# Patient Record
Sex: FEMALE | Race: WHITE | NOT HISPANIC OR LATINO | Employment: OTHER | URBAN - METROPOLITAN AREA
[De-identification: names, ages, dates, MRNs, and addresses within clinical notes are randomized per-mention and may not be internally consistent; named-entity substitution may affect disease eponyms.]

---

## 2017-05-10 ENCOUNTER — TELEPHONE (OUTPATIENT)
Dept: FAMILY MEDICINE | Facility: CLINIC | Age: 67
End: 2017-05-10

## 2017-05-10 NOTE — TELEPHONE ENCOUNTER
Call returned to pt.  Pt stated that she could not make an appt on 6/14/2017 due to jury duty.  Pt was informed that nurse will give her a call back once she has an available date and time to offer per Dr. Covarrubias.  Pt verbalized understanding.

## 2017-05-10 NOTE — TELEPHONE ENCOUNTER
----- Message from Christina Del Valle sent at 5/10/2017  3:12 PM CDT -----  Contact: Self [phone]  Patient Returning Your Phone Call

## 2017-05-11 NOTE — TELEPHONE ENCOUNTER
----- Message from Allison Owusu sent at 5/11/2017  1:21 PM CDT -----  Contact: [phone]/ self   Patient called in returning your call. Please advise.

## 2017-05-11 NOTE — TELEPHONE ENCOUNTER
----- Message from Allison Owusu sent at 5/11/2017  1:21 PM CDT -----  Contact: [phone]/ self   Patient called in returning your call. Please advise.

## 2017-05-18 ENCOUNTER — OFFICE VISIT (OUTPATIENT)
Dept: FAMILY MEDICINE | Facility: CLINIC | Age: 67
End: 2017-05-18
Payer: MEDICARE

## 2017-05-18 VITALS
DIASTOLIC BLOOD PRESSURE: 76 MMHG | HEART RATE: 77 BPM | WEIGHT: 130.31 LBS | HEIGHT: 63 IN | BODY MASS INDEX: 23.09 KG/M2 | SYSTOLIC BLOOD PRESSURE: 108 MMHG | OXYGEN SATURATION: 97 %

## 2017-05-18 DIAGNOSIS — R73.03 PREDIABETES: ICD-10-CM

## 2017-05-18 DIAGNOSIS — E06.3 ACQUIRED AUTOIMMUNE HYPOTHYROIDISM: ICD-10-CM

## 2017-05-18 DIAGNOSIS — I10 ESSENTIAL HYPERTENSION: ICD-10-CM

## 2017-05-18 DIAGNOSIS — F32.A MILD DEPRESSION: ICD-10-CM

## 2017-05-18 DIAGNOSIS — M47.812 FACET ARTHRITIS OF CERVICAL REGION: ICD-10-CM

## 2017-05-18 DIAGNOSIS — M85.80 OSTEOPENIA, UNSPECIFIED LOCATION: ICD-10-CM

## 2017-05-18 DIAGNOSIS — K21.9 GASTROESOPHAGEAL REFLUX DISEASE, ESOPHAGITIS PRESENCE NOT SPECIFIED: ICD-10-CM

## 2017-05-18 DIAGNOSIS — E78.5 HYPERLIPIDEMIA, UNSPECIFIED HYPERLIPIDEMIA TYPE: ICD-10-CM

## 2017-05-18 DIAGNOSIS — N60.19 FIBROCYSTIC BREAST DISEASE, UNSPECIFIED LATERALITY: ICD-10-CM

## 2017-05-18 DIAGNOSIS — Z00.00 ENCOUNTER FOR PREVENTIVE HEALTH EXAMINATION: Primary | ICD-10-CM

## 2017-05-18 PROBLEM — E03.9 HYPOTHYROIDISM: Status: ACTIVE | Noted: 2017-05-18

## 2017-05-18 PROCEDURE — 3078F DIAST BP <80 MM HG: CPT | Mod: S$GLB,,, | Performed by: NURSE PRACTITIONER

## 2017-05-18 PROCEDURE — 3074F SYST BP LT 130 MM HG: CPT | Mod: S$GLB,,, | Performed by: NURSE PRACTITIONER

## 2017-05-18 PROCEDURE — 99999 PR PBB SHADOW E&M-EST. PATIENT-LVL IV: CPT | Mod: PBBFAC,,, | Performed by: NURSE PRACTITIONER

## 2017-05-18 PROCEDURE — G0439 PPPS, SUBSEQ VISIT: HCPCS | Mod: S$GLB,,, | Performed by: NURSE PRACTITIONER

## 2017-05-18 RX ORDER — IBUPROFEN 100 MG/5ML
1000 SUSPENSION, ORAL (FINAL DOSE FORM) ORAL DAILY
COMMUNITY

## 2017-05-18 RX ORDER — BIOTIN 1 MG
1000 TABLET ORAL DAILY
COMMUNITY
End: 2019-03-21

## 2017-05-18 RX ORDER — HYDROCHLOROTHIAZIDE 12.5 MG/1
12.5 TABLET ORAL EVERY MORNING
Qty: 90 TABLET | Refills: 0 | Status: SHIPPED | OUTPATIENT
Start: 2017-05-18 | End: 2017-07-12 | Stop reason: SDUPTHER

## 2017-05-18 RX ORDER — ACETAMINOPHEN 500 MG
5000 TABLET ORAL DAILY
COMMUNITY

## 2017-05-18 NOTE — MR AVS SNAPSHOT
[facility]  [address]  [place] [address]  Phone: [phone]  Fax: [phone]                  Bernadette Aquino   2017 10:00 AM   Office Visit    Description:  Female : 1950   Provider:  Roseann Parks NP   Department:  [facility]           Reason for Visit     Health Risk Assessment           Diagnoses this Visit        Comments    Encounter for preventive health examination    -  Primary     Essential hypertension         Hyperlipidemia, unspecified hyperlipidemia type         Hypothyroidism, unspecified type         Prediabetes                To Do List           Future Appointments        Provider Department Dept Phone    2017 8:00 AM LAB, [facility] [phone]    2017 8:00 AM James Covarrubias MD [facility] [phone]      Goals (5 Years of Data)     None      Follow-Up and Disposition     Return in 5 weeks (on 2017) for to establish care with PCP, HRA visit in 1 year.       These Medications        Disp Refills Start End    hydrochlorothiazide (HYDRODIURIL) 12.5 MG Tab 90 tablet 0 2017     Take 1 tablet (12.5 mg total) by mouth every morning. - Oral    Pharmacy: [facility] - [address] Ph #: [phone]         [facility]     [facility] - 24 Assistance  Unless otherwise directed by your provider, please contact [facility], our nurse care line that is available for  assistance.     Registered nurses in the [facility] provide: appointment scheduling, clinical advisement, health education, and other advisory services.  Call: [phone] (toll free)               Medications           Message regarding Medications     Verify the changes and/or additions to your medication regime listed below are the same as discussed with your clinician today.  If any of these changes or  additions are incorrect, please notify your healthcare provider.        CHANGE how you are taking these medications     Start Taking Instead of    hydrochlorothiazide (HYDRODIURIL) 12.5 MG Tab hydrochlorothiazide (HYDRODIURIL) 12.5 MG Tab    Dosage:  Take 1 tablet (12.5 mg total) by mouth every morning. Dosage:  Take 12.5 mg by mouth every morning.    Reason for Change:  Reorder       STOP taking these medications     aspirin 81 mg Tab Take 81 mg by mouth.  Tablet Oral Every day    promethazine (PHENERGAN) 25 MG tablet Take 1 tablet (25 mg total) by mouth every 6 (six) hours as needed for Nausea.    tramadol (ULTRAM) 50 mg tablet Take 1 tablet (50 mg total) by mouth 2 (two) times daily as needed for Pain.    valacyclovir (VALTREX) 1000 MG tablet            Verify that the below list of medications is an accurate representation of the medications you are currently taking.  If none reported, the list may be blank. If incorrect, please contact your healthcare provider. Carry this list with you in case of emergency.           Current Medications     ascorbic acid, vitamin C, (VITAMIN C) 1000 MG tablet Take 1,000 mg by mouth once daily.    aspirin (ECOTRIN) 81 MG EC tablet Take 81 mg by mouth once daily.     biotin 1 mg tablet Take 1,000 mcg by mouth once daily.    CALCIUM CARBONATE/VITAMIN D3 (CALTRATE 600 + D ORAL) Take by mouth. 1 Tablet Oral Twice a day    cholecalciferol, vitamin D3, (VITAMIN D3) 5,000 unit Tab Take 5,000 Units by mouth once daily.    fish oil-omega-3 fatty acids 300-1,000 mg capsule Take 2 g by mouth once daily.    hydrochlorothiazide (HYDRODIURIL) 12.5 MG Tab Take 1 tablet (12.5 mg total) by mouth every morning.    Lactobacillus rhamnosus GG (CULTURELLE) 10 billion cell capsule Take 1 capsule by mouth once daily.    multivitamin with minerals tablet Take 1 tablet by mouth once daily.    omeprazole (PRILOSEC) 20 MG capsule Take 1 capsule (20 mg total) by mouth 2 (two) times daily before meals.     "sertraline (ZOLOFT) 100 MG tablet Take 1 tablet (100 mg total) by mouth once daily.    simvastatin (ZOCOR) 40 MG tablet Take 40 mg by mouth nightly.    SYNTHROID 25 mcg tablet Take 1-2 tablets (25-50 mcg total) by mouth before breakfast.    TURMERIC-TURMERIC ROOT EXTRACT ORAL Take 1 capsule by mouth once daily.    zoledronic acid-mannitol & water (RECLAST) 5 mg/100 mL Soln Inject 5 mg into the vein once.           Clinical Reference Information           Your Vitals Were     BP Pulse Height Weight SpO2 BMI    108/76 77 5' 3" (1.6 m) 59.1 kg (130 lb 4.7 oz) 97% 23.08 kg/m2      Blood Pressure          Most Recent Value    BP  108/76      Allergies as of 5/18/2017     No Known Drug Allergies      Immunizations Administered on Date of Encounter - 5/18/2017     None      Orders Placed During Today's Visit     Future Labs/Procedures Expected by Expires    CBC auto differential  5/18/2017 7/17/2018    Comprehensive metabolic panel  5/18/2017 7/17/2018    Hemoglobin A1c  5/18/2017 7/17/2018    Lipid panel  5/18/2017 7/17/2018    T4, free  5/18/2017 7/17/2018    TSH  5/18/2017 7/17/2018      Instructions      Counseling and Referral of Other Preventative  (Italic type indicates deductible and co-insurance are waived)    Patient Name: Bernadette Aquino  Today's Date: 5/18/2017      SERVICE LIMITATIONS RECOMMENDATION    Vaccines    · Pneumococcal (once after 65)    · Influenza (annually)    · Hepatitis B (if medium/high risk)    · Prevnar 13      Hepatitis B medium/high risk factors:       - End-stage renal disease       - Hemophiliacs who received Factor VII or         IX concentrates       - Clients of institutions for the mentally             retarded       - Persons who live in the same house as          a HepB carrier       - Homosexual men       - Illicit injectable drug abusers     Pneumococcal: N/A     Influenza: Done, repeat in one year     Hepatitis B: Done, no repeat necessary     Prevnar 13: N/A    Mammogram (biennial " age 50-74)  Annually (age 40 or over)  Done this year, repeat every year    Pap (up to age 70 and after 70 if unknown history or abnormal study last 10 years)    N/A     The USPSTF recommends against screening for cervical cancer in women older than age 65 years who have had adequate prior screening and are not otherwise at high risk for cervical cancer.      Colorectal cancer screening (to age 75)    · Fecal occult blood test (annual)  · Flexible sigmoidoscopy (5y)  · Screening colonoscopy (10y)  · Barium enema   Last done 10/06/16, recommend to repeat every 10  years    Diabetes self-management training (no USPSTF recommendations)  Requires referral by treating physician for patient with diabetes or renal disease. 10 hours of initial DSMT sessions of no less than 30 minutes each in a continuous 12-month period. 2 hours of follow-up DSMT in subsequent years.  N/A    Bone mass measurements (age 65 & older, biennial)  Requires diagnosis related to osteoporosis or estrogen deficiency. Biennial benefit unless patient has history of long-term glucocorticoid  Last done 10/19/16, recommend to repeat every 3  years    Glaucoma screening (no USPSTF recommendation)  Diabetes mellitus, family history   , age 50 or over    American, age 65 or over  Done this year, repeat every year    Medical nutrition therapy for diabetes or renal disease (no recommended schedule)  Requires referral by treating physician for patient with diabetes or renal disease or kidney transplant within the past 3 years.  Can be provided in same year as diabetes self-management training (DSMT), and CMS recommends medical nutrition therapy take place after DSMT. Up to 3 hours for initial year and 2 hours in subsequent years.  N/A    Cardiovascular screening blood tests (every 5 years)  · Fasting lipid panel  Order as a panel if possible  Done this year, repeat every year    Diabetes screening tests (at least every 3 years, Medicare  covers annually or at 6-month intervals for prediabetic patients)  · Fasting blood sugar (FBS) or glucose tolerance test (GTT)  Patient must be diagnosed with one of the following:       - Hypertension       - Dyslipidemia       - Obesity (BMI 30kg/m2)       - Previous elevated impaired FBS or GTT       ... or any two of the following:       - Overweight (BMI 25 but <30)       - Family history of diabetes       - Age 65 or older       - History of gestational diabetes or birth of baby weighing more than 9 pounds  Done this year, repeat every year    Abdominal aortic aneurysm screening (once)  · Sonogram   Limited to patients who meet one of the following criteria:       - Men who are 65-75 years old and have smoked more than 100 cigarette in their lifetime       - Anyone with a family history of abdominal aortic aneurysm       - Anyone recommended for screening by the USPSTF  N/A    HIV screening (annually for increased risk patients)  · HIV-1 and HIV-2 by EIA, or FÉLIX, rapid antibody test or oral mucosa transudate  Patients must be at increased risk for HIV infection per USPSTF guidelines or pregnant. Tests covered annually for patient at increased risk or as requested by the patient. Pregnant patients may receive up to 3 tests during pregnancy.  Risks discussed, screening is not recommended    Smoking cessation counseling (up to 8 sessions per year)  Patients must be asymptomatic of tobacco-related conditions to receive as a preventative service.  Non-smoker    Subsequent annual wellness visit  At least 12 months since last AWV  Return in one year     The following information is provided to all patients.  This information is to help you find resources for any of the problems found today that may be affecting your health:                Living healthy guide: www.[facility].[place].gov      Understanding Diabetes: www.diabetes.org      Eating healthy: www.cdc.gov/healthyweight      CDC home safety checklist:  www.cdc.gov/steadi/patient.html      Agency on Aging: www.goea.[place].gov      Alcoholics anonymous (AA): www.aa.org      Physical Activity: www.dasia.nih.gov/ji7regr      Tobacco use: www.quitwithusla.org          Language Assistance Services     ATTENTION: Language assistance services are available, free of charge. Please call [phone].      ATENCIÓN: Si habla vic, tiene a raya disposición servicios gratuitos de asistencia lingüística. Llame al [phone].     CHÚ Ý: N?u b?n nói Ti?ng Vi?t, có các d?ch v? h? tr? ngôn ng? mi?n phí dành cho b?n. G?i s? [phone].         [facility] complies with applicable Federal civil rights laws and does not discriminate on the basis of race, color, national origin, age, disability, or sex.

## 2017-05-18 NOTE — PATIENT INSTRUCTIONS
Counseling and Referral of Other Preventative  (Italic type indicates deductible and co-insurance are waived)    Patient Name: Bernadette Aquino  Today's Date: 5/18/2017      SERVICE LIMITATIONS RECOMMENDATION    Vaccines    · Pneumococcal (once after 65)    · Influenza (annually)    · Hepatitis B (if medium/high risk)    · Prevnar 13      Hepatitis B medium/high risk factors:       - End-stage renal disease       - Hemophiliacs who received Factor VII or         IX concentrates       - Clients of institutions for the mentally             retarded       - Persons who live in the same house as          a HepB carrier       - Homosexual men       - Illicit injectable drug abusers     Pneumococcal: N/A     Influenza: Done, repeat in one year     Hepatitis B: Done, no repeat necessary     Prevnar 13: N/A    Mammogram (biennial age 50-74)  Annually (age 40 or over)  Done this year, repeat every year    Pap (up to age 70 and after 70 if unknown history or abnormal study last 10 years)    N/A     The USPSTF recommends against screening for cervical cancer in women older than age 65 years who have had adequate prior screening and are not otherwise at high risk for cervical cancer.      Colorectal cancer screening (to age 75)    · Fecal occult blood test (annual)  · Flexible sigmoidoscopy (5y)  · Screening colonoscopy (10y)  · Barium enema   Last done 10/06/16, recommend to repeat every 10  years    Diabetes self-management training (no USPSTF recommendations)  Requires referral by treating physician for patient with diabetes or renal disease. 10 hours of initial DSMT sessions of no less than 30 minutes each in a continuous 12-month period. 2 hours of follow-up DSMT in subsequent years.  N/A    Bone mass measurements (age 65 & older, biennial)  Requires diagnosis related to osteoporosis or estrogen deficiency. Biennial benefit unless patient has history of long-term glucocorticoid  Last done 10/19/16, recommend to repeat every  3  years    Glaucoma screening (no USPSTF recommendation)  Diabetes mellitus, family history   , age 50 or over    American, age 65 or over  Done this year, repeat every year    Medical nutrition therapy for diabetes or renal disease (no recommended schedule)  Requires referral by treating physician for patient with diabetes or renal disease or kidney transplant within the past 3 years.  Can be provided in same year as diabetes self-management training (DSMT), and CMS recommends medical nutrition therapy take place after DSMT. Up to 3 hours for initial year and 2 hours in subsequent years.  N/A    Cardiovascular screening blood tests (every 5 years)  · Fasting lipid panel  Order as a panel if possible  Done this year, repeat every year    Diabetes screening tests (at least every 3 years, Medicare covers annually or at 6-month intervals for prediabetic patients)  · Fasting blood sugar (FBS) or glucose tolerance test (GTT)  Patient must be diagnosed with one of the following:       - Hypertension       - Dyslipidemia       - Obesity (BMI 30kg/m2)       - Previous elevated impaired FBS or GTT       ... or any two of the following:       - Overweight (BMI 25 but <30)       - Family history of diabetes       - Age 65 or older       - History of gestational diabetes or birth of baby weighing more than 9 pounds  Done this year, repeat every year    Abdominal aortic aneurysm screening (once)  · Sonogram   Limited to patients who meet one of the following criteria:       - Men who are 65-75 years old and have smoked more than 100 cigarette in their lifetime       - Anyone with a family history of abdominal aortic aneurysm       - Anyone recommended for screening by the USPSTF  N/A    HIV screening (annually for increased risk patients)  · HIV-1 and HIV-2 by EIA, or FÉLIX, rapid antibody test or oral mucosa transudate  Patients must be at increased risk for HIV infection per USPSTF guidelines or  pregnant. Tests covered annually for patient at increased risk or as requested by the patient. Pregnant patients may receive up to 3 tests during pregnancy.  Risks discussed, screening is not recommended    Smoking cessation counseling (up to 8 sessions per year)  Patients must be asymptomatic of tobacco-related conditions to receive as a preventative service.  Non-smoker    Subsequent annual wellness visit  At least 12 months since last AWV  Return in one year     The following information is provided to all patients.  This information is to help you find resources for any of the problems found today that may be affecting your health:                Living healthy guide: www.[facility].[place].[facility]      Understanding Diabetes: www.diabetes.org      Eating healthy: www.cdc.gov/healthyweight      CDC home safety checklist: www.cdc.gov/steadi/patient.html      Agency on Aging: www.goea.[place].[facility]      Alcoholics anonymous (AA): www.aa.org      Physical Activity: www.dasia.nih.gov/fr6dcux      Tobacco use: www.quitwithusla.org

## 2017-05-18 NOTE — PROGRESS NOTES
"Bernadette Aquino presented for a  Medicare AWV and comprehensive Health Risk Assessment today. The following components were reviewed and updated:    · Medical history  · Family History  · Social history  · Allergies and Current Medications  · Health Risk Assessment  · Health Maintenance  · Care Team     ** See Completed Assessments for Annual Wellness Visit within the encounter summary.**       The following assessments were completed:  · Living Situation  · CAGE  · Depression Screening  · Timed Get Up and Go  · Whisper Test  · Cognitive Function Screening  · Nutrition Screening  · ADL Screening  · PAQ Screening    Vitals:    05/18/17 1000   BP: 108/76   Pulse: 77   SpO2: 97%   Weight: 59.1 kg (130 lb 4.7 oz)   Height: 5' 3" (1.6 m)     Body mass index is 23.08 kg/(m^2).     Physical Exam   Constitutional: She is oriented to person, place, and time. She appears well-developed and well-nourished. No distress.   HENT:   Head: Normocephalic and atraumatic.   Eyes: EOM are normal. Pupils are equal, round, and reactive to light.   Neck: Neck supple. No JVD present. No tracheal deviation present.   Cardiovascular: Normal rate, regular rhythm, normal heart sounds and intact distal pulses.    No murmur heard.  Pulmonary/Chest: Effort normal and breath sounds normal. No respiratory distress. She has no wheezes. She has no rales.   Abdominal: Soft. Bowel sounds are normal. She exhibits no distension and no mass. There is no tenderness.   Musculoskeletal: Normal range of motion. She exhibits no edema or tenderness.   Neurological: She is alert and oriented to person, place, and time. Coordination normal.   Skin: Skin is warm and dry. No erythema. No pallor.   Psychiatric: She has a normal mood and affect. Her behavior is normal. Judgment and thought content normal. Cognition and memory are normal. She expresses no homicidal and no suicidal ideation.   Nursing note and vitals reviewed.        Diagnoses and health risks identified " today and associated recommendations/orders:    1. Encounter for preventive health examination    2. Essential hypertension  Chronic; stable on medication.  Followed by PCP.  - CBC auto differential; Future  - Comprehensive metabolic panel; Future  - hydrochlorothiazide (HYDRODIURIL) 12.5 MG Tab; Take 1 tablet (12.5 mg total) by mouth every morning.  Dispense: 90 tablet; Refill: 0    3. Hyperlipidemia, unspecified hyperlipidemia type  Chronic; stable on medication.  Followed by PCP.  - Lipid panel; Future    4. Acquired autoimmune hypothyroidism  Chronic; stable on medication.  Followed by PCP.  - TSH; Future  - T4, free; Future    5. Prediabetes  Chronic; stable.  Followed by PCP.  - Hemoglobin A1c; Future    6. Gastroesophageal reflux disease, esophagitis presence not specified  Chronic; stable on medication.  Followed by PCP.    7. Fibrocystic breast disease, unspecified laterality  Chronic; stable.  Followed by external GYN ([facility]    8. Osteopenia, unspecified location  Chronic; stable on medication.  Followed by PCP.    9. Facet arthritis of cervical region  Chronic; stable.  As seen on imaging dated 09/20/13.  Followed by PCP.    10. Mild depression  Chronic; stable on medication.  Followed by PCP.    11. Body mass index (BMI) 23.0-23.9, adult      Provided Bernadette with a 5-10 year written screening schedule and personal prevention plan. Recommendations were developed using the USPSTF age appropriate recommendations. Education, counseling, and referrals were provided as needed. After Visit Summary printed and given to patient which includes a list of additional screenings\tests needed.    Return in 5 weeks (on 6/21/2017) to establish care with PCP, HRA visit in 1 year.    Roseann Parks NP

## 2017-06-16 ENCOUNTER — LAB VISIT (OUTPATIENT)
Dept: LAB | Facility: HOSPITAL | Age: 67
End: 2017-06-16
Attending: NURSE PRACTITIONER
Payer: MEDICARE

## 2017-06-16 DIAGNOSIS — E78.5 HYPERLIPIDEMIA, UNSPECIFIED HYPERLIPIDEMIA TYPE: ICD-10-CM

## 2017-06-16 DIAGNOSIS — R73.03 PREDIABETES: ICD-10-CM

## 2017-06-16 DIAGNOSIS — I10 ESSENTIAL HYPERTENSION: ICD-10-CM

## 2017-06-16 DIAGNOSIS — E06.3 ACQUIRED AUTOIMMUNE HYPOTHYROIDISM: ICD-10-CM

## 2017-06-16 LAB
ALBUMIN SERPL BCP-MCNC: 3.9 G/DL
ALP SERPL-CCNC: 66 U/L
ALT SERPL W/O P-5'-P-CCNC: 26 U/L
ANION GAP SERPL CALC-SCNC: 8 MMOL/L
AST SERPL-CCNC: 27 U/L
BASOPHILS # BLD AUTO: 0.04 K/UL
BASOPHILS NFR BLD: 0.8 %
BILIRUB SERPL-MCNC: 0.5 MG/DL
BUN SERPL-MCNC: 13 MG/DL
CALCIUM SERPL-MCNC: 9.9 MG/DL
CHLORIDE SERPL-SCNC: 100 MMOL/L
CHOLEST/HDLC SERPL: 2.3 {RATIO}
CO2 SERPL-SCNC: 31 MMOL/L
CREAT SERPL-MCNC: 0.9 MG/DL
DIFFERENTIAL METHOD: ABNORMAL
EOSINOPHIL # BLD AUTO: 0.3 K/UL
EOSINOPHIL NFR BLD: 5.7 %
ERYTHROCYTE [DISTWIDTH] IN BLOOD BY AUTOMATED COUNT: 13.4 %
EST. GFR  (AFRICAN AMERICAN): >60 ML/MIN/1.73 M^2
EST. GFR  (NON AFRICAN AMERICAN): >60 ML/MIN/1.73 M^2
ESTIMATED AVG GLUCOSE: 117 MG/DL
GLUCOSE SERPL-MCNC: 106 MG/DL
HBA1C MFR BLD HPLC: 5.7 %
HCT VFR BLD AUTO: 38.2 %
HDL/CHOLESTEROL RATIO: 42.9 %
HDLC SERPL-MCNC: 182 MG/DL
HDLC SERPL-MCNC: 78 MG/DL
HGB BLD-MCNC: 13.1 G/DL
LDLC SERPL CALC-MCNC: 87.2 MG/DL
LYMPHOCYTES # BLD AUTO: 1.5 K/UL
LYMPHOCYTES NFR BLD: 27.5 %
MCH RBC QN AUTO: 33.5 PG
MCHC RBC AUTO-ENTMCNC: 34.3 %
MCV RBC AUTO: 98 FL
MONOCYTES # BLD AUTO: 0.3 K/UL
MONOCYTES NFR BLD: 6.1 %
NEUTROPHILS # BLD AUTO: 3.2 K/UL
NEUTROPHILS NFR BLD: 59.9 %
NONHDLC SERPL-MCNC: 104 MG/DL
PLATELET # BLD AUTO: 245 K/UL
PMV BLD AUTO: 9.8 FL
POTASSIUM SERPL-SCNC: 4 MMOL/L
PROT SERPL-MCNC: 7 G/DL
RBC # BLD AUTO: 3.91 M/UL
SODIUM SERPL-SCNC: 139 MMOL/L
T4 FREE SERPL-MCNC: 1.1 NG/DL
TRIGL SERPL-MCNC: 84 MG/DL
TSH SERPL DL<=0.005 MIU/L-ACNC: 1.55 UIU/ML
WBC # BLD AUTO: 5.27 K/UL

## 2017-06-16 PROCEDURE — 84439 ASSAY OF FREE THYROXINE: CPT

## 2017-06-16 PROCEDURE — 80053 COMPREHEN METABOLIC PANEL: CPT

## 2017-06-16 PROCEDURE — 84443 ASSAY THYROID STIM HORMONE: CPT

## 2017-06-16 PROCEDURE — 83036 HEMOGLOBIN GLYCOSYLATED A1C: CPT

## 2017-06-16 PROCEDURE — 36415 COLL VENOUS BLD VENIPUNCTURE: CPT | Mod: PO

## 2017-06-16 PROCEDURE — 80061 LIPID PANEL: CPT

## 2017-06-16 PROCEDURE — 85025 COMPLETE CBC W/AUTO DIFF WBC: CPT

## 2017-07-12 ENCOUNTER — OFFICE VISIT (OUTPATIENT)
Dept: FAMILY MEDICINE | Facility: CLINIC | Age: 67
End: 2017-07-12
Payer: MEDICARE

## 2017-07-12 VITALS
HEIGHT: 63 IN | HEART RATE: 78 BPM | BODY MASS INDEX: 23.43 KG/M2 | SYSTOLIC BLOOD PRESSURE: 123 MMHG | OXYGEN SATURATION: 97 % | DIASTOLIC BLOOD PRESSURE: 79 MMHG | WEIGHT: 132.25 LBS

## 2017-07-12 DIAGNOSIS — N60.19 FIBROCYSTIC BREAST DISEASE, UNSPECIFIED LATERALITY: ICD-10-CM

## 2017-07-12 DIAGNOSIS — E06.3 ACQUIRED AUTOIMMUNE HYPOTHYROIDISM: ICD-10-CM

## 2017-07-12 DIAGNOSIS — I10 ESSENTIAL HYPERTENSION: ICD-10-CM

## 2017-07-12 DIAGNOSIS — R73.01 IFG (IMPAIRED FASTING GLUCOSE): ICD-10-CM

## 2017-07-12 DIAGNOSIS — E78.5 HYPERLIPIDEMIA, UNSPECIFIED HYPERLIPIDEMIA TYPE: ICD-10-CM

## 2017-07-12 DIAGNOSIS — I67.1 CEREBRAL ANEURYSM: ICD-10-CM

## 2017-07-12 DIAGNOSIS — K21.9 GASTROESOPHAGEAL REFLUX DISEASE, ESOPHAGITIS PRESENCE NOT SPECIFIED: ICD-10-CM

## 2017-07-12 DIAGNOSIS — M85.80 OSTEOPENIA, UNSPECIFIED LOCATION: ICD-10-CM

## 2017-07-12 DIAGNOSIS — Z00.00 ROUTINE GENERAL MEDICAL EXAMINATION AT A HEALTH CARE FACILITY: Primary | ICD-10-CM

## 2017-07-12 DIAGNOSIS — E03.9 HYPOTHYROIDISM, UNSPECIFIED TYPE: ICD-10-CM

## 2017-07-12 DIAGNOSIS — K90.0 CELIAC DISEASE: ICD-10-CM

## 2017-07-12 DIAGNOSIS — F32.A MILD DEPRESSION: ICD-10-CM

## 2017-07-12 PROCEDURE — 99999 PR PBB SHADOW E&M-EST. PATIENT-LVL III: CPT | Mod: PBBFAC,,, | Performed by: FAMILY MEDICINE

## 2017-07-12 PROCEDURE — 99397 PER PM REEVAL EST PAT 65+ YR: CPT | Mod: S$GLB,,, | Performed by: FAMILY MEDICINE

## 2017-07-12 PROCEDURE — 99499 UNLISTED E&M SERVICE: CPT | Mod: S$GLB,,, | Performed by: FAMILY MEDICINE

## 2017-07-12 RX ORDER — LEVOTHYROXINE SODIUM 25 UG/1
TABLET ORAL
Qty: 135 TABLET | Refills: 3 | Status: SHIPPED | OUTPATIENT
Start: 2017-07-12

## 2017-07-12 RX ORDER — SIMVASTATIN 40 MG/1
40 TABLET, FILM COATED ORAL NIGHTLY
Qty: 90 TABLET | Refills: 3 | Status: SHIPPED | OUTPATIENT
Start: 2017-07-12

## 2017-07-12 RX ORDER — HYDROCHLOROTHIAZIDE 12.5 MG/1
12.5 TABLET ORAL EVERY MORNING
Qty: 90 TABLET | Refills: 3 | Status: ON HOLD | OUTPATIENT
Start: 2017-07-12 | End: 2018-12-17 | Stop reason: ALTCHOICE

## 2017-07-12 RX ORDER — SERTRALINE HYDROCHLORIDE 100 MG/1
100 TABLET, FILM COATED ORAL DAILY
Qty: 90 TABLET | Refills: 3 | Status: SHIPPED | OUTPATIENT
Start: 2017-07-12

## 2017-07-12 RX ORDER — OMEPRAZOLE 20 MG/1
20 CAPSULE, DELAYED RELEASE ORAL
Qty: 180 CAPSULE | Refills: 3 | Status: SHIPPED | OUTPATIENT
Start: 2017-07-12

## 2017-07-12 NOTE — PROGRESS NOTES
(Portions of this note were dictated using voice recognition software and may contain dictation related errors in spelling/grammar/syntax not found on text review)    CC:   Chief Complaint   Patient presents with    Annual Exam       HPI: 66 y.o. female new patient to the office.  Here for annual exam.  Medical history below    Hypothyroidism on Synthroid 25 µg daily (50 mcg on sat and sun)    Hyperlipidemia on simvastatin 40 mg daily    Depression on Zoloft 100 mg daily    Osteopenia history, were on fosamax but was taken off due to PUD, started on Reclast infusion therapy, took once 2 years ago (2015). Recent dxa was osteopenia range as below (FRAX: 7.7% total fx risk/0.5% hip fx risk). Takes vit d3 5000 units daily.    GERD, on omeprazole and PUD.   Had upper endoscopy with Dr. Gonzales in October 2016 showing normal esophagus, normal stomach, normal duodenum.  Listed history of celiac disease, diet controlled. although during endoscopy  Biopsies were taken for celiac disease but these were negative.    History of cerebral aneurysm in 2010 status post coiling, had been followed by neurosurgery.  She also has a second untreated aneurysm MCA bifurcation which has been stable.  I reviewed recent visit from interventional radiology in August 2016.  Recommendation for repeat imaging and 3 years    Also has been seen in the past by physical medicine secondary to cervical spine stenosis    She has a history of fibrocystic breast disease.  Had 3 biopsies on the right breast through [facility]. Gets mmg thru [facility]    HCTZ 12.5 mg for HTN    Overall feels well.  Plays tennis 3 days a week.  His attention to her diet especially given her celiac disease history    Past Medical History:   Diagnosis Date    Appendicitis with perforation     Status post appendectomy, January 2011    Celiac disease     Cerebral aneurysm     Right internal carotid artery, nonruptured, status post coil    DDD (degenerative disc  disease), cervical     Paresthesia of hands    Elevated liver enzymes     Prior history    Encounter for blood transfusion     GERD (gastroesophageal reflux disease)     HTN (hypertension)     Hyperlipidemia     Mild depression     Multinodular thyroid     u/s     Osteopenia     Prior history of osteoporosis on bisphosphonate    Other specified acquired hypothyroidism     On replacement    Single cyst of left breast        Past Surgical History:   Procedure Laterality Date    APPENDECTOMY      2011    BREAST BIOPSY Left     BREAST LUMPECTOMY Right     BREAST SURGERY      BUNIONECTOMY  10/2013    right foot    CARPAL TUNNEL RELEASE Left     CEREBRAL ANEURYSM REPAIR       SECTION      CHOLECYSTECTOMY      COLONOSCOPY      COLONOSCOPY N/A 10/6/2016    Procedure: COLONOSCOPY;  Surgeon: Tom Gonzales MD;  Location: [facility] ([address] [place] [address]);  Service: Endoscopy;  Laterality: N/A;  Patient reports PONV.    EYE SURGERY Left     pterygium removal    KNEE ARTHROSCOPY Right     SHOULDER ARTHROSCOPY Right 2015    Dr Altman    TOTAL ABDOMINAL HYSTERECTOMY         Family History   Problem Relation Age of Onset    Hyperlipidemia Mother     Diverticulitis Mother     Hypertension Father     Diabetes Father     COPD Father     Heart disease Father     Hypertension Sister     Hyperlipidemia Sister     Heart disease Sister     Gout Brother     Celiac disease Neg Hx     Colon cancer Neg Hx     Cirrhosis Neg Hx     Crohn's disease Neg Hx     Esophageal cancer Neg Hx     Irritable bowel syndrome Neg Hx     Liver cancer Neg Hx     Rectal cancer Neg Hx     Stomach cancer Neg Hx     Ulcerative colitis Neg Hx          SCREENINGS  DEXA scan 10/19/16:  L spine T score is -1.9.  Left femoral neck T score is -0.9.  Right femoral neck T score is -0.9.  Mammogram through [facility], last 3/2017 (goes q 6 mo)   Colonoscopy 10/6/16 normal.   Thyroid US , stable subcentimeter nodules not  meeting criteria for bx  PAP 2017 Dr. Sanchez    Immunizations  Tdap 2016  Pneumococcal (received one shot? At prior PCP 2016, not sure which one)  Zvx: has not done    Lab Results   Component Value Date    WBC 5.27 06/16/2017    HGB 13.1 06/16/2017    HCT 38.2 06/16/2017     06/16/2017    CHOL 182 06/16/2017    TRIG 84 06/16/2017    HDL 78 (H) 06/16/2017    ALT 26 06/16/2017    AST 27 06/16/2017     06/16/2017    K 4.0 06/16/2017     06/16/2017    CREATININE 0.9 06/16/2017    BUN 13 06/16/2017    CO2 31 (H) 06/16/2017    TSH 1.552 06/16/2017    INR 1.0 12/08/2015    HGBA1C 5.7 (H) 06/16/2017    LDLCALC 87.2 06/16/2017     06/16/2017       ROS:  GENERAL: No fever, chills, fatigability or weight loss.  SKIN: No rashes, no itching.  HEAD: No headaches.  EYES: No visual changes  EARS: No ear pain or changes in hearing.  NOSE: No congestion or rhinorrhea.  MOUTH & THROAT: No hoarseness, change in voice, or sore throat.  NODES: Denies swollen glands.  CHEST: Denies PALMA, cyanosis, wheezing, cough and sputum production.  CARDIOVASCULAR: Denies chest pain, PND, orthopnea.  ABDOMEN: No nausea, vomiting, or changes in bowel function.  URINARY: No flank pain, dysuria or hematuria.  PERIPHERAL VASCULAR: No claudication or cyanosis.  MUSCULOSKELETAL: No joint stiffness or swelling. Denies back pain.  NEUROLOGIC: No weakness or numbness.    Vital signs reviewed  PE:   APPEARANCE: Well nourished, well developed, in no acute distress.    HEAD: Normocephalic, atraumatic.  EYES: PERRL. EOMI.   Conjunctivae noninjected.  EARS: TM's intact. Light reflex normal. No retraction or perforation  NOSE: Mucosa pink. Airway clear.  MOUTH & THROAT: No tonsillar enlargement. No pharyngeal erythema or exudate.   NECK: Supple with no cervical lymphadenopathy.  No thyromegaly.  No carotid bruits   CHEST: Good inspiratory effort. Lungs clear to auscultation with no wheezes or crackles.  CARDIOVASCULAR: Normal S1, S2. No rubs,  murmurs, or gallops.  ABDOMEN: Bowel sounds normal. Not distended. Soft. No tenderness or masses. No organomegaly.  EXTREMITIES: No edema, cyanosis, or clubbing.      IMPRESSION  1. Routine general medical examination at a health care facility    2. Essential hypertension    3. Hypothyroidism, unspecified type    4. Fibrocystic breast disease, unspecified laterality    5. Hyperlipidemia, unspecified hyperlipidemia type    6. Osteopenia, unspecified location    7. Acquired autoimmune hypothyroidism    8. Mild depression    9. Gastroesophageal reflux disease, esophagitis presence not specified    10. IFG (impaired fasting glucose)    11. Cerebral aneurysm    12. Celiac disease            PLAN  Reviewed available chart records and health history and updated as above.  Reviewed her health screenings, labs, immunizations status and updated as above    Hypertension controlled.  Continue Hydrocort thiazide    Hyperlipidemia controlled.  Continue simvastatin    Osteopenia: Low frax score.  We can hold off on BPP at this time.  Repeat bone density in 2 years    Hypothyroidism: Continue current Synthroid dose of 25 µg weekly and 50 µg on the weekends.  Recent TSH is normal    Depression controlled with Zoloft.  No changes    Cerebral aneurysm: Continue radiology follow-up.  Apparently has been stable on recent assessments.    Celiac disease: She continues diet control.  No active issues.  Recent biopsies were negative    Mildly abnormal fasting glucose of 106 but with normal A1c.  Attention to eating habits, decreasing sugars and starches.  Continue regular exercise    Health maintenance: Reviewed as above.  She received 1 pneumococcal vaccination by her prior PCP.  She will have to check to see which one this was.  Would need one Prevnar vaccination and 1 Pneumovax vaccination after 65.  Also she can check to local pharmacy regarding Zostavax vaccination

## 2017-12-11 ENCOUNTER — HOSPITAL ENCOUNTER (OUTPATIENT)
Dept: RADIOLOGY | Facility: HOSPITAL | Age: 67
Discharge: HOME | End: 2017-12-11
Attending: ORTHOPAEDIC SURGERY
Payer: MEDICARE

## 2017-12-11 ENCOUNTER — OFFICE VISIT (OUTPATIENT)
Dept: SPORTS MEDICINE | Facility: CLINIC | Age: 67
End: 2017-12-11
Payer: MEDICARE

## 2017-12-11 VITALS
DIASTOLIC BLOOD PRESSURE: 78 MMHG | HEIGHT: 63 IN | BODY MASS INDEX: 23.39 KG/M2 | HEART RATE: 76 BPM | WEIGHT: 132 LBS | SYSTOLIC BLOOD PRESSURE: 130 MMHG

## 2017-12-11 DIAGNOSIS — M21.161 GENU VARUM, ACQUIRED, RIGHT: ICD-10-CM

## 2017-12-11 DIAGNOSIS — M17.11 PRIMARY OSTEOARTHRITIS OF RIGHT KNEE: ICD-10-CM

## 2017-12-11 DIAGNOSIS — M23.91 INTERNAL DERANGEMENT OF RIGHT KNEE: ICD-10-CM

## 2017-12-11 DIAGNOSIS — M25.569 KNEE PAIN, UNSPECIFIED CHRONICITY, UNSPECIFIED LATERALITY: Primary | ICD-10-CM

## 2017-12-11 DIAGNOSIS — M25.569 KNEE PAIN, UNSPECIFIED CHRONICITY, UNSPECIFIED LATERALITY: ICD-10-CM

## 2017-12-11 PROCEDURE — 73564 X-RAY EXAM KNEE 4 OR MORE: CPT | Mod: 26,50,, | Performed by: RADIOLOGY

## 2017-12-11 PROCEDURE — 73564 X-RAY EXAM KNEE 4 OR MORE: CPT | Mod: TC,50,PO

## 2017-12-11 PROCEDURE — 99999 PR PBB SHADOW E&M-EST. PATIENT-LVL IV: CPT | Mod: PBBFAC,,, | Performed by: ORTHOPAEDIC SURGERY

## 2017-12-11 PROCEDURE — 99214 OFFICE O/P EST MOD 30 MIN: CPT | Mod: 25,S$GLB,, | Performed by: ORTHOPAEDIC SURGERY

## 2017-12-11 NOTE — PROGRESS NOTES
Subjective:          Chief Complaint: Bernadette Aquino is a 67 y.o. female who had concerns including Pain of the Right Knee.    68 yo F presents to clinic for initial evaluation for right knee. She has pain in her knee daily and she plays tennis. She saw Dr Cronin who gave a 5 series of HA injections into the knee. She previously has a partial meniscectomy to that knee. The knee swells from time to time. She does not take NSAIDs due to gastric ulcers. She does ice her knee. She had a brace from Dr Altman which helps quite a bit. She has no issues with her right shoulder, which was operated on in 2015.    DATE OF PROCEDURE: 12/11/2015     ATTENDING SURGEON: Surgeon(s) and Role:     * Shalonda Altman MD - Primary     * La Tang DO - Fellow     * Laquita Vail PA-C - assistant     PREOPERATIVE DIAGNOSIS:    Pre-operative Diagnosis: Right shoulder   Tear, biceps tendon, non-traumatic M66.829 Rotator Cuff Syndrome/Disorder  M75.100, Tear, Biceps Tendon, Non-Tramatic M66.829, Tear, Rotator Cuff, Tramatic S46.012A, S46.011A and Labral tear and chondromalacia     Post-operative Diagnosis:  Right shoulder   Tear, biceps tendon, non-traumatic M66.829 Rotator Cuff Syndrome/Disorder  M75.100, Tear, Rotator Cuff, Tramatic S46.012A, S46.011A and Labral Tear, Chondromalacia Shoulder joint      Procedures Performed:  1. Right shoulder Arthroscopic rotator cuff repair CPT - 24827, COMPLEX     2. Right shoulder Biceps tenodesis CPT - 86021, COMPLEX     3. Right shoulder Arthroscopic labral debridement CPT - 60004     4. Right shoulder Arthroscopic chondroplasty            Review of Systems   Constitution: Negative for fever and night sweats.   HENT: Negative for hearing loss.    Eyes: Negative for blurred vision and visual disturbance.   Cardiovascular: Negative for chest pain and leg swelling.   Respiratory: Negative for shortness of breath.    Endocrine: Negative for polyuria.   Hematologic/Lymphatic: Negative for  bleeding problem.   Skin: Negative for rash.   Musculoskeletal: Positive for joint pain and joint swelling. Negative for back pain, muscle cramps and muscle weakness.   Gastrointestinal: Negative for melena.   Genitourinary: Negative for hematuria.   Neurological: Negative for loss of balance, numbness and paresthesias.   Psychiatric/Behavioral: Negative for altered mental status.       Pain Related Questions  Over the past 3 days, what was your average pain during activity? (I.e. running, jogging, walking, climbing stairs, getting dressed, ect.): 7  Over the past 3 days, what was your highest pain level?: 7  Over the past 3 days, what was your lowest pain level? : 2    Other  How many nights a week are you awakened by your affected body part?: 0  Was the patient's HEIGHT measured or patient reported?: Patient Reported  Was the patient's WEIGHT measured or patient reported?: Patient Reported      Objective:        General: Bernadette is well-developed, well-nourished, appears stated age, in no acute distress, alert and oriented to time, place and person.     General    Vitals reviewed.  Constitutional: She is oriented to person, place, and time. She appears well-developed and well-nourished. No distress.   HENT:   Mouth/Throat: No oropharyngeal exudate.   Eyes: Right eye exhibits no discharge. Left eye exhibits no discharge.   Neck: Normal range of motion.   Pulmonary/Chest: Effort normal and breath sounds normal. No respiratory distress.   Neurological: She is alert and oriented to person, place, and time. She has normal reflexes. No cranial nerve deficit. Coordination normal.   Psychiatric: She has a normal mood and affect. Her behavior is normal. Judgment and thought content normal.     General Musculoskeletal Exam   Gait: normal       Right Knee Exam     Inspection   Erythema: absent  Scars: absent  Swelling: present  Effusion: effusion  Deformity: deformity  Bruising: absent    Tenderness   The patient is tender to  palpation of the lateral joint line, medial joint line and patella.    Crepitus   The patient has crepitus of the patella.    Range of Motion   Extension: 5   Flexion: 130     Tests   Meniscus   Neeta:  Medial - positive Lateral - positive  Ligament Examination Lachman: normal (-1 to 2mm) PCL-Posterior Drawer: normal (0 to 2mm)     MCL - Valgus: normal (0 to 2mm)  LCL - Varus: normalPivot Shift: normal (Equal)Reverse Pivot Shift: normal (Equal)Dial Test at 30 degrees: normal (< 5 degrees)Dial Test at 90 degrees: normal (< 5 degrees)  Posterior Sag Test: negative  Posterolateral Corner: unstable (>15 degrees difference)  Patella   Patellar Apprehension: negative  Passive Patellar Tilt: neutral  Patellar Tracking: normal  Patellar Glide (quadrants): Lateral - 1   Medial - 2  Q-Angle at 90 degrees: normal  Patellar Grind: positive  J-Sign: none    Other   Meniscal Cyst: absent  Popliteal (Baker's) Cyst: absent  Sensation: normal    Left Knee Exam     Inspection   Erythema: absent  Scars: absent  Swelling: absent  Effusion: absent  Deformity: deformity  Bruising: absent    Tenderness   The patient tender to palpation of the medial joint line.    Range of Motion   Extension: 0   Flexion: 140     Tests   Meniscus   Neeta:  Medial - negative Lateral - negative  Stability Lachman: normal (-1 to 2mm) PCL-Posterior Drawer: normal (0 to 2mm)  MCL - Valgus: normal (0 to 2mm)  LCL - Varus: normal (0 to 2mm)Pivot Shift: normal (Equal)Reverse Pivot Shift: normal (Equal)Dial Test at 30 degrees: normal (< 5 degrees)Dial Test at 90 degrees: normal (< 5 degrees)  Posterior Sag Test: negative  Posterolateral Corner: unstable (>15 degrees difference)  Patella   Patellar Apprehension: negative  Passive Patellar Tilt: neutral  Patellar Tracking: normal  Patellar Glide (Quadrants): Lateral - 1 Medial - 2  Q-Angle at 90 degrees: normal  Patellar Grind: negative  J-Sign: J sign absent    Other   Meniscal Cyst: absent  Popliteal  (Baker's) Cyst: absent  Sensation: normal    Right Hip Exam     Tests   Jorden: negative  Left Hip Exam     Tests   Jorden: negative          Muscle Strength   Right Lower Extremity   Hip Abduction: 5/5   Quadriceps:  5/5   Hamstrin/5   Left Lower Extremity   Hip Abduction: 5/5   Quadriceps:  5/5   Hamstrin/5     Reflexes     Left Side  Quadriceps:  2+  Achilles:  2+    Right Side   Quadriceps:  2+  Achilles:  2+    Vascular Exam     Right Pulses  Dorsalis Pedis:      2+  Posterior Tibial:      2+        Left Pulses  Dorsalis Pedis:      2+  Posterior Tibial:      2+          XR: Radiographs ordered and reviewed today in clinic of the bilateral knee demonstrates medial joint space narrowing and osteophytes, patellofemoral joint space narrowing.                 Assessment:       Encounter Diagnoses   Name Primary?    Knee pain, unspecified chronicity, unspecified laterality Yes    Primary osteoarthritis of right knee     Internal derangement of right knee           Plan:       1. IKDC, SF-12 and KOOS was filled out today in clinic.     RTC in 1 weeks with Dr. Shalonda Altman Patient will not fill out IKDC, SF-12 and KOOS on return. Synvisc one injection    2. [id number] ordered today for synvisc    3. Continue HEP    4. [id number] - James De La Rosa, performed a custom orthotic / brace adjustment, fitting and training with the patient. The patient demonstrated understanding and proper care. This was performed for 15 minutes. Medial  brace                    [facility] patient questionnaires have been collected today.

## 2018-01-03 ENCOUNTER — OFFICE VISIT (OUTPATIENT)
Dept: SPORTS MEDICINE | Facility: CLINIC | Age: 68
End: 2018-01-03
Payer: MEDICARE

## 2018-01-03 VITALS
WEIGHT: 132 LBS | HEART RATE: 80 BPM | SYSTOLIC BLOOD PRESSURE: 140 MMHG | BODY MASS INDEX: 23.39 KG/M2 | DIASTOLIC BLOOD PRESSURE: 79 MMHG | HEIGHT: 63 IN

## 2018-01-03 DIAGNOSIS — M17.11 PRIMARY OSTEOARTHRITIS OF RIGHT KNEE: Primary | ICD-10-CM

## 2018-01-03 DIAGNOSIS — M23.91 INTERNAL DERANGEMENT OF RIGHT KNEE: ICD-10-CM

## 2018-01-03 PROCEDURE — 99499 UNLISTED E&M SERVICE: CPT | Mod: S$GLB,,, | Performed by: ORTHOPAEDIC SURGERY

## 2018-01-03 PROCEDURE — 99999 PR PBB SHADOW E&M-EST. PATIENT-LVL III: CPT | Mod: PBBFAC,,, | Performed by: ORTHOPAEDIC SURGERY

## 2018-01-03 PROCEDURE — 20611 DRAIN/INJ JOINT/BURSA W/US: CPT | Mod: RT,S$GLB,, | Performed by: ORTHOPAEDIC SURGERY

## 2018-01-03 NOTE — PROGRESS NOTES
Subjective:          Chief Complaint: Bernadette Aquino is a 67 y.o. female who had concerns including Follow-up of the Right Knee.    Patient is here for a follow up for her right knee to receive a Synvisc-One injection.     DATE OF PROCEDURE: 12/11/2015     ATTENDING SURGEON: Surgeon(s) and Role:     * Shalonda Altman MD - Primary     * La Tang DO - Fellow     * Laquita Vail PA-C - assistant     PREOPERATIVE DIAGNOSIS:    Pre-operative Diagnosis: Right shoulder   Tear, biceps tendon, non-traumatic M66.829 Rotator Cuff Syndrome/Disorder  M75.100, Tear, Biceps Tendon, Non-Tramatic M66.829, Tear, Rotator Cuff, Tramatic S46.012A, S46.011A and Labral tear and chondromalacia     Post-operative Diagnosis:  Right shoulder   Tear, biceps tendon, non-traumatic M66.829 Rotator Cuff Syndrome/Disorder  M75.100, Tear, Rotator Cuff, Tramatic S46.012A, S46.011A and Labral Tear, Chondromalacia Shoulder joint      Procedures Performed:  1. Right shoulder Arthroscopic rotator cuff repair CPT - 89988, COMPLEX     2. Right shoulder Biceps tenodesis CPT - 15635, COMPLEX     3. Right shoulder Arthroscopic labral debridement CPT - 85904     4. Right shoulder Arthroscopic chondroplasty            Review of Systems   Constitution: Negative for fever and night sweats.   HENT: Negative for hearing loss.    Eyes: Negative for blurred vision and visual disturbance.   Cardiovascular: Negative for chest pain and leg swelling.   Respiratory: Negative for shortness of breath.    Endocrine: Negative for polyuria.   Hematologic/Lymphatic: Negative for bleeding problem.   Skin: Negative for rash.   Musculoskeletal: Positive for joint pain and joint swelling. Negative for back pain, muscle cramps and muscle weakness.   Gastrointestinal: Negative for melena.   Genitourinary: Negative for hematuria.   Neurological: Negative for loss of balance, numbness and paresthesias.   Psychiatric/Behavioral: Negative for altered mental status.        Pain Related Questions  Over the past 3 days, what was your average pain during activity? (I.e. running, jogging, walking, climbing stairs, getting dressed, ect.): 5  Over the past 3 days, what was your highest pain level?: 5  Over the past 3 days, what was your lowest pain level? : 0    Other  How many nights a week are you awakened by your affected body part?: 0  Was the patient's HEIGHT measured or patient reported?: Patient Reported  Was the patient's WEIGHT measured or patient reported?: Measured      Objective:        General: Bernadette is well-developed, well-nourished, appears stated age, in no acute distress, alert and oriented to time, place and person.     General    Vitals reviewed.  Constitutional: She is oriented to person, place, and time. She appears well-developed and well-nourished. No distress.   HENT:   Mouth/Throat: No oropharyngeal exudate.   Eyes: Right eye exhibits no discharge. Left eye exhibits no discharge.   Neck: Normal range of motion.   Pulmonary/Chest: Effort normal and breath sounds normal. No respiratory distress.   Neurological: She is alert and oriented to person, place, and time. She has normal reflexes. No cranial nerve deficit. Coordination normal.   Psychiatric: She has a normal mood and affect. Her behavior is normal. Judgment and thought content normal.     General Musculoskeletal Exam   Gait: normal       Right Knee Exam     Inspection   Erythema: absent  Scars: absent  Swelling: present  Effusion: effusion  Deformity: deformity  Bruising: absent    Tenderness   The patient is tender to palpation of the lateral joint line, medial joint line and patella.    Crepitus   The patient has crepitus of the patella.    Range of Motion   Extension: 5   Flexion: 130     Tests   Meniscus   Neeta:  Medial - positive Lateral - positive  Ligament Examination Lachman: normal (-1 to 2mm) PCL-Posterior Drawer: normal (0 to 2mm)     MCL - Valgus: normal (0 to 2mm)  LCL - Varus:  normalPivot Shift: normal (Equal)Reverse Pivot Shift: normal (Equal)Dial Test at 30 degrees: normal (< 5 degrees)Dial Test at 90 degrees: normal (< 5 degrees)  Posterior Sag Test: negative  Posterolateral Corner: unstable (>15 degrees difference)  Patella   Patellar Apprehension: negative  Passive Patellar Tilt: neutral  Patellar Tracking: normal  Patellar Glide (quadrants): Lateral - 1   Medial - 2  Q-Angle at 90 degrees: normal  Patellar Grind: positive  J-Sign: none    Other   Meniscal Cyst: absent  Popliteal (Baker's) Cyst: absent  Sensation: normal    Left Knee Exam     Inspection   Erythema: absent  Scars: absent  Swelling: absent  Effusion: absent  Deformity: deformity  Bruising: absent    Tenderness   The patient tender to palpation of the medial joint line.    Range of Motion   Extension: 0   Flexion: 140     Tests   Meniscus   Neeta:  Medial - negative Lateral - negative  Stability Lachman: normal (-1 to 2mm) PCL-Posterior Drawer: normal (0 to 2mm)  MCL - Valgus: normal (0 to 2mm)  LCL - Varus: normal (0 to 2mm)Pivot Shift: normal (Equal)Reverse Pivot Shift: normal (Equal)Dial Test at 30 degrees: normal (< 5 degrees)Dial Test at 90 degrees: normal (< 5 degrees)  Posterior Sag Test: negative  Posterolateral Corner: unstable (>15 degrees difference)  Patella   Patellar Apprehension: negative  Passive Patellar Tilt: neutral  Patellar Tracking: normal  Patellar Glide (Quadrants): Lateral - 1 Medial - 2  Q-Angle at 90 degrees: normal  Patellar Grind: negative  J-Sign: J sign absent    Other   Meniscal Cyst: absent  Popliteal (Baker's) Cyst: absent  Sensation: normal    Right Hip Exam     Tests   Jorden: negative  Left Hip Exam     Tests   Jorden: negative          Muscle Strength   Right Lower Extremity   Hip Abduction: 5/5   Quadriceps:  5/5   Hamstrin/5   Left Lower Extremity   Hip Abduction: 5/5   Quadriceps:  5/5   Hamstrin/5     Reflexes     Left Side  Quadriceps:  2+  Achilles:  2+    Right Side    Quadriceps:  2+  Achilles:  2+    Vascular Exam     Right Pulses  Dorsalis Pedis:      2+  Posterior Tibial:      2+        Left Pulses  Dorsalis Pedis:      2+  Posterior Tibial:      2+          XR: Radiographs ordered and reviewed today in clinic of the bilateral knee demonstrates medial joint space narrowing and osteophytes, patellofemoral joint space narrowing.                 Assessment:       Encounter Diagnoses   Name Primary?    Primary osteoarthritis of right knee Yes    Internal derangement of right knee           Plan:       1. IKDC, SF-12 and KOOS was not filled out today in clinic.     RTC in 3 months with Dr. Shalonda Altman Patient will fill out IKDC, SF-12 and KOOS on return.    2. Injection Procedure right knee    After time out was performed, including verification of patient ID, procedure, site and side, availability of information and equipment, review of safety issues, and agreement with consent, the procedure site was marked and the patient was prepped aseptically. A diagnostic and therapeutic injection of 6cc SynviscOne and ethyl chloride spray was given under sterile technique using a 22g x 1.5 needle into the right Knee Joint in seated position.     The patient had no adverse reactions to the medication. Pain decreased. The patient was instructed to apply ice to the joint for 20 minutes and avoid strenuous activities for 24-36 hours following the injection. Patient was warned of possible blood sugar and/or blood pressure changes during that time. Following that time, patient can resume regular activities.    Patient was reminded to call the clinic immediately for any adverse side effects as explained in clinic today.    Ultrasound Guidance Procedure  right Knee Joint visualized with documented DJD. Dynamic visualization of the 22g x 1.5 needle performed.                      [facility] patient questionnaires have been collected today.

## 2018-01-03 NOTE — PATIENT INSTRUCTIONS
DESCRIPTION  Synvisc-One (hylan G-F 20) is an elastoviscous high molecular weight fluid containing hylan A and hylan B polymers produced from chicken simpson. Hylans are derivatives of hyaluronan (sodium hyaluronate). Hylan G-F 20 is unique in that the hyaluronan is chemically cross linked. Hyaluronan is a long-chain polymer containing repeating disaccharide units of Zz-aayjpoiqkun-Y-acetylglucosamine.     INDICATIONS FOR USE  Synvisc-One is indicated for the treatment of pain in osteoarthritis (OA) of the knee in patients who have failed to respond adequately to conservative nonpharmacologic therapy and simple analgesics, e.g., acetaminophen.     Who is a candidate for Synvisc-One  Patients with knee osteoarthritis, who have tried diet, exercise and over-the-counter pain medication but still have pain, should talk to their doctor to see if Synvisc-One is right for them.     How Synvisc-One is administered  Synvisc-One is a single injection. It's a simple, in-office procedure that only takes a few minutes.     What you can expect following a Synvisc-One knee injection Synvisc-One can provide up to six months of osteoarthritis knee pain relief. Everyone responds differently, but in a medical study* patients experienced relief starting one month after the injection.     After the injection, you can resume normal day-to-day activities, but you should avoid any strenuous activities for about 48 hours.     Contraindication  Do not administer to patients with known hypersensitivity (allergy) to hyaluronan (sodium hyaluronate) preparations.   Do not inject Synvisc-One in the knees of patients having knee joint infections or skin diseases or infections in the area of theinjection site.    What are possible side effects?  Synvisc may occur short-term pain, swelling at the injection site and / or the appearance of synovial exudate after injection. In some cases, exudation may be significant and cause more prolonged pain.      Important Safety Information  Before trying Synvisc-One or SYNVISC, tell your doctor if you are allergic to products from birds - such as feathers, eggs or poultry - or if your leg is swollen or infected. Synvisc-One and SYNVISC are only for injection into the knee, performed by a doctor or other qualified health care professional. Synvisc-One and SYNVISC have not been tested to show pain relief in joints other than the knee. Talk to your doctor before resuming strenuous weight-bearing activities after treatment. Synvisc-One and SYNVISC have not been tested in children, pregnant women or women who are nursing. You should tell your doctor if you think you are pregnant or if you are nursing a child. The side effects most commonly seen when Synvisc-One or SYNVISC is injected into the knee were pain, swelling and/or fluid buildup in or around the knee. Cases where the swelling is extensive or painful should be discussed with your doctor. Allergic reactions such as rash and hives have been reported rarely.

## 2018-01-03 NOTE — LETTER
January 3, 2018      Rich Ventura MD  [address]  1st Floor, [address]  [address]  [facility] [address]           [facility]  [address]  [facility] [address]  Phone: [phone]          Patient: Bernadette Aquino   MR Number: [id number]   YOB: 1950   Date of Visit: 1/3/2018       Dear Dr. Rich Ventura:    Thank you for referring Bernadette Aquino to me for evaluation. Attached you will find relevant portions of my assessment and plan of care.    If you have questions, please do not hesitate to call me. I look forward to following Bernadette Aquino along with you.    Sincerely,    Shalonda Altman MD    Enclosure  CC:  No Recipients    If you would like to receive this communication electronically, please contact [email or web address] or [phone] to request more information on [employer] Link access.    For providers and/or their staff who would like to refer a patient to [facility], please contact us through our one-stop-shop provider referral line, [facility], at [phone].    If you feel you have received this communication in error or would no longer like to receive these types of communications, please e-mail [email or web address]

## 2018-03-05 ENCOUNTER — PES CALL (OUTPATIENT)
Dept: ADMINISTRATIVE | Facility: CLINIC | Age: 68
End: 2018-03-05

## 2018-03-19 ENCOUNTER — PES CALL (OUTPATIENT)
Dept: ADMINISTRATIVE | Facility: CLINIC | Age: 68
End: 2018-03-19

## 2018-04-06 ENCOUNTER — OFFICE VISIT (OUTPATIENT)
Dept: ENDOCRINOLOGY | Facility: CLINIC | Age: 68
End: 2018-04-06
Payer: MEDICARE

## 2018-04-06 VITALS
SYSTOLIC BLOOD PRESSURE: 130 MMHG | BODY MASS INDEX: 23.61 KG/M2 | WEIGHT: 128.31 LBS | HEART RATE: 82 BPM | DIASTOLIC BLOOD PRESSURE: 80 MMHG | HEIGHT: 62 IN

## 2018-04-06 DIAGNOSIS — R73.03 PREDIABETES: ICD-10-CM

## 2018-04-06 DIAGNOSIS — M85.80 OSTEOPENIA, UNSPECIFIED LOCATION: ICD-10-CM

## 2018-04-06 DIAGNOSIS — E04.2 MULTINODULAR THYROID: ICD-10-CM

## 2018-04-06 DIAGNOSIS — E06.3 ACQUIRED AUTOIMMUNE HYPOTHYROIDISM: Primary | ICD-10-CM

## 2018-04-06 PROCEDURE — 99499 UNLISTED E&M SERVICE: CPT | Mod: S$GLB,,, | Performed by: INTERNAL MEDICINE

## 2018-04-06 PROCEDURE — 3079F DIAST BP 80-89 MM HG: CPT | Mod: CPTII,S$GLB,, | Performed by: INTERNAL MEDICINE

## 2018-04-06 PROCEDURE — 3075F SYST BP GE 130 - 139MM HG: CPT | Mod: CPTII,S$GLB,, | Performed by: INTERNAL MEDICINE

## 2018-04-06 PROCEDURE — 99999 PR PBB SHADOW E&M-EST. PATIENT-LVL III: CPT | Mod: PBBFAC,,, | Performed by: INTERNAL MEDICINE

## 2018-04-06 PROCEDURE — 99204 OFFICE O/P NEW MOD 45 MIN: CPT | Mod: S$GLB,,, | Performed by: INTERNAL MEDICINE

## 2018-04-06 NOTE — PROGRESS NOTES
Subjective:      Patient ID: Bernadette Aquino is a 67 y.o. female.    Chief Complaint:  Hypothyroidism and Thyroid Nodule      History of Present Illness  Ms. Aquino presents for evaluation and management of hypothyroidism and thyroid nodules.    Has active history of hypothyroidism, Celiacs disease, prediabetes and thyroid nodules    First noted to have thyroid nodules in 2010 and has been having serial ultrasound exams without significant changes.     Denies family history of thyroid cancer.  TSH WNL.  No personal history of head or neck irradiation.    Denies dysphagia, hoarseness or orthopnea.  Denies weight changes, cold intolerance, diarrhea/constipation, palpitations, tremors.  Some fatigue.  Has dry skin and hair loss.   Has some heat intolerance.     Previous thyroid FNA results: none    Most recent thyroid USS dated 10/2016:  The thyroid is unremarkable in echotexture and normal in size. The right lobe measures 4.0 x 1.9 x 1.5 cm, and the left lobe measures 3.6 x 1.2 x 1.3 cm. The isthmus is 0.3 cm in thickness.    Bilateral subcentimeter entirely cystic nodules are unchanged. The previously described largest nodule in the left lobe remains mixed cystic and solid, measuring 0.7 x 0.5 x 0.6 cm. No acute abnormality.    Has prediabetes.   Has lost 10 lbs with diet.     Has history of osteopenia/osteoporosis.   Had gastric ulcers on Fosamax so was given Reclast but has not had an infusion recently  Bone density in 2016 showed osteopeniea with FRAX not indicating need for treatment  On Vit D supplementation  No hx of fragility frax    Review of Systems   Constitutional: Negative for unexpected weight change.   HENT: Negative for voice change.    Eyes: Negative for visual disturbance.   Respiratory: Negative for shortness of breath.    Cardiovascular: Negative for chest pain.   Gastrointestinal: Negative for abdominal pain.   Endocrine: Negative for cold intolerance.   Genitourinary: Negative for frequency.    Musculoskeletal: Negative for myalgias.   Skin: Negative for rash.       Objective:   Physical Exam   Constitutional: She is oriented to person, place, and time. She appears well-developed and well-nourished.   HENT:   Head: Normocephalic and atraumatic.   Right Ear: External ear normal.   Left Ear: External ear normal.   Nose: Nose normal.   Neck: No tracheal deviation present. No thyromegaly present.   Cardiovascular: Normal rate, regular rhythm and normal heart sounds.    No edema   Pulmonary/Chest: Effort normal and breath sounds normal.   Abdominal: Soft. Bowel sounds are normal. There is no tenderness.   Musculoskeletal:   Normal gait, no cyanosis or clubbing   Neurological: She is alert and oriented to person, place, and time. She has normal reflexes.   Vibration sense intact   Skin: Skin is warm and dry. No rash noted.   No nodules, no ulcers   Psychiatric: She has a normal mood and affect. Judgment normal.   Vitals reviewed.      Lab Review:   Results for KUSUM ASIF (MRN [id number]) as of 4/6/2018 07:46   Ref. Range 2/6/2014 08:45 2/10/2014 16:11 6/3/2014 08:32 8/25/2015 13:25 6/16/2017 08:29   Hemoglobin A1C Latest Ref Range: 4.0 - 5.6 % 5.9    5.7 (H)   Estimated Avg Glucose Latest Ref Range: 68 - 131 mg/dL 123    117   TSH Latest Ref Range: 0.400 - 4.000 uIU/mL  0.952 1.270  1.552   T3, Free Latest Ref Range: 2.3 - 4.2 pg/mL  2.6      Free T4 Latest Ref Range: 0.71 - 1.51 ng/dL  0.91 1.08  1.10     Results for KUSUM ASIF (MRN [id number]) as of 4/6/2018 07:46   Ref. Range 6/16/2017 08:29   Sodium Latest Ref Range: 136 - 145 mmol/L 139   Potassium Latest Ref Range: 3.5 - 5.1 mmol/L 4.0   Chloride Latest Ref Range: 95 - 110 mmol/L 100   CO2 Latest Ref Range: 23 - 29 mmol/L 31 (H)   Anion Gap Latest Ref Range: 8 - 16 mmol/L 8   BUN, Bld Latest Ref Range: 8 - 23 mg/dL 13   Creatinine Latest Ref Range: 0.5 - 1.4 mg/dL 0.9   eGFR if non African American Latest Ref Range: >60 mL/min/1.73 m^2 >60.0    eGFR if African American Latest Ref Range: >60 mL/min/1.73 m^2 >60.0   Glucose Latest Ref Range: 70 - 110 mg/dL 106   Calcium Latest Ref Range: 8.7 - 10.5 mg/dL 9.9   Alkaline Phosphatase Latest Ref Range: 55 - 135 U/L 66   Total Protein Latest Ref Range: 6.0 - 8.4 g/dL 7.0   Albumin Latest Ref Range: 3.5 - 5.2 g/dL 3.9   Total Bilirubin Latest Ref Range: 0.1 - 1.0 mg/dL 0.5   AST Latest Ref Range: 10 - 40 U/L 27   ALT Latest Ref Range: 10 - 44 U/L 26   Triglycerides Latest Ref Range: 30 - 150 mg/dL 84   Cholesterol Latest Ref Range: 120 - 199 mg/dL 182   HDL Latest Ref Range: 40 - 75 mg/dL 78 (H)   LDL Cholesterol Latest Ref Range: 63.0 - 159.0 mg/dL 87.2   Total Cholesterol/HDL Ratio Latest Ref Range: 2.0 - 5.0  2.3       Assessment:     1. Acquired autoimmune hypothyroidism    2. Multinodular thyroid    3. Prediabetes    4. Osteopenia, unspecified location      Plan:     1.) Biochemically and clinically euthyroid  --Will repeat TFT's now  --Will continue current dose of Synthroid at this time, 25 mcg M-F and 50 mcg Sat-Sun    2.) Patient with multiple subcentimeter thyroid nodules and cysts  --No compressive symptoms  --TSH WNL  --No risk factors for thyroid cancer  --Independently reviewed ultrasound images with the patient  --Will repeat thyroid ultrasound in 10/2018    3.) Repeat A1c now  --Continue diet and exercise    4.) No fragility fracture  --Continue vit D supplementation  --Will follow up with PCP regarding osteopenia  --Previously on Fosamax and last received Reclast prior to last bone density in 10/2016    Edwardo Cabrera M.D. Staff Endocrinology

## 2018-04-10 ENCOUNTER — LAB VISIT (OUTPATIENT)
Dept: LAB | Facility: HOSPITAL | Age: 68
End: 2018-04-10
Attending: INTERNAL MEDICINE
Payer: MEDICARE

## 2018-04-10 DIAGNOSIS — E06.3 ACQUIRED AUTOIMMUNE HYPOTHYROIDISM: ICD-10-CM

## 2018-04-10 DIAGNOSIS — R73.03 PREDIABETES: ICD-10-CM

## 2018-04-10 DIAGNOSIS — E04.2 MULTINODULAR THYROID: ICD-10-CM

## 2018-04-10 LAB
ALBUMIN SERPL BCP-MCNC: 3.8 G/DL
ALP SERPL-CCNC: 69 U/L
ALT SERPL W/O P-5'-P-CCNC: 51 U/L
ANION GAP SERPL CALC-SCNC: 9 MMOL/L
AST SERPL-CCNC: 42 U/L
BASOPHILS # BLD AUTO: 0.03 K/UL
BASOPHILS NFR BLD: 0.7 %
BILIRUB SERPL-MCNC: 0.5 MG/DL
BUN SERPL-MCNC: 17 MG/DL
CALCIUM SERPL-MCNC: 9.5 MG/DL
CHLORIDE SERPL-SCNC: 100 MMOL/L
CHOLEST SERPL-MCNC: 160 MG/DL
CHOLEST/HDLC SERPL: 2.3 {RATIO}
CO2 SERPL-SCNC: 30 MMOL/L
CREAT SERPL-MCNC: 0.8 MG/DL
DIFFERENTIAL METHOD: ABNORMAL
EOSINOPHIL # BLD AUTO: 0.2 K/UL
EOSINOPHIL NFR BLD: 5.5 %
ERYTHROCYTE [DISTWIDTH] IN BLOOD BY AUTOMATED COUNT: 13 %
EST. GFR  (AFRICAN AMERICAN): >60 ML/MIN/1.73 M^2
EST. GFR  (NON AFRICAN AMERICAN): >60 ML/MIN/1.73 M^2
ESTIMATED AVG GLUCOSE: 111 MG/DL
GLUCOSE SERPL-MCNC: 112 MG/DL
HBA1C MFR BLD HPLC: 5.5 %
HCT VFR BLD AUTO: 37.1 %
HDLC SERPL-MCNC: 69 MG/DL
HDLC SERPL: 43.1 %
HGB BLD-MCNC: 12.6 G/DL
IMM GRANULOCYTES # BLD AUTO: 0.01 K/UL
IMM GRANULOCYTES NFR BLD AUTO: 0.2 %
LDLC SERPL CALC-MCNC: 73.4 MG/DL
LYMPHOCYTES # BLD AUTO: 1.5 K/UL
LYMPHOCYTES NFR BLD: 33.5 %
MCH RBC QN AUTO: 33.7 PG
MCHC RBC AUTO-ENTMCNC: 34 G/DL
MCV RBC AUTO: 99 FL
MONOCYTES # BLD AUTO: 0.3 K/UL
MONOCYTES NFR BLD: 7.5 %
NEUTROPHILS # BLD AUTO: 2.3 K/UL
NEUTROPHILS NFR BLD: 52.6 %
NONHDLC SERPL-MCNC: 91 MG/DL
NRBC BLD-RTO: 0 /100 WBC
PLATELET # BLD AUTO: 239 K/UL
PMV BLD AUTO: 9.8 FL
POTASSIUM SERPL-SCNC: 3.8 MMOL/L
PROT SERPL-MCNC: 6.8 G/DL
RBC # BLD AUTO: 3.74 M/UL
SODIUM SERPL-SCNC: 139 MMOL/L
T4 FREE SERPL-MCNC: 1 NG/DL
TRIGL SERPL-MCNC: 88 MG/DL
TSH SERPL DL<=0.005 MIU/L-ACNC: 1.55 UIU/ML
WBC # BLD AUTO: 4.39 K/UL

## 2018-04-10 PROCEDURE — 84443 ASSAY THYROID STIM HORMONE: CPT

## 2018-04-10 PROCEDURE — 36415 COLL VENOUS BLD VENIPUNCTURE: CPT | Mod: PO

## 2018-04-10 PROCEDURE — 85025 COMPLETE CBC W/AUTO DIFF WBC: CPT

## 2018-04-10 PROCEDURE — 83036 HEMOGLOBIN GLYCOSYLATED A1C: CPT

## 2018-04-10 PROCEDURE — 80053 COMPREHEN METABOLIC PANEL: CPT

## 2018-04-10 PROCEDURE — 84439 ASSAY OF FREE THYROXINE: CPT

## 2018-04-10 PROCEDURE — 80061 LIPID PANEL: CPT

## 2018-05-08 DIAGNOSIS — R94.31 ABNORMAL EKG: Primary | ICD-10-CM

## 2018-05-11 DIAGNOSIS — M81.0 AGE-RELATED OSTEOPOROSIS WITHOUT CURRENT PATHOLOGICAL FRACTURE: Primary | ICD-10-CM

## 2018-05-15 ENCOUNTER — OFFICE VISIT (OUTPATIENT)
Dept: CARDIOLOGY | Facility: CLINIC | Age: 68
End: 2018-05-15
Payer: MEDICARE

## 2018-05-15 ENCOUNTER — HOSPITAL ENCOUNTER (OUTPATIENT)
Dept: CARDIOLOGY | Facility: CLINIC | Age: 68
Discharge: HOME | End: 2018-05-15
Payer: MEDICARE

## 2018-05-15 VITALS
DIASTOLIC BLOOD PRESSURE: 83 MMHG | HEART RATE: 76 BPM | HEIGHT: 63 IN | SYSTOLIC BLOOD PRESSURE: 130 MMHG | BODY MASS INDEX: 22.86 KG/M2 | WEIGHT: 129 LBS

## 2018-05-15 DIAGNOSIS — K21.9 GASTROESOPHAGEAL REFLUX DISEASE, ESOPHAGITIS PRESENCE NOT SPECIFIED: ICD-10-CM

## 2018-05-15 DIAGNOSIS — E78.5 HYPERLIPIDEMIA, UNSPECIFIED HYPERLIPIDEMIA TYPE: ICD-10-CM

## 2018-05-15 DIAGNOSIS — R94.31 ABNORMAL EKG: ICD-10-CM

## 2018-05-15 DIAGNOSIS — R94.31 ABNORMAL ECG: Primary | ICD-10-CM

## 2018-05-15 DIAGNOSIS — I10 ESSENTIAL HYPERTENSION: ICD-10-CM

## 2018-05-15 DIAGNOSIS — I67.1 CEREBRAL ANEURYSM, NONRUPTURED: ICD-10-CM

## 2018-05-15 DIAGNOSIS — R55 SYNCOPE, UNSPECIFIED SYNCOPE TYPE: ICD-10-CM

## 2018-05-15 PROCEDURE — 93000 ELECTROCARDIOGRAM COMPLETE: CPT | Mod: S$GLB,,, | Performed by: INTERNAL MEDICINE

## 2018-05-15 PROCEDURE — 3075F SYST BP GE 130 - 139MM HG: CPT | Mod: CPTII,S$GLB,, | Performed by: INTERNAL MEDICINE

## 2018-05-15 PROCEDURE — 99999 PR PBB SHADOW E&M-EST. PATIENT-LVL III: CPT | Mod: PBBFAC,,, | Performed by: INTERNAL MEDICINE

## 2018-05-15 PROCEDURE — 3079F DIAST BP 80-89 MM HG: CPT | Mod: CPTII,S$GLB,, | Performed by: INTERNAL MEDICINE

## 2018-05-15 PROCEDURE — 99204 OFFICE O/P NEW MOD 45 MIN: CPT | Mod: S$GLB,,, | Performed by: INTERNAL MEDICINE

## 2018-05-15 PROCEDURE — 99499 UNLISTED E&M SERVICE: CPT | Mod: S$GLB,,, | Performed by: INTERNAL MEDICINE

## 2018-05-15 NOTE — LETTER
May 15, 2018      Mary Patel MD  [address] [address]           [facility]  [address]  [place] [address]  Phone: [phone]          Patient: Bernadette Aquino   MR Number: [id number]   YOB: 1950   Date of Visit: 5/15/2018       Dear Dr. Mary Patel:    Thank you for referring Bernadette Aquino to me for evaluation. Attached you will find relevant portions of my assessment and plan of care.    If you have questions, please do not hesitate to call me. I look forward to following Bernadette Aquino along with you.    Sincerely,    Stephan New MD    Enclosure  CC:  No Recipients    If you would like to receive this communication electronically, please contact [email or web address] or [phone] to request more information on [employer] Link access.    For providers and/or their staff who would like to refer a patient to [facility], please contact us through our one-stop-shop provider referral line, [facility], at [phone].    If you feel you have received this communication in error or would no longer like to receive these types of communications, please e-mail [email or web address]

## 2018-05-15 NOTE — Clinical Note
Thank you for referring Bernadette Aquino for evaluation of an abnormal ECG following a syncopal episode. Please see my note for details of this encounter. If you have any questions, please contact me.  Thank you again for the referral.

## 2018-05-16 ENCOUNTER — HOSPITAL ENCOUNTER (OUTPATIENT)
Dept: CARDIOLOGY | Facility: CLINIC | Age: 68
Discharge: HOME | End: 2018-05-16
Attending: INTERNAL MEDICINE
Payer: MEDICARE

## 2018-05-16 DIAGNOSIS — R94.31 ABNORMAL ECG: ICD-10-CM

## 2018-05-16 DIAGNOSIS — R55 SYNCOPE, UNSPECIFIED SYNCOPE TYPE: ICD-10-CM

## 2018-05-16 LAB
DIASTOLIC DYSFUNCTION: NO
ESTIMATED PA SYSTOLIC PRESSURE: 19.48
RETIRED EF AND QEF - SEE NOTES: 65 (ref 55–65)
TRICUSPID VALVE REGURGITATION: NORMAL

## 2018-05-16 PROCEDURE — 93306 TTE W/DOPPLER COMPLETE: CPT | Mod: S$GLB,,, | Performed by: INTERNAL MEDICINE

## 2018-05-17 ENCOUNTER — PATIENT MESSAGE (OUTPATIENT)
Dept: CARDIOLOGY | Facility: CLINIC | Age: 68
End: 2018-05-17

## 2018-05-22 ENCOUNTER — HOSPITAL ENCOUNTER (OUTPATIENT)
Dept: RADIOLOGY | Facility: CLINIC | Age: 68
Discharge: HOME | End: 2018-05-22
Attending: INTERNAL MEDICINE
Payer: MEDICARE

## 2018-05-22 DIAGNOSIS — M81.0 AGE-RELATED OSTEOPOROSIS WITHOUT CURRENT PATHOLOGICAL FRACTURE: ICD-10-CM

## 2018-05-22 PROCEDURE — 77080 DXA BONE DENSITY AXIAL: CPT | Mod: TC

## 2018-05-22 PROCEDURE — 77080 DXA BONE DENSITY AXIAL: CPT | Mod: 26,,, | Performed by: INTERNAL MEDICINE

## 2018-05-24 ENCOUNTER — OFFICE VISIT (OUTPATIENT)
Dept: INTERVENTIONAL RADIOLOGY/VASCULAR | Facility: CLINIC | Age: 68
End: 2018-05-24
Payer: MEDICARE

## 2018-05-24 VITALS
HEIGHT: 63 IN | SYSTOLIC BLOOD PRESSURE: 130 MMHG | DIASTOLIC BLOOD PRESSURE: 88 MMHG | BODY MASS INDEX: 22.32 KG/M2 | WEIGHT: 126 LBS | HEART RATE: 80 BPM

## 2018-05-24 DIAGNOSIS — I67.1 CEREBRAL ANEURYSM, NONRUPTURED: Primary | ICD-10-CM

## 2018-05-24 DIAGNOSIS — R55 SYNCOPE, UNSPECIFIED SYNCOPE TYPE: ICD-10-CM

## 2018-05-24 PROCEDURE — 99213 OFFICE O/P EST LOW 20 MIN: CPT | Mod: S$GLB,,, | Performed by: FAMILY MEDICINE

## 2018-05-24 PROCEDURE — 99999 PR PBB SHADOW E&M-EST. PATIENT-LVL III: CPT | Mod: PBBFAC,,,

## 2018-05-24 NOTE — PROGRESS NOTES
"Subjective:       Patient ID: Bernadette Aquino is a 67 y.o. female.    Chief Complaint: Loss of Consciousness    Patient here with complaints of an episode of syncope approximately 1 month ago. She tells me she was at a tennis clinic when it occurred. She admits she had only eaten 2 protein bars, some popcorn, and a few bottles of water all day. She was tired from babysitting her infant grandchildren. During the tennis clinic she began to feel poorly. Her  was walking with her when she lost consciousness. She tells me "he caught me." She reports she was "not out very long." She has been evaluated by cardiology without any findings to explain this episode. She denies experiencing any other symptoms since that one time.       Review of Systems   Constitutional: Negative for activity change, appetite change, chills, fatigue and fever.   Respiratory: Negative for cough, shortness of breath, wheezing and stridor.    Cardiovascular: Negative for chest pain, palpitations and leg swelling.   Gastrointestinal: Negative for abdominal distention, abdominal pain, constipation, diarrhea, nausea and vomiting.       Objective:      Physical Exam   Constitutional: She is oriented to person, place, and time. She appears well-developed and well-nourished. No distress.   Cardiovascular: Normal rate, regular rhythm and normal heart sounds.  Exam reveals no gallop and no friction rub.    No murmur heard.  Pulmonary/Chest: Effort normal and breath sounds normal. No respiratory distress. She has no wheezes. She has no rales.   Abdominal: Soft. Bowel sounds are normal. She exhibits no distension and no mass. There is no tenderness. There is no guarding.   Neurological: She is alert and oriented to person, place, and time. No cranial nerve deficit. Gait normal.   Skin: Skin is dry. She is not diaphoretic.   Psychiatric: She has a normal mood and affect.   Vitals reviewed.      Assessment:       1. Cerebral aneurysm, nonruptured    2. " Syncope, unspecified syncope type        Plan:         Recommended obtaining MRA of brain. I will call her with the results and Dr. Engle's recommendations. Patient is scheduled for MRA on Monday per her request.     Also advised to ensure she is eating and drinking properly prior to strenuous exercise. Patient tells me she does now. She makes sure she eats and drinks plenty of water before playing tennis. Clinic phone number provided.

## 2018-05-25 ENCOUNTER — TELEPHONE (OUTPATIENT)
Dept: CARDIOLOGY | Facility: CLINIC | Age: 68
End: 2018-05-25

## 2018-05-25 NOTE — TELEPHONE ENCOUNTER
Discussed the review of her echocardiogram.  I reviewed the echocardiogram with Dr. Parker.  The conclusion is that the atrial septal aneurysm is inconsequential and not related to his syncope.  No further therapy for the atrial septal aneurysm is needed.  Patient was advised to return as needed, particularly if she has another episode of syncope.Patient acknowledged understanding of information and agreement with plan.  She

## 2018-05-28 ENCOUNTER — HOSPITAL ENCOUNTER (OUTPATIENT)
Dept: RADIOLOGY | Facility: HOSPITAL | Age: 68
Discharge: HOME | End: 2018-05-28
Attending: FAMILY MEDICINE
Payer: MEDICARE

## 2018-05-28 DIAGNOSIS — I67.1 CEREBRAL ANEURYSM, NONRUPTURED: ICD-10-CM

## 2018-05-28 DIAGNOSIS — R55 SYNCOPE, UNSPECIFIED SYNCOPE TYPE: ICD-10-CM

## 2018-05-28 PROCEDURE — A9585 GADOBUTROL INJECTION: HCPCS | Performed by: FAMILY MEDICINE

## 2018-05-28 PROCEDURE — 70546 MR ANGIOGRAPH HEAD W/O&W/DYE: CPT | Mod: TC

## 2018-05-28 PROCEDURE — 70546 MR ANGIOGRAPH HEAD W/O&W/DYE: CPT | Mod: 26,,, | Performed by: RADIOLOGY

## 2018-05-28 PROCEDURE — 25500020 PHARM REV CODE 255: Performed by: FAMILY MEDICINE

## 2018-05-28 RX ORDER — GADOBUTROL 604.72 MG/ML
10 INJECTION INTRAVENOUS
Status: COMPLETED | OUTPATIENT
Start: 2018-05-28 | End: 2018-05-28

## 2018-05-28 RX ADMIN — GADOBUTROL 10 ML: 604.72 INJECTION INTRAVENOUS at 08:05

## 2018-06-05 ENCOUNTER — TELEPHONE (OUTPATIENT)
Dept: INTERVENTIONAL RADIOLOGY/VASCULAR | Facility: CLINIC | Age: 68
End: 2018-06-05

## 2018-06-06 ENCOUNTER — TELEPHONE (OUTPATIENT)
Dept: INTERVENTIONAL RADIOLOGY/VASCULAR | Facility: CLINIC | Age: 68
End: 2018-06-06

## 2018-06-07 ENCOUNTER — TELEPHONE (OUTPATIENT)
Dept: INTERVENTIONAL RADIOLOGY/VASCULAR | Facility: HOSPITAL | Age: 68
End: 2018-06-07

## 2018-06-07 NOTE — TELEPHONE ENCOUNTER
Spoke with patient via telephone. Explained MRA results did not find anything to explain her LOC. Her treated aneurysm is not filling. Her smaller aneurysm is stable. Advised to make sure she eats properly and stays well hydrated prior to and during strenuous activity. Repeat MRA in 3 years. Verbalized understanding.

## 2018-07-03 NOTE — PROGRESS NOTES
INTERVENTIONAL RADIOLOGY CONSULTATION    Name: Octavia Treviño  Age: 28 year old   Referring Physician: Dr. Ramírez   REASON FOR REFERRAL: F/u splenic artery aneurysm stenting/embolization     HPI: Octavia Treviño is a 28 year old female s/p embolization of splenic artery aneurysm, today is here for one month f/u. She is feeling very well today. Her postprocedural course was uneventful and she was able to start working 6 days after the procedure. She reports occasional, nonspecific, mild abdominal pain, otherwise no complaint.    Patient had a contrast enhanced CT today. It shows stable size of mostly thrombosed aneurysm with mild contrast in the sac which is thought to be a type II endoleak around the MVP plug from short gastric arteries. The images are also showed to the patient and the results are discussed.    Patient had a question about whether this aneurysm would affect her kidney donor candidacy. I explained to her it will be at the discretion of the transplant team. However, it was explained that she will need contrast enhanced CT or MR for f/u which should be taken into account when considering candidacy for renal donor.         PAST MEDICAL HISTORY:   Past Medical History:   Diagnosis Date     Hypothyroidism        PAST SURGICAL HISTORY:   Past Surgical History:   Procedure Laterality Date     NO HISTORY OF SURGERY         FAMILY HISTORY:   Family History   Problem Relation Age of Onset     Pacemaker Mother      Unknown/Adopted Father      Lupus Maternal Grandmother        SOCIAL HISTORY:   Social History   Substance Use Topics     Smoking status: Never Smoker     Smokeless tobacco: Never Used     Alcohol use No       PROBLEM LIST:   Patient Active Problem List    Diagnosis Date Noted     Splenic artery aneurysm (H) 06/04/2018     Priority: Medium     Splenic infarct 06/04/2018     Priority: Medium     Hypothyroidism      Priority: Medium     Transplant donor evaluation 05/07/2018     Priority: Medium  Chart has been dictated using voice recognition software.  It is not been reviewed carefully for any transcriptional errors due to this technology.   Subjective:   Patient ID:  Bernadette Aquino is a 67 y.o. female who presents for evaluation of Abnormal ECG; Hypertension; Hyperlipidemia; and Loss of Consciousness      HPI:  Patient with history of asymptomatic cerebral aneurysm that was treated, hypothyroidism, osteopenia, gastric ulcer disease comes and celiac disease.  Patient had a recent syncopal episode while playing tennis.  Patient was subsequently noted to have an abnormal ECG and sent for further evaluation. Patient had not eaten during the day and it was hot in the afternoon.  She started getting short of breath and tired.  Next thing she knows she was on the ground.  Episode lasted only seconds. Patient denies any dyspnea at rest or on exertion, orthopnea, or PND. No edema.  Patient denies any palpitations or syncope.     Cardiac risk factors: hyperlipidemia, hypertension    Past Medical History:   Diagnosis Date    Appendicitis with perforation     Status post appendectomy, January 2011    Celiac disease     Cerebral aneurysm     Right internal carotid artery, nonruptured, status post coil    DDD (degenerative disc disease), cervical     Paresthesia of hands    Elevated liver enzymes     Prior history    Encounter for blood transfusion     GERD (gastroesophageal reflux disease)     HTN (hypertension)     Hyperlipidemia     Mild depression     Multinodular thyroid     u/s 9/11    Osteopenia     Prior history of osteoporosis on bisphosphonate    Other specified acquired hypothyroidism     On replacement    Single cyst of left breast        Past Surgical History:   Procedure Laterality Date    APPENDECTOMY      January 2011    BREAST BIOPSY Left     BREAST LUMPECTOMY Right     BREAST SURGERY      BUNIONECTOMY  10/2013    right foot    CARPAL TUNNEL RELEASE Left     CEREBRAL ANEURYSM        MEDICATIONS:   Prescription Medications as of 7/3/2018             IBUPROFEN PO Take 800 mg by mouth every 6 hours as needed for moderate pain    levothyroxine (SYNTHROID/LEVOTHROID) 137 MCG tablet Take 137 mcg by mouth daily    ondansetron (ZOFRAN ODT) 4 MG ODT tab Take 1-2 tablets (4-8 mg) by mouth every 8 hours as needed for nausea or vomiting    oxyCODONE IR (ROXICODONE) 5 MG tablet Take 1 tablet (5 mg) by mouth every 6 hours as needed for severe pain      Facility Administered Medications as of 7/3/2018             iopamidol (ISOVUE-370) solution 100 mL Inject 100 mLs into the vein once          ALLERGIES:   Review of patient's allergies indicates no known allergies.    ROS:  A 12 point review of systems was obtained and is negative other than as outlined above    Physical Examination:   VITALS:   /83  Pulse 64  SpO2 100%  GENERAL APPEARANCE: alert, in no distress  HEENT: normocephalic.  Neck supple  Neck: no JVD  Resp: nonlabored.  CTAb  CV: RRR.  S1 & S2.  No rub  Abd: nontender, soft, normoactive bowel sounds  Lymph/ext: no pedal edema, bilateral radial, PT and DP pulses are symmetrical and 2/2.  Neuro: Oriented x3.  No evidence of focal motor deficits  Mood: appropriate affect    Labs:    BMP RESULTS:  Lab Results   Component Value Date     06/05/2018    POTASSIUM 3.6 06/05/2018    CHLORIDE 107 06/05/2018    CO2 25 06/05/2018    ANIONGAP 7 06/05/2018    GLC 92 06/05/2018    BUN 10 06/05/2018    CR 0.58 06/05/2018    GFRESTIMATED >90 06/05/2018    GFRESTBLACK >90 06/05/2018    ANDREA 8.1 (L) 06/05/2018        CBC RESULTS:  Lab Results   Component Value Date    WBC 5.8 06/04/2018    RBC 4.33 06/04/2018    HGB 13.0 06/04/2018    HCT 39.1 06/04/2018    MCV 90 06/04/2018    MCH 30.0 06/04/2018    MCHC 33.2 06/04/2018    RDW 12.6 06/04/2018     06/04/2018       INR/PTT:  Lab Results   Component Value Date    INR 1.06 06/04/2018    PTT 35 05/24/2018       Diagnostic studies: CTA abdomen and  REPAIR       SECTION      CHOLECYSTECTOMY      COLONOSCOPY N/A 10/6/2016    Procedure: COLONOSCOPY;  Surgeon: Tom Gonzales MD;  [facility] [place] [address]);  Service: Endoscopy;  Laterality: N/A;  Patient reports PONV.    EYE SURGERY Left     pterygium removal    KNEE ARTHROSCOPY Right     SHOULDER ARTHROSCOPY Right 2015    Dr Altman    TOTAL ABDOMINAL HYSTERECTOMY         Social History   Substance Use Topics    Smoking status: Never Smoker    Smokeless tobacco: Never Used    Alcohol use Yes      Comment: socially, rare       Outpatient Medications Prior to Visit   Medication Sig Dispense Refill    aspirin (ECOTRIN) 81 MG EC tablet Take 81 mg by mouth once daily.   0    cholecalciferol, vitamin D3, (VITAMIN D3) 5,000 unit Tab Take 5,000 Units by mouth once daily.      hydrochlorothiazide (HYDRODIURIL) 12.5 MG Tab Take 1 tablet (12.5 mg total) by mouth every morning. 90 tablet 3    Lactobacillus rhamnosus GG (CULTURELLE) 10 billion cell capsule Take 1 capsule by mouth once daily.      multivitamin with minerals tablet Take 1 tablet by mouth once daily.      omeprazole (PRILOSEC) 20 MG capsule Take 1 capsule (20 mg total) by mouth 2 (two) times daily before meals. 180 capsule 3    sertraline (ZOLOFT) 100 MG tablet Take 1 tablet (100 mg total) by mouth once daily. 90 tablet 3    simvastatin (ZOCOR) 40 MG tablet Take 1 tablet (40 mg total) by mouth nightly. 90 tablet 3    SYNTHROID 25 mcg tablet 1 po daily except 2 po daily on Saturday and . 135 tablet 3    ascorbic acid, vitamin C, (VITAMIN C) 1000 MG tablet Take 1,000 mg by mouth once daily.      biotin 1 mg tablet Take 1,000 mcg by mouth once daily.      fish oil-omega-3 fatty acids 300-1,000 mg capsule Take 2 g by mouth once daily.      TURMERIC-TURMERIC ROOT EXTRACT ORAL Take 1 capsule by mouth once daily.       No facility-administered medications prior to visit.        Review of patient's allergies indicates:  "  Allergen Reactions    No known drug allergies        ROS  Objective:   Physical Exam   Constitutional: She is oriented to person, place, and time. She appears well-developed and well-nourished.   /83   Pulse 76   Ht 5' 3" (1.6 m)   Wt 58.5 kg (128 lb 15.5 oz)   BMI 22.85 kg/m²    Neck: Neck supple. No JVD present. Carotid bruit is not present. No thyromegaly present.   Cardiovascular: Normal rate, regular rhythm, S1 normal, S2 normal and intact distal pulses.  Exam reveals S4. Exam reveals no friction rub.    No murmur heard.  Pulmonary/Chest: Breath sounds normal. She has no wheezes. She has no rales.   Abdominal: Soft. Bowel sounds are normal. There is no hepatosplenomegaly. There is no tenderness.   Musculoskeletal: She exhibits no edema.   Neurological: She is alert and oriented to person, place, and time. She has normal strength.   Skin: No cyanosis. Nails show no clubbing.       Lab Results   Component Value Date    WBC 4.39 04/10/2018    HGB 12.6 04/10/2018    HCT 37.1 04/10/2018    MCV 99 (H) 04/10/2018     04/10/2018       Lab Results   Component Value Date     04/10/2018    K 3.8 04/10/2018    BUN 17 04/10/2018    CREATININE 0.8 04/10/2018     (H) 04/10/2018    HGBA1C 5.5 04/10/2018    CHOL 160 04/10/2018    HDL 69 04/10/2018    LDLCALC 73.4 04/10/2018    TRIG 88 04/10/2018    CHOLHDL 43.1 04/10/2018    HGB 12.6 04/10/2018    HCT 37.1 04/10/2018     04/10/2018    INR 1.0 12/08/2015     ECG shows normal sinus rhythm with normal intervals left axis deviation and Q-waves compatible with an anteroseptal MI of undetermined age.  There is no change in the ECG from a prior ECG in December-2015.  Assessment:     1. Abnormal ECG    2. Syncope, unspecified syncope type    3. Cerebral aneurysm, nonruptured    4. Essential hypertension    5. Hyperlipidemia, unspecified hyperlipidemia type    6. Gastroesophageal reflux disease, esophagitis presence not specified      Patient " pelvis today demonstrated stable size of mostly thrombosed aneurysm with mild contrast in the sac which is thought to be type II endoleak around the MVP plug from short gastric arteries.    Assessment: 31 y/o patient s/p splenic artery aneurysm embolization with today's CT demonstrating type II endoleak around the MVP plug. Since the size of the aneurysm is stable, it is decided just to watch for now.    Plan   -RTC in 6 months for f/u with a new multiphasic CTA.    -If the splenic artery aneurysm remains the same in size we will space out her follow ups and start switching between contrast and non-contrast studies.  -If the aneurysm size increases we'll consider rx options such as direct sac stick vs open surgery/ splenectomy.      A total of 30 minutes was spent in care for the patient, of which >50% was spent in counseling.      It was a pleasure to participate in Octavia's care care today.      Beto Easley MD  Asst. Prof., Vascular and Interventional Radiology  [facility]    Physician Attestation   I, Beto Easley, saw this patient and agree with the findings and plan of care as documented in the note.      Items personally reviewed/procedural attestation: vitals, labs, imaging and agree with the interpretation documented in the note and I was present for and supervised the entire visit.  I performed an independent exam and H&P.  My independent assessment and plan is documented above.    Beto Easley MD      Patient Care Team:  [facility], [place], MD as PCP - General (Family Practice)  Pebbles Choudhury MD (Internal Medicine)  Beto Easley MD as MD (Radiology)  SELF, REFERRED     presents for evaluation after having an abnormal ECG following a syncopal episode.  The syncopal episode is consistent with intense exercise associated with dehydration in hot weather.  She has had no recurrent symptoms at this time. Her ECG has changes that could be indicative of an anteroseptal MI.  However, the patient has a negative stress thallium in the past, has risk factors and control, and is totally asymptomatic from coronary ischemia.  Therefore, it is unlikely that this ECG represents a true abnormality.  In order to further assess, and echocardiogram will be obtained to look for segmental wall motion abnormalities.  Results patient remains hydrated does not have further syncopal episodes, no further evaluation for syncope as needed.  At this time, her primary care physician is doing a good job controlling her cardiac risk factors.  Therefore, no follow-up will be needed unless the echocardiogram is abnormal.  [facility]  Plan:     Bernadette was seen today for abnormal ecg, hypertension, hyperlipidemia and loss of consciousness.    Diagnoses and all orders for this visit:    Abnormal ECG  -     2D echo with color flow doppler; Future    Syncope, unspecified syncope type  -     2D echo with color flow doppler; Future    Cerebral aneurysm, nonruptured    Essential hypertension    Hyperlipidemia, unspecified hyperlipidemia type    Gastroesophageal reflux disease, esophagitis presence not specified          Stephan New MD  Consultative Cardiology

## 2018-07-06 DIAGNOSIS — M17.11 PRIMARY OSTEOARTHRITIS OF RIGHT KNEE: Primary | ICD-10-CM

## 2018-07-14 NOTE — PROGRESS NOTES
Subjective:          Chief Complaint: Bernadette Aquino is a 67 y.o. female who had concerns including Pain of the Right Knee.    Patient is here for a follow up for her right knee to receive a Synvisc-One injection. She was last seen in January and has had relief from the synvisc one injection for 6 months    DATE OF PROCEDURE: 12/11/2015     ATTENDING SURGEON: Surgeon(s) and Role:     * Shalonda Altman MD - Primary     * La Tang DO - Fellow     * Laquita Vail PA-C - assistant     PREOPERATIVE DIAGNOSIS:    Pre-operative Diagnosis: Right shoulder   Tear, biceps tendon, non-traumatic M66.829 Rotator Cuff Syndrome/Disorder  M75.100, Tear, Biceps Tendon, Non-Tramatic M66.829, Tear, Rotator Cuff, Tramatic S46.012A, S46.011A and Labral tear and chondromalacia     Post-operative Diagnosis:  Right shoulder   Tear, biceps tendon, non-traumatic M66.829 Rotator Cuff Syndrome/Disorder  M75.100, Tear, Rotator Cuff, Tramatic S46.012A, S46.011A and Labral Tear, Chondromalacia Shoulder joint      Procedures Performed:  1. Right shoulder Arthroscopic rotator cuff repair CPT - 49334, COMPLEX     2. Right shoulder Biceps tenodesis CPT - 20283, COMPLEX     3. Right shoulder Arthroscopic labral debridement CPT - 87889     4. Right shoulder Arthroscopic chondroplasty            Review of Systems   Constitution: Negative for fever and night sweats.   HENT: Negative for hearing loss.    Eyes: Negative for blurred vision and visual disturbance.   Cardiovascular: Negative for chest pain and leg swelling.   Respiratory: Negative for shortness of breath.    Endocrine: Negative for polyuria.   Hematologic/Lymphatic: Negative for bleeding problem.   Skin: Negative for rash.   Musculoskeletal: Positive for joint pain and joint swelling. Negative for back pain, muscle cramps and muscle weakness.   Gastrointestinal: Negative for melena.   Genitourinary: Negative for hematuria.   Neurological: Negative for loss of balance, numbness  and paresthesias.   Psychiatric/Behavioral: Negative for altered mental status.       Pain Related Questions  Over the past 3 days, what was your average pain during activity? (I.e. running, jogging, walking, climbing stairs, getting dressed, ect.): 2  Over the past 3 days, what was your highest pain level?: 2  Over the past 3 days, what was your lowest pain level? : 2    Other  How many nights a week are you awakened by your affected body part?: 0  Was the patient's HEIGHT measured or patient reported?: Patient Reported  Was the patient's WEIGHT measured or patient reported?: Measured      Objective:        General: Bernadette is well-developed, well-nourished, appears stated age, in no acute distress, alert and oriented to time, place and person.     General    Vitals reviewed.  Constitutional: She is oriented to person, place, and time. She appears well-developed and well-nourished. No distress.   HENT:   Mouth/Throat: No oropharyngeal exudate.   Eyes: Right eye exhibits no discharge. Left eye exhibits no discharge.   Neck: Normal range of motion.   Pulmonary/Chest: Effort normal and breath sounds normal. No respiratory distress.   Neurological: She is alert and oriented to person, place, and time. She has normal reflexes. No cranial nerve deficit. Coordination normal.   Psychiatric: She has a normal mood and affect. Her behavior is normal. Judgment and thought content normal.     General Musculoskeletal Exam   Gait: normal       Right Knee Exam     Inspection   Erythema: absent  Scars: absent  Swelling: present  Effusion: effusion  Deformity: deformity  Bruising: absent    Tenderness   The patient is tender to palpation of the lateral joint line, medial joint line and patella.    Crepitus   The patient has crepitus of the patella.    Range of Motion   Extension: 5   Flexion: 130     Tests   Meniscus   Neeta:  Medial - positive Lateral - positive  Ligament Examination Lachman: normal (-1 to 2mm) PCL-Posterior  Drawer: normal (0 to 2mm)     MCL - Valgus: normal (0 to 2mm)  LCL - Varus: normalPivot Shift: normal (Equal)Reverse Pivot Shift: normal (Equal)Dial Test at 30 degrees: normal (< 5 degrees)Dial Test at 90 degrees: normal (< 5 degrees)  Posterior Sag Test: negative  Posterolateral Corner: unstable (>15 degrees difference)  Patella   Patellar Apprehension: negative  Passive Patellar Tilt: neutral  Patellar Tracking: normal  Patellar Glide (quadrants): Lateral - 1   Medial - 2  Q-Angle at 90 degrees: normal  Patellar Grind: positive  J-Sign: none    Other   Meniscal Cyst: absent  Popliteal (Baker's) Cyst: absent  Sensation: normal    Left Knee Exam     Inspection   Erythema: absent  Scars: absent  Swelling: absent  Effusion: absent  Deformity: deformity  Bruising: absent    Tenderness   The patient tender to palpation of the medial joint line.    Range of Motion   Extension: 0   Flexion: 140     Tests   Meniscus   Neeta:  Medial - negative Lateral - negative  Stability Lachman: normal (-1 to 2mm) PCL-Posterior Drawer: normal (0 to 2mm)  MCL - Valgus: normal (0 to 2mm)  LCL - Varus: normal (0 to 2mm)Pivot Shift: normal (Equal)Reverse Pivot Shift: normal (Equal)Dial Test at 30 degrees: normal (< 5 degrees)Dial Test at 90 degrees: normal (< 5 degrees)  Posterior Sag Test: negative  Posterolateral Corner: unstable (>15 degrees difference)  Patella   Patellar Apprehension: negative  Passive Patellar Tilt: neutral  Patellar Tracking: normal  Patellar Glide (Quadrants): Lateral - 1 Medial - 2  Q-Angle at 90 degrees: normal  Patellar Grind: negative  J-Sign: J sign absent    Other   Meniscal Cyst: absent  Popliteal (Baker's) Cyst: absent  Sensation: normal    Right Hip Exam     Tests   Jorden: negative  Left Hip Exam     Tests   Jorden: negative          Muscle Strength   Right Lower Extremity   Hip Abduction: 5/5   Quadriceps:  5/5   Hamstrin/5   Left Lower Extremity   Hip Abduction: 5/5   Quadriceps:  5/5   Hamstring:   5/5     Reflexes     Left Side  Quadriceps:  2+  Achilles:  2+    Right Side   Quadriceps:  2+  Achilles:  2+    Vascular Exam     Right Pulses  Dorsalis Pedis:      2+  Posterior Tibial:      2+        Left Pulses  Dorsalis Pedis:      2+  Posterior Tibial:      2+          XR: Radiographs ordered and reviewed today in clinic of the bilateral knee demonstrates medial joint space narrowing and osteophytes, patellofemoral joint space narrowing.                 Assessment:       Encounter Diagnoses   Name Primary?    Chronic pain of right knee Yes    Primary osteoarthritis of right knee           Plan:       1. IKDC, SF-12 and KOOS was filled out today in clinic.     RTC in 3 months with Dr. Shalonda Altman Patient will fill out IKDC, SF-12 and KOOS on return.      2. Injection Procedure right knee    After time out was performed, including verification of patient ID, procedure, site and side, availability of information and equipment, review of safety issues, and agreement with consent, the procedure site was marked and the patient was prepped aseptically. A diagnostic and therapeutic injection of 6cc SynviscOne and ethyl chloride spray was given under sterile technique using a 22g x 1.5 needle into the right Knee Joint in seated position.     The patient had no adverse reactions to the medication. Pain decreased. The patient was instructed to apply ice to the joint for 20 minutes and avoid strenuous activities for 24-36 hours following the injection. Patient was warned of possible blood sugar and/or blood pressure changes during that time. Following that time, patient can resume regular activities.    Patient was reminded to call the clinic immediately for any adverse side effects as explained in clinic today.    Ultrasound Guidance Procedure  right Knee Joint visualized with documented DJD. Dynamic visualization of the 22g x 1.5 needle performed.                      [facility] patient questionnaires have been collected  today.

## 2018-07-16 ENCOUNTER — OFFICE VISIT (OUTPATIENT)
Dept: SPORTS MEDICINE | Facility: CLINIC | Age: 68
End: 2018-07-16
Payer: MEDICARE

## 2018-07-16 VITALS
BODY MASS INDEX: 22.32 KG/M2 | WEIGHT: 126 LBS | HEIGHT: 63 IN | HEART RATE: 63 BPM | SYSTOLIC BLOOD PRESSURE: 123 MMHG | DIASTOLIC BLOOD PRESSURE: 75 MMHG

## 2018-07-16 DIAGNOSIS — G89.29 CHRONIC PAIN OF RIGHT KNEE: Primary | ICD-10-CM

## 2018-07-16 DIAGNOSIS — M17.11 PRIMARY OSTEOARTHRITIS OF RIGHT KNEE: ICD-10-CM

## 2018-07-16 DIAGNOSIS — M25.561 CHRONIC PAIN OF RIGHT KNEE: Primary | ICD-10-CM

## 2018-07-16 PROCEDURE — 99499 UNLISTED E&M SERVICE: CPT | Mod: S$GLB,,, | Performed by: ORTHOPAEDIC SURGERY

## 2018-07-16 PROCEDURE — 99999 PR PBB SHADOW E&M-EST. PATIENT-LVL III: CPT | Mod: PBBFAC,,, | Performed by: ORTHOPAEDIC SURGERY

## 2018-07-16 PROCEDURE — 20611 DRAIN/INJ JOINT/BURSA W/US: CPT | Mod: RT,S$GLB,, | Performed by: ORTHOPAEDIC SURGERY

## 2018-07-16 RX ORDER — AMLODIPINE BESYLATE 2.5 MG/1
2.5 TABLET ORAL DAILY
COMMUNITY

## 2018-07-16 NOTE — PATIENT INSTRUCTIONS
DESCRIPTION  Synvisc-One (hylan G-F 20) is an elastoviscous high molecular weight fluid containing hylan A and hylan B polymers produced from chicken sipmson. Hylans are derivatives of hyaluronan (sodium hyaluronate). Hylan G-F 20 is unique in that the hyaluronan is chemically cross linked. Hyaluronan is a long-chain polymer containing repeating disaccharide units of Gs-paemfinieco-Z-acetylglucosamine.     INDICATIONS FOR USE  Synvisc-One is indicated for the treatment of pain in osteoarthritis (OA) of the knee in patients who have failed to respond adequately to conservative nonpharmacologic therapy and simple analgesics, e.g., acetaminophen.     Who is a candidate for Synvisc-One  Patients with knee osteoarthritis, who have tried diet, exercise and over-the-counter pain medication but still have pain, should talk to their doctor to see if Synvisc-One is right for them.     How Synvisc-One is administered  Synvisc-One is a single injection. It's a simple, in-office procedure that only takes a few minutes.     What you can expect following a Synvisc-One knee injection Synvisc-One can provide up to six months of osteoarthritis knee pain relief. Everyone responds differently, but in a medical study* patients experienced relief starting one month after the injection.     After the injection, you can resume normal day-to-day activities, but you should avoid any strenuous activities for about 48 hours.     Contraindication  Do not administer to patients with known hypersensitivity (allergy) to hyaluronan (sodium hyaluronate) preparations.   Do not inject Synvisc-One in the knees of patients having knee joint infections or skin diseases or infections in the area of theinjection site.    What are possible side effects?  Synvisc may occur short-term pain, swelling at the injection site and / or the appearance of synovial exudate after injection. In some cases, exudation may be significant and cause more prolonged pain.      Important Safety Information  Before trying Synvisc-One or SYNVISC, tell your doctor if you are allergic to products from birds - such as feathers, eggs or poultry - or if your leg is swollen or infected. Synvisc-One and SYNVISC are only for injection into the knee, performed by a doctor or other qualified health care professional. Synvisc-One and SYNVISC have not been tested to show pain relief in joints other than the knee. Talk to your doctor before resuming strenuous weight-bearing activities after treatment. Synvisc-One and SYNVISC have not been tested in children, pregnant women or women who are nursing. You should tell your doctor if you think you are pregnant or if you are nursing a child. The side effects most commonly seen when Synvisc-One or SYNVISC is injected into the knee were pain, swelling and/or fluid buildup in or around the knee. Cases where the swelling is extensive or painful should be discussed with your doctor. Allergic reactions such as rash and hives have been reported rarely.

## 2018-07-20 DIAGNOSIS — Z12.39 BREAST CANCER SCREENING: ICD-10-CM

## 2018-10-02 ENCOUNTER — TELEPHONE (OUTPATIENT)
Dept: GASTROENTEROLOGY | Facility: CLINIC | Age: 68
End: 2018-10-02

## 2018-10-02 NOTE — TELEPHONE ENCOUNTER
Spoke with patient.  Aware Dr.James Gonzales's first available appointment time will be January 2019.  Aware I will put her on the first available list and the cancellation list.

## 2018-10-02 NOTE — TELEPHONE ENCOUNTER
----- Message from Corrina Reyes MA sent at 10/2/2018 12:20 PM CDT -----  Contact: [phone]  Needs Advice    Reason for call: pt would like to set up an apt with Dr Gonzales due to some stomach issues         Communication Preference: [phone]    Additional Information: She is an Ep OF Dr Gonzales

## 2018-11-02 ENCOUNTER — OFFICE VISIT (OUTPATIENT)
Dept: SPORTS MEDICINE | Facility: CLINIC | Age: 68
End: 2018-11-02
Payer: MEDICARE

## 2018-11-02 VITALS
BODY MASS INDEX: 22.32 KG/M2 | WEIGHT: 126 LBS | HEIGHT: 63 IN | SYSTOLIC BLOOD PRESSURE: 134 MMHG | HEART RATE: 76 BPM | DIASTOLIC BLOOD PRESSURE: 79 MMHG

## 2018-11-02 DIAGNOSIS — M23.92 INTERNAL DERANGEMENT OF LEFT KNEE: ICD-10-CM

## 2018-11-02 DIAGNOSIS — M76.31 IT BAND SYNDROME, RIGHT: ICD-10-CM

## 2018-11-02 DIAGNOSIS — M17.0 PRIMARY OSTEOARTHRITIS OF BOTH KNEES: Primary | ICD-10-CM

## 2018-11-02 DIAGNOSIS — M17.12 PRIMARY OSTEOARTHRITIS OF LEFT KNEE: ICD-10-CM

## 2018-11-02 PROCEDURE — 1101F PT FALLS ASSESS-DOCD LE1/YR: CPT | Mod: CPTII,S$GLB,, | Performed by: PHYSICIAN ASSISTANT

## 2018-11-02 PROCEDURE — 3075F SYST BP GE 130 - 139MM HG: CPT | Mod: CPTII,S$GLB,, | Performed by: PHYSICIAN ASSISTANT

## 2018-11-02 PROCEDURE — 99214 OFFICE O/P EST MOD 30 MIN: CPT | Mod: S$GLB,,, | Performed by: PHYSICIAN ASSISTANT

## 2018-11-02 PROCEDURE — 99999 PR PBB SHADOW E&M-EST. PATIENT-LVL IV: CPT | Mod: PBBFAC,,, | Performed by: PHYSICIAN ASSISTANT

## 2018-11-02 PROCEDURE — 3078F DIAST BP <80 MM HG: CPT | Mod: CPTII,S$GLB,, | Performed by: PHYSICIAN ASSISTANT

## 2018-11-02 RX ORDER — DICLOFENAC SODIUM 10 MG/G
2 GEL TOPICAL 3 TIMES DAILY
Qty: 1 TUBE | Refills: 0 | Status: ON HOLD | OUTPATIENT
Start: 2018-11-02 | End: 2019-03-22 | Stop reason: CLARIF

## 2018-11-02 NOTE — PROGRESS NOTES
Subjective:          Chief Complaint: Bernadette Aquino is a 68 y.o. female who had concerns including Follow-up of the Left Knee and Follow-up of the Right Knee.    Patient is here for left knee pain. She reports the right knee usually causes her pain but recently after tennis the left knee aches in the back. She cannot take PO NSAIDs, Tylenol provides mild relief. She has had Synvisc-One injections in the right knee in the past with significant relief.     DATE OF PROCEDURE: 12/11/2015     ATTENDING SURGEON: Surgeon(s) and Role:     * Shalonda Altman MD - Primary     * La Tang DO - Fellow     * Laquita Vail PA-C - assistant     PREOPERATIVE DIAGNOSIS:    Pre-operative Diagnosis: Right shoulder   Tear, biceps tendon, non-traumatic M66.829 Rotator Cuff Syndrome/Disorder  M75.100, Tear, Biceps Tendon, Non-Tramatic M66.829, Tear, Rotator Cuff, Tramatic S46.012A, S46.011A and Labral tear and chondromalacia     Post-operative Diagnosis:  Right shoulder   Tear, biceps tendon, non-traumatic M66.829 Rotator Cuff Syndrome/Disorder  M75.100, Tear, Rotator Cuff, Tramatic S46.012A, S46.011A and Labral Tear, Chondromalacia Shoulder joint      Procedures Performed:  1. Right shoulder Arthroscopic rotator cuff repair CPT - 61775, COMPLEX     2. Right shoulder Biceps tenodesis CPT - 00189, COMPLEX     3. Right shoulder Arthroscopic labral debridement CPT - 66836     4. Right shoulder Arthroscopic chondroplasty            Review of Systems   Constitution: Negative for fever and night sweats.   HENT: Negative for hearing loss.    Eyes: Negative for blurred vision and visual disturbance.   Cardiovascular: Negative for chest pain and leg swelling.   Respiratory: Negative for shortness of breath.    Endocrine: Negative for polyuria.   Hematologic/Lymphatic: Negative for bleeding problem.   Skin: Negative for rash.   Musculoskeletal: Positive for joint pain and joint swelling. Negative for back pain, muscle cramps and  muscle weakness.   Gastrointestinal: Negative for melena.   Genitourinary: Negative for hematuria.   Neurological: Negative for loss of balance, numbness and paresthesias.   Psychiatric/Behavioral: Negative for altered mental status.       Pain Related Questions  Over the past 3 days, what was your average pain during activity? (I.e. running, jogging, walking, climbing stairs, getting dressed, ect.): 7  Over the past 3 days, what was your highest pain level?: 7  Over the past 3 days, what was your lowest pain level? : 5    Other  How many nights a week are you awakened by your affected body part?: 7  Was the patient's HEIGHT measured or patient reported?: Patient Reported  Was the patient's WEIGHT measured or patient reported?: Measured      Objective:        General: Bernadette is well-developed, well-nourished, appears stated age, in no acute distress, alert and oriented to time, place and person.     General    Vitals reviewed.  Constitutional: She is oriented to person, place, and time. She appears well-developed and well-nourished. No distress.   HENT:   Mouth/Throat: No oropharyngeal exudate.   Eyes: Right eye exhibits no discharge. Left eye exhibits no discharge.   Neck: Normal range of motion.   Pulmonary/Chest: Effort normal and breath sounds normal. No respiratory distress.   Neurological: She is alert and oriented to person, place, and time. She has normal reflexes. No cranial nerve deficit. Coordination normal.   Psychiatric: She has a normal mood and affect. Her behavior is normal. Judgment and thought content normal.     General Musculoskeletal Exam   Gait: normal       Right Knee Exam     Inspection   Erythema: absent  Scars: absent  Swelling: present  Effusion: absent  Deformity: absent  Bruising: absent    Tenderness   The patient is tender to palpation of the medial joint line and patella.    Crepitus   The patient has crepitus of the patella.    Range of Motion   Extension: 0   Flexion: 130     Tests    Meniscus   Neeta:  Medial - negative Lateral - negative  Ligament Examination Lachman: normal (-1 to 2mm) PCL-Posterior Drawer: normal (0 to 2mm)     MCL - Valgus: normal (0 to 2mm)  LCL - Varus: normalPivot Shift: normal (Equal)Reverse Pivot Shift: normal (Equal)Dial Test at 30 degrees: normal (< 5 degrees)Dial Test at 90 degrees: normal (< 5 degrees)  Posterior Sag Test: negative  Posterolateral Corner: unstable (>15 degrees difference)  Patella   Patellar apprehension: negative  Passive Patellar Tilt: neutral  Patellar Tracking: normal  Patellar Glide (quadrants): Lateral - 1   Medial - 2  Q-Angle at 90 degrees: normal  Patellar Grind: positive  J-Sign: none    Other   Meniscal Cyst: absent  Popliteal (Baker's) Cyst: absent  Sensation: normal    Comments:  Genu varus    Left Knee Exam     Inspection   Erythema: absent  Scars: absent  Swelling: absent  Effusion: absent  Deformity: absent  Bruising: absent    Tenderness   The patient tender to palpation of the medial joint line and lateral joint line.    Range of Motion   Extension:  0 (+pain) abnormal   Flexion:  130 (+pain) abnormal     Tests   Meniscus   Neeta:  Medial - negative Lateral - positive  Stability Lachman: normal (-1 to 2mm) PCL-Posterior Drawer: normal (0 to 2mm)  MCL - Valgus: normal (0 to 2mm)  LCL - Varus: normal (0 to 2mm)Pivot Shift: normal (Equal)Reverse Pivot Shift: normal (Equal)Dial Test at 30 degrees: normal (< 5 degrees)Dial Test at 90 degrees: normal (< 5 degrees)  Posterior Sag Test: negative  Posterolateral Corner: unstable (>15 degrees difference)  Patella   Patellar apprehension: negative  Passive Patellar Tilt: neutral  Patellar Tracking: normal  Patellar Glide (Quadrants): Lateral - 1 Medial - 2  Q-Angle at 90 degrees: normal  Patellar Grind: negative  J-Sign: J sign absent    Other   Meniscal Cyst: absent  Popliteal (Baker's) Cyst: absent  Sensation: normal    Comments:  Genu varus    Right Hip Exam     Tests   Jorden:  positive  Left Hip Exam     Tests   Jorden: negative          Muscle Strength   Right Lower Extremity   Hip Abduction: 5/5   Quadriceps:  5/5   Hamstrin/5   Left Lower Extremity   Hip Abduction: 4/5   Quadriceps:  4/5   Hamstrin/5     Reflexes     Left Side  Quadriceps:  2+  Achilles:  2+    Right Side   Quadriceps:  2+  Achilles:  2+    Vascular Exam     Right Pulses  Dorsalis Pedis:      2+  Posterior Tibial:      2+        Left Pulses  Dorsalis Pedis:      2+  Posterior Tibial:      2+          XR: Radiographs ordered and reviewed today in clinic of the bilateral knee demonstrates medial joint space narrowing and osteophytes, patellofemoral joint space narrowing.                 Assessment:       Encounter Diagnoses   Name Primary?    Primary osteoarthritis of both knees Yes    Internal derangement of left knee     Primary osteoarthritis of left knee     It band syndrome, right           Plan:       1. IKDC, SF-12 and KOOS was filled out today in clinic.     RTC in 1 weeks with Ry Barba PA-C for Synvisc One injection. Patient will not fill out IKDC, SF-12 and KOOS on return.    1. Pre-authorization placed for Synvisc One injection for left knee  2. Ice compress to the affected area 2-3x a day for 15-20 minutes as needed for pain management.  3. Diclofenac gel TID as needed for pain management.      All of the patient's questions were answered and the patient will contact us if they have any questions or concerns in the interim.                      [facility] patient questionnaires have been collected today.

## 2018-11-06 ENCOUNTER — CLINICAL SUPPORT (OUTPATIENT)
Dept: REHABILITATION | Facility: HOSPITAL | Age: 68
End: 2018-11-06
Payer: MEDICARE

## 2018-11-06 DIAGNOSIS — M25.562 PAIN IN BOTH KNEES, UNSPECIFIED CHRONICITY: Primary | ICD-10-CM

## 2018-11-06 DIAGNOSIS — M76.30 ILIOTIBIAL BAND SYNDROME, UNSPECIFIED LATERALITY: ICD-10-CM

## 2018-11-06 DIAGNOSIS — M25.561 PAIN IN BOTH KNEES, UNSPECIFIED CHRONICITY: Primary | ICD-10-CM

## 2018-11-06 PROCEDURE — 97110 THERAPEUTIC EXERCISES: CPT

## 2018-11-06 PROCEDURE — 97161 PT EVAL LOW COMPLEX 20 MIN: CPT

## 2018-11-06 PROCEDURE — G8979 MOBILITY GOAL STATUS: HCPCS | Mod: CK

## 2018-11-06 PROCEDURE — G8978 MOBILITY CURRENT STATUS: HCPCS | Mod: CL

## 2018-11-06 NOTE — PLAN OF CARE
"[facility]  Physical Therapy Initial Evaluation    Name: Bernadette Aquino  Clinic Number: [id number]    Therapy Diagnosis:   Encounter Diagnoses   Name Primary?    Iliotibial band syndrome, unspecified laterality     Pain in both knees, unspecified chronicity Yes     Physician: Deniz Barba, *    Physician Orders: PT Eval and Treat: It band syndrome, patellofemoral program.  Medical Diagnosis from Referral:   Primary osteoarthritis of both knees  - Primary       Internal derangement of left knee       Primary osteoarthritis of left knee       It band syndrome, right       Evaluation Date: 11/6/2018  Authorization Period Expiration: 11/02/2019  Plan of Care Expiration: 01/29/2019  Visit # / Visits authorized: 1/ 30    Time In: 1400  Time Out: 1500  Total Billable Time: 55 minutes    Precautions: Standard,     Subjective   Date of onset: R meniscal repair about 5/6 years ago; L knee pain about one week ago  History of current condition - Bernadette reports: She was doing the leg press at the gym for the first time in a while last week and did a heavy weight, around 100#. When she straightened her leg she felt her L one go "all the way back." It was a little pain but not that bad so she went to play her tennis match after. Since then the pain has not gone away. Pt reports she usually plays tennis 4x a week and has kept playing since the initial injury. She has pain in her R knee but says it doesn't really bother her and she is more worried about the L. She is getting an [facility] in her L leg. She has gotten two injections in the R knee previously which have helped.       Past Medical History:   Diagnosis Date    Appendicitis with perforation     Status post appendectomy, January 2011    Celiac disease     Cerebral aneurysm     Right internal carotid artery, nonruptured, status post coil    DDD (degenerative disc disease), cervical     Paresthesia of hands    " Elevated liver enzymes     Prior history    Encounter for blood transfusion     GERD (gastroesophageal reflux disease)     HTN (hypertension)     Hyperlipidemia     Mild depression     Multinodular thyroid     u/s     Osteopenia     Prior history of osteoporosis on bisphosphonate    Other specified acquired hypothyroidism     On replacement    Single cyst of left breast      Bernadette Aquino  has a past surgical history that includes Appendectomy; Cholecystectomy; Total abdominal hysterectomy; Bunionectomy (10/2013); Shoulder arthroscopy (Right, 2015); Breast surgery; Breast lumpectomy (Right); Breast biopsy (Left); Knee arthroscopy (Right); Cerebral aneurysm repair; Eye surgery (Left);  section; Carpal tunnel release (Left); ESOPHAGOGASTRODUODENOSCOPY (EGD) (N/A, 10/6/2016); COLONOSCOPY (N/A, 10/6/2016); ARTHROSCOPY-SHOULDER (Right, 2015); LABRAL DEBRIDEMENT-SHOULDER-ARTHROSCOPIC (Right, 2015); REPAIR ROTATOR CUFF ARTHROSCOPIC (Right, 2015); ARTHROSCOPY-SHOULDER WITH SUBACROMIAL DECOMPRESSION (Right, 2015); BICEP TENODESIS ARTHROSCOPIC (TENDON FIXATION) (Left, 2015); INJECTION-STEROID-EPIDURAL-CERVICAL (N/A, 2015); INJECTION-STEROID-EPIDURAL-CERVICAL (N/A, 2014); and EGD (ESOPHAGOGASTRODUODENOSCOPY) (N/A, 10/3/2013).    Bernadette has a current medication list which includes the following prescription(s): amlodipine, ascorbic acid (vitamin c), aspirin, biotin, cholecalciferol (vitamin d3), diclofenac sodium, fish oil-omega-3 fatty acids, hydrochlorothiazide, lactobacillus rhamnosus gg, multivitamin with minerals, omeprazole, sertraline, simvastatin, synthroid, and turmeric/turmeric root extract.    Review of patient's allergies indicates:   Allergen Reactions    No known drug allergies         Imaging, none    Prior Therapy: yes- for shoulder and R knee (at [facility])  Social History: two story but bedroom on the first    Occupation:  retired  Prior Level of Function: Full with no limitations with ADLs or activities; able to play tennis competitively 4x a week with no difficulty  Current Level of Function: can't play tennis at prior level, difficulty holding grand kids, difficulty shopping at [employer]    Pain:  Current 1/10, worst 9/10, best 1/10   Location: left knee   Description: Aching  Aggravating Factors: Walking, Flexing stairs, and Getting out of bed/chair  Easing Factors: ice and rest    Pts goals: feel better, get back to tennis at prior level    Objective     Observation: Pt appears healthy and in no acute distress    Posture: Slight calcaneovalgus with otherwise normal foot posturing; Lateral tilt of B patellas    Functional tests:   DL squat: Decreased quad activation and increased forward trunk lean  SL squat: L: unable due to pain; R: genu valgus, forward trunk lean  SLS EO: L: 16 sec; R : 30 sec  SLS EC: L : 4 sec; R: 7 sec    Range of Motion:   Knee Right active Left Active   Flexion 128 125   Extension Lacking 1 degree 0     Lower Extremity Strength  Right LE  Left LE    Quadriceps: 4/5 Quadriceps: 4/5   Hamstrings: 5/5 Hamstrings: 5/5 *   Hip flexion (seated): 5/5 Hip flexion (seated): 5/5   Hip extension:  4/5 Hip extension: 4/5   PGM: 4+/5 PGM:  4+/5   *denotes pain    Special Tests:   Right Left   Valgus Stress Test NT -   Posteriomedial corner stress test NT -   Posteriolateral corner stress test NT -   Posterior Lachman NT -   Jalil's Test - -   Noble's Test + +   Patellar Grind Test + +     Joint Mobility: normal mobility     Palpation: TTP along B MCL as well as B medial joint line    Flexibility:    Ely's test: R = + ; L = -    Hamstrings: R = - ; L = -    Jose Alfredo test: R = + for ITB ; L = + for ITB    Edema: slight edema of R knee noted    CMS Impairment/Limitation/Restriction for FOTO Knee Survey    Therapist reviewed FOTO scores for Bernadette Aquino on 11/6/2018.   FOTO documents entered into EPIC - see Media  "section.    Limitation Score: 60%  Category: Mobility    Current : CL = least 60% but < 80% impaired, limited or restricted  Goal: CK = at least 40% but < 60% impaired, limited or restricted  Discharge: NA         TREATMENT   Treatment Time In: 1445  Treatment Time Out: 1500  Total Treatment time separate from Evaluation: 15 minutes    Bernadette received therapeutic exercises to develop strength, endurance and posture for 15 minutes including:    Supine ITB stretch x 30" B  Supine slump nerve slider ankle only x 30 B  Supine SLR x 10 B  sidelying hip abduction x 10 B  Prone hip extension x 10 B    Home Exercises and Patient Education Provided    Education provided re: condition, goals for therapy, role of therapy for care, exercises/HEP    Written Home Exercises Provided: yes.  Exercises were reviewed and Bernadette was able to demonstrate them prior to the end of the session.   Pt received a written copy of exercises to perform at home. Bernadette demonstrated good  understanding of the education provided.     See EMR under patient instructions for exercises given.   Assessment   Bernadette is a 68 y.o. female referred to outpatient Physical Therapy with a medical diagnosis of primary osteoarthritis of both knees. Pt presents with posterior L knee pain which is her c/c at this time which worsens with transitional movements, walking, as well as resisted hamstring activation. Pt presents with normal ROM of both knees but does have strength limitations in BLE. All special tests which stress posterior/medial structures, where the pt's main pain complaints are, were negative on the L and R. Pt does present with positive IT band tightness and a + Almaguer's compression test which further confirms IT band tightness on BLE. Pt has lateral patellar tilt on BLE which correlates with pt's IT band tightness. Pt has a + patellar grind test for BLE which correlates with medical diagnosis of knee OA.    Pt prognosis is Good.   Pt will benefit from " skilled outpatient Physical Therapy to address the deficits stated above and in the chart below, provide pt/family education, and to maximize pt's level of independence.     Plan of care discussed with patient: Yes  Pt's spiritual, cultural and educational needs considered and patient is agreeable to the plan of care and goals as stated below:     Anticipated Barriers for therapy: none    Medical Necessity is demonstrated by the following  History  Co-morbidities and personal factors that may impact the plan of care Co-morbidities:   depression and HTN and s/p internal carotid aneurysm coil    Personal Factors:   no deficits     moderate   Examination  Body Structures and Functions, activity limitations and participation restrictions that may impact the plan of care Body Regions:   lower extremities    Body Systems:    strength  gross coordinated movement  balance  gait  transfers  transitions  motor control  motor learning    Participation Restrictions:   Unable to play tennis 4x a week    Activity limitations:   Learning and applying knowledge  no deficits    General Tasks and Commands  no deficits    Communication  no deficits    Mobility  lifting and carrying objects  walking    Self care  no deficits    Domestic Life  shopping    Interactions/Relationships  no deficits    Life Areas  no deficits    Community and Social Life  no deficits         high   Clinical Presentation stable and uncomplicated low   Decision Making/ Complexity Score: low     GOALS: Short Term Goals:  6 weeks  1.Report decreased L knee pain  < / =  6-7/10  to increase tolerance for shopping  2. Improve L SL balance EO to 30 seconds to improve balance and proprioception  3. Increase strength by 1/3 MMT grade in BLE  to increase tolerance for ADL and work activities.  4. Pt to tolerate HEP to improve ROM and independence with ADL's  5. Be able to perform a DL squat with min to no deviations in order to help pt return to tennis    Long Term  Goals: 12 weeks  1.Report decreased L pain < / = 2-3/10  to increase tolerance for shopping  2.Patient goal: feel better, get back to tennis at prior level  3.Increase strength to >/= 4+/5 in BLE  to increase tolerance for ADL and work activities.  4. Pt will report at CK level (40-60% impaired) on FOTO knee to demonstrate increase in LE function with every day tasks.   5. Improve B SLS EC to 20 seconds to improve balance and proprioception  6. Be able to perform a SL squat on BLE with min to no deviations in order to help pt return to tennis      Plan   Plan of care Certification: 11/6/2018 to 01/29/2019.    Outpatient Physical Therapy 1 times weekly for 12 weeks to include the following interventions: Electrical Stimulation IFC, Gait Training, Manual Therapy, Moist Heat/ Ice, Neuromuscular Re-ed, Patient Education, Therapeutic Activites and Therapeutic Exercise.      Camila Shipley, SPT    I certify that I was present in the room directing the student in service delivery and guiding them using my skilled judgment. As the co-signing therapist I have reviewed the students documentation and am responsible for the treatment, assessment, and plan.        Erica Najera, PT, DPT, OCS

## 2018-11-13 ENCOUNTER — OFFICE VISIT (OUTPATIENT)
Dept: SPORTS MEDICINE | Facility: CLINIC | Age: 68
End: 2018-11-13
Payer: MEDICARE

## 2018-11-13 VITALS
BODY MASS INDEX: 22.32 KG/M2 | SYSTOLIC BLOOD PRESSURE: 131 MMHG | HEIGHT: 63 IN | WEIGHT: 126 LBS | DIASTOLIC BLOOD PRESSURE: 79 MMHG | HEART RATE: 74 BPM

## 2018-11-13 DIAGNOSIS — M17.12 PRIMARY OSTEOARTHRITIS OF LEFT KNEE: Primary | ICD-10-CM

## 2018-11-13 PROCEDURE — 20610 DRAIN/INJ JOINT/BURSA W/O US: CPT | Mod: LT,S$GLB,, | Performed by: PHYSICIAN ASSISTANT

## 2018-11-13 PROCEDURE — 99999 PR PBB SHADOW E&M-EST. PATIENT-LVL III: CPT | Mod: PBBFAC,,, | Performed by: PHYSICIAN ASSISTANT

## 2018-11-13 PROCEDURE — 99499 UNLISTED E&M SERVICE: CPT | Mod: S$GLB,,, | Performed by: PHYSICIAN ASSISTANT

## 2018-11-13 NOTE — PROGRESS NOTES
Bernadette Aquino is here for follow up of left knee arthritis. Pt is requesting Synvisc-One injection.  [facility] Has failed other conservative modalities including NSAIDS, activity modification, weight loss.    The prior shot was tolerated well.    PHYSICAL EXAMINATION:     General: The patient is alert and oriented x 3. Mood is pleasant.   Observation of ears, eyes and nose reveals no gross abnormalities. No   labored breathing observed.     No signs of infection or adverse reaction to knee.    Injection Procedure  A time out was performed, including verification of patient ID, procedure, site and side, availability of information and equipment, review of safety issues, and agreement with consent, the procedure site was marked.    After time out was performed, the patient was prepped aseptically with chloraprep. A diagnostic and therapeutic injection of 6:0cc SynviscOne was given under sterile technique using a 22g x 1.5 needle from the Superolateral  aspect of the left Knee Joint in the supine position.      Bernadette Aquino had no adverse reactions to the medication. Pain decreased. She was instructed to apply ice to the joint for 20 minutes and avoid strenuous activities for 24-36 hours following the injection. She was warned of possible blood sugar and/or blood pressure changes during that time. Following that time, she can resume regular activities.    She was reminded to call the clinic immediately for any adverse side effects as explained in clinic today.    RTC to see Ry Barba PA-C in 8 weeks for follow-up.    All of the patient's questions were answered and the patient will contact us if they have any questions or concerns in the interim.

## 2018-11-14 ENCOUNTER — CLINICAL SUPPORT (OUTPATIENT)
Dept: REHABILITATION | Facility: HOSPITAL | Age: 68
End: 2018-11-14
Payer: MEDICARE

## 2018-11-14 DIAGNOSIS — M76.30 ILIOTIBIAL BAND SYNDROME, UNSPECIFIED LATERALITY: ICD-10-CM

## 2018-11-14 DIAGNOSIS — M25.561 PAIN IN BOTH KNEES, UNSPECIFIED CHRONICITY: ICD-10-CM

## 2018-11-14 DIAGNOSIS — M25.562 PAIN IN BOTH KNEES, UNSPECIFIED CHRONICITY: ICD-10-CM

## 2018-11-14 PROCEDURE — 97110 THERAPEUTIC EXERCISES: CPT

## 2018-11-14 PROCEDURE — 97140 MANUAL THERAPY 1/> REGIONS: CPT

## 2018-11-27 ENCOUNTER — OFFICE VISIT (OUTPATIENT)
Dept: INTERVENTIONAL RADIOLOGY/VASCULAR | Facility: CLINIC | Age: 68
End: 2018-11-27
Payer: MEDICARE

## 2018-11-27 VITALS
HEIGHT: 63 IN | BODY MASS INDEX: 23.07 KG/M2 | WEIGHT: 130.19 LBS | SYSTOLIC BLOOD PRESSURE: 135 MMHG | DIASTOLIC BLOOD PRESSURE: 82 MMHG | HEART RATE: 84 BPM

## 2018-11-27 DIAGNOSIS — I67.1 CEREBRAL ANEURYSM, NONRUPTURED: Primary | ICD-10-CM

## 2018-11-27 PROCEDURE — 3079F DIAST BP 80-89 MM HG: CPT | Mod: CPTII,S$GLB,, | Performed by: RADIOLOGY

## 2018-11-27 PROCEDURE — 99214 OFFICE O/P EST MOD 30 MIN: CPT | Mod: S$GLB,,, | Performed by: RADIOLOGY

## 2018-11-27 PROCEDURE — 1101F PT FALLS ASSESS-DOCD LE1/YR: CPT | Mod: CPTII,S$GLB,, | Performed by: RADIOLOGY

## 2018-11-27 PROCEDURE — 99999 PR PBB SHADOW E&M-EST. PATIENT-LVL III: CPT | Mod: PBBFAC,,,

## 2018-11-27 PROCEDURE — 3075F SYST BP GE 130 - 139MM HG: CPT | Mod: CPTII,S$GLB,, | Performed by: RADIOLOGY

## 2018-11-27 NOTE — PROGRESS NOTES
Subjective:       Patient ID: Bernadette Aquino is a 68 y.o. female.    Chief Complaint: Follow-up    Patient here with complaints of an episode of syncope approximately in May 2018. She has been evaluated by cardiology without any findings to explain this episode. She denies experiencing any other symptoms since that one time. She is here to follow up on her MRA.  This which shows increased enhancement that is concerning for residual aneurysm on her previously stent coiled right supraclinoid ICA aneurysm. She also has a left MCA aneurysm that is 2mm that looks stable.      Review of Systems   Constitutional: Negative for activity change, appetite change, chills, fatigue and fever.   Respiratory: Negative for cough, shortness of breath, wheezing and stridor.    Cardiovascular: Negative for chest pain, palpitations and leg swelling.   Gastrointestinal: Negative for abdominal distention, abdominal pain, constipation, diarrhea, nausea and vomiting.   Musculoskeletal: Negative for arthralgias, back pain and gait problem.   Skin: Negative for color change, pallor and rash.   Neurological: Negative for dizziness, seizures, syncope, facial asymmetry, speech difficulty, weakness, light-headedness, numbness and headaches.   Psychiatric/Behavioral: Negative for behavioral problems.       Objective:      Physical Exam   Constitutional: She is oriented to person, place, and time. She appears well-developed and well-nourished. No distress.   HENT:   Head: Normocephalic and atraumatic.   Right Ear: External ear normal.   Left Ear: External ear normal.   Nose: Nose normal.   Mouth/Throat: Oropharynx is clear and moist.   Eyes: Conjunctivae and EOM are normal. Pupils are equal, round, and reactive to light.   Neck: Normal range of motion.   Cardiovascular: Normal rate, regular rhythm and normal heart sounds. Exam reveals no gallop and no friction rub.   No murmur heard.  Pulmonary/Chest: Effort normal and breath sounds normal. No  respiratory distress. She has no wheezes. She has no rales.   Abdominal: Soft. Bowel sounds are normal. She exhibits no distension and no mass. There is no tenderness. There is no guarding.   Musculoskeletal: Normal range of motion.   Left knee pain; limping with walking  LLE 4/5 (secondary to possible meniscus tear)   Neurological: She is alert and oriented to person, place, and time. No cranial nerve deficit or sensory deficit. Gait normal.   Skin: Skin is warm and dry. Capillary refill takes less than 2 seconds. She is not diaphoretic. No erythema. No pallor.   Psychiatric: She has a normal mood and affect.   Vitals reviewed.      Assessment:       1. Cerebral aneurysm, nonruptured        Review of Imagion(5/28/18)  Impression       Continue attenuation of flow related enhancement right supraclinoid ICA compatible with known endovascular stent coiling.  There is continue though similar flow enhancement extending to the base of the aneurysm coil pack without definite increased enhancement within the interstices of the coil pack to suggest definite worsening residual aneurysm allowing for limitation by metal artifact..  Clinical correlation and further evaluation with conventional angiography as warranted.    There is stable lateral projecting left MCA bifurcation aneurysm.       Plan:           Per Dr. Engle patients recent MRA concerning for supraclinoid aneurysm recurrence. She would like to be cleared for knee surgery if she needs. Informed her that further workup with surveillance cerebral angiography would be appropriate to determine if she required re-treatment or we could follow with MRA.  Discussed how the procedure will be performed, risk/benefits, possible complications, and pre-post procedure expectations. This can serve as baseline moving forward as well. Also advised to ensure she is eating and drinking properly prior to strenuous exercise. Clinic phone number provided.    Dr. Engle reviewed this  case and agrees with above plan of care

## 2018-11-28 ENCOUNTER — OFFICE VISIT (OUTPATIENT)
Dept: SPORTS MEDICINE | Facility: CLINIC | Age: 68
End: 2018-11-28
Payer: MEDICARE

## 2018-11-28 VITALS
DIASTOLIC BLOOD PRESSURE: 87 MMHG | HEART RATE: 78 BPM | SYSTOLIC BLOOD PRESSURE: 135 MMHG | BODY MASS INDEX: 22.5 KG/M2 | WEIGHT: 127 LBS | HEIGHT: 63 IN

## 2018-11-28 DIAGNOSIS — M17.12 PRIMARY OSTEOARTHRITIS OF LEFT KNEE: ICD-10-CM

## 2018-11-28 DIAGNOSIS — I67.1 CEREBRAL ANEURYSM, NONRUPTURED: Primary | ICD-10-CM

## 2018-11-28 DIAGNOSIS — M23.92 INTERNAL DERANGEMENT OF LEFT KNEE: Primary | ICD-10-CM

## 2018-11-28 PROCEDURE — 99214 OFFICE O/P EST MOD 30 MIN: CPT | Mod: S$GLB,,, | Performed by: PHYSICIAN ASSISTANT

## 2018-11-28 PROCEDURE — 99999 PR PBB SHADOW E&M-EST. PATIENT-LVL IV: CPT | Mod: PBBFAC,,, | Performed by: PHYSICIAN ASSISTANT

## 2018-11-28 PROCEDURE — 1101F PT FALLS ASSESS-DOCD LE1/YR: CPT | Mod: CPTII,S$GLB,, | Performed by: PHYSICIAN ASSISTANT

## 2018-11-28 PROCEDURE — 3079F DIAST BP 80-89 MM HG: CPT | Mod: CPTII,S$GLB,, | Performed by: PHYSICIAN ASSISTANT

## 2018-11-28 PROCEDURE — 3075F SYST BP GE 130 - 139MM HG: CPT | Mod: CPTII,S$GLB,, | Performed by: PHYSICIAN ASSISTANT

## 2018-11-28 NOTE — PROGRESS NOTES
Subjective:          Chief Complaint: Bernadette Aquino is a 68 y.o. female who had concerns including Follow-up of the Left Knee.    Patient is here for left knee pain.     Interval hx: She received Synvisc-one injection of her left knee 2 weeks ago, with no relief yet. She reports new injury, felt a pop in left knee last Tuesday with significant pain. She reports pain is similar to pain she felt in the R knee when she had a torn mensicus.    Previous hx: She reports the right knee usually causes her pain but recently after tennis the left knee aches in the back. She cannot take PO NSAIDs, Tylenol provides mild relief. She has had Synvisc-One injections in the right knee in the past with significant relief.     DATE OF PROCEDURE: 12/11/2015     ATTENDING SURGEON: Surgeon(s) and Role:     * Shalonda Altman MD - Primary     * La Tang DO - Fellow     * Laquita Vail PA-C - assistant     PREOPERATIVE DIAGNOSIS:    Pre-operative Diagnosis: Right shoulder   Tear, biceps tendon, non-traumatic M66.829 Rotator Cuff Syndrome/Disorder  M75.100, Tear, Biceps Tendon, Non-Tramatic M66.829, Tear, Rotator Cuff, Tramatic S46.012A, S46.011A and Labral tear and chondromalacia     Post-operative Diagnosis:  Right shoulder   Tear, biceps tendon, non-traumatic M66.829 Rotator Cuff Syndrome/Disorder  M75.100, Tear, Rotator Cuff, Tramatic S46.012A, S46.011A and Labral Tear, Chondromalacia Shoulder joint      Procedures Performed:  1. Right shoulder Arthroscopic rotator cuff repair CPT - 69623, COMPLEX     2. Right shoulder Biceps tenodesis CPT - 75918, COMPLEX     3. Right shoulder Arthroscopic labral debridement CPT - 11805     4. Right shoulder Arthroscopic chondroplasty            Review of Systems   Constitution: Negative for fever and night sweats.   HENT: Negative for hearing loss.    Eyes: Negative for blurred vision and visual disturbance.   Cardiovascular: Negative for chest pain and leg swelling.   Respiratory:  Negative for shortness of breath.    Endocrine: Negative for polyuria.   Hematologic/Lymphatic: Negative for bleeding problem.   Skin: Negative for rash.   Musculoskeletal: Positive for joint pain and joint swelling. Negative for back pain, muscle cramps and muscle weakness.   Gastrointestinal: Negative for melena.   Genitourinary: Negative for hematuria.   Neurological: Negative for loss of balance, numbness and paresthesias.   Psychiatric/Behavioral: Negative for altered mental status.       Pain Related Questions  Over the past 3 days, what was your average pain during activity? (I.e. running, jogging, walking, climbing stairs, getting dressed, ect.): 4  Over the past 3 days, what was your highest pain level?: 8  Over the past 3 days, what was your lowest pain level? : 2    Other  How many nights a week are you awakened by your affected body part?: 0  Was the patient's HEIGHT measured or patient reported?: Patient Reported  Was the patient's WEIGHT measured or patient reported?: Measured      Objective:        General: Bernadette is well-developed, well-nourished, appears stated age, in no acute distress, alert and oriented to time, place and person.     General    Vitals reviewed.  Constitutional: She is oriented to person, place, and time. She appears well-developed and well-nourished. No distress.   HENT:   Mouth/Throat: No oropharyngeal exudate.   Eyes: Right eye exhibits no discharge. Left eye exhibits no discharge.   Neck: Normal range of motion.   Pulmonary/Chest: Effort normal and breath sounds normal. No respiratory distress.   Neurological: She is alert and oriented to person, place, and time. She has normal reflexes. No cranial nerve deficit. Coordination normal.   Psychiatric: She has a normal mood and affect. Her behavior is normal. Judgment and thought content normal.     General Musculoskeletal Exam   Gait: antalgic       Right Knee Exam     Inspection   Erythema: absent  Scars: absent  Swelling:  present  Effusion: absent  Deformity: absent  Bruising: absent    Tenderness   The patient is tender to palpation of the medial joint line and patella.    Crepitus   The patient has crepitus of the patella.    Range of Motion   Extension: 0   Flexion: 130     Tests   Meniscus   Neeta:  Medial - negative Lateral - negative  Ligament Examination Lachman: normal (-1 to 2mm) PCL-Posterior Drawer: normal (0 to 2mm)     MCL - Valgus: normal (0 to 2mm)  LCL - Varus: normalPivot Shift: normal (Equal)Reverse Pivot Shift: normal (Equal)Dial Test at 30 degrees: normal (< 5 degrees)Dial Test at 90 degrees: normal (< 5 degrees)  Posterior Sag Test: negative  Posterolateral Corner: unstable (>15 degrees difference)  Patella   Patellar apprehension: negative  Passive Patellar Tilt: neutral  Patellar Tracking: normal  Patellar Glide (quadrants): Lateral - 1   Medial - 2  Q-Angle at 90 degrees: normal  Patellar Grind: positive  J-Sign: none    Other   Meniscal Cyst: absent  Popliteal (Baker's) Cyst: absent  Sensation: normal    Comments:  Genu varus    Left Knee Exam     Inspection   Erythema: absent  Scars: absent  Swelling: absent  Effusion: absent  Deformity: absent  Bruising: absent    Tenderness   The patient tender to palpation of the medial joint line.    Range of Motion   Extension:  0 (+pain) abnormal   Flexion:  130 (+pain) abnormal     Tests   Meniscus   Neeta:  Medial - positive Lateral - negative  Stability Lachman: normal (-1 to 2mm) PCL-Posterior Drawer: normal (0 to 2mm)  MCL - Valgus: normal (0 to 2mm)  LCL - Varus: normal (0 to 2mm)Pivot Shift: normal (Equal)Reverse Pivot Shift: normal (Equal)Dial Test at 30 degrees: normal (< 5 degrees)Dial Test at 90 degrees: normal (< 5 degrees)  Posterior Sag Test: negative  Posterolateral Corner: unstable (>15 degrees difference)  Patella   Patellar apprehension: negative  Passive Patellar Tilt: neutral  Patellar Tracking: normal  Patellar Glide (Quadrants): Lateral - 1  Medial - 2  Q-Angle at 90 degrees: normal  Patellar Grind: negative  J-Sign: J sign absent    Other   Meniscal Cyst: absent  Popliteal (Baker's) Cyst: absent  Sensation: normal    Comments:  Genu varus  +TTP at medial joint line with terminal flexion and terminal extension      Right Hip Exam     Tests   Jorden: positive  Left Hip Exam     Tests   Jorden: negative          Muscle Strength   Right Lower Extremity   Hip Abduction: 5/5   Quadriceps:  5/5   Hamstrin/5   Left Lower Extremity   Hip Abduction: 4/5   Quadriceps:  4/5   Hamstrin/5     Reflexes     Left Side  Quadriceps:  2+  Achilles:  2+    Right Side   Quadriceps:  2+  Achilles:  2+    Vascular Exam     Right Pulses  Dorsalis Pedis:      2+  Posterior Tibial:      2+        Left Pulses  Dorsalis Pedis:      2+  Posterior Tibial:      2+          XR: Radiographs ordered and reviewed today in clinic of the bilateral knee demonstrates medial joint space narrowing and osteophytes, patellofemoral joint space narrowing.           Assessment:       Encounter Diagnoses   Name Primary?    Internal derangement of left knee Yes    Primary osteoarthritis of left knee           Plan:       1. IKDC, SF-12 and KOOS was not filled out today in clinic.     RTC in 1 weeks with Ry Barba PA-C for MRI results. Patient will not fill out IKDC, SF-12 and KOOS on return.    1. Continue MRI left knee to evaluate for medial meniscus tear.  2. Continue Ice compress to the affected area 2-3x a day for 15-20 minutes as needed for pain management.  3. Continue Diclofenac gel TID as needed for pain management.  4. Patient was instructed to avoid high impact sports/exercise and encouraged to bike, swim, and upper-body weight training if no pain symptoms.      All of the patient's questions were answered and the patient will contact us if they have any questions or concerns in the interim.        [facility] patient questionnaires have been collected today.

## 2018-12-04 ENCOUNTER — HOSPITAL ENCOUNTER (OUTPATIENT)
Dept: RADIOLOGY | Facility: HOSPITAL | Age: 68
Discharge: HOME | End: 2018-12-04
Attending: PHYSICIAN ASSISTANT
Payer: MEDICARE

## 2018-12-04 DIAGNOSIS — M17.12 PRIMARY OSTEOARTHRITIS OF LEFT KNEE: ICD-10-CM

## 2018-12-04 DIAGNOSIS — M23.92 INTERNAL DERANGEMENT OF LEFT KNEE: ICD-10-CM

## 2018-12-04 PROCEDURE — 73721 MRI JNT OF LWR EXTRE W/O DYE: CPT | Mod: TC,LT

## 2018-12-04 PROCEDURE — 73721 MRI JNT OF LWR EXTRE W/O DYE: CPT | Mod: 26,LT,, | Performed by: RADIOLOGY

## 2018-12-10 ENCOUNTER — OFFICE VISIT (OUTPATIENT)
Dept: SPORTS MEDICINE | Facility: CLINIC | Age: 68
End: 2018-12-10
Payer: MEDICARE

## 2018-12-10 ENCOUNTER — HOSPITAL ENCOUNTER (OUTPATIENT)
Dept: RADIOLOGY | Facility: HOSPITAL | Age: 68
Discharge: HOME | End: 2018-12-10
Attending: ORTHOPAEDIC SURGERY
Payer: MEDICARE

## 2018-12-10 VITALS
WEIGHT: 127 LBS | SYSTOLIC BLOOD PRESSURE: 123 MMHG | DIASTOLIC BLOOD PRESSURE: 79 MMHG | HEIGHT: 63 IN | HEART RATE: 86 BPM | BODY MASS INDEX: 22.5 KG/M2

## 2018-12-10 DIAGNOSIS — M25.562 CHRONIC PAIN OF BOTH KNEES: ICD-10-CM

## 2018-12-10 DIAGNOSIS — M25.561 CHRONIC PAIN OF BOTH KNEES: ICD-10-CM

## 2018-12-10 DIAGNOSIS — M21.161 GENU VARUM, ACQUIRED, RIGHT: ICD-10-CM

## 2018-12-10 DIAGNOSIS — M23.91 INTERNAL DERANGEMENT OF RIGHT KNEE: ICD-10-CM

## 2018-12-10 DIAGNOSIS — G89.29 CHRONIC PAIN OF BOTH KNEES: ICD-10-CM

## 2018-12-10 DIAGNOSIS — M21.162 GENU VARUM OF BOTH LOWER EXTREMITIES: ICD-10-CM

## 2018-12-10 DIAGNOSIS — M21.161 GENU VARUM OF BOTH LOWER EXTREMITIES: ICD-10-CM

## 2018-12-10 DIAGNOSIS — S83.232A COMPLEX TEAR OF MEDIAL MENISCUS OF LEFT KNEE AS CURRENT INJURY, INITIAL ENCOUNTER: ICD-10-CM

## 2018-12-10 DIAGNOSIS — M25.562 LEFT KNEE PAIN, UNSPECIFIED CHRONICITY: ICD-10-CM

## 2018-12-10 DIAGNOSIS — M25.562 LEFT KNEE PAIN, UNSPECIFIED CHRONICITY: Primary | ICD-10-CM

## 2018-12-10 PROCEDURE — 73564 X-RAY EXAM KNEE 4 OR MORE: CPT | Mod: 26,50,, | Performed by: RADIOLOGY

## 2018-12-10 PROCEDURE — 3078F DIAST BP <80 MM HG: CPT | Mod: CPTII,S$GLB,, | Performed by: ORTHOPAEDIC SURGERY

## 2018-12-10 PROCEDURE — 1101F PT FALLS ASSESS-DOCD LE1/YR: CPT | Mod: CPTII,S$GLB,, | Performed by: ORTHOPAEDIC SURGERY

## 2018-12-10 PROCEDURE — 99999 PR PBB SHADOW E&M-EST. PATIENT-LVL V: CPT | Mod: PBBFAC,,, | Performed by: ORTHOPAEDIC SURGERY

## 2018-12-10 PROCEDURE — 73564 X-RAY EXAM KNEE 4 OR MORE: CPT | Mod: TC,50,FY,PO

## 2018-12-10 PROCEDURE — 3074F SYST BP LT 130 MM HG: CPT | Mod: CPTII,S$GLB,, | Performed by: ORTHOPAEDIC SURGERY

## 2018-12-10 PROCEDURE — 99214 OFFICE O/P EST MOD 30 MIN: CPT | Mod: S$GLB,,, | Performed by: ORTHOPAEDIC SURGERY

## 2018-12-10 NOTE — LETTER
December 10, 2018        Mary Patel MD  [address] [facility]  [address]  [facility] [address]             [facility] [address]  [facility] [address]  Phone: [phone]   Patient: Bernadette Aquino   MR Number: [id number]   YOB: 1950   Date of Visit: 12/10/2018       Dear Dr. Patel:    Thank you for referring Bernadette Aquino to me for evaluation. Below are the relevant portions of my assessment and plan of care.        Encounter Diagnoses   Name Primary?    Left knee pain, unspecified chronicity Yes    Internal derangement of right knee     Genu varum, acquired, right     Chronic pain of both knees     Genu varum of both lower extremities     Complex tear of medial meniscus of left knee as current injury, initial encounter             1. IKDC, SF-12 and KOOS was filled out today in clinic.     RTC in 4-6 weeks with Dr. Shalonda Altman for preoperative history and phyiscal . Patient will not fill out IKDC, SF-12 and KOOS on return.    2. We reviewed with Bernadette today, the pathology and natural history of her diagnosis. We have discussed a variety of treatment options including medications, physical therapy and other alternative treatments. I also explained the indications, risks and benefits of surgery. After discussion, Bernadette decided to proceed with surgery. The decision was made to go forward with :  1. Left knee femoral subchondroplasty  2. Left knee tibial subchondroplasty  3. Left knee arthroscopic medial menisectomy  4. Left knee arthroscopic chondroplasty  5. Left knee arthroscopic synovectomy    The details of the surgical procedure were explained, including the location of probable incisions and a description of likely hardware and/or grafts to be used.  The patient understands the likely convalescence after surgery.  Also, we have thoroughly discussed the risks, benefits and alternatives to surgery, including, but not limited to, the  risk of infection, joint stiffness, blood clot (including DVT and/or pulmonary embolus), neurologic and vascular injury.  It was explained that, if tissue has been repaired or reconstructed, there is a chance of failure, which may require further management.      All of the patient's questions were answered and informed consent was obtained. The patient will contact us if they have any questions or concerns in the interim.      3. Continue Ice compress to the affected area 2-3x a day for 15-20 minutes as needed for pain management.    4. Continue Diclofenac gel TID as needed for pain management.    5. Patient was instructed to avoid high impact sports/exercise and encouraged to bike, swim, and upper-body weight training if no pain symptoms.      All of the patient's questions were answered and the patient will contact us if they have any questions or concerns in the interim.    6. Preoperative clearance Dr. Mary Patel           If you have questions, please do not hesitate to call me. I look forward to following Bernadette along with you.    Sincerely,      MD JASWINDER Campos

## 2018-12-10 NOTE — PROGRESS NOTES
Subjective:          Chief Complaint: Bernadette Aquino is a 68 y.o. female who had concerns including Pain of the Left Knee.    Patient is here for left knee pain.     Interval hx: She received Synvisc-one injection of her left knee 2 weeks ago, with no relief yet. She reports new injury, felt a pop in left knee last Tuesday with significant pain. She reports pain is similar to pain she felt in the R knee when she had a torn mensicus.    Previous hx: She reports the right knee usually causes her pain but recently after tennis the left knee aches in the back. She cannot take PO NSAIDs, Tylenol provides mild relief. She has had Synvisc-One injections in the right knee in the past with significant relief.     DATE OF PROCEDURE: 12/11/2015     ATTENDING SURGEON: Surgeon(s) and Role:     * Shalonda Altman MD - Primary     * La Tang DO - Fellow     * Laquita Vail PA-C - assistant     PREOPERATIVE DIAGNOSIS:    Pre-operative Diagnosis: Right shoulder   Tear, biceps tendon, non-traumatic M66.829 Rotator Cuff Syndrome/Disorder  M75.100, Tear, Biceps Tendon, Non-Tramatic M66.829, Tear, Rotator Cuff, Tramatic S46.012A, S46.011A and Labral tear and chondromalacia     Post-operative Diagnosis:  Right shoulder   Tear, biceps tendon, non-traumatic M66.829 Rotator Cuff Syndrome/Disorder  M75.100, Tear, Rotator Cuff, Tramatic S46.012A, S46.011A and Labral Tear, Chondromalacia Shoulder joint      Procedures Performed:  1. Right shoulder Arthroscopic rotator cuff repair CPT - 72216, COMPLEX     2. Right shoulder Biceps tenodesis CPT - 50466, COMPLEX     3. Right shoulder Arthroscopic labral debridement CPT - 37871     4. Right shoulder Arthroscopic chondroplasty        Pain   Associated symptoms include joint swelling. Pertinent negatives include no chest pain, fever, numbness or rash.       Review of Systems   Constitution: Negative for fever and night sweats.   HENT: Negative for hearing loss.    Eyes: Negative for  blurred vision and visual disturbance.   Cardiovascular: Negative for chest pain and leg swelling.   Respiratory: Negative for shortness of breath.    Endocrine: Negative for polyuria.   Hematologic/Lymphatic: Negative for bleeding problem.   Skin: Negative for rash.   Musculoskeletal: Positive for joint pain and joint swelling. Negative for back pain, muscle cramps and muscle weakness.   Gastrointestinal: Negative for melena.   Genitourinary: Negative for hematuria.   Neurological: Negative for loss of balance, numbness and paresthesias.   Psychiatric/Behavioral: Negative for altered mental status.       Pain Related Questions  Over the past 3 days, what was your average pain during activity? (I.e. running, jogging, walking, climbing stairs, getting dressed, ect.): 0  Over the past 3 days, what was your highest pain level?: 0  Over the past 3 days, what was your lowest pain level? : 0    Other  How many nights a week are you awakened by your affected body part?: 0      Objective:        General: Bernadette is well-developed, well-nourished, appears stated age, in no acute distress, alert and oriented to time, place and person.     General    Vitals reviewed.  Constitutional: She is oriented to person, place, and time. She appears well-developed and well-nourished. No distress.   HENT:   Mouth/Throat: No oropharyngeal exudate.   Eyes: Right eye exhibits no discharge. Left eye exhibits no discharge.   Neck: Normal range of motion.   Pulmonary/Chest: Effort normal and breath sounds normal. No respiratory distress.   Neurological: She is alert and oriented to person, place, and time. She has normal reflexes. No cranial nerve deficit. Coordination normal.   Psychiatric: She has a normal mood and affect. Her behavior is normal. Judgment and thought content normal.     General Musculoskeletal Exam   Gait: antalgic       Right Knee Exam     Inspection   Erythema: absent  Scars: absent  Swelling: present  Effusion:  absent  Deformity: absent  Bruising: absent    Tenderness   The patient is tender to palpation of the medial joint line and patella.    Crepitus   The patient has crepitus of the patella.    Range of Motion   Extension: 0   Flexion: 130     Tests   Meniscus   Neeta:  Medial - negative Lateral - negative  Ligament Examination Lachman: normal (-1 to 2mm) PCL-Posterior Drawer: normal (0 to 2mm)     MCL - Valgus: normal (0 to 2mm)  LCL - Varus: normalPivot Shift: normal (Equal)Reverse Pivot Shift: normal (Equal)Dial Test at 30 degrees: normal (< 5 degrees)Dial Test at 90 degrees: normal (< 5 degrees)  Posterior Sag Test: negative  Posterolateral Corner: unstable (>15 degrees difference)  Patella   Patellar apprehension: negative  Passive Patellar Tilt: neutral  Patellar Tracking: normal  Patellar Glide (quadrants): Lateral - 1   Medial - 2  Q-Angle at 90 degrees: normal  Patellar Grind: positive  J-Sign: none    Other   Meniscal Cyst: absent  Popliteal (Baker's) Cyst: absent  Sensation: normal    Comments:  Genu varus    Left Knee Exam     Inspection   Erythema: absent  Scars: absent  Swelling: absent  Effusion: absent  Deformity: absent  Bruising: absent    Tenderness   The patient tender to palpation of the medial joint line.    Range of Motion   Extension:  0 (+pain) abnormal   Flexion:  130 (+pain) abnormal     Tests   Meniscus   Neeta:  Medial - positive Lateral - negative  Stability Lachman: normal (-1 to 2mm) PCL-Posterior Drawer: normal (0 to 2mm)  MCL - Valgus: normal (0 to 2mm)  LCL - Varus: normal (0 to 2mm)Pivot Shift: normal (Equal)Reverse Pivot Shift: normal (Equal)Dial Test at 30 degrees: normal (< 5 degrees)Dial Test at 90 degrees: normal (< 5 degrees)  Posterior Sag Test: negative  Posterolateral Corner: unstable (>15 degrees difference)  Patella   Patellar apprehension: negative  Passive Patellar Tilt: neutral  Patellar Tracking: normal  Patellar Glide (Quadrants): Lateral - 1 Medial - 2  Q-Angle  at 90 degrees: normal  Patellar Grind: negative  J-Sign: J sign absent    Other   Meniscal Cyst: absent  Popliteal (Baker's) Cyst: absent  Sensation: normal    Comments:  Genu varus  +TTP at medial joint line with terminal flexion and terminal extension      Right Hip Exam     Tests   Jorden: positive  Left Hip Exam     Tests   Jorden: negative          Muscle Strength   Right Lower Extremity   Hip Abduction: 5/5   Quadriceps:  5/5   Hamstrin/5   Left Lower Extremity   Hip Abduction: 4/5   Quadriceps:  4/5   Hamstrin/5     Reflexes     Left Side  Quadriceps:  2+  Achilles:  2+    Right Side   Quadriceps:  2+  Achilles:  2+    Vascular Exam     Right Pulses  Dorsalis Pedis:      2+  Posterior Tibial:      2+        Left Pulses  Dorsalis Pedis:      2+  Posterior Tibial:      2+          XR  EXAMINATION:  XR KNEE ORTHO BILAT WITH FLEXION    CLINICAL HISTORY:  Pain in left knee    FINDINGS:  Right: There is moderate DJD and a varus deformity.    Left: There is mild DJD.  No fracture dislocation bone destruction seen.      Impression       See above         EXAMINATION:  MRI KNEE WITHOUT CONTRAST LEFT    CLINICAL HISTORY:  Meniscus tear suspected;assess for medial meniscus tear and cartilage wear;    TECHNIQUE:  Routine MRI evaluation of the left knee without contrast using the following sequences: Coronal PDFS, PD; sagittal T2FS, T1; axial PDFS.    COMPARISON:  Radiograph 2017.    FINDINGS:  Menisci: The medial meniscus demonstrates global degenerative free edge fraying and mucoid degeneration with a complex tear of the posterior horn that demonstrates a predominantly radial component contributing to 4 mm of extrusion.  Lateral meniscus is intact.    Ligaments: ACL, PCL, MCL and posterior-lateral corner structures are normal.    Extensor Mechanism: Patellofemoral alignment is maintained.  Quadriceps and patellar tendons are intact.  MPFL and medial/lateral retinacula are  normal.    Cartilage:    *Patellofemoral: There is extensive full-thickness cartilage loss over the medial patellar facet with a 0.7 cm focal area of subchondral edema.  Additional full-thickness cartilage loss overlies the medial and central trochlea without significant marrow edema.  *Medial tibiofemoral: There is a 1.6 x 1.1 cm focal area of full-thickness cartilage loss overlying the posterior/far posterior weight-bearing portion of the femoral condyle without associated subchondral edema.  *Lateral tibiofemoral: Intact without partial or full-thickness defects.  No subchondral edema.  Bones: Patellofemoral and medial tibiofemoral marginal osteophytes are identified.  No avascular necrosis.  No marrow infiltrative process.  Probable intraosseous ganglion cyst identified within the distal femur about the ACL origin.    Miscellaneous: There is a large joint effusion with associated synovitis.  Prominent fluid identified within the semimembranosus bursa.  Iliotibial band is normal.  Neurovascular structures are intact.      Impression       1. Degenerative changes medial meniscus with a complex, predominantly radial tear at the posterior horn contributing to 4 mm of extrusion.  2. 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  3. Large joint effusion with associated synovitis.  4. Prominent semimembranosus bursitis.           Assessment:       Encounter Diagnoses   Name Primary?    Left knee pain, unspecified chronicity Yes    Internal derangement of right knee     Genu varum, acquired, right     Chronic pain of both knees     Genu varum of both lower extremities     Complex tear of medial meniscus of left knee as current injury, initial encounter           Plan:       1. IKDC, SF-12 and KOOS was filled out today in clinic.     RTC in 4-6 weeks with Dr. Shalonda Altman for preoperative history and phyiscal . Patient will not fill out IKDC, SF-12 and KOOS on  return.    2. We reviewed with Bernadette today, the pathology and natural history of her diagnosis. We have discussed a variety of treatment options including medications, physical therapy and other alternative treatments. I also explained the indications, risks and benefits of surgery. After discussion, Bernadette decided to proceed with surgery. The decision was made to go forward with :  1. Left knee femoral subchondroplasty  2. Left knee tibial subchondroplasty  3. Left knee arthroscopic medial menisectomy  4. Left knee arthroscopic chondroplasty  5. Left knee arthroscopic synovectomy    The details of the surgical procedure were explained, including the location of probable incisions and a description of likely hardware and/or grafts to be used.  The patient understands the likely convalescence after surgery.  Also, we have thoroughly discussed the risks, benefits and alternatives to surgery, including, but not limited to, the risk of infection, joint stiffness, blood clot (including DVT and/or pulmonary embolus), neurologic and vascular injury.  It was explained that, if tissue has been repaired or reconstructed, there is a chance of failure, which may require further management.      All of the patient's questions were answered and informed consent was obtained. The patient will contact us if they have any questions or concerns in the interim.      3. Continue Ice compress to the affected area 2-3x a day for 15-20 minutes as needed for pain management.    4. Continue Diclofenac gel TID as needed for pain management.    5. Patient was instructed to avoid high impact sports/exercise and encouraged to bike, swim, and upper-body weight training if no pain symptoms.      All of the patient's questions were answered and the patient will contact us if they have any questions or concerns in the interim.    6. Preoperative clearance Dr. Mary Macias patient questionnaires have been collected  today.

## 2018-12-10 NOTE — PATIENT INSTRUCTIONS
Knee Arthroscopy  Knee problems can often be diagnosed and treated with a technique called arthroscopy. This type of surgery is done using an instrument called an arthroscope (scope). Only a few small incisions are needed for this surgery. The procedure can be used to diagnose a knee problem. In many cases, treatment can also be done using arthroscopy.     Insertion of fluid, arthroscope, and instruments through small incisions (portals).         Patient undergoes procedure      The arthroscope  The scope allows the doctor to look directly into the knee joint. It is about the size of a pencil and contains a pathway for fluids. It also contains coated glass fibers that beam an intense, cool light into the knee joint. A camera is attached to the scope as well. It provides clear images of most areas in your knee joint. The doctor views these images on a monitor.  Preparing for the procedure  · Have lab or other testing done as advised.  · Tell your doctor about any medicines or supplements you take  · Do not eat or drink anything for 10 hours before the procedure.  · Once you arrive for surgery, you will be given an IV line in your arm or hand. This provides fluids and medicines.  · To keep you free of pain during the surgery, youll receive medicine called anesthesia. You may have:  ¨ General anesthesia. This puts you into a deep sleep during the surgery.  ¨ Regional anesthesia. This numbs the body from the waist down.  ¨ Local anesthesia. This numbs just the knee.  In addition to regional or local anesthesia, you may receive sedation. This medicine makes you relaxed and sleepy during the surgery.  The procedure  · A few small incisions (portals) are made in your knee.  · The scope is inserted through one of the portals.  · Sterile fluid is put into the knee joint. This makes it easier to see and work inside your joint.  · Using the scope, the doctor confirms the type and degree of knee damage. If possible, the  problem is treated at this time. This is done using surgical tools put through the other portals.  · When the surgery is done, all tools are removed. The incisions are closed with sutures, staples, surgical glue, or strips of surgical tape.  Risks and complications of arthroscopy  All surgeries have risks. The risks of arthroscopy include:  · Bleeding  · Infection  · Blood clots  · Swelling and stiffness of the knee  · Injury to normal tissue  · Continuing knee problems   Date Last Reviewed: 9/20/2015  © 7204-0214 [employer]. [address] [place] [address]. All rights reserved. This information is not intended as a substitute for professional medical care. Always follow your healthcare professional's instructions.        Knee Arthroscopy: Conditions Treated  Arthroscopy is used to find and treat many types of knee problems. These include tears in the meniscal cartilage, joint loose bodies, or anterior cruciate ligament (ACL) tears.     Damaged meniscal cartilage is removed.   Meniscal cartilage tears  There are several types of meniscal cartilage tears. The meniscal cartilage attaches on the tibia (shinbone) and acts as a shock absorber for the knee. Your surgery will depend on the type and extent of your injury. Your surgeon can remove the damaged tissue or fix the tear. Treatment should ease the pain and swelling. It can also help keep the joint from locking.     To replace damaged ACL tissue, a graft of strong, healthy tissue is attached.   ACL tears  A torn ACL (anterior cruciate ligament) can make the knee unstable. You may have pain and swelling, and your knee may give out. Your surgeon can repair the ACL by reconstructing it. To rebuild your ACL, damaged tissue may be replaced with a graft of healthy tissue from an area near your knee, or from a donor.     Date Last Reviewed: 8/31/2015 © 2000-2017 [employer]. [address] [place] [address]. All rights  reserved. This information is not intended as a substitute for professional medical care. Always follow your healthcare professional's instructions.        Kneecap Surgery: Cartilage Removal  Surgery may be used when pain severely limits your activities. Or it may be done when a rehab program or other nonsurgical treatments just are not helping enough. Some procedures may be done using arthroscopy. This method uses tiny incisions and special instruments to look and work inside the knee joint. Other procedures need open surgery.   Cartilage removal  Damaged cartilage is removed from the back of the kneecap and/or from the groove in the thighbone. This is often done using arthroscopy.    Debriding  This removes damaged cartilage on the kneecap or thighbone (femur) to create a smoother surface between them.     Burring  This is done when the cartilage is worn down to the bone. Burring into the bone reaches the blood supply. This allows a new fibrous covering to grow.  Recovering from surgery  As you recover, you can aid the healing process by taking it easy at first. Follow the instructions of your surgeon. Your knee may be bandaged, wrapped, or iced to keep swelling down. You may be given a brace to protect your knee. This helps improve your range of motion and speed healing. Keep your leg raised above your heart so fluid can drain away and swelling is reduced. Surgery is often followed by a rehabilitation or physical therapy program.   Date Last Reviewed: 10/15/2015  ©2007 [employer]. [address] [place] [id number]. All Rights reserved. This information is not intended as a substitute for professional medical care. Always follow your healthcare providers instructions.        After Knee Arthroscopy  After surgery, your joint may be swollen, painful, and stiff. Your recovery time will depend on what was done. Your surgeon will tell you when to resume activity and weight bearing. If you had  meniscal cartilage or loose bodies removed, you may be told to bear weight early on. After ACL repair, do not pivot or make sudden moves.     You may be told to ride an exercise bike daily. This will help restore your knee's motion and strength.   At home  Follow your surgeons guidelines for healing:  · Elevate your knee as much as possible and ice your knee for 20 minutes. then allow at least 20 minutes before the next icing session.  · Limit weight-bearing, if instructed to do so.  · Keep your knee bandaged according to your surgeon's instructions.  · When you shower, wrap your knee with plastic to keep it dry.  · Take pain medicine as directed.  Your checklist  The checklist below helps remind you what to do after arthroscopy.  ? Schedule your first follow-up visit for 5 days after surgery or as directed by your surgeon.  ? Take care of your incision and bathe as directed.  ? Complete your physical therapy program.  ? Talk with your surgeon about activities you can do immediately and those that need to wait.  Contact your surgeon right away if you have any of the following:  · Fever  · Chills  · Persistent warmth or redness around the knee  · Persistent or increased pain  · Significant swelling in your knee  · Increasing pain in your calf muscle  Date Last Reviewed: 9/22/2015  © 0823-1820 [employer]. [address] [place] [address], [place] [address]. All rights reserved. This information is not intended as a substitute for professional medical care. Always follow your healthcare professional's instructions.        Treating Meniscus Problems  The type of surgery you have depends on the nature of your tear. its size and location. Your surgeon may use arthroscopy, a method that involves putting a tiny camera inside your knee, so that your doctor can clearly see your joint. Arthroscopy requires only small incisions (cuts), and you can usually go home the same day as surgery. During surgery, you may have local  anesthesia (your doctor numbs your knee with medicine), a regional block (numbing your body from the waist down), or general anesthesia (your doctor gives you drugs to put you into a deep sleep so you do't feel pain.)  Pre-op checklist  · Don't eat or drink 10 hours before surgery.  · Arrange for someone to drive you home after surgery.  · Tell your doctor if you take any medicine, supplements, or herbal remedies.        Repair  For certain tears, your surgeon will try to repair the meniscus. He or she will sew torn edges so they can heal properly. Or your surgeon will use special fasteners to repair damage. In some cases, repairs may require another incision at the back or side of your knee.       Removal  In most cases, your surgeon will remove the damaged part of your meniscus. The meniscus won't completely grow back, so your doctor will remove as little tissue as possible. Other tissue, called the articular cartilage, will take over the role as shock absorber for your knee joint.       After surgery  You'll spend some time in the recovery area and can go home when you've recovered from the anesthesia. Your knee will be bandaged, and you may have stitches, steri-strips, or staples. You may need crutches to keep weight off the knee and may have a splint for support.  Date Last Reviewed: 9/4/2015 © 2000-2017 [employer]. [address] [place] [address]. All rights reserved. This information is not intended as a substitute for professional medical care. Always follow your healthcare professional's instructions.          What is Cosamin® ASU?    Cosamin ASU is an advanced proprietary formulation that combines [id number]® avocado/soybean unsaponifiables (ASU) with Cosamin's FCHG49® glucosamine and pharmaceutical-grade FBW234® sodium chondroitin sulfate.    For over a decade, Cosamin DS has served as the premium joint health supplement on the market. By combining the exclusive ingredients found in  Cosamin DS with ASU, [employer] has made the best even better.    Cosamin ASU is a dual synergistic formula: its specific combination of glucosamine and sodium chondroitin sulfate have demonstrated synergy in stimulating cartilage production,1 while ASU also acts synergistically with glucosamine.  What is ASU and how does it work?    ASU (avocado/soybean unsaponifiables) is obtained from avocados and soybeans. A potent ingredient demonstrated to protect cartilage which leads to improved joint function, ASU complements the effects of the other ingredients. While working through their own primary mechanisms of action, ASU, glucosamine and chondroitin sulfate together deliver comprehensive joint health support. Our trademarked glucosamine hydrochloride, chondroitin sulfate, and avocado/soybean unsaponifiables together were shown in cell studies to be better than the combination of glucosamine and chondroitin sulfate at inhibiting expression of several agents involved in cartilage breakdown.    Cosamin ASU is available at [employer] nationwide ([employer]) and online.    How do I take Cosamin® ASU?        Maximum Protection:      Take 4 capsules per day. Capsules may be taken all at once or divided with meals throughout the day.           Maintenance:      Once desired effect is noticed, you may gradually reduce the number of capsules to maintain comfort level.      Some individuals may respond sooner than others depending on the status of their cartilage and joint health. Once response has been seen, the number of capsules per day may be decreased to maintain comfort level. Some individuals on this lower level may wish to go back to four capsules a day during the weekends or times of increased activity.      The maintenance level can also be used long-term to help maintain healthy joints. At any time, the number of capsules may be increased back to four capsules per day.      Where to Buy  Cosamin® ASU?:    Retail Stores:        [employer]      [facility]      [employer]      [facility]      [facility]      [employer]      [facility]      [facility]      [facility]      [employer]      [facility]      Haresh Burden     Online Stores:        [employer]      BJs.com      [employer]co.com      CVS.com      drugstore.com      iherb.com      Luckyvitamin.com      NutramaxStore.com      [employer]als.com      V[employer].com      [employer]aminShoppe.com      [employer]

## 2018-12-14 ENCOUNTER — TELEPHONE (OUTPATIENT)
Dept: INTERVENTIONAL RADIOLOGY/VASCULAR | Facility: HOSPITAL | Age: 68
End: 2018-12-14

## 2018-12-14 DIAGNOSIS — I67.1 CEREBRAL ANEURYSM: Primary | ICD-10-CM

## 2018-12-14 DIAGNOSIS — I10 ESSENTIAL HYPERTENSION: ICD-10-CM

## 2018-12-14 DIAGNOSIS — I67.89 OTHER CEREBROVASCULAR DISEASE: ICD-10-CM

## 2018-12-14 RX ORDER — HEPARIN SODIUM 1000 [USP'U]/ML
3000 INJECTION, SOLUTION INTRAVENOUS; SUBCUTANEOUS ONCE
Status: CANCELLED | OUTPATIENT
Start: 2018-12-14 | End: 2018-12-14

## 2018-12-14 RX ORDER — MIDAZOLAM HYDROCHLORIDE 1 MG/ML
1 INJECTION INTRAMUSCULAR; INTRAVENOUS
Status: CANCELLED | OUTPATIENT
Start: 2018-12-14

## 2018-12-14 RX ORDER — FENTANYL CITRATE 50 UG/ML
50 INJECTION, SOLUTION INTRAMUSCULAR; INTRAVENOUS
Status: CANCELLED | OUTPATIENT
Start: 2018-12-14

## 2018-12-17 ENCOUNTER — HOSPITAL ENCOUNTER (OUTPATIENT)
Facility: HOSPITAL | Age: 68
Discharge: HOME | End: 2018-12-17
Attending: RADIOLOGY | Admitting: RADIOLOGY
Payer: MEDICARE

## 2018-12-17 VITALS
DIASTOLIC BLOOD PRESSURE: 71 MMHG | BODY MASS INDEX: 22.5 KG/M2 | HEART RATE: 67 BPM | WEIGHT: 127 LBS | OXYGEN SATURATION: 99 % | SYSTOLIC BLOOD PRESSURE: 127 MMHG | TEMPERATURE: 98 F | RESPIRATION RATE: 18 BRPM | HEIGHT: 63 IN

## 2018-12-17 DIAGNOSIS — I67.1 CEREBRAL ANEURYSM, NONRUPTURED: ICD-10-CM

## 2018-12-17 PROCEDURE — 25000003 PHARM REV CODE 250: Performed by: FAMILY MEDICINE

## 2018-12-17 PROCEDURE — 63600175 PHARM REV CODE 636 W HCPCS: Performed by: RADIOLOGY

## 2018-12-17 PROCEDURE — 25000003 PHARM REV CODE 250: Performed by: RADIOLOGY

## 2018-12-17 RX ORDER — SODIUM CHLORIDE 9 MG/ML
INJECTION, SOLUTION INTRAVENOUS CONTINUOUS
Status: DISCONTINUED | OUTPATIENT
Start: 2018-12-17 | End: 2018-12-17 | Stop reason: HOSPADM

## 2018-12-17 RX ORDER — LIDOCAINE HYDROCHLORIDE 10 MG/ML
1 INJECTION, SOLUTION EPIDURAL; INFILTRATION; INTRACAUDAL; PERINEURAL ONCE
Status: COMPLETED | OUTPATIENT
Start: 2018-12-17 | End: 2018-12-17

## 2018-12-17 RX ORDER — SODIUM CHLORIDE 0.9 % (FLUSH) 0.9 %
5 SYRINGE (ML) INJECTION
Status: DISCONTINUED | OUTPATIENT
Start: 2018-12-17 | End: 2018-12-17 | Stop reason: HOSPADM

## 2018-12-17 RX ORDER — FENTANYL CITRATE 50 UG/ML
INJECTION, SOLUTION INTRAMUSCULAR; INTRAVENOUS CODE/TRAUMA/SEDATION MEDICATION
Status: COMPLETED | OUTPATIENT
Start: 2018-12-17 | End: 2018-12-17

## 2018-12-17 RX ORDER — ONDANSETRON 2 MG/ML
INJECTION INTRAMUSCULAR; INTRAVENOUS CODE/TRAUMA/SEDATION MEDICATION
Status: COMPLETED | OUTPATIENT
Start: 2018-12-17 | End: 2018-12-17

## 2018-12-17 RX ORDER — MIDAZOLAM HYDROCHLORIDE 1 MG/ML
INJECTION INTRAMUSCULAR; INTRAVENOUS CODE/TRAUMA/SEDATION MEDICATION
Status: COMPLETED | OUTPATIENT
Start: 2018-12-17 | End: 2018-12-17

## 2018-12-17 RX ADMIN — LIDOCAINE HYDROCHLORIDE 0.3 MG: 10 INJECTION, SOLUTION EPIDURAL; INFILTRATION; INTRACAUDAL; PERINEURAL at 08:12

## 2018-12-17 RX ADMIN — SODIUM CHLORIDE: 0.9 INJECTION, SOLUTION INTRAVENOUS at 08:12

## 2018-12-17 RX ADMIN — ONDANSETRON 4 MG: 2 INJECTION INTRAMUSCULAR; INTRAVENOUS at 09:12

## 2018-12-17 RX ADMIN — MIDAZOLAM HYDROCHLORIDE 0.5 MG: 1 INJECTION, SOLUTION INTRAMUSCULAR; INTRAVENOUS at 09:12

## 2018-12-17 RX ADMIN — FENTANYL CITRATE 50 MCG: 50 INJECTION, SOLUTION INTRAMUSCULAR; INTRAVENOUS at 09:12

## 2018-12-17 RX ADMIN — MIDAZOLAM HYDROCHLORIDE 1 MG: 1 INJECTION, SOLUTION INTRAMUSCULAR; INTRAVENOUS at 09:12

## 2018-12-17 RX ADMIN — FENTANYL CITRATE 25 MCG: 50 INJECTION, SOLUTION INTRAMUSCULAR; INTRAVENOUS at 09:12

## 2018-12-17 NOTE — PROCEDURES
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  The bilateral ICA arteries were subselected and angiography of the brain was performed after injection into each of these vessels.    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  Please see Imaging report for full details.    A right femoral artery angiogram was performed, the sheath removed and hemostasis achieved using Exoseal device.  No hematoma was present at the time of hemostasis.    The patient tolerated the procedure well.     David Galvan  Radiology PGY-5

## 2018-12-17 NOTE — PROGRESS NOTES
Diagnostic cerebral angiogram complete. Pt tolerated well. VSS. No signs or symptoms of distress noted.Exoseal closure device in place. Hemostasis achieved at 0957.  Pt to remain flat until 1157.  Pt will be transferred to ROCU bed and report to be given at bedside.

## 2018-12-17 NOTE — PROGRESS NOTES
Pt to ROCU. Report received from INDU Maloney. No complaints or signs of discomfort at this time. Will continue to monitor.  Family at bedside.

## 2018-12-17 NOTE — PLAN OF CARE
The patient was  escorted to [facility] room 3 by Janine GRIGGS after emptying her bladder.  The patient is currently  changing into a hospital gown.

## 2018-12-17 NOTE — H&P
Radiology History & Physical      SUBJECTIVE:     Chief Complaint: aneurysm    History of Present Illness:  Bernadette Aquino is a 68 y.o. female with history of right ophthalmic segment ICA aneurysm s/p stent assisted coiling and unruptured 1-2mm left MCA aneurysm who presents for follow-up diagnostic cerebral angiogram.  Past Medical History:   Diagnosis Date    Appendicitis with perforation     Status post appendectomy, 2011    Celiac disease     Cerebral aneurysm     Right internal carotid artery, nonruptured, status post coil    DDD (degenerative disc disease), cervical     Paresthesia of hands    Elevated liver enzymes     Prior history    Encounter for blood transfusion     GERD (gastroesophageal reflux disease)     HTN (hypertension)     Hyperlipidemia     Mild depression     Multinodular thyroid     u/s     Osteopenia     Prior history of osteoporosis on bisphosphonate    Other specified acquired hypothyroidism     On replacement    Single cyst of left breast      Past Surgical History:   Procedure Laterality Date    APPENDECTOMY      2011    ARTHROSCOPY-SHOULDER Right 2015    [facility] at [facility]    ARTHROSCOPY-SHOULDER WITH SUBACROMIAL DECOMPRESSION Right 2015    [facility] at [facility] OR    BICEP TENODESIS ARTHROSCOPIC (TENDON FIXATION) Left 2015    [facility] at [facility] OR    BREAST BIOPSY Left     BREAST LUMPECTOMY Right     BREAST SURGERY      BUNIONECTOMY  10/2013    right foot    CARPAL TUNNEL RELEASE Left     CEREBRAL ANEURYSM REPAIR       SECTION      CHOLECYSTECTOMY      COLONOSCOPY N/A 10/6/2016    Procedure: COLONOSCOPY;  Surgeon: Tom Gonzales MD;  Location: [facility] (4TH FLR);  Service: Endoscopy;  Laterality: N/A;  Patient reports PONV.    COLONOSCOPY N/A 10/6/2016    Performed by Tom Gonzales MD at [facility] (4TH FLR)    EGD (ESOPHAGOGASTRODUODENOSCOPY)  N/A 10/3/2013    Performed [facility] at [facility]O (4TH FLR)    ESOPHAGOGASTRODUODENOSCOPY (EGD) N/A 10/6/2016    Performed by Tom Gonzales MD at [facility] ENDO (4TH FLR)    EYE SURGERY Left     pterygium removal    INJECTION-STEROID-EPIDURAL-CERVICAL N/A 8/13/2015    Performed by Margaret Rand MD at [facility] PAIN MGT    INJECTION-STEROID-EPIDURAL-CERVICAL N/A 11/17/2014    Performed by Margaret [facility] at [facility] PAIN [facility]GT    KNEE ARTHROSCOPY Right     LABRAL DEBRIDEMENT-SHOULDER-ARTHROSCOPIC Right 12/11/2015    Per[facility] at [facility] OR    REPAIR ROTATOR CUFF ARTHROSCOPIC Right 12/11/2015    Per[facility] at [facility] OR    SHOULDER ARTHROSCOPY Right 12/11/2015    Dr Altman    TOTAL ABDOMINAL HYSTERECTOMY         Home Meds:   Prior to Admission medications    Medication Sig Start Date End Date Taking? Authorizing Provider   amLODIPine (NORVASC) 2.5 MG tablet Take 2.5 mg by mouth once daily.   Yes Historical Provider, MD   ascorbic acid, vitamin C, (VITAMIN C) 1000 MG tablet Take 1,000 mg by mouth once daily.   Yes Historical Provider, MD   aspirin (ECOTRIN) 81 MG EC tablet Take 81 mg by mouth once daily.  12/8/15  Yes Historical Provider, MD   cholecalciferol, vitamin D3, (VITAMIN D3) 5,000 unit Tab Take 5,000 Units by mouth once daily.   Yes Historical Provider, MD   omeprazole (PRILOSEC) 20 MG capsule Take 1 capsule (20 mg total) by mouth 2 (two) times daily before meals. 7/12/17  Yes James Covarrubias MD   SYNTHROID 25 mcg tablet 1 po daily except 2 po daily on Saturday and Sunday. 7/12/17  Yes James Covarrubias MD   biotin 1 mg tablet Take 1,000 mcg by mouth once daily.    Historical Provider, MD   diclofenac sodium (VOLTAREN) 1 % Gel Apply 2 g topically 3 (three) times daily. 11/2/18   Deniz Barba PA-C   fish oil-omega-3 fatty acids 300-1,000 mg capsule Take 2 g by mouth once daily.    Historical Provider, MD   sertraline (ZOLOFT) 100 MG tablet Take 1 tablet  (100 mg total) by mouth once daily. 7/12/17   James Covarrubias MD   simvastatin (ZOCOR) 40 MG tablet Take 1 tablet (40 mg total) by mouth nightly. 7/12/17   James Covarrubias MD   hydrochlorothiazide (HYDRODIURIL) 12.5 MG Tab Take 1 tablet (12.5 mg total) by mouth every morning. 7/12/17 12/17/18  James Covarrubias MD   Lactobacillus rhamnosus GG (CULTURELLE) 10 billion cell capsule Take 1 capsule by mouth once daily.  12/17/18  Historical Provider, MD   multivitamin with minerals tablet Take 1 tablet by mouth once daily.  12/17/18  Historical Provider, MD   TURMERIC-TURMERIC ROOT EXTRACT ORAL Take 1 capsule by mouth once daily.  12/17/18  Historical Provider, MD     Anticoagulants/Antiplatelets: aspirin    Allergies:   Review of patient's allergies indicates:   Allergen Reactions    No known drug allergies      Sedation History:  no adverse reactions    Review of Systems:   Hematological: no known coagulopathies  Respiratory: no shortness of breath  Cardiovascular: no chest pain  Gastrointestinal: no abdominal pain  Genito-Urinary: no dysuria  Musculoskeletal: negative  Neurological: no TIA or stroke symptoms         OBJECTIVE:     Vital Signs (Most Recent)  Temp: 98.1 °F (36.7 °C) (12/17/18 0818)  Pulse: 73 (12/17/18 0818)  Resp: 16 (12/17/18 0818)  BP: 135/72 (12/17/18 0818)  SpO2: 97 % (12/17/18 0818)    Physical Exam:  ASA: 2  Mallampati: 2    General: no acute distress  Mental Status: alert and oriented to person, place and time  HEENT: normocephalic, atraumatic  Chest: unlabored breathing  Heart: regular heart rate  Abdomen: nondistended  Extremity: moves all extremities    Laboratory  Lab Results   Component Value Date    INR 0.9 12/17/2018       Lab Results   Component Value Date    WBC 5.64 12/17/2018    HGB 13.5 12/17/2018    HCT 40.0 12/17/2018     (H) 12/17/2018     12/17/2018      Lab Results   Component Value Date     (H) 12/17/2018     12/17/2018    K 4.0 12/17/2018      12/17/2018    CO2 28 12/17/2018    BUN 15 12/17/2018    CREATININE 0.8 12/17/2018    CALCIUM 9.8 12/17/2018    MG 2.4 08/25/2015    ALT 25 12/17/2018    AST 23 12/17/2018    ALBUMIN 3.9 12/17/2018    BILITOT 0.3 12/17/2018    BILIDIR 0.2 09/17/2008       ASSESSMENT/PLAN:     Sedation Plan: moderate  Patient will undergo diagnostic cerebral angiogram.    David Galvan  Radiology PGY-5

## 2018-12-17 NOTE — DISCHARGE SUMMARY
Radiology Discharge Summary      Hospital Course: No complications    Admit Date: 12/17/2018  Discharge Date: 12/17/2018     Instructions Given to Patient: Yes  Diet: Resume prior diet  Activity: activity as tolerated    Description of Condition on Discharge: Stable  Vital Signs (Most Recent): Temp: 98.1 °F (36.7 °C) (12/17/18 1000)  Pulse: 67 (12/17/18 1200)  Resp: 18 (12/17/18 1200)  BP: 127/71 (12/17/18 1200)  SpO2: 99 % (12/17/18 1200)    Discharge Disposition: Home    Discharge Diagnosis: no recurrence of right paraophthalmic aneurysm; unruptured left MCA bifurcation aneurysm     Follow-up: pt will follow-up in NeuroIR clinic with yearly MRA of the brain to monitor left MCA aneurysm    David Galvan  Radiology PGY-5

## 2018-12-17 NOTE — PROGRESS NOTES
Ok to discharge per  Dr Engle. Discharge instructions reviewed with patient and family. Understanding verbalized. Dressing CDI. VSS. IV discontinued with cannula intact, dressing applied. Patient awaiting transport w/c and will be accompanied by .

## 2018-12-17 NOTE — DISCHARGE INSTRUCTIONS
Interventional Radiology [phone]  Mon-Fri  8A-4P  24hr Radiology Resident on call [phone]

## 2018-12-17 NOTE — PLAN OF CARE
Patient assigned to this writer by charge nurse. The patient is in the waiting room but will be assigned to [facility] room 3.This writer is completing a chart review and reviewing MD orders.

## 2018-12-17 NOTE — PROGRESS NOTES
Pt arrived to  for diagnostic cerebral angiogram.  Name verified using two identifiers.  Allergies verified. Consent in chart.  Will continue to monitor.

## 2019-01-04 ENCOUNTER — TELEPHONE (OUTPATIENT)
Dept: INTERVENTIONAL RADIOLOGY/VASCULAR | Facility: CLINIC | Age: 69
End: 2019-01-04

## 2019-01-04 ENCOUNTER — TELEPHONE (OUTPATIENT)
Dept: SPORTS MEDICINE | Facility: CLINIC | Age: 69
End: 2019-01-04

## 2019-01-04 NOTE — TELEPHONE ENCOUNTER
Spoke to the patient and canceled her surgery and corresponding appts. She will call when she is ready to reschedule.     ----- Message from Monica Cole sent at 1/4/2019  2:40 PM CST -----  Contact: self  Pt Is needing a call back to reschedule surgery pt can be reached @home#

## 2019-01-04 NOTE — TELEPHONE ENCOUNTER
Left message for pt to return my call.  Need to schedule IR clinic follow up.  Please forward call to [phone]. Deborah

## 2019-01-15 ENCOUNTER — OFFICE VISIT (OUTPATIENT)
Dept: INTERVENTIONAL RADIOLOGY/VASCULAR | Facility: CLINIC | Age: 69
End: 2019-01-15
Payer: MEDICARE

## 2019-01-15 VITALS
HEIGHT: 62 IN | WEIGHT: 129.5 LBS | SYSTOLIC BLOOD PRESSURE: 134 MMHG | BODY MASS INDEX: 23.83 KG/M2 | DIASTOLIC BLOOD PRESSURE: 81 MMHG | HEART RATE: 75 BPM

## 2019-01-15 DIAGNOSIS — I67.1 CEREBRAL ANEURYSM: ICD-10-CM

## 2019-01-15 PROCEDURE — 99213 PR OFFICE/OUTPT VISIT, EST, LEVL III, 20-29 MIN: ICD-10-PCS | Mod: S$GLB,,, | Performed by: RADIOLOGY

## 2019-01-15 PROCEDURE — 3075F PR MOST RECENT SYSTOLIC BLOOD PRESS GE 130-139MM HG: ICD-10-PCS | Mod: CPTII,S$GLB,, | Performed by: RADIOLOGY

## 2019-01-15 PROCEDURE — 1101F PT FALLS ASSESS-DOCD LE1/YR: CPT | Mod: CPTII,S$GLB,, | Performed by: RADIOLOGY

## 2019-01-15 PROCEDURE — 3079F PR MOST RECENT DIASTOLIC BLOOD PRESSURE 80-89 MM HG: ICD-10-PCS | Mod: CPTII,S$GLB,, | Performed by: RADIOLOGY

## 2019-01-15 PROCEDURE — 99999 PR PBB SHADOW E&M-EST. PATIENT-LVL III: CPT | Mod: PBBFAC,,,

## 2019-01-15 PROCEDURE — 3075F SYST BP GE 130 - 139MM HG: CPT | Mod: CPTII,S$GLB,, | Performed by: RADIOLOGY

## 2019-01-15 PROCEDURE — 3079F DIAST BP 80-89 MM HG: CPT | Mod: CPTII,S$GLB,, | Performed by: RADIOLOGY

## 2019-01-15 PROCEDURE — 99213 OFFICE O/P EST LOW 20 MIN: CPT | Mod: S$GLB,,, | Performed by: RADIOLOGY

## 2019-01-15 PROCEDURE — 1101F PR PT FALLS ASSESS DOC 0-1 FALLS W/OUT INJ PAST YR: ICD-10-PCS | Mod: CPTII,S$GLB,, | Performed by: RADIOLOGY

## 2019-01-15 PROCEDURE — 99999 PR PBB SHADOW E&M-EST. PATIENT-LVL III: ICD-10-PCS | Mod: PBBFAC,,,

## 2019-01-15 NOTE — PROGRESS NOTES
Subjective:       Patient ID: Bernadette Aquino is a 68 y.o. female.    Chief Complaint: Follow-up    Patient here with is sp cerebral angiogram for surveillance of her previously treated ICA aneurysm and her small MCA aneurysm. She is doing well, reports her groin is CDI and demonstrates no signs or symptoms of infection. She denies new neurological deficits, including numbness, weakness, or other signs of stroke. She additionally reports she has had no new syncopal events and denies dizziness, shortness of breath, chest pain or palpitation. Today she reports that she had an aunt who had a subarachnoid hemorrhage due to ruptured aneurysm. She denies smoking and informed me she leads a healthy lifestyle.       Review of Systems   Constitutional: Negative for activity change, appetite change, chills, fatigue and fever.   Respiratory: Negative for cough, shortness of breath, wheezing and stridor.    Cardiovascular: Negative for chest pain, palpitations and leg swelling.   Gastrointestinal: Negative for abdominal distention, abdominal pain, constipation, diarrhea, nausea and vomiting.   Musculoskeletal: Negative for arthralgias, back pain and gait problem.   Skin: Negative for color change, pallor and rash.   Neurological: Negative for dizziness, seizures, syncope, facial asymmetry, speech difficulty, weakness, light-headedness, numbness and headaches.   Psychiatric/Behavioral: Negative for behavioral problems.       Objective:      Physical Exam   Constitutional: She is oriented to person, place, and time. She appears well-developed and well-nourished. No distress.   HENT:   Head: Normocephalic and atraumatic.   Right Ear: External ear normal.   Left Ear: External ear normal.   Nose: Nose normal.   Mouth/Throat: Oropharynx is clear and moist.   Eyes: Conjunctivae and EOM are normal. Pupils are equal, round, and reactive to light.   Neck: Normal range of motion.   Cardiovascular: Normal rate, regular rhythm and normal  heart sounds. Exam reveals no gallop and no friction rub.   No murmur heard.  Pulmonary/Chest: Effort normal and breath sounds normal. No respiratory distress. She has no wheezes. She has no rales.   Abdominal: Soft. Bowel sounds are normal. She exhibits no distension and no mass. There is no tenderness. There is no guarding.   Musculoskeletal: Normal range of motion.   Left knee pain; limping with walking  LLE 4/5 (secondary to possible meniscus tear)   Neurological: She is alert and oriented to person, place, and time. No cranial nerve deficit or sensory deficit. Gait normal.   Skin: Skin is warm and dry. Capillary refill takes less than 2 seconds. She is not diaphoretic. No erythema. No pallor.   Psychiatric: She has a normal mood and affect.   Vitals reviewed.      Assessment:       1. Cerebral aneurysm        Review of Imaging  MRA (5/28/18)  Impression       Continue attenuation of flow related enhancement right supraclinoid ICA compatible with known endovascular stent coiling.  There is continue though similar flow enhancement extending to the base of the aneurysm coil pack without definite increased enhancement within the interstices of the coil pack to suggest definite worsening residual aneurysm allowing for limitation by metal artifact..  Clinical correlation and further evaluation with conventional angiography as warranted.    There is stable lateral projecting left MCA bifurcation aneurysm.     Review of Imaging  Angiogram(12/17/18)  Impression       1.  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    2.  Wide necked left MCA bifurcation aneurysm measuring 2.4 x 2.0 mm, slightly increased in size from 2010.    3.  Small shallow blister type aneurysm from the left paraophthalmic ICA measuring 2.7 x 1.1 mm.  Plan:   Reviewed imaging with patient. At this time risk of endovascular stenting out-weigh benefits per Dr. Engle. He discussed that the  aneurysm is quite small and the risk for rupture is extremely low. He feels if the aneurysm continues to grow treatment of her Left MCA aneurysm would be an option, via stenting. However he discussed that she should have her surgery prior to this as she would need to be on dual antiplatelet. He feels that the risk of rupture of this aneurysm is very low, however that surveillance using MRA would be prudent. He reccomends a 6-12 month follow up angiogram to observe for growth. Clinic phone number provided.Signs and symptoms of stroke discussed/when to go to ED. Patient was educated on familial aneurysms as well and that we would be happy to provide family members with appointment to do MRA(screening). Dr. Engle reviewed this case and agrees with above plan of care.    I spent 45 minutes performing a direct face-to face visit with Ms. Aquino and greater than 50% of that time was spent counseling/coordinating care. She verbalized understanding of all of the above. Was given appropriate time ask questions, and these were answered. She was encouraged to call office with any new questions, comments, or concerns.

## 2019-01-31 ENCOUNTER — PES CALL (OUTPATIENT)
Dept: ADMINISTRATIVE | Facility: CLINIC | Age: 69
End: 2019-01-31

## 2019-02-12 ENCOUNTER — OFFICE VISIT (OUTPATIENT)
Dept: INTERVENTIONAL RADIOLOGY/VASCULAR | Facility: CLINIC | Age: 69
End: 2019-02-12
Payer: MEDICARE

## 2019-02-12 VITALS
HEIGHT: 63 IN | BODY MASS INDEX: 23.25 KG/M2 | DIASTOLIC BLOOD PRESSURE: 87 MMHG | SYSTOLIC BLOOD PRESSURE: 144 MMHG | WEIGHT: 131.19 LBS | HEART RATE: 74 BPM

## 2019-02-12 DIAGNOSIS — I67.1 CEREBRAL ANEURYSM, NONRUPTURED: Primary | ICD-10-CM

## 2019-02-12 DIAGNOSIS — I67.1 CEREBRAL ANEURYSM: ICD-10-CM

## 2019-02-12 PROCEDURE — 3079F PR MOST RECENT DIASTOLIC BLOOD PRESSURE 80-89 MM HG: ICD-10-PCS | Mod: CPTII,S$GLB,, | Performed by: NURSE PRACTITIONER

## 2019-02-12 PROCEDURE — 99215 PR OFFICE/OUTPT VISIT, EST, LEVL V, 40-54 MIN: ICD-10-PCS | Mod: S$GLB,,, | Performed by: NURSE PRACTITIONER

## 2019-02-12 PROCEDURE — 1101F PR PT FALLS ASSESS DOC 0-1 FALLS W/OUT INJ PAST YR: ICD-10-PCS | Mod: CPTII,S$GLB,, | Performed by: NURSE PRACTITIONER

## 2019-02-12 PROCEDURE — 99999 PR PBB SHADOW E&M-EST. PATIENT-LVL III: ICD-10-PCS | Mod: PBBFAC,,, | Performed by: NURSE PRACTITIONER

## 2019-02-12 PROCEDURE — 3079F DIAST BP 80-89 MM HG: CPT | Mod: CPTII,S$GLB,, | Performed by: NURSE PRACTITIONER

## 2019-02-12 PROCEDURE — 99999 PR PBB SHADOW E&M-EST. PATIENT-LVL III: CPT | Mod: PBBFAC,,, | Performed by: NURSE PRACTITIONER

## 2019-02-12 PROCEDURE — 99215 OFFICE O/P EST HI 40 MIN: CPT | Mod: S$GLB,,, | Performed by: NURSE PRACTITIONER

## 2019-02-12 PROCEDURE — 3077F SYST BP >= 140 MM HG: CPT | Mod: CPTII,S$GLB,, | Performed by: NURSE PRACTITIONER

## 2019-02-12 PROCEDURE — 3077F PR MOST RECENT SYSTOLIC BLOOD PRESSURE >= 140 MM HG: ICD-10-PCS | Mod: CPTII,S$GLB,, | Performed by: NURSE PRACTITIONER

## 2019-02-12 PROCEDURE — 1101F PT FALLS ASSESS-DOCD LE1/YR: CPT | Mod: CPTII,S$GLB,, | Performed by: NURSE PRACTITIONER

## 2019-02-12 NOTE — PROGRESS NOTES
Subjective:       Patient ID: Bernadette Aquino is a 68 y.o. female.    Chief Complaint: Follow-up    Patient here with is sp cerebral angiogram for surveillance of her previously treated ICA aneurysm and her small MCA aneurysm. She is doing well, reports her groin is CDI and demonstrates no signs or symptoms of infection. She denies new neurological deficits, including numbness, weakness, or other signs of stroke. She additionally reports she has had no new syncopal events and denies dizziness, shortness of breath, chest pain or palpitation. Today she reports that she had an aunt who had a subarachnoid hemorrhage due to ruptured aneurysm. She denies smoking and informed me she leads a healthy lifestyle. She had some new questions regarding her knee surgery and when it could be done.       Review of Systems   Constitutional: Negative for activity change, appetite change, chills, fatigue and fever.   Respiratory: Negative for cough, shortness of breath, wheezing and stridor.    Cardiovascular: Negative for chest pain, palpitations and leg swelling.   Gastrointestinal: Negative for abdominal distention, abdominal pain, constipation, diarrhea, nausea and vomiting.   Musculoskeletal: Negative for arthralgias, back pain and gait problem.   Skin: Negative for color change, pallor and rash.   Neurological: Negative for dizziness, seizures, syncope, facial asymmetry, speech difficulty, weakness, light-headedness, numbness and headaches.   Psychiatric/Behavioral: Negative for behavioral problems.       Objective:      Physical Exam   Constitutional: She is oriented to person, place, and time. She appears well-developed and well-nourished. No distress.   HENT:   Head: Normocephalic and atraumatic.   Right Ear: External ear normal.   Left Ear: External ear normal.   Nose: Nose normal.   Mouth/Throat: Oropharynx is clear and moist.   Eyes: Conjunctivae and EOM are normal. Pupils are equal, round, and reactive to light.   Neck:  Normal range of motion.   Cardiovascular: Normal rate, regular rhythm and normal heart sounds. Exam reveals no gallop and no friction rub.   No murmur heard.  Pulmonary/Chest: Effort normal and breath sounds normal. No respiratory distress. She has no wheezes. She has no rales.   Abdominal: Soft. Bowel sounds are normal. She exhibits no distension and no mass. There is no tenderness. There is no guarding.   Musculoskeletal: Normal range of motion.   Left knee pain; limping with walking  LLE 4/5 (secondary to possible meniscus tear)   Neurological: She is alert and oriented to person, place, and time. No cranial nerve deficit or sensory deficit. Gait normal.   Skin: Skin is warm and dry. Capillary refill takes less than 2 seconds. She is not diaphoretic. No erythema. No pallor.   Psychiatric: She has a normal mood and affect.   Vitals reviewed.      Assessment:       1. Cerebral aneurysm, nonruptured    2. Cerebral aneurysm        Review of Imaging  MRA (5/28/18)  Impression       Continue attenuation of flow related enhancement right supraclinoid ICA compatible with known endovascular stent coiling.  There is continue though similar flow enhancement extending to the base of the aneurysm coil pack without definite increased enhancement within the interstices of the coil pack to suggest definite worsening residual aneurysm allowing for limitation by metal artifact..  Clinical correlation and further evaluation with conventional angiography as warranted.    There is stable lateral projecting left MCA bifurcation aneurysm.     Review of Imaging  Angiogram(12/17/18)  Impression       1.  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    2.  Wide necked left MCA bifurcation aneurysm measuring 2.4 x 2.0 mm, slightly increased in size from 2010.    3.  Small shallow blister type aneurysm from the left paraophthalmic ICA measuring 2.7 x 1.1 mm.  Plan:   Dr. Engle saw and  examined this patient. He reviewed imaging with patient. At this time risk of endovascular stenting out-weigh benefits per Dr. Engel. He discussed that the aneurysm is quite small and the risk for rupture is extremely low. He feels if the aneurysm continues to grow treatment of her Left MCA aneurysm would be an option, via stenting. However he discussed that she should have her surgery prior to this as she would need to be on dual antiplatelet to have the pipeline placed. Encouraged patient that if she needs General anesthesia for her procedure, anesthesia to minimize increases in intracranial/intrathoracic pressure for optimal aneurysm protection. Dr. Engle expressed that he feels that the risk of rupture of this aneurysm is very low, however that surveillance using MRA would be prudent in the future if it doesn't grow. He reccomends a 6-12 month follow up angiogram to observe for growth, and we could correlate a baseline MRA, then follow her with annual MRA thereafter if it has not demonstrated growth on her angiogram. Clinic phone number provided.Signs and symptoms of stroke discussed/when to go to ED. Patient was educated on familial aneurysms as well and that we would be happy to provide family members with appointment to do MRA(screening). Follow up with PCP for BP control.     I spent 45 minutes performing a direct face-to face visit with Ms. Aquino and greater than 50% of that time was spent counseling/coordinating care. She verbalized understanding of all of the above. Was given appropriate time ask questions, and these were answered. She was encouraged to call office with any new questions, comments, or concerns.

## 2019-02-12 NOTE — PATIENT INSTRUCTIONS
RTC in 6 months   We will plan to perform and angiogram at that time  If your aneurysm has not grown on angiogram, we can obtain a baseline MRA   and follow you with MRAs after that

## 2019-02-13 ENCOUNTER — TELEPHONE (OUTPATIENT)
Dept: SPORTS MEDICINE | Facility: CLINIC | Age: 69
End: 2019-02-13

## 2019-02-13 NOTE — TELEPHONE ENCOUNTER
M for Ms. Fleming to return my calling regarding her surgery.    ----- Message from Kenia Reveles MA sent at 2/13/2019 11:57 AM CST -----  Contact: patient      ----- Message -----  From: Francisca Livingston  Sent: 2/13/2019  11:20 AM  To: [facility] Staff    Attn Kenia    Please call pt at [phone]    Would like to discuss future surgery with you only    Thank you

## 2019-02-14 ENCOUNTER — TELEPHONE (OUTPATIENT)
Dept: SPORTS MEDICINE | Facility: CLINIC | Age: 69
End: 2019-02-14

## 2019-02-14 NOTE — TELEPHONE ENCOUNTER
Telephoned patient and scheduled surgery with Dr. Altman 3/22/19.  ----- Message from Kenia Reveles MA sent at 2/14/2019 12:05 PM CST -----  Contact: Self      ----- Message -----  From: Mahi Conklin  Sent: 2/14/2019  12:01 PM  To: [facility] Staff    Pt requesting a return call back from [facility] and could be reached [phone]

## 2019-02-15 ENCOUNTER — TELEPHONE (OUTPATIENT)
Dept: SPORTS MEDICINE | Facility: CLINIC | Age: 69
End: 2019-02-15

## 2019-02-18 ENCOUNTER — TELEPHONE (OUTPATIENT)
Dept: SPORTS MEDICINE | Facility: CLINIC | Age: 69
End: 2019-02-18

## 2019-02-18 DIAGNOSIS — M17.11 PRIMARY OSTEOARTHRITIS OF RIGHT KNEE: Primary | ICD-10-CM

## 2019-02-18 NOTE — TELEPHONE ENCOUNTER
Spoke to the patient scheduled an appt for Right knee Synvisc-One injection.   ----- Message from Mahi Conklin sent at 2/18/2019  3:11 PM CST -----  Contact: Self  Pt requesting a return call back from [facility] regarding her upcoming surgery and could be reached at [phone]

## 2019-02-19 DIAGNOSIS — M94.262 CHONDROMALACIA OF LEFT KNEE: ICD-10-CM

## 2019-02-19 DIAGNOSIS — S83.242A ACUTE MEDIAL MENISCUS TEAR OF LEFT KNEE, INITIAL ENCOUNTER: Primary | ICD-10-CM

## 2019-02-19 DIAGNOSIS — M23.92 INTERNAL DERANGEMENT OF LEFT KNEE: ICD-10-CM

## 2019-02-19 DIAGNOSIS — M22.42 CHONDROMALACIA OF LEFT PATELLA: ICD-10-CM

## 2019-03-11 ENCOUNTER — TELEPHONE (OUTPATIENT)
Dept: SPORTS MEDICINE | Facility: CLINIC | Age: 69
End: 2019-03-11

## 2019-03-11 ENCOUNTER — DOCUMENTATION ONLY (OUTPATIENT)
Dept: SPORTS MEDICINE | Facility: CLINIC | Age: 69
End: 2019-03-11

## 2019-03-11 ENCOUNTER — OFFICE VISIT (OUTPATIENT)
Dept: SPORTS MEDICINE | Facility: CLINIC | Age: 69
End: 2019-03-11
Payer: MEDICARE

## 2019-03-11 VITALS
SYSTOLIC BLOOD PRESSURE: 132 MMHG | HEIGHT: 63 IN | WEIGHT: 131 LBS | BODY MASS INDEX: 23.21 KG/M2 | HEART RATE: 82 BPM | DIASTOLIC BLOOD PRESSURE: 87 MMHG

## 2019-03-11 DIAGNOSIS — M23.92 INTERNAL DERANGEMENT OF LEFT KNEE: Primary | ICD-10-CM

## 2019-03-11 DIAGNOSIS — S83.242A ACUTE MEDIAL MENISCUS TEAR OF LEFT KNEE, INITIAL ENCOUNTER: ICD-10-CM

## 2019-03-11 DIAGNOSIS — M94.262 CHONDROMALACIA OF LEFT KNEE: ICD-10-CM

## 2019-03-11 PROCEDURE — 99499 UNLISTED E&M SERVICE: CPT | Mod: S$GLB,,, | Performed by: PHYSICIAN ASSISTANT

## 2019-03-11 PROCEDURE — 99999 PR PBB SHADOW E&M-EST. PATIENT-LVL IV: ICD-10-PCS | Mod: PBBFAC,,, | Performed by: PHYSICIAN ASSISTANT

## 2019-03-11 PROCEDURE — 99499 NO LOS: ICD-10-PCS | Mod: S$GLB,,, | Performed by: PHYSICIAN ASSISTANT

## 2019-03-11 PROCEDURE — 99999 PR PBB SHADOW E&M-EST. PATIENT-LVL IV: CPT | Mod: PBBFAC,,, | Performed by: PHYSICIAN ASSISTANT

## 2019-03-11 RX ORDER — SODIUM CHLORIDE 0.9 % (FLUSH) 0.9 %
5 SYRINGE (ML) INJECTION
Status: CANCELLED | OUTPATIENT
Start: 2019-03-11

## 2019-03-11 RX ORDER — CELECOXIB 200 MG/1
200 CAPSULE ORAL 2 TIMES DAILY
Qty: 60 CAPSULE | Refills: 0 | Status: SHIPPED | OUTPATIENT
Start: 2019-03-11

## 2019-03-11 RX ORDER — OXYCODONE AND ACETAMINOPHEN 10; 325 MG/1; MG/1
1 TABLET ORAL EVERY 6 HOURS PRN
Qty: 30 TABLET | Refills: 0 | Status: SHIPPED | OUTPATIENT
Start: 2019-03-11 | End: 2022-07-16

## 2019-03-11 RX ORDER — MUPIROCIN 20 MG/G
OINTMENT TOPICAL
Status: CANCELLED | OUTPATIENT
Start: 2019-03-11

## 2019-03-11 RX ORDER — ASPIRIN 325 MG
325 TABLET ORAL DAILY
Qty: 42 TABLET | Refills: 0 | Status: SHIPPED | OUTPATIENT
Start: 2019-03-11 | End: 2019-04-22

## 2019-03-11 RX ORDER — TRAMADOL HYDROCHLORIDE 50 MG/1
50 TABLET ORAL EVERY 6 HOURS PRN
Qty: 30 TABLET | Refills: 0 | Status: SHIPPED | OUTPATIENT
Start: 2019-03-11 | End: 2019-03-21

## 2019-03-11 RX ORDER — PROMETHAZINE HYDROCHLORIDE 25 MG/1
25 TABLET ORAL EVERY 4 HOURS PRN
Qty: 30 TABLET | Refills: 0 | Status: SHIPPED | OUTPATIENT
Start: 2019-03-11 | End: 2022-07-16

## 2019-03-11 NOTE — TELEPHONE ENCOUNTER
**PT STILL NEEDS CLEARANCE**While pt was at her pre-op appt with Harvey Barba, she requested the letter to her Doctor that Dr. Altman wrote back in December be re-printed with today's date so she could bring it to her doctor appt. She stated she would get her clearance to us before her surgery date 03/22/2019.

## 2019-03-11 NOTE — H&P (VIEW-ONLY)
Bernadette Aquino  is here for a completion of her perioperative paperwork. she  Is scheduled to undergo 1. Left knee femoral subchondroplasty  2. Left knee tibial subchondroplasty  3. Left knee arthroscopic medial menisectomy  4. Left knee arthroscopic chondroplasty  5. Left knee arthroscopic synovectomy     on 3/22/2019.  She is a healthy individual and does need clearance for this procedure.     Risks, indications and benefits of the surgical procedure were discussed with the patient. All questions with regard to surgery, rehab, expected return to functional activities, activities of daily living and recreational endeavors were answered to her satisfaction.    Patient was informed and understands the risks of surgery are greater for patients with a current condition or history of heart disease, obesity, clotting disorders, recurrent infections, steroid use, current or past smoking, and factors such as sedentary lifestyle and noncompliance with medications, therapy or follow-up. The degree of the increased risk is hard to estimate with any degree of precision.    Once no other questions were asked, a brief history and physical exam was then performed.    PAST MEDICAL HISTORY:   Past Medical History:   Diagnosis Date    Appendicitis with perforation     Status post appendectomy, January 2011    Celiac disease     Cerebral aneurysm     Right internal carotid artery, nonruptured, status post coil    DDD (degenerative disc disease), cervical     Paresthesia of hands    Elevated liver enzymes     Prior history    Encounter for blood transfusion     GERD (gastroesophageal reflux disease)     HTN (hypertension)     Hyperlipidemia     Mild depression     Multinodular thyroid     u/s 9/11    Osteopenia     Prior history of osteoporosis on bisphosphonate    Other specified acquired hypothyroidism     On replacement    Single cyst of left breast      PAST SURGICAL HISTORY:   Past Surgical History:   Procedure  Laterality Date    ANGIOGRAM-CEREBRAL N/A 2018    Performed by [facility] at [facility] OR 2ND FLR    APPENDECTOMY      2011    ARTHROSCOPY-SHOULDER Right 2015    [facility] at [facility]    ARTHROSCOPY-SHOULDER WITH SUBACROMIAL DECOMPRESSION Right 2015    [facility] at [facility] OR    BICEP TENODESIS ARTHROSCOPIC (TENDON FIXATION) Left 2015    [facility] at [facility] OR    BREAST BIOPSY Left     BREAST LUMPECTOMY Right     BREAST SURGERY      BUNIONECTOMY  10/2013    right foot    CARPAL TUNNEL RELEASE Left     CEREBRAL ANEURYSM REPAIR       SECTION      CHOLECYSTECTOMY      COLONOSCOPY N/A 10/6/2016    Performed by Tom Gonzales MD at [facility] ENDO (4TH FLR)    EGD (ESOPHAGOGASTRODUODENOSCOPY) N/A 10/3/2013    Performed by Tom Gonzales [facility] at [facility] ENDO (4TH FLR)    ESOPHAGOGASTRODUODENOSCOPY (EGD) N/A 10/6/2016    Performed by Tom Gonzales [facility] at [facility] ENDO (4TH FLR)    EYE SURGERY Left     pterygium removal    INJECTION-STEROID-EPIDURAL-CERVICAL N/A 2015    Performed by Margaret Rand MD at [facility] PAIN MGT    INJECTION-STEROID-EPIDURAL-CERVICAL N/A 2014    Performed by Margaret [facility] at [facility] PAIN MGT    KNEE ARTHROSCOPY Right     LABRAL DEBRIDEMENT-SHOULDER-ARTHROSCOPIC Right 2015    Per[facility] at [facility] OR    REPAIR ROTATOR CUFF ARTHROSCOPIC Right 2015    [facility] at [facility] OR    SHOULDER ARTHROSCOPY Right 2015    Dr Altman    TOTAL ABDOMINAL HYSTERECTOMY       FAMILY HISTORY:   Family History   Problem Relation Age of Onset    Hyperlipidemia Mother     Diverticulitis Mother     Hypertension Father     Diabetes Father     COPD Father     Heart disease Father     Hypertension Sister     Hyperlipidemia Sister     Heart disease Sister     Gout Brother     Celiac disease Neg Hx     Colon cancer Neg Hx     Cirrhosis Neg  Hx     Crohn's disease Neg Hx     Esophageal cancer Neg Hx     Irritable bowel syndrome Neg Hx     Liver cancer Neg Hx     Rectal cancer Neg Hx     Stomach cancer Neg Hx     Ulcerative colitis Neg Hx      SOCIAL HISTORY:   Social History     Socioeconomic History    Marital status:      Spouse name: Not on file    Number of children: 3    Years of education: college    Highest education level: Not on file   Social Needs    Financial resource strain: Not on file    Food insecurity - worry: Not on file    Food insecurity - inability: Not on file    Transportation needs - medical: Not on file    Transportation needs - non-medical: Not on file   Occupational History    Occupation: Liv[employer]     Employer: [employer]     Comment: retired     Employer: [employer]   Tobacco Use    Smoking status: Never Smoker    Smokeless tobacco: Never Used   Substance and Sexual Activity    Alcohol use: Yes     Comment: socially, rare    Drug use: No    Sexual activity: Yes     Partners: Male   Other Topics Concern    Not on file   Social History Narrative    Plays tennis regularly    Healthy diet       MEDICATIONS:   Current Outpatient Medications:     amLODIPine (NORVASC) 2.5 MG tablet, Take 2.5 mg by mouth once daily., Disp: , Rfl:     ascorbic acid, vitamin C, (VITAMIN C) 1000 MG tablet, Take 1,000 mg by mouth once daily., Disp: , Rfl:     aspirin (ECOTRIN) 81 MG EC tablet, Take 81 mg by mouth once daily. , Disp: , Rfl: 0    biotin 1 mg tablet, Take 1,000 mcg by mouth once daily., Disp: , Rfl:     cholecalciferol, vitamin D3, (VITAMIN D3) 5,000 unit Tab, Take 5,000 Units by mouth once daily., Disp: , Rfl:     diclofenac sodium (VOLTAREN) 1 % Gel, Apply 2 g topically 3 (three) times daily., Disp: 1 Tube, Rfl: 0    fish oil-omega-3 fatty acids 300-1,000 mg capsule, Take 2 g by mouth once daily., Disp: , Rfl:     omeprazole (PRILOSEC) 20 MG capsule, Take 1 capsule (20 mg total) by mouth 2 (two)  times daily before meals., Disp: 180 capsule, Rfl: 3    sertraline (ZOLOFT) 100 MG tablet, Take 1 tablet (100 mg total) by mouth once daily., Disp: 90 tablet, Rfl: 3    simvastatin (ZOCOR) 40 MG tablet, Take 1 tablet (40 mg total) by mouth nightly., Disp: 90 tablet, Rfl: 3    SYNTHROID 25 mcg tablet, 1 po daily except 2 po daily on Saturday and Sunday., Disp: 135 tablet, Rfl: 3  ALLERGIES:   Review of patient's allergies indicates:   Allergen Reactions    No known drug allergies        Review of Systems   Constitution: Negative. Negative for chills, fever and night sweats.   HENT: Negative for congestion and headaches.    Eyes: Negative for blurred vision, left vision loss and right vision loss.   Cardiovascular: Negative for chest pain and syncope.   Respiratory: Negative for cough and shortness of breath.    Endocrine: Negative for polydipsia, polyphagia and polyuria.   Hematologic/Lymphatic: Negative for bleeding problem. Does not bruise/bleed easily.   Skin: Negative for dry skin, itching and rash.   Musculoskeletal: Negative for falls and muscle weakness.   Gastrointestinal: Negative for abdominal pain and bowel incontinence.   Genitourinary: Negative for bladder incontinence and nocturia.   Neurological: Negative for disturbances in coordination, loss of balance and seizures.   Psychiatric/Behavioral: Negative for depression. The patient does not have insomnia.    Allergic/Immunologic: Negative for hives and persistent infections.     PHYSICAL EXAM:  GEN: A&Ox3, WD WN NAD  HEENT: WNL  CHEST: CTAB, no W/R/R  HEART: RRR, no M/R/G   ABD: Soft, NT ND, BS x4 QUADS  MS: Refer to previous note for detailed MS exam  NEURO: CN II-XII intact       The surgical consent was then reviewed with the patient, who agreed with all the contents of the consent form and it was signed. she was then given the [facility] surgery packet to bring with her to [facility] for the anesthesia portion of her perioperative paperwork.      PHYSICAL THERAPY:  She was also instructed regarding physical therapy and will begin POD # 1-3. She was given a copy of the original prescription to schedule. Another copy of this prescription was also faxed to [facility].    POST OP CARE:instructions were reviewed including care of the wound and dressing after surgery and when she can shower.     PAIN MANAGEMENT: Bernadette Aquino was also given a pain management regime, which includes the TENS unit given to her by James De La Rosa along with the education required for its use. She was also instructed regarding the Polar ice unit that will be in place after surgery and her postoperative pain medications.     PAIN MEDICATION:  Percocet 10/325mg 1 po q 4-6 hours prn pain  Ultram 50 mg one p.o. q.4-6 hours p.r.n. breakthrough pain,   Phenergan 25 mg one p.o. q.4-6 hours p.r.n. nausea and vomiting.  Celebrex 200 mg BID    Patient denies history of seizures.     Patient was instructed to buy and take:  Aspirin 325mg daily x 6 weeks for DVT prophylaxis starting on the evening after surgery.  Patient will also use bilateral TEDs on lower extremities, SCDs during surgery, and early ambulation post-op. If the patient was previously taking 81mg baby aspirin, they were told to not take it will using the above stated aspirin and to restart the 81mg aspirin after completion of the aspirin dose.       Patient was also told to buy over the counter Prilosec medication and take it once daily for GI protection as long as they are taking NSAIDs or Aspirin.    DVT prophylaxis was discussed with the patient today including risk factors for developing DVTs and history of DVTs. The patient was asked if any specific recommendations were given from the doctor/s that did pre-operative surgical clearance.      Patient was asked if they were taking or using OCP pills or devices. If they answered yes, then they were instructed to stop using OCPs at this pre-operative appointment until 2  months post-op to help prevent DVT development. They understand that there are other forms of birth control that do not involve hormones. They expressed understanding that ignoring/not following this instruction could result in a DVT which could turn into a deadly pulmonary embolism.      The patient was told that narcotic pain medications may make them drowsy and instructions were given to not sign legal documents, drive or operate heavy machinery, cars, or equipment while under the influence of narcotic medications.     As there were no other questions to be asked, she was given my business card along with Shalonda Altman MD business card if she has any questions or concerns prior to surgery or in the postop period.

## 2019-03-14 ENCOUNTER — TELEPHONE (OUTPATIENT)
Dept: SPORTS MEDICINE | Facility: CLINIC | Age: 69
End: 2019-03-14

## 2019-03-14 NOTE — TELEPHONE ENCOUNTER
Patient says she has an appointment on tomorrow 3/15/19 with internist for surgery medical clearance. I've asked that she has her provider fax the findings to our office.

## 2019-03-16 ENCOUNTER — PATIENT MESSAGE (OUTPATIENT)
Dept: SPORTS MEDICINE | Facility: CLINIC | Age: 69
End: 2019-03-16

## 2019-03-19 NOTE — PROGRESS NOTES
Subjective:          Chief Complaint: Bernadette Aquino is a 68 y.o. female who had concerns including Injections of the Right Knee.    Patient is here for a follow up for her right knee to receive a Synvisc-One injection.     DATE OF PROCEDURE: 12/11/2015     ATTENDING SURGEON: Surgeon(s) and Role:     * Shalonda Altman MD - Primary     * La Tang DO - Fellow     * Laquita Vail PA-C - assistant     PREOPERATIVE DIAGNOSIS:    Pre-operative Diagnosis: Right shoulder   Tear, biceps tendon, non-traumatic M66.829 Rotator Cuff Syndrome/Disorder  M75.100, Tear, Biceps Tendon, Non-Tramatic M66.829, Tear, Rotator Cuff, Tramatic S46.012A, S46.011A and Labral tear and chondromalacia     Post-operative Diagnosis:  Right shoulder   Tear, biceps tendon, non-traumatic M66.829 Rotator Cuff Syndrome/Disorder  M75.100, Tear, Rotator Cuff, Tramatic S46.012A, S46.011A and Labral Tear, Chondromalacia Shoulder joint      Procedures Performed:  1. Right shoulder Arthroscopic rotator cuff repair CPT - 99450, COMPLEX     2. Right shoulder Biceps tenodesis CPT - 69794, COMPLEX     3. Right shoulder Arthroscopic labral debridement CPT - 86007     4. Right shoulder Arthroscopic chondroplasty            Review of Systems   Constitution: Negative for fever and night sweats.   HENT: Negative for hearing loss.    Eyes: Negative for blurred vision and visual disturbance.   Cardiovascular: Negative for chest pain and leg swelling.   Respiratory: Negative for shortness of breath.    Endocrine: Negative for polyuria.   Hematologic/Lymphatic: Negative for bleeding problem.   Skin: Negative for rash.   Musculoskeletal: Positive for joint pain and joint swelling. Negative for back pain, muscle cramps and muscle weakness.   Gastrointestinal: Negative for melena.   Genitourinary: Negative for hematuria.   Neurological: Negative for loss of balance, numbness and paresthesias.   Psychiatric/Behavioral: Negative for altered mental status.        Pain Related Questions  Over the past 3 days, what was your average pain during activity? (I.e. running, jogging, walking, climbing stairs, getting dressed, ect.): 7  Over the past 3 days, what was your highest pain level?: 6  Over the past 3 days, what was your lowest pain level? : 3    Other  How many nights a week are you awakened by your affected body part?: 1  Was the patient's HEIGHT measured or patient reported?: Patient Reported  Was the patient's WEIGHT measured or patient reported?: Measured      Objective:        General: Bernadette is well-developed, well-nourished, appears stated age, in no acute distress, alert and oriented to time, place and person.     General    Vitals reviewed.  Constitutional: She is oriented to person, place, and time. She appears well-developed and well-nourished. No distress.   HENT:   Mouth/Throat: No oropharyngeal exudate.   Eyes: Right eye exhibits no discharge. Left eye exhibits no discharge.   Neck: Normal range of motion.   Pulmonary/Chest: Effort normal and breath sounds normal. No respiratory distress.   Neurological: She is alert and oriented to person, place, and time. She has normal reflexes. No cranial nerve deficit. Coordination normal.   Psychiatric: She has a normal mood and affect. Her behavior is normal. Judgment and thought content normal.     General Musculoskeletal Exam   Gait: normal       Right Knee Exam     Inspection   Erythema: absent  Scars: absent  Swelling: present  Effusion: present  Deformity: absent  Bruising: absent    Tenderness   The patient is tender to palpation of the lateral joint line, medial joint line and patella.    Crepitus   The patient has crepitus of the patella.    Range of Motion   Extension: 5   Flexion: 130     Tests   Meniscus   Neeta:  Medial - positive Lateral - positive  Ligament Examination Lachman: normal (-1 to 2mm) PCL-Posterior Drawer: normal (0 to 2mm)     MCL - Valgus: normal (0 to 2mm)  LCL - Varus: normalPivot  Shift: normal (Equal)Reverse Pivot Shift: normal (Equal)Dial Test at 30 degrees: normal (< 5 degrees)Dial Test at 90 degrees: normal (< 5 degrees)  Posterior Sag Test: negative  Posterolateral Corner: unstable (>15 degrees difference)  Patella   Patellar apprehension: negative  Passive Patellar Tilt: neutral  Patellar Tracking: normal  Patellar Glide (quadrants): Lateral - 1   Medial - 2  Q-Angle at 90 degrees: normal  Patellar Grind: positive  J-Sign: none    Other   Meniscal Cyst: absent  Popliteal (Baker's) Cyst: absent  Sensation: normal    Left Knee Exam     Inspection   Erythema: absent  Scars: absent  Swelling: absent  Effusion: absent  Deformity: absent  Bruising: absent    Tenderness   The patient tender to palpation of the medial joint line.    Range of Motion   Extension: 0   Flexion: 140     Tests   Meniscus   Neeta:  Medial - negative Lateral - negative  Stability Lachman: normal (-1 to 2mm) PCL-Posterior Drawer: normal (0 to 2mm)  MCL - Valgus: normal (0 to 2mm)  LCL - Varus: normal (0 to 2mm)Pivot Shift: normal (Equal)Reverse Pivot Shift: normal (Equal)Dial Test at 30 degrees: normal (< 5 degrees)Dial Test at 90 degrees: normal (< 5 degrees)  Posterior Sag Test: negative  Posterolateral Corner: unstable (>15 degrees difference)  Patella   Patellar apprehension: negative  Passive Patellar Tilt: neutral  Patellar Tracking: normal  Patellar Glide (Quadrants): Lateral - 1 Medial - 2  Q-Angle at 90 degrees: normal  Patellar Grind: negative  J-Sign: J sign absent    Other   Meniscal Cyst: absent  Popliteal (Baker's) Cyst: absent  Sensation: normal    Right Hip Exam     Tests   Jorden: negative  Left Hip Exam     Tests   Jorden: negative          Muscle Strength   Right Lower Extremity   Hip Abduction: 5/5   Quadriceps:  5/5   Hamstrin/5   Left Lower Extremity   Hip Abduction: 5/5   Quadriceps:  5/5   Hamstrin/5     Reflexes     Left Side  Quadriceps:  2+  Achilles:  2+    Right Side   Quadriceps:   2+  Achilles:  2+    Vascular Exam     Right Pulses  Dorsalis Pedis:      2+  Posterior Tibial:      2+        Left Pulses  Dorsalis Pedis:      2+  Posterior Tibial:      2+          XR: Radiographs ordered and reviewed today in clinic of the bilateral knee demonstrates medial joint space narrowing and osteophytes, patellofemoral joint space narrowing.                 Assessment:       Encounter Diagnoses   Name Primary?    Chronic pain of right knee Yes    Primary osteoarthritis of right knee     Genu varum of both lower extremities           Plan:       1. IKDC, SF-12 and KOOS was not filled out today in clinic.     RTC in 1-2 weeks with Dr. Shalonda Altman for postoperative follow-up. Patient will not fill out IKDC, SF-12 and KOOS on return.    2. Aspiration/Injection Procedure    After time out was performed, including verification of patient ID, procedure, site and side, availability of information and equipment, review of safety issues, and agreement with consent, the procedure site was marked and the patient was prepped aseptically. 8cc's of normal joint fluid was aspirated from the right Knee Joint using a 21.5g x 1.5 needle in sterile fashion without complication. Following aspiration, a diagnostic and therapeutic injection of 6cc SynviscOne and ethyl chloride spray was given under sterile technique using a 21.5g x 1.5 needle into the right Knee Joint in seated position.     The patient had no adverse reactions to the medication. Pain decreased. The patient was instructed to apply ice to the joint for 20 minutes and avoid strenuous activities for 24-36 hours following the injection. Patient was warned of possible blood sugar and/or blood pressure changes during that time. Following that time, patient can resume regular activities.    Ultrasound Guidance Procedure  right Knee Joint visualized with documented DJD. Dynamic visualization of the 22g x 1.5 needle performed.                      [place] patient  questionnaires have been collected today.

## 2019-03-20 ENCOUNTER — DOCUMENTATION ONLY (OUTPATIENT)
Dept: SPORTS MEDICINE | Facility: CLINIC | Age: 69
End: 2019-03-20

## 2019-03-20 ENCOUNTER — OFFICE VISIT (OUTPATIENT)
Dept: SPORTS MEDICINE | Facility: CLINIC | Age: 69
End: 2019-03-20
Payer: MEDICARE

## 2019-03-20 VITALS
HEIGHT: 63 IN | WEIGHT: 131 LBS | BODY MASS INDEX: 23.21 KG/M2 | DIASTOLIC BLOOD PRESSURE: 88 MMHG | HEART RATE: 78 BPM | SYSTOLIC BLOOD PRESSURE: 137 MMHG

## 2019-03-20 DIAGNOSIS — M21.161 GENU VARUM OF BOTH LOWER EXTREMITIES: ICD-10-CM

## 2019-03-20 DIAGNOSIS — M21.162 GENU VARUM OF BOTH LOWER EXTREMITIES: ICD-10-CM

## 2019-03-20 DIAGNOSIS — G89.29 CHRONIC PAIN OF RIGHT KNEE: Primary | ICD-10-CM

## 2019-03-20 DIAGNOSIS — M17.11 PRIMARY OSTEOARTHRITIS OF RIGHT KNEE: ICD-10-CM

## 2019-03-20 DIAGNOSIS — M25.561 CHRONIC PAIN OF RIGHT KNEE: Primary | ICD-10-CM

## 2019-03-20 PROCEDURE — 99499 NO LOS: ICD-10-PCS | Mod: S$GLB,,, | Performed by: ORTHOPAEDIC SURGERY

## 2019-03-20 PROCEDURE — 99999 PR PBB SHADOW E&M-EST. PATIENT-LVL III: CPT | Mod: PBBFAC,,, | Performed by: ORTHOPAEDIC SURGERY

## 2019-03-20 PROCEDURE — 20611 PR DRAIN/ASP/INJECT MAJOR JOINT/BURSA W/US GUIDANCE: ICD-10-PCS | Mod: RT,S$GLB,, | Performed by: ORTHOPAEDIC SURGERY

## 2019-03-20 PROCEDURE — 20611 DRAIN/INJ JOINT/BURSA W/US: CPT | Mod: RT,S$GLB,, | Performed by: ORTHOPAEDIC SURGERY

## 2019-03-20 PROCEDURE — 99499 UNLISTED E&M SERVICE: CPT | Mod: S$GLB,,, | Performed by: ORTHOPAEDIC SURGERY

## 2019-03-20 PROCEDURE — 99999 PR PBB SHADOW E&M-EST. PATIENT-LVL III: ICD-10-PCS | Mod: PBBFAC,,, | Performed by: ORTHOPAEDIC SURGERY

## 2019-03-20 NOTE — PATIENT INSTRUCTIONS
DESCRIPTION  Synvisc-One (hylan G-F 20) is an elastoviscous high molecular weight fluid containing hylan A and hylan B polymers produced from chicken simpson. Hylans are derivatives of hyaluronan (sodium hyaluronate). Hylan G-F 20 is unique in that the hyaluronan is chemically cross linked. Hyaluronan is a long-chain polymer containing repeating disaccharide units of Sg-hkpfxwotcvj-D-acetylglucosamine.     INDICATIONS FOR USE  Synvisc-One is indicated for the treatment of pain in osteoarthritis (OA) of the knee in patients who have failed to respond adequately to conservative nonpharmacologic therapy and simple analgesics, e.g., acetaminophen.     Who is a candidate for Synvisc-One  Patients with knee osteoarthritis, who have tried diet, exercise and over-the-counter pain medication but still have pain, should talk to their doctor to see if Synvisc-One is right for them.     How Synvisc-One is administered  Synvisc-One is a single injection. It's a simple, in-office procedure that only takes a few minutes.     What you can expect following a Synvisc-One knee injection Synvisc-One can provide up to six months of osteoarthritis knee pain relief. Everyone responds differently, but in a medical study* patients experienced relief starting one month after the injection.     After the injection, you can resume normal day-to-day activities, but you should avoid any strenuous activities for about 48 hours.     Contraindication  Do not administer to patients with known hypersensitivity (allergy) to hyaluronan (sodium hyaluronate) preparations.   Do not inject Synvisc-One in the knees of patients having knee joint infections or skin diseases or infections in the area of theinjection site.    What are possible side effects?  Synvisc may occur short-term pain, swelling at the injection site and / or the appearance of synovial exudate after injection. In some cases, exudation may be significant and cause more prolonged pain.      Important Safety Information  Before trying Synvisc-One or SYNVISC, tell your doctor if you are allergic to products from birds - such as feathers, eggs or poultry - or if your leg is swollen or infected. Synvisc-One and SYNVISC are only for injection into the knee, performed by a doctor or other qualified health care professional. Synvisc-One and SYNVISC have not been tested to show pain relief in joints other than the knee. Talk to your doctor before resuming strenuous weight-bearing activities after treatment. Synvisc-One and SYNVISC have not been tested in children, pregnant women or women who are nursing. You should tell your doctor if you think you are pregnant or if you are nursing a child. The side effects most commonly seen when Synvisc-One or SYNVISC is injected into the knee were pain, swelling and/or fluid buildup in or around the knee. Cases where the swelling is extensive or painful should be discussed with your doctor. Allergic reactions such as rash and hives have been reported rarely.

## 2019-03-20 NOTE — PROGRESS NOTES
"Surgical clearance received and scanned in media tab.    Per PCP, "She is cleared low risk for general anesthesia for proposed knee surgery."  "

## 2019-03-21 ENCOUNTER — TELEPHONE (OUTPATIENT)
Dept: SPORTS MEDICINE | Facility: CLINIC | Age: 69
End: 2019-03-21

## 2019-03-21 ENCOUNTER — ANESTHESIA EVENT (OUTPATIENT)
Dept: SURGERY | Facility: HOSPITAL | Age: 69
End: 2019-03-21
Payer: MEDICARE

## 2019-03-21 NOTE — TELEPHONE ENCOUNTER
Pt reports her knee is very swollen and she is having trouble walking on it after having Synvisc injection yesterday. Patient is coming in today at 3:30 to see Laquita Vail PA-C

## 2019-03-21 NOTE — TELEPHONE ENCOUNTER
----- Message from Kenia Reveles MA sent at 3/21/2019  8:32 AM CDT -----  Contact: patient   Delaney Rolle,    I scheduled Ms. Aquino for 3:30pm because she works from 12-2:30. However if Laquita says its okay she would like to come in the morning. She received a synvisc-one injection yesterday and states her knee is more swollen and painful. She is scheduled for surgery on Friday. Can you just ask Laquita if she could come anytime before lunch? If so can you just let the patient know what time. If not it is no big deal she is understanding. Just very nervous because we're operating on her other knee tomorrow.    Thank you,  Kenia Reveles  Sports Medicine Assistant  [facility]    ----- Message -----  From: Francisca Livingston  Sent: 3/21/2019   8:07 AM  To: [facility] Staff    Please call pt at [phone]    Patient is having severe pain & swelling in the right knee pain since yesterday    Thank you

## 2019-03-21 NOTE — ANESTHESIA PREPROCEDURE EVALUATION
03/21/2019  Bernadette Aquino is a 68 y.o., female.    Anesthesia Evaluation    I have reviewed the Patient Summary Reports.    I have reviewed the Nursing Notes.   I have reviewed the Medications.     Review of Systems  Anesthesia Hx:  No problems with previous Anesthesia  History of prior surgery of interest to airway management or planning: Previous anesthesia: General Denies Family Hx of Anesthesia complications.  Personal Hx of Anesthesia complications, Post-Operative Nausea/Vomiting, in the past, but not with recent anesthetics / prophylaxis   Social:  Non-Smoker    Hematology/Oncology:  Hematology Normal   Oncology Normal     EENT/Dental:EENT/Dental Normal   Cardiovascular:   Exercise tolerance: good Hypertension hyperlipidemia    Pulmonary:  Pulmonary Normal    Renal/:  Renal/ Normal     Hepatic/GI:   PUD, GERD Celiac disease   Musculoskeletal:   Arthritis   Spine Disorders: cervical Disc disease    Neurological:   Neuromuscular Disease, Hx cerebral coiling   Endocrine:   Hypothyroidism    Dermatological:  Skin Normal    Psych:   Psychiatric History depression      some relief with phenergan    Physical Exam  General:  Well nourished    Airway/Jaw/Neck:  Airway Findings: Mouth Opening: Normal Tongue: Normal  General Airway Assessment: Adult, Average  Mallampati: III  Improves to II with phonation.  TM Distance: Normal, at least 6 cm        Eyes/Ears/Nose:  EYES/EARS/NOSE FINDINGS: Normal   Dental:  Dental Findings: In tact, Molar caps   Chest/Lungs:  Chest/Lungs Findings: Clear to auscultation, Normal Respiratory Rate     Heart/Vascular:  Heart Findings: Rate: Normal  Rhythm: Regular Rhythm  Sounds: Normal  Heart murmur: negative Vascular Findings: Normal    Abdomen:  Abdomen Findings: Normal    Musculoskeletal:  Musculoskeletal Findings: Normal   Skin:  Skin Findings: Normal    Mental  Status:  Mental Status Findings:  Cooperative, Alert and Oriented         Anesthesia Plan  Type of Anesthesia, risks & benefits discussed:  Anesthesia Type:  general  Patient's Preference:   Intra-op Monitoring Plan: standard ASA monitors  Intra-op Monitoring Plan Comments:   Post Op Pain Control Plan:   Post Op Pain Control Plan Comments:   Induction:   IV  Beta Blocker:  Patient is not currently on a Beta-Blocker (No further documentation required).       Informed Consent: Patient understands risks and agrees with Anesthesia plan.  Questions answered. Anesthesia consent signed with patient.  ASA Score: 3     Day of Surgery Review of History & Physical:            Ready For Surgery From Anesthesia Perspective.

## 2019-03-21 NOTE — PRE-PROCEDURE INSTRUCTIONS
Preop instructions: NPO solids/ milk products after midnight and clears up to 2 hours before arrival (clear liquids are: water, apple juice, Gatorade & Jell-O, black coffee/no milk, cream or creamer), shower instructions, directions, leave all valuables at home, medication instructions for PM prior & am of procedure explained. Patient stated an understanding.     Patient states has had PONV with anesthesia in the past, better with prophylaxis.

## 2019-03-21 NOTE — TELEPHONE ENCOUNTER
Spoke to the patient and scheduled a follow up with Laquita. Requested that she be seen before lunch if possible. MA will contact pt if they can make those arrangements.    ----- Message from Francisca Livingston sent at 3/21/2019  8:07 AM CDT -----  Contact: patient   Please call pt at [phone]    Patient is having severe pain & swelling in the right knee pain since yesterday    Thank you

## 2019-03-22 ENCOUNTER — HOSPITAL ENCOUNTER (OUTPATIENT)
Facility: HOSPITAL | Age: 69
Discharge: HOME | End: 2019-03-22
Attending: ORTHOPAEDIC SURGERY | Admitting: ORTHOPAEDIC SURGERY
Payer: MEDICARE

## 2019-03-22 ENCOUNTER — ANESTHESIA (OUTPATIENT)
Dept: SURGERY | Facility: HOSPITAL | Age: 69
End: 2019-03-22
Payer: MEDICARE

## 2019-03-22 VITALS
TEMPERATURE: 98 F | DIASTOLIC BLOOD PRESSURE: 73 MMHG | RESPIRATION RATE: 16 BRPM | SYSTOLIC BLOOD PRESSURE: 125 MMHG | BODY MASS INDEX: 23.92 KG/M2 | HEIGHT: 62 IN | HEART RATE: 89 BPM | WEIGHT: 130 LBS | OXYGEN SATURATION: 95 %

## 2019-03-22 DIAGNOSIS — M23.92 INTERNAL DERANGEMENT OF LEFT KNEE: ICD-10-CM

## 2019-03-22 DIAGNOSIS — M94.262 CHONDROMALACIA OF LEFT KNEE: ICD-10-CM

## 2019-03-22 DIAGNOSIS — S83.232D COMPLEX TEAR OF MEDIAL MENISCUS OF LEFT KNEE AS CURRENT INJURY, SUBSEQUENT ENCOUNTER: Primary | ICD-10-CM

## 2019-03-22 DIAGNOSIS — S83.242A ACUTE MEDIAL MENISCUS TEAR OF LEFT KNEE, INITIAL ENCOUNTER: ICD-10-CM

## 2019-03-22 PROCEDURE — 27200651 HC AIRWAY, LMA: Performed by: NURSE ANESTHETIST, CERTIFIED REGISTERED

## 2019-03-22 PROCEDURE — 37000009 HC ANESTHESIA EA ADD 15 MINS: Performed by: ORTHOPAEDIC SURGERY

## 2019-03-22 PROCEDURE — 27599 PR SUBCHONDROPLASTY, KNEE, OPEN: ICD-10-PCS | Mod: ,,, | Performed by: ORTHOPAEDIC SURGERY

## 2019-03-22 PROCEDURE — 29855 PR TIBIAL SCOPE/SURG/FX AID,UNICONDYLR: ICD-10-PCS | Mod: LT,,, | Performed by: ORTHOPAEDIC SURGERY

## 2019-03-22 PROCEDURE — 27201423 OPTIME MED/SURG SUP & DEVICES STERILE SUPPLY: Performed by: ORTHOPAEDIC SURGERY

## 2019-03-22 PROCEDURE — D9220A PRA ANESTHESIA: ICD-10-PCS | Mod: CRNA,,, | Performed by: NURSE ANESTHETIST, CERTIFIED REGISTERED

## 2019-03-22 PROCEDURE — 27000221 HC OXYGEN, UP TO 24 HOURS

## 2019-03-22 PROCEDURE — 37000008 HC ANESTHESIA 1ST 15 MINUTES: Performed by: ORTHOPAEDIC SURGERY

## 2019-03-22 PROCEDURE — 71000033 HC RECOVERY, INTIAL HOUR: Performed by: ORTHOPAEDIC SURGERY

## 2019-03-22 PROCEDURE — 71000039 HC RECOVERY, EACH ADD'L HOUR: Performed by: ORTHOPAEDIC SURGERY

## 2019-03-22 PROCEDURE — 64447 NJX AA&/STRD FEMORAL NRV IMG: CPT | Mod: LT | Performed by: ANESTHESIOLOGY

## 2019-03-22 PROCEDURE — 94761 N-INVAS EAR/PLS OXIMETRY MLT: CPT

## 2019-03-22 PROCEDURE — 29855 TIBIAL ARTHROSCOPY/SURGERY: CPT | Mod: LT,,, | Performed by: ORTHOPAEDIC SURGERY

## 2019-03-22 PROCEDURE — 63600175 PHARM REV CODE 636 W HCPCS: Performed by: NURSE ANESTHETIST, CERTIFIED REGISTERED

## 2019-03-22 PROCEDURE — 63600175 PHARM REV CODE 636 W HCPCS: Performed by: PHYSICIAN ASSISTANT

## 2019-03-22 PROCEDURE — 64447 PR NERVE BLOCK INJ, ANES/STEROID, FEMORAL, INCL IMAG GUIDANCE: ICD-10-PCS | Mod: 59,LT,, | Performed by: ANESTHESIOLOGY

## 2019-03-22 PROCEDURE — 76942 ECHO GUIDE FOR BIOPSY: CPT | Mod: 26,,, | Performed by: ANESTHESIOLOGY

## 2019-03-22 PROCEDURE — 71000016 HC POSTOP RECOV ADDL HR: Performed by: ORTHOPAEDIC SURGERY

## 2019-03-22 PROCEDURE — 36000711: Performed by: ORTHOPAEDIC SURGERY

## 2019-03-22 PROCEDURE — 27599 UNLISTED PX FEMUR/KNEE: CPT | Mod: ,,, | Performed by: ORTHOPAEDIC SURGERY

## 2019-03-22 PROCEDURE — 25000003 PHARM REV CODE 250: Performed by: PHYSICIAN ASSISTANT

## 2019-03-22 PROCEDURE — 29881 ARTHRS KNE SRG MNISECTMY M/L: CPT | Mod: 51,LT,, | Performed by: ORTHOPAEDIC SURGERY

## 2019-03-22 PROCEDURE — 25000003 PHARM REV CODE 250: Performed by: NURSE ANESTHETIST, CERTIFIED REGISTERED

## 2019-03-22 PROCEDURE — 64447 NJX AA&/STRD FEMORAL NRV IMG: CPT | Mod: 59,LT,, | Performed by: ANESTHESIOLOGY

## 2019-03-22 PROCEDURE — C1889 IMPLANT/INSERT DEVICE, NOC: HCPCS | Performed by: ORTHOPAEDIC SURGERY

## 2019-03-22 PROCEDURE — 71000015 HC POSTOP RECOV 1ST HR: Performed by: ORTHOPAEDIC SURGERY

## 2019-03-22 PROCEDURE — S0028 INJECTION, FAMOTIDINE, 20 MG: HCPCS | Performed by: NURSE ANESTHETIST, CERTIFIED REGISTERED

## 2019-03-22 PROCEDURE — D9220A PRA ANESTHESIA: Mod: ANES,,, | Performed by: ANESTHESIOLOGY

## 2019-03-22 PROCEDURE — D9220A PRA ANESTHESIA: ICD-10-PCS | Mod: ANES,,, | Performed by: ANESTHESIOLOGY

## 2019-03-22 PROCEDURE — 25000003 PHARM REV CODE 250: Performed by: ORTHOPAEDIC SURGERY

## 2019-03-22 PROCEDURE — 25000003 PHARM REV CODE 250: Performed by: ANESTHESIOLOGY

## 2019-03-22 PROCEDURE — 36000710: Performed by: ORTHOPAEDIC SURGERY

## 2019-03-22 PROCEDURE — 63600175 PHARM REV CODE 636 W HCPCS: Performed by: ANESTHESIOLOGY

## 2019-03-22 PROCEDURE — 29881 PR KNEE SCOPE SINGLE MENISECECTOMY: ICD-10-PCS | Mod: 51,LT,, | Performed by: ORTHOPAEDIC SURGERY

## 2019-03-22 PROCEDURE — 76942 PR U/S GUIDANCE FOR NEEDLE GUIDANCE: ICD-10-PCS | Mod: 26,,, | Performed by: ANESTHESIOLOGY

## 2019-03-22 PROCEDURE — D9220A PRA ANESTHESIA: Mod: CRNA,,, | Performed by: NURSE ANESTHETIST, CERTIFIED REGISTERED

## 2019-03-22 PROCEDURE — 63600175 PHARM REV CODE 636 W HCPCS: Performed by: ORTHOPAEDIC SURGERY

## 2019-03-22 DEVICE — KIT KNEE SCP ACCUPRT 11G 120MM: Type: IMPLANTABLE DEVICE | Site: FEMUR | Status: FUNCTIONAL

## 2019-03-22 RX ORDER — DIPHENHYDRAMINE HYDROCHLORIDE 50 MG/ML
25 INJECTION INTRAMUSCULAR; INTRAVENOUS EVERY 6 HOURS PRN
Status: DISCONTINUED | OUTPATIENT
Start: 2019-03-22 | End: 2019-03-22 | Stop reason: HOSPADM

## 2019-03-22 RX ORDER — LORAZEPAM 2 MG/ML
0.25 INJECTION INTRAMUSCULAR ONCE AS NEEDED
Status: DISCONTINUED | OUTPATIENT
Start: 2019-03-22 | End: 2019-03-22 | Stop reason: HOSPADM

## 2019-03-22 RX ORDER — SODIUM CHLORIDE 9 MG/ML
INJECTION, SOLUTION INTRAVENOUS CONTINUOUS
Status: DISCONTINUED | OUTPATIENT
Start: 2019-03-22 | End: 2019-03-22 | Stop reason: HOSPADM

## 2019-03-22 RX ORDER — ONDANSETRON 8 MG/1
8 TABLET, ORALLY DISINTEGRATING ORAL EVERY 8 HOURS PRN
Status: DISCONTINUED | OUTPATIENT
Start: 2019-03-22 | End: 2019-03-22 | Stop reason: HOSPADM

## 2019-03-22 RX ORDER — EPINEPHRINE 1 MG/ML
INJECTION INTRAMUSCULAR; INTRAVENOUS; SUBCUTANEOUS
Status: DISCONTINUED | OUTPATIENT
Start: 2019-03-22 | End: 2019-03-22 | Stop reason: HOSPADM

## 2019-03-22 RX ORDER — OXYCODONE HYDROCHLORIDE 5 MG/1
10 TABLET ORAL EVERY 4 HOURS PRN
Status: DISCONTINUED | OUTPATIENT
Start: 2019-03-22 | End: 2019-03-22 | Stop reason: HOSPADM

## 2019-03-22 RX ORDER — FAMOTIDINE 10 MG/ML
INJECTION INTRAVENOUS
Status: DISCONTINUED | OUTPATIENT
Start: 2019-03-22 | End: 2019-03-22

## 2019-03-22 RX ORDER — LIDOCAINE HCL/PF 100 MG/5ML
SYRINGE (ML) INTRAVENOUS
Status: DISCONTINUED | OUTPATIENT
Start: 2019-03-22 | End: 2019-03-22

## 2019-03-22 RX ORDER — LIDOCAINE HYDROCHLORIDE 10 MG/ML
1 INJECTION, SOLUTION EPIDURAL; INFILTRATION; INTRACAUDAL; PERINEURAL ONCE
Status: COMPLETED | OUTPATIENT
Start: 2019-03-22 | End: 2019-03-22

## 2019-03-22 RX ORDER — MUPIROCIN 20 MG/G
OINTMENT TOPICAL
Status: DISCONTINUED | OUTPATIENT
Start: 2019-03-22 | End: 2019-03-22 | Stop reason: HOSPADM

## 2019-03-22 RX ORDER — ONDANSETRON 2 MG/ML
INJECTION INTRAMUSCULAR; INTRAVENOUS
Status: DISCONTINUED | OUTPATIENT
Start: 2019-03-22 | End: 2019-03-22

## 2019-03-22 RX ORDER — FENTANYL CITRATE 50 UG/ML
INJECTION, SOLUTION INTRAMUSCULAR; INTRAVENOUS
Status: DISCONTINUED | OUTPATIENT
Start: 2019-03-22 | End: 2019-03-22

## 2019-03-22 RX ORDER — HYDROMORPHONE HYDROCHLORIDE 1 MG/ML
0.2 INJECTION, SOLUTION INTRAMUSCULAR; INTRAVENOUS; SUBCUTANEOUS EVERY 5 MIN PRN
Status: DISCONTINUED | OUTPATIENT
Start: 2019-03-22 | End: 2019-03-22 | Stop reason: HOSPADM

## 2019-03-22 RX ORDER — HYDROCODONE BITARTRATE AND ACETAMINOPHEN 5; 325 MG/1; MG/1
1 TABLET ORAL EVERY 4 HOURS PRN
Status: DISCONTINUED | OUTPATIENT
Start: 2019-03-22 | End: 2019-03-22 | Stop reason: HOSPADM

## 2019-03-22 RX ORDER — MIDAZOLAM HYDROCHLORIDE 1 MG/ML
0.5 INJECTION INTRAMUSCULAR; INTRAVENOUS
Status: DISCONTINUED | OUTPATIENT
Start: 2019-03-22 | End: 2019-03-22 | Stop reason: HOSPADM

## 2019-03-22 RX ORDER — FENTANYL CITRATE 50 UG/ML
25 INJECTION, SOLUTION INTRAMUSCULAR; INTRAVENOUS EVERY 5 MIN PRN
Status: DISCONTINUED | OUTPATIENT
Start: 2019-03-22 | End: 2019-03-22 | Stop reason: HOSPADM

## 2019-03-22 RX ORDER — SODIUM CHLORIDE 0.9 % (FLUSH) 0.9 %
5 SYRINGE (ML) INJECTION
Status: DISCONTINUED | OUTPATIENT
Start: 2019-03-22 | End: 2019-03-22 | Stop reason: HOSPADM

## 2019-03-22 RX ORDER — SODIUM CHLORIDE 0.9 % (FLUSH) 0.9 %
3 SYRINGE (ML) INJECTION
Status: DISCONTINUED | OUTPATIENT
Start: 2019-03-22 | End: 2019-03-22 | Stop reason: HOSPADM

## 2019-03-22 RX ORDER — CEFAZOLIN SODIUM 1 G/3ML
2 INJECTION, POWDER, FOR SOLUTION INTRAMUSCULAR; INTRAVENOUS
Status: DISCONTINUED | OUTPATIENT
Start: 2019-03-22 | End: 2019-03-22 | Stop reason: HOSPADM

## 2019-03-22 RX ORDER — METOCLOPRAMIDE HYDROCHLORIDE 5 MG/ML
5 INJECTION INTRAMUSCULAR; INTRAVENOUS EVERY 6 HOURS PRN
Status: DISCONTINUED | OUTPATIENT
Start: 2019-03-22 | End: 2019-03-22 | Stop reason: HOSPADM

## 2019-03-22 RX ORDER — GLYCOPYRROLATE 0.2 MG/ML
INJECTION INTRAMUSCULAR; INTRAVENOUS
Status: DISCONTINUED | OUTPATIENT
Start: 2019-03-22 | End: 2019-03-22

## 2019-03-22 RX ORDER — DEXAMETHASONE SODIUM PHOSPHATE 4 MG/ML
INJECTION, SOLUTION INTRA-ARTICULAR; INTRALESIONAL; INTRAMUSCULAR; INTRAVENOUS; SOFT TISSUE
Status: DISCONTINUED | OUTPATIENT
Start: 2019-03-22 | End: 2019-03-22

## 2019-03-22 RX ORDER — PROPOFOL 10 MG/ML
VIAL (ML) INTRAVENOUS
Status: DISCONTINUED | OUTPATIENT
Start: 2019-03-22 | End: 2019-03-22

## 2019-03-22 RX ADMIN — FENTANYL CITRATE 50 MCG: 50 INJECTION, SOLUTION INTRAMUSCULAR; INTRAVENOUS at 08:03

## 2019-03-22 RX ADMIN — LIDOCAINE HYDROCHLORIDE 10 MG: 10 INJECTION, SOLUTION EPIDURAL; INFILTRATION; INTRACAUDAL; PERINEURAL at 06:03

## 2019-03-22 RX ADMIN — ONDANSETRON 4 MG: 2 INJECTION INTRAMUSCULAR; INTRAVENOUS at 08:03

## 2019-03-22 RX ADMIN — DEXAMETHASONE SODIUM PHOSPHATE 8 MG: 4 INJECTION, SOLUTION INTRAMUSCULAR; INTRAVENOUS at 07:03

## 2019-03-22 RX ADMIN — KETOROLAC TROMETHAMINE: 30 INJECTION, SOLUTION INTRAMUSCULAR; INTRAVENOUS at 08:03

## 2019-03-22 RX ADMIN — LIDOCAINE HYDROCHLORIDE 60 MG: 20 INJECTION, SOLUTION INTRAVENOUS at 07:03

## 2019-03-22 RX ADMIN — SODIUM CHLORIDE: 0.9 INJECTION, SOLUTION INTRAVENOUS at 06:03

## 2019-03-22 RX ADMIN — GLYCOPYRROLATE 0.2 MG: 0.2 INJECTION, SOLUTION INTRAMUSCULAR; INTRAVENOUS at 07:03

## 2019-03-22 RX ADMIN — CEFAZOLIN 2 G: 330 INJECTION, POWDER, FOR SOLUTION INTRAMUSCULAR; INTRAVENOUS at 07:03

## 2019-03-22 RX ADMIN — FAMOTIDINE 20 MG: 10 INJECTION, SOLUTION INTRAVENOUS at 07:03

## 2019-03-22 RX ADMIN — FENTANYL CITRATE 50 MCG: 50 INJECTION INTRAMUSCULAR; INTRAVENOUS at 06:03

## 2019-03-22 RX ADMIN — MIDAZOLAM HYDROCHLORIDE 2 MG: 1 INJECTION, SOLUTION INTRAMUSCULAR; INTRAVENOUS at 06:03

## 2019-03-22 RX ADMIN — PROPOFOL 160 MG: 10 INJECTION, EMULSION INTRAVENOUS at 07:03

## 2019-03-22 RX ADMIN — HYDROCODONE BITARTRATE AND ACETAMINOPHEN 1 TABLET: 5; 325 TABLET ORAL at 09:03

## 2019-03-22 RX ADMIN — MUPIROCIN: 20 OINTMENT TOPICAL at 06:03

## 2019-03-22 RX ADMIN — SODIUM CHLORIDE, SODIUM GLUCONATE, SODIUM ACETATE, POTASSIUM CHLORIDE, MAGNESIUM CHLORIDE, SODIUM PHOSPHATE, DIBASIC, AND POTASSIUM PHOSPHATE: .53; .5; .37; .037; .03; .012; .00082 INJECTION, SOLUTION INTRAVENOUS at 07:03

## 2019-03-22 NOTE — TRANSFER OF CARE
"Anesthesia Transfer of Care Note    Patient: Bernadette Aquino    Procedure(s) Performed: Procedure(s) (LRB):  REPAIR,Subchondroplasty-Tibia (Left)  SUBCHONDROPLASTY-Femur (Left)  SYNOVECTOMY, KNEE (Left)  ARTHROSCOPY, KNEE, WITH CHONDROPLASTY (Left)  ARTHROSCOPY, KNEE, WITH MENISCECTOMY,(medial and lateral) (Left)    Patient location: PACU    Anesthesia Type: general    Transport from OR: Transported from OR on 6-10 L/min O2 by face mask with adequate spontaneous ventilation    Post pain: adequate analgesia    Post assessment: no apparent anesthetic complications and tolerated procedure well    Post vital signs: stable    Level of consciousness: awake    Nausea/Vomiting: no nausea/vomiting    Complications: none    Transfer of care protocol was followed      Last vitals:   Visit Vitals  /72 (BP Location: Left arm, Patient Position: Lying)   Pulse (P) 95   Temp 36.9 °C (98.5 °F) (Oral)   Resp (P) 14   Ht 5' 2" (1.575 m)   Wt 59 kg (130 lb)   SpO2 (P) 98%   Breastfeeding? No   BMI 23.78 kg/m²     "

## 2019-03-22 NOTE — PROGRESS NOTES
Pt and family have questions regarding TENS unit use.  Ortho resident (Palsis) paged. Called into OR. Waiting for response.

## 2019-03-22 NOTE — PROGRESS NOTES
Unable to get in touch with pt.  Pt ambulated comfortably with walker to restroom.  Pt states she feels confident and does not need bedside training. All questions answered.

## 2019-03-22 NOTE — ANESTHESIA POSTPROCEDURE EVALUATION
"Anesthesia Post Evaluation    Patient: Bernadette Aquino    Procedure(s) Performed: Procedure(s) (LRB):  REPAIR,Subchondroplasty-Tibia (Left)  SUBCHONDROPLASTY-Femur (Left)  SYNOVECTOMY, KNEE (Left)  ARTHROSCOPY, KNEE, WITH CHONDROPLASTY (Left)  ARTHROSCOPY, KNEE, WITH MENISCECTOMY,(medial and lateral) (Left)    Final Anesthesia Type: general  Patient location during evaluation: PACU  Patient participation: Yes- Able to Participate  Level of consciousness: awake and alert and oriented  Post-procedure vital signs: reviewed and stable  Pain management: adequate  Airway patency: patent  PONV status at discharge: No PONV  Anesthetic complications: no      Cardiovascular status: hemodynamically stable  Respiratory status: unassisted, spontaneous ventilation and room air  Hydration status: euvolemic  Follow-up not needed.        Visit Vitals  /73   Pulse 89   Temp 36.4 °C (97.5 °F) (Temporal)   Resp 16   Ht 5' 2" (1.575 m)   Wt 59 kg (130 lb)   SpO2 95%   Breastfeeding? No   BMI 23.78 kg/m²       Pain/Andrew Score: Pain Rating Prior to Med Admin: 4 (3/22/2019  9:02 AM)  Pain Rating Post Med Admin: 0 (3/22/2019  7:07 AM)  Andrew Score: 10 (3/22/2019  9:40 AM)        "

## 2019-03-22 NOTE — INTERVAL H&P NOTE
"The patient has been examined and the H&P has been reviewed:    I concur with the findings and changes have been noted since the H&P was written: Per PCP, "She is cleared low risk for general anesthesia for proposed knee surgery."    Anesthesia/Surgery risks, benefits and alternative options discussed and understood by patient/family.          Active Hospital Problems    Diagnosis  POA    Internal derangement of left knee [M23.92]  Yes      Resolved Hospital Problems   No resolved problems to display.     "

## 2019-03-22 NOTE — DISCHARGE SUMMARY
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]    Post-op Diagnosis:  Post-Op Diagnosis Codes:     * Acute medial meniscus tear of left knee, initial encounter [S83.242A]     * Internal derangement of left knee [M23.92]     * Chondromalacia of left knee [M94.262]     * 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]    Procedure: Procedure(s) (LRB):  REPAIR,Subchondroplasty-Tibia (Left)  SUBCHONDROPLASTY-Femur (Left)  SYNOVECTOMY, KNEE (Left)  ARTHROSCOPY, KNEE, WITH CHONDROPLASTY (Left)  ARTHROSCOPY, KNEE, WITH MENISCECTOMY,(medial and lateral) (Left)    Anesthesia: General    Description of the findings of the procedure: See Dictation    Findings/Key Components: left knee arthroscopic synovectomy, chondroplasty, partial medial meniscectomy, femoral and tibial subchondroplasty    Estimated Blood Loss: * No values recorded between 3/22/2019  7:43 AM and 3/22/2019  8:38 AM *         Specimens:   Specimen (12h ago, onward)    None          Disposition: Patient tolerated the procedure well and was transferred to PACU in stable condition.      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]    Post-op Diagnosis:  Post-Op Diagnosis Codes:     * Acute medial meniscus tear of left knee, initial encounter [S83.242A]     * Internal derangement of left knee [M23.92]     * Chondromalacia of left knee [M94.262]     * Chondromalacia of  left patella [M22.42]    Procedure: Procedure(s) (LRB):  REPAIR,Subchondroplasty-Tibia (Left)  SUBCHONDROPLASTY-Femur (Left)  SYNOVECTOMY, KNEE (Left)  ARTHROSCOPY, KNEE, WITH CHONDROPLASTY (Left)  ARTHROSCOPY, KNEE, WITH MENISCECTOMY,(medial and lateral) (Left)    Hospital Course (synopsis of major diagnoses, care, treatment, and services provided during the course of the hospital stay): left knee arthroscopic synovectomy, chondroplasty, partial medial meniscectomy, femoral and tibial subchondroplasty     Final Diagnosis: Post-Op Diagnosis Codes:     * Acute medial meniscus tear of left knee, initial encounter [S83.242A]     * Internal derangement of left knee [M23.92]     * Chondromalacia of left knee [M94.262]     * 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     * aspirin 81 MG EC tablet  Commonly known as:  ECOTRIN  Take 81 mg by mouth once daily.     * aspirin 325 MG tablet  Take 1 tablet (325 mg total) by mouth once daily. for 42 doses     celecoxib 200 MG capsule  Commonly known as:  CeleBREX  Take 1 capsule (200 mg total) by mouth 2 (two) times daily.     omeprazole 20 MG capsule  Commonly known as:  PRILOSEC  Take 1 capsule (20 mg total) by mouth 2 (two) times daily before meals.     oxyCODONE-acetaminophen  mg per tablet  Commonly known as:  PERCOCET  Take 1 tablet by mouth every 6 (six) hours as needed for Pain.     promethazine 25 MG tablet  Commonly known as:  PHENERGAN  Take 1 tablet (25 mg total) by mouth every 4 (four) hours as needed for Nausea.     sertraline 100 MG tablet  Commonly known as:  ZOLOFT  Take 1 tablet (100 mg total) by mouth once daily.     simvastatin 40 MG tablet  Commonly known as:  ZOCOR  Take 1 tablet (40 mg total)  by mouth nightly.     SYNTHROID 25 MCG tablet  Generic drug:  levothyroxine  1 po daily except 2 po daily on Saturday and Sunday.     VITAMIN C 1000 MG tablet  Generic drug:  ascorbic acid (vitamin C)  Take 1,000 mg by mouth once daily.     VITAMIN D3 5,000 unit Tab  Generic drug:  cholecalciferol (vitamin D3)  Take 5,000 Units by mouth once daily.         * This list has 2 medication(s) that are the same as other medications prescribed for you. Read the directions carefully, and ask your doctor or other care provider to review them with you.            ASK your doctor about these medications    traMADol 50 mg tablet  Commonly known as:  ULTRAM  Take 1 tablet (50 mg total) by mouth every 6 (six) hours as needed for Pain.  Ask about: Should I take this medication?          Discharge Procedure Orders   Diet general     Activity as tolerated     Sponge bath only until clinic visit     Ice to affected area     Weight bearing restrictions (specify)   Order Comments: WBAT to BLE     Call MD for:  temperature >100.4     Call MD for:  persistent nausea and vomiting     Call MD for:  severe uncontrolled pain     Call MD for:  difficulty breathing, headache or visual disturbances     Call MD for:  redness, tenderness, or signs of infection (pain, swelling, redness, odor or green/yellow discharge around incision site)     Call MD for:  hives     Call MD for:  persistent dizziness or light-headedness     Call MD for:  extreme fatigue     Remove dressing in 72 hours

## 2019-03-22 NOTE — ANESTHESIA PROCEDURE NOTES
Left adductor canal single shot block    Patient location during procedure: holding area   Block not for primary anesthetic.  Reason for block: at surgeon's request and post-op pain management   Post-op Pain Location: Left knee pain  Start time: 3/22/2019 6:40 AM  Timeout: 3/22/2019 6:40 AM   End time: 3/22/2019 6:45 AM  Staffing  Anesthesiologist: Juan Luis Thomas MD  Performed: anesthesiologist   Preanesthetic Checklist  Completed: patient identified, site marked, surgical consent, pre-op evaluation, timeout performed, IV checked, risks and benefits discussed and monitors and equipment checked  Peripheral Block  Patient position: supine  Prep: ChloraPrep  Patient monitoring: continuous pulse ox, frequent blood pressure checks, continuous capnometry, cardiac monitor and heart rate  Block type: adductor canal  Laterality: left  Injection technique: single shot  Needle  Needle type: Stimuplex   Needle gauge: 20 G  Needle length: 4 in  Needle localization: ultrasound guidance   -ultrasound image captured on disc.  Assessment  Injection assessment: negative aspiration, negative parasthesia and local visualized surrounding nerve  Paresthesia pain: none  Heart rate change: no  Slow fractionated injection: yes  Additional Notes  Ropivacaine 0.25% 20ml

## 2019-03-22 NOTE — DISCHARGE INSTRUCTIONS
[address], [place]                                                                                          [phone]                   Postoperative Instructions for Knee Surgery                 Your Surgery Included:   Open               Arthroscopic   [] Ligament Repair       [x] Diagnostic           [] ACL     [] PCL     [] MCL     [] PLLC      [x] Synovectomy / Plica Removal [] Meniscal Cartilage Repair / Transplantation      [] Lysis of Adhesions / Manipulation [] Articular Cartilage Repair      [] Interval Release           [] Microfracture       [] OATS   [] ACI      [x] Meniscectomy           [] Osteochondral Allograft      [] Meniscal Cartilage Repair  [] Patellar Realignment       [x] Debridement / Chondroplasty         [] Lateral Release   [] Ligament Repair      [] Articular Cartilage Repair          [] Extensor Mechanism             []   Microfracture  []  OATS         []  Cartilage Biopsy [] Tendon Repair          [] Ligament Reconstruction          [] Patella                  [] Quadriceps             []   ACL    []   PCL  [] High Tibial Osteotomy       [] PRP Arthrocentesis  [] Joint Replacement         [] Amniox Arthrocentesis           [] Unicompartmental   [] Patellofemoral        [x] Subchondroplasty         Call our office ([phone]) immediately or message through [facility] if you experience any of the following:       Excessive bleeding or pus like drainage at the incision site       Uncontrollable pain not relieved by pain medication       Excessive swelling or redness at the incision site       Fever above 101.5 degrees not controlled with Tylenol or Motrin       Shortness of Breath or severe calf pain       Any foul odor or blistering from the surgery site    FOR EMERGENCIES: [facility] is the best way to contact us. If on the weekend, page the  at [phone] who will direct your call appropriately.    1.   Pain Management: A cold  therapy cuff, pain medications, local injections, TENs unit, and in some cases, regional anesthesia injections are used to manage your post-operative pain. The decision to use each of these options is based on their risks and benefits.     Medications: You were given one or more of the following medication prescriptions before leaving the hospital. Have the prescriptions filled at a pharmacy on your way home and follow the instructions on the bottles. If you need a refill, please call your pharmacy.      Narcotic Medication (usually Percocet, Roxicodone, or Norco): Begin taking the medication before your knee starts to hurt. Some patients do not like to take any medication, but if you wait until your pain is severe before taking, you will be very uncomfortable for several hours waiting for the narcotic to work. Always take with food.     Nausea / Vomiting: For this issue, we prescribe Phenergan or Zofran, use this medication as directed as needed for nausea.     Cold Therapy: You may have been sent home with a [facility] cold therapy unit and wrap for your knee. Fill with ice and water to the indicated fill line. You can use 20-30 minutes on then off, several times a day. This will help relieve pain and control swelling. Do not sleep with on.     Regional Anesthesia Injections (Blocks): You may have been given a regional nerve block either before or after surgery. This may make your entire leg numb for 24-36 hours.                            * Proceed with caution when bearing weight on your leg.     2.   Diet: Eat a bland diet for the first day after surgery. Progress your diet as tolerated. Constipation may occur with Narcotic usage. We recommend Colace 100mg twice a day while taking narcotics.    3.   Activity: Limit your activity during the first 48 hours, keep your leg elevated with pillows under your heel. After the first 48 hours at home, increase your activity level based on your symptoms.    4.   Dressing:  (a) The soft, bulky dressing will be removed on the 3rd day after surgery. Place waterproof bandages at this time. Keep wounds as dry as possible for first 2 weeks. It is normal for some blood to be seen on the dressings. It is also normal for you to see apparent bruising on the skin around your knee. If you are concerned by the drainage or the appearance of your knee, you can send a picture via [facility].    5.   Shower: (a) You may shower on the 3rd day after surgery. Place waterproof bandages prior to shower. It is recommended to use Saran wrap before showering. Do not submerge limb for 4 weeks or incisions completely healed in any water.     6.   Your procedure did not require a post-operative brace.    7.   Your procedure did not require a Continuous Passive Motion (CPM) device.    8.   Weight Bearing: You may have been sent home with crutches. If instructed (see below), use these crutches at all times unless at complete rest.      [] Non-weight bearing for     {NUMBER:00650} weeks (you may touch your toes to the floor)      [] Partial weight bearing for  {NUMBER:62552} weeks    [] 25% Body Weight   [] 50% Body Weight      [x] Full weight bearing            [x]  NOW    []  after {NUMBER:97053} weeks     9.  Knee Exercises: Begin these exercises the first day after surgery in order to help you regain your motion and strength. You may do the following marked exercises:     [x] Quad Sets - Begin activating your quadriceps muscle by driving your          knee downward into full knee extension while seated on a table or bed   with a towel rolled and propped under your heel     [x] Straight Leg Raise (SLR) - While sharyn your quadriceps muscle, lift     your fully extended leg to the level of your non-operative knee (as shown)     [x] Heel Slides - With the knee straight, slide your heel slowly toward your   buttocks, hold at the endpoint for 10-15 seconds, then slowly straighten     [x] Ankle pumps - With your  "knee straight, move your ankle in a "pumping"    fashion to activate your calf and leg muscles      10.  Physical Therapy: Physical therapy is an essential component to your recovery from surgery. Your physical therapy will start in 3 days.    FIRST POSTOPERATIVE VISIT: As scheduled.       "

## 2019-03-23 NOTE — OP NOTE
DATE OF PROCEDURE: 3/22/2019    ATTENDING SURGEON: Surgeon(s) and Role:     * Shalonda Altman MD - Primary    ASSISTANTS:  Noe Estrada MD - Fellow  SMA [place] - Assistant     PREOPERATIVE DIAGNOSIS:  LEFT  Chondromalacia, patella M22.40, Chondromalacia, (excludes patella) M94.29, Internal derangement knee M23.90, Synovitis M65.9 and Tear, Medial meniscus, acute S83.249A    POSTOPERATIVE DIAGNOSIS:   LEFT  Chondromalacia, patella M22.40, Chondromalacia, (excludes patella) M94.29, Internal derangement knee M23.90, Pain, arthralgia M25.569, Synovitis M65.9 and Tear, Medial meniscus, acute S83.249A    PROCEDURES(S) PERFORMED:   1. LEFT  Femoral Subchondroplasty, 19103  2.  LEFT  Tibial Subchondroplasty, 54504  3.  LEFT  Arthroscopy, with meniscectomy (medial OR lateral) 51133  4LEFT  Arthroscopy, debridement/shaving of articular cartilage (chondroplasty) 12506  5. LEFT  Arthroscopy, knee, synovectomy, limited 72792    ANESTHESIA: General, Local, Regional w/o Catheter  adductor block and 30 cc 0.5% ropivicaine mixture    FLUIDS IN THE CASE: 1000 ml    ESTIMATED BLOOD LOSS: Minimal    URINE OUTPUT: 0 ml    COMPLICATIONS: none    CONDITION ON RETURN TO RECOVERY ROOM: Good     Expand All Collapse All       IMPLANTS UTILIZED: Shaw SCP Kit x 2    INDICATIONS FOR OPERATIVE PROCEDURE: Bernadette Aquino is a 68 y.o.  female with history of LEFT knee pain and pathology. The patient's history and physical examination findings consistent with the procedure performed. He noted significant problems in the area of concern with problems on activities of daily living and aggressive use of the LEFT leg. As a result of these problems and problems with overall activity level, the patient was deemed to be an appropriate candidate for operative intervention. Nonoperative versus operative options were discussed. The risks and benefits were discussed with the patient. The patient acknowledged understanding and wished to proceed  with operative intervention. Informed consent was obtained prior to the procedure. Details of the surgical procedure were explained, including incisions and probable rehabilitation course. The patient understands the likely length of convalescence after surgery; and we have explained the risks, benefits, and alternatives of surgery. Reasonable expectations and potential complications were discussed and acknowledged, including but not limited to infection, bleeding, blood clots, (DVT and/or PE), nerve injury, retear, instability, continued pain and stiffness. It was also explained that there was a chance of failure which may require further management. The patient agreed and understood and wished to proceed.       FINDINGS:     ARTICULAR CARTILAGE LESION(S):  Medial Femoral Condyle: ICRS Grade 3      Size: 2.0 x 2.5 cm  Medial tibial plateau: ICRS Grade 2      Size: 2.0 x 2.0 cm        Lateral Femoral Condyle: ICRS Grade 0      Size: none  Lateral tibial plateau: ICRS Grade 0      Size: none      Patellar surface: ICRS Grade 0      Size: none  Trochlear groove: ICRS Grade 3      Size: 2.0 x 2.5 cm        EXAMINATION UNDER ANESTHESIA:   Extension 0 degrees  Flexion 140 degrees  Lachman Maneuver:  Negative  Anterior Drawer: Negative  Pivot Shift: Negative  Posterior Drawer:  Negative  Varus stability @ 30 degrees: 0  Valgus stability @ 30 degrees: 0  Patellar glide:1 quadrant lateral, 2 quadrant medial      DESCRIPTION OF PROCEDURE: The patient was brought into the Operating Room and placed in supine position. Upon application of no block in the preoperative holding area, the patient underwent General to stabilize the airway. The patient was given the appropriate dose of antibiotics based on body weight. Timeout was utilized to verify the side as the operative side. Both upper extremities were placed in comfortable position. Examination under anesthesia was performed. The nonoperative leg was carefully padded along the  heel and peroneal nerve regions and maintained flat on the table. The operative leg was then stabilized with a lateral post for intra-operative positioning as well as a popliteal post placed at the mid-calf level.  No bump was placed under the hip on the operative side. The operative leg was prepped and draped in a sterile fashion with ChloraPrep material.    We injected 0.5% ropivacaine mixture into the anterolateral and anteromedial aspect of the knee with application of 15 mL per portal site. A #11 blade was used to make the arthroscopic portals. Blunt trocar and sheath were inserted into the intercondylar notch and then subsequently into the suprapatellar pouch. This patellofemoral joint was visualized, demonstrating normal lateral patellar tilt, normal patellar subluxation. The patellar tracked midline with flexion and extension. Arthroscopic pictures were obtained. There was articular cartilage damage was present in the patellofemoral compartment as noted in the Findings section of this operative report. The lesion if present was treated with arthroscopic chondroplasty technique removing all irregular edges and flaps of articular cartilage at the lesion.    Attention was turned to the intercondylar notch where the anterior cruciate ligament (ACL) and posterior cruciate ligament (PCL) structures were visualized. Visualization demonstrated an intact ACL and an intact PCL. Probe analysis revealed no signs of occult pathology within the ligamentous structures.     Attention was then turned to the lateral compartment. The patient demonstrated an intact lateral meniscus with probe analysis demonstrating no occult tears or pathology Arthroscopic instrumentation was used to veify no tear and pictures obtained. no articular cartilage damage in the lateral compartment The lesion if present was treated withobservation.    Attention was then turned to the medial compartment. The patient demonstrated a smalll central tear  to the body  type tear of the central body region of the medial meniscus. Arthroscopic instrumentation was used to  carefully remove the tear and removing 10 % of the central body region of the medial meniscus. The remaining meniscus structure medially was found to be intact. There was articular cartilage damage was present in the medial compartment as noted in the Findings section of this operative report. The lesion if present was treated with arthroscopic chondroplasty technique removing all irregular edges and flaps of articular cartilage at the lesion.    Arthroscopic limited synovectomy was performed in the anteromedial and anterolateral regions of the knee.     Subchondroplasty:    Arthroscopic instrumentation was removed from the LEFT knee and the C-arm was used to visualize the medial femoral region. A spinal needle was used to localize the location for incision based on flouroscopic visualization laterally and anteriorly. A # 11 blade was used to create a small incision and the trochar from the Shaw set was used to place the application system into the medial femur 1.5 - 2 cm proximal to the medial articular subchondral bone plate. The holes were positioned towards the joint line using flouroscopic guidance and the AccuFill injected; all vials were placed and the trochar was reapplied. Attention was turned distally. The C-arm was used to visualize the medial Tibial region. A # 11 blade was used to create a small incision and the trochar from the Shaw set was used to place the application system into the medial Tibial 1.5 - 2.0 cm distal to the medial articular subchondral bone plate. The holes were positioned towards the joint line using flouroscopic guidance and the AccuFill injected; all vials were placed and the trochar was reapplied. We allowed 15 minutes for the calcium phosphate to harden and solidify. The trochars were removed. We used arthroscopic instrumentation to verify that no calcium  phosphate had entered the medial compartment. The trochars were removed and we verified no material in the medial soft tissue structures as well.       Final arthroscopic pictures were obtained. Fluid was extravasated from the joint. A 4-0 nylon sutures were used to close the arthroscopic portals. We then injected additional 0.5% ropivicaine mixture using 10 ml per portal site and along then anterior portion of the knee. no further treatments were performed to the knee. Xeroform was applied along with application of sterile electrodes proximally and distally, gauze, ABD pads,cast padding, long-leg BREE hose stocking and cooling unit. No immobilizer was required postoperatively for this patient. The patient was then allowed to recover from anesthesia.  General was removed. The patient was taken to Recovery Room in Good condition. At the completion case, all instrument and sponge counts were   correct.    NOTE: I was present and scrubbed for the key portions of the procedure.    PHYSICAL THERAPY:  The patient should begin physical therapy on postoperative   day # 5 and will be advanced to outpatient therapy as soon as   Possible following discharge.  Weight bearing:as tolerate LEFT leg  Range of Motion:Full normal motion symmetric to opposite side     If the CPM is required the patient is to be taken out of the CPM machine as much as possible.    Additional exercises to be performed are:   to begin quad sets with a heel roll to obtain hyperextension, straight leg raise and heel slides with the heel supported in a closed-chain fashion    Immediate specific exercises should include:   Gait program should include the above stated weightbearing; in addition: active extension with a heel-to-toe gait working on maintaining extension    Discharge summary:  The patient was discharged to home in Good  Follow-up as scheduled preoperatively.    Medication(s): Refer to Discharge Medication List         Resume preoperative diet as  tolerated    Activity per outpatient discharge instruction sheet

## 2019-03-25 ENCOUNTER — PATIENT MESSAGE (OUTPATIENT)
Dept: SPORTS MEDICINE | Facility: CLINIC | Age: 69
End: 2019-03-25

## 2019-03-27 ENCOUNTER — PATIENT MESSAGE (OUTPATIENT)
Dept: SPORTS MEDICINE | Facility: CLINIC | Age: 69
End: 2019-03-27

## 2019-04-01 ENCOUNTER — OFFICE VISIT (OUTPATIENT)
Dept: SPORTS MEDICINE | Facility: CLINIC | Age: 69
End: 2019-04-01
Payer: MEDICARE

## 2019-04-01 ENCOUNTER — TELEPHONE (OUTPATIENT)
Dept: SPORTS MEDICINE | Facility: CLINIC | Age: 69
End: 2019-04-01

## 2019-04-01 VITALS
SYSTOLIC BLOOD PRESSURE: 141 MMHG | DIASTOLIC BLOOD PRESSURE: 86 MMHG | HEIGHT: 62 IN | WEIGHT: 130 LBS | HEART RATE: 82 BPM | BODY MASS INDEX: 23.92 KG/M2

## 2019-04-01 DIAGNOSIS — M21.162 GENU VARUM OF BOTH LOWER EXTREMITIES: ICD-10-CM

## 2019-04-01 DIAGNOSIS — M23.92 INTERNAL DERANGEMENT OF LEFT KNEE: Primary | ICD-10-CM

## 2019-04-01 DIAGNOSIS — G89.29 CHRONIC PAIN OF LEFT KNEE: ICD-10-CM

## 2019-04-01 DIAGNOSIS — M23.91 INTERNAL DERANGEMENT OF RIGHT KNEE: ICD-10-CM

## 2019-04-01 DIAGNOSIS — M21.161 GENU VARUM OF BOTH LOWER EXTREMITIES: ICD-10-CM

## 2019-04-01 DIAGNOSIS — M25.562 CHRONIC PAIN OF LEFT KNEE: ICD-10-CM

## 2019-04-01 PROCEDURE — 99999 PR PBB SHADOW E&M-EST. PATIENT-LVL III: ICD-10-PCS | Mod: PBBFAC,,, | Performed by: ORTHOPAEDIC SURGERY

## 2019-04-01 PROCEDURE — 99999 PR PBB SHADOW E&M-EST. PATIENT-LVL III: CPT | Mod: PBBFAC,,, | Performed by: ORTHOPAEDIC SURGERY

## 2019-04-01 PROCEDURE — 99024 PR POST-OP FOLLOW-UP VISIT: ICD-10-PCS | Mod: S$GLB,,, | Performed by: ORTHOPAEDIC SURGERY

## 2019-04-01 PROCEDURE — 99024 POSTOP FOLLOW-UP VISIT: CPT | Mod: S$GLB,,, | Performed by: ORTHOPAEDIC SURGERY

## 2019-04-01 NOTE — TELEPHONE ENCOUNTER
Spoke to Brielle. Clarified the PT orders.    ----- Message from Francisca Livingston sent at 4/1/2019  2:12 PM CDT -----  [facility]  Please call Brielle at [phone]    Fax# [phone]    Need a clarification on physical therapy orders. Patient is currently there now and has had 3 sessions already    Thank you

## 2019-04-01 NOTE — PROGRESS NOTES
Subjective:          Chief Complaint: Bernadette Aquino is a 68 y.o. female who had concerns including Post-op Evaluation of the Left Knee.    Patient is here for 11 day post op follow up for her left knee. She is doing well. She did notice a blister and redness under her portal sites. She is completing PT at [facility]. She does have concern about swelling in her right knee following the Synvisc-One injection. She is only taking ASA at this time.     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 LEFT  Femoral Subchondroplasty, 40956  2.  LEFT  Tibial Subchondroplasty, 63895  3.  LEFT  Arthroscopy, with meniscectomy (medial OR lateral) 05233  4LEFT  Arthroscopy, debridement/shaving of articular cartilage (chondroplasty) 31148  5. LEFT  Arthroscopy, knee, synovectomy, limited 37728      DATE OF PROCEDURE: 12/11/2015     ATTENDING SURGEON: Surgeon(s) and Role:     * Shalonda Altman MD - Primary     * La Tang DO - Fellow     * Laquita Vail PA-C - assistant     PREOPERATIVE DIAGNOSIS:    Pre-operative Diagnosis: Right shoulder   Tear, biceps tendon, non-traumatic M66.829 Rotator Cuff Syndrome/Disorder  M75.100, Tear, Biceps Tendon, Non-Tramatic M66.829, Tear, Rotator Cuff, Tramatic S46.012A, S46.011A and Labral tear and chondromalacia     Post-operative Diagnosis:  Right shoulder   Tear, biceps  tendon, non-traumatic M66.829 Rotator Cuff Syndrome/Disorder  M75.100, Tear, Rotator Cuff, Tramatic S46.012A, S46.011A and Labral Tear, Chondromalacia Shoulder joint      Procedures Performed:  1. Right shoulder Arthroscopic rotator cuff repair CPT - 28040, COMPLEX     2. Right shoulder Biceps tenodesis CPT - 20758, COMPLEX     3. Right shoulder Arthroscopic labral debridement CPT - 37410     4. Right shoulder Arthroscopic chondroplasty          Review of Systems   Constitution: Negative for fever and night sweats.   HENT: Negative for hearing loss.    Eyes: Negative for blurred vision and visual disturbance.   Cardiovascular: Negative for chest pain and leg swelling.   Respiratory: Negative for shortness of breath.    Endocrine: Negative for polyuria.   Hematologic/Lymphatic: Negative for bleeding problem.   Skin: Negative for rash.   Musculoskeletal: Positive for joint pain and joint swelling. Negative for back pain, muscle cramps and muscle weakness.   Gastrointestinal: Negative for melena.   Genitourinary: Negative for hematuria.   Neurological: Negative for loss of balance, numbness and paresthesias.   Psychiatric/Behavioral: Negative for altered mental status.       Pain Related Questions  Over the past 3 days, what was your average pain during activity? (I.e. running, jogging, walking, climbing stairs, getting dressed, ect.): 3  Over the past 3 days, what was your highest pain level?: 3  Over the past 3 days, what was your lowest pain level? : 3    Other  How many nights a week are you awakened by your affected body part?: 2      Objective:        General: Bernadette is well-developed, well-nourished, appears stated age, in no acute distress, alert and oriented to time, place and person.     General    Vitals reviewed.  Constitutional: She is oriented to person, place, and time. She appears well-developed and well-nourished. No distress.   HENT:   Mouth/Throat: No oropharyngeal exudate.   Eyes: Right eye exhibits  no discharge. Left eye exhibits no discharge.   Neck: Normal range of motion.   Pulmonary/Chest: Effort normal and breath sounds normal. No respiratory distress.   Neurological: She is alert and oriented to person, place, and time. She has normal reflexes. No cranial nerve deficit. Coordination normal.   Psychiatric: She has a normal mood and affect. Her behavior is normal. Judgment and thought content normal.     General Musculoskeletal Exam   Gait: normal       Right Knee Exam     Inspection   Erythema: absent  Scars: absent  Swelling: present  Effusion: absent  Deformity: absent  Bruising: absent    Tenderness   The patient is tender to palpation of the lateral joint line, medial joint line and patella.    Crepitus   The patient has crepitus of the patella.    Range of Motion   Extension: 5   Flexion: 130     Tests   Meniscus   Neeta:  Medial - positive Lateral - positive  Ligament Examination Lachman: normal (-1 to 2mm) PCL-Posterior Drawer: normal (0 to 2mm)     MCL - Valgus: normal (0 to 2mm)  LCL - Varus: normalPivot Shift: normal (Equal)Reverse Pivot Shift: normal (Equal)Dial Test at 30 degrees: normal (< 5 degrees)Dial Test at 90 degrees: normal (< 5 degrees)  Posterior Sag Test: negative  Posterolateral Corner: unstable (>15 degrees difference)  Patella   Patellar apprehension: negative  Passive Patellar Tilt: neutral  Patellar Tracking: normal  Patellar Glide (quadrants): Lateral - 1   Medial - 2  Q-Angle at 90 degrees: normal  Patellar Grind: positive  J-Sign: none    Other   Meniscal Cyst: absent  Popliteal (Baker's) Cyst: absent  Sensation: normal    Left Knee Exam     Inspection   Erythema: absent  Scars: absent  Swelling: present  Effusion: absent  Deformity: absent  Bruising: absent    Tenderness   The patient tender to palpation of the medial joint line.    Range of Motion   Extension: 0   Flexion: 140     Tests   Meniscus   Neeta:  Medial - negative Lateral - negative  Stability Lachman:  normal (-1 to 2mm) PCL-Posterior Drawer: normal (0 to 2mm)  MCL - Valgus: normal (0 to 2mm)  LCL - Varus: normal (0 to 2mm)Pivot Shift: normal (Equal)Reverse Pivot Shift: normal (Equal)Dial Test at 30 degrees: normal (< 5 degrees)Dial Test at 90 degrees: normal (< 5 degrees)  Posterior Sag Test: negative  Posterolateral Corner: unstable (>15 degrees difference)  Patella   Patellar apprehension: negative  Passive Patellar Tilt: neutral  Patellar Tracking: normal  Patellar Glide (Quadrants): Lateral - 1 Medial - 2  Q-Angle at 90 degrees: normal  Patellar Grind: negative  J-Sign: J sign absent    Other   Meniscal Cyst: absent  Popliteal (Baker's) Cyst: absent  Sensation: normal    Right Hip Exam     Tests   Jorden: negative  Left Hip Exam     Tests   Jorden: negative          Muscle Strength   Right Lower Extremity   Hip Abduction: 5/5   Quadriceps:  5/5   Hamstrin/5   Left Lower Extremity   Hip Abduction: 5/5   Quadriceps:  5/5   Hamstrin/5     Reflexes     Left Side  Quadriceps:  2+  Achilles:  2+    Right Side   Quadriceps:  2+  Achilles:  2+    Vascular Exam     Right Pulses  Dorsalis Pedis:      2+  Posterior Tibial:      2+        Left Pulses  Dorsalis Pedis:      2+  Posterior Tibial:      2+          XR: Radiographs ordered and reviewed today in clinic of the bilateral knee demonstrates medial joint space narrowing and osteophytes, patellofemoral joint space narrowing.                 Assessment:       Encounter Diagnoses   Name Primary?    Internal derangement of left knee Yes    Genu varum of both lower extremities     Chronic pain of left knee     Internal derangement of right knee           Plan:       1. IKDC, SF-12 and KOOS was not filled out today in clinic.     RTC in 4 weeks with Dr. Shalonda Altman for postoperative follow-up. Patient will  fill out IKDC, SF-12 and KOOS on return and bilateral knee series.    2. Continue Celebrex Once Daily- due to concern of GI upset. She will notify us if she  has any trouble    3. Physical Therapy- sent to [facility]    4. Sutures Removed. May Wash with Soap and Water    5. HEP 04700 - Shalonda Altman MD and SMA [place], instructed and demonstrated a CORE HEP. The patient then demonstrated understanding of exercises and proper technique. This program was performed for 16 minutes.                         [facility] patient questionnaires have been collected today.

## 2019-04-29 ENCOUNTER — HOSPITAL ENCOUNTER (OUTPATIENT)
Dept: RADIOLOGY | Facility: HOSPITAL | Age: 69
Discharge: HOME | End: 2019-04-29
Attending: ORTHOPAEDIC SURGERY
Payer: MEDICARE

## 2019-04-29 ENCOUNTER — OFFICE VISIT (OUTPATIENT)
Dept: SPORTS MEDICINE | Facility: CLINIC | Age: 69
End: 2019-04-29
Payer: MEDICARE

## 2019-04-29 VITALS
HEIGHT: 62 IN | WEIGHT: 130 LBS | DIASTOLIC BLOOD PRESSURE: 80 MMHG | BODY MASS INDEX: 23.92 KG/M2 | HEART RATE: 87 BPM | SYSTOLIC BLOOD PRESSURE: 117 MMHG

## 2019-04-29 DIAGNOSIS — M23.91 INTERNAL DERANGEMENT OF RIGHT KNEE: ICD-10-CM

## 2019-04-29 DIAGNOSIS — M23.92 INTERNAL DERANGEMENT OF LEFT KNEE: ICD-10-CM

## 2019-04-29 DIAGNOSIS — M25.562 ACUTE PAIN OF LEFT KNEE: ICD-10-CM

## 2019-04-29 DIAGNOSIS — M17.11 PRIMARY OSTEOARTHRITIS OF RIGHT KNEE: ICD-10-CM

## 2019-04-29 DIAGNOSIS — M25.562 ACUTE PAIN OF LEFT KNEE: Primary | ICD-10-CM

## 2019-04-29 DIAGNOSIS — S83.232D COMPLEX TEAR OF MEDIAL MENISCUS OF LEFT KNEE AS CURRENT INJURY, SUBSEQUENT ENCOUNTER: ICD-10-CM

## 2019-04-29 PROCEDURE — 73564 X-RAY EXAM KNEE 4 OR MORE: CPT | Mod: 26,LT,, | Performed by: RADIOLOGY

## 2019-04-29 PROCEDURE — 99024 PR POST-OP FOLLOW-UP VISIT: ICD-10-PCS | Mod: S$GLB,,, | Performed by: ORTHOPAEDIC SURGERY

## 2019-04-29 PROCEDURE — 73564 XR KNEE ORTHO BILAT WITH FLEXION: ICD-10-PCS | Mod: 26,LT,, | Performed by: RADIOLOGY

## 2019-04-29 PROCEDURE — 99999 PR PBB SHADOW E&M-EST. PATIENT-LVL IV: ICD-10-PCS | Mod: PBBFAC,,, | Performed by: ORTHOPAEDIC SURGERY

## 2019-04-29 PROCEDURE — 99999 PR PBB SHADOW E&M-EST. PATIENT-LVL IV: CPT | Mod: PBBFAC,,, | Performed by: ORTHOPAEDIC SURGERY

## 2019-04-29 PROCEDURE — 73564 X-RAY EXAM KNEE 4 OR MORE: CPT | Mod: TC,50,FY,PO

## 2019-04-29 PROCEDURE — 99024 POSTOP FOLLOW-UP VISIT: CPT | Mod: S$GLB,,, | Performed by: ORTHOPAEDIC SURGERY

## 2019-04-29 RX ORDER — DICLOFENAC SODIUM 10 MG/G
2 GEL TOPICAL 2 TIMES DAILY
Qty: 1 TUBE | Refills: 3 | Status: SHIPPED | OUTPATIENT
Start: 2019-04-29 | End: 2020-03-11 | Stop reason: SDUPTHER

## 2019-04-29 NOTE — PROGRESS NOTES
Subjective:          Chief Complaint: Bernadette Aquino is a 68 y.o. female who had concerns including Post-op Evaluation of the Left Knee.    Patient is here for 6 weeks post op follow up for her left knee. She is doing well. The blistering has cleared up. She is completing PT at [facility]. She does have more pain in her right knee at this time than the left. She feels that the Synvisc-One injection helped some into the right knee.    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 LEFT  Femoral Subchondroplasty, 37775  2.  LEFT  Tibial Subchondroplasty, 25126  3.  LEFT  Arthroscopy, with meniscectomy (medial OR lateral) 26393  4LEFT  Arthroscopy, debridement/shaving of articular cartilage (chondroplasty) 42574  5. LEFT  Arthroscopy, knee, synovectomy, limited 10713      DATE OF PROCEDURE: 12/11/2015     ATTENDING SURGEON: Surgeon(s) and Role:     * Shalonda Altman MD - Primary     * La Tang DO - Fellow     * Laquita Vail PA-C - assistant     PREOPERATIVE DIAGNOSIS:    Pre-operative Diagnosis: Right shoulder   Tear, biceps tendon, non-traumatic M66.829 Rotator Cuff Syndrome/Disorder  M75.100, Tear, Biceps Tendon, Non-Tramatic M66.829, Tear, Rotator Cuff, Tramatic S46.012A, S46.011A and Labral tear and chondromalacia     Post-operative Diagnosis:  Right shoulder   Tear, biceps tendon,  non-traumatic M66.829 Rotator Cuff Syndrome/Disorder  M75.100, Tear, Rotator Cuff, Tramatic S46.012A, S46.011A and Labral Tear, Chondromalacia Shoulder joint      Procedures Performed:  1. Right shoulder Arthroscopic rotator cuff repair CPT - 15140, COMPLEX     2. Right shoulder Biceps tenodesis CPT - 95554, COMPLEX     3. Right shoulder Arthroscopic labral debridement CPT - 60107     4. Right shoulder Arthroscopic chondroplasty          Review of Systems   Constitution: Negative for fever and night sweats.   HENT: Negative for hearing loss.    Eyes: Negative for blurred vision and visual disturbance.   Cardiovascular: Negative for chest pain and leg swelling.   Respiratory: Negative for shortness of breath.    Endocrine: Negative for polyuria.   Hematologic/Lymphatic: Negative for bleeding problem.   Skin: Negative for rash.   Musculoskeletal: Positive for joint pain and joint swelling. Negative for back pain, muscle cramps and muscle weakness.   Gastrointestinal: Negative for melena.   Genitourinary: Negative for hematuria.   Neurological: Negative for loss of balance, numbness and paresthesias.   Psychiatric/Behavioral: Negative for altered mental status.                   Objective:        General: Bernadette is well-developed, well-nourished, appears stated age, in no acute distress, alert and oriented to time, place and person.     General    Vitals reviewed.  Constitutional: She is oriented to person, place, and time. She appears well-developed and well-nourished. No distress.   HENT:   Mouth/Throat: No oropharyngeal exudate.   Eyes: Right eye exhibits no discharge. Left eye exhibits no discharge.   Neck: Normal range of motion.   Pulmonary/Chest: Effort normal and breath sounds normal. No respiratory distress.   Neurological: She is alert and oriented to person, place, and time. She has normal reflexes. No cranial nerve deficit. Coordination normal.   Psychiatric: She has a normal mood and affect. Her behavior is  normal. Judgment and thought content normal.     General Musculoskeletal Exam   Gait: normal       Right Knee Exam     Inspection   Erythema: absent  Scars: present  Swelling: absent  Effusion: absent  Deformity: absent  Bruising: absent    Tenderness   The patient is tender to palpation of the medial joint line and patella.    Crepitus   The patient has crepitus of the patella.    Range of Motion   Extension:  5 abnormal   Flexion: 130     Tests   Meniscus   Neeta:  Medial - positive Lateral - positive  Ligament Examination Lachman: normal (-1 to 2mm) PCL-Posterior Drawer: normal (0 to 2mm)     MCL - Valgus: normal (0 to 2mm)  LCL - Varus: normalPivot Shift: normal (Equal)Reverse Pivot Shift: normal (Equal)Dial Test at 30 degrees: normal (< 5 degrees)Dial Test at 90 degrees: normal (< 5 degrees)  Posterior Sag Test: negative  Posterolateral Corner: unstable (>15 degrees difference)  Patella   Patellar apprehension: negative  Passive Patellar Tilt: neutral  Patellar Tracking: normal  Patellar Glide (quadrants): Lateral - 1   Medial - 2  Q-Angle at 90 degrees: normal  Patellar Grind: positive  J-Sign: none    Other   Meniscal Cyst: absent  Popliteal (Baker's) Cyst: absent  Sensation: normal    Left Knee Exam     Inspection   Erythema: absent  Scars: present  Effusion: absent  Deformity: absent  Bruising: absent    Tenderness   The patient is experiencing no tenderness.     Range of Motion   Extension: 0   Flexion: 140     Tests   Meniscus   Neeta:  Medial - negative Lateral - negative  Stability Lachman: normal (-1 to 2mm) PCL-Posterior Drawer: normal (0 to 2mm)  MCL - Valgus: normal (0 to 2mm)  LCL - Varus: normal (0 to 2mm)Pivot Shift: normal (Equal)Reverse Pivot Shift: normal (Equal)Dial Test at 30 degrees: normal (< 5 degrees)Dial Test at 90 degrees: normal (< 5 degrees)  Posterior Sag Test: negative  Posterolateral Corner: unstable (>15 degrees difference)  Patella   Patellar apprehension:  negative  Passive Patellar Tilt: neutral  Patellar Tracking: normal  Patellar Glide (Quadrants): Lateral - 1 Medial - 2  Q-Angle at 90 degrees: normal  Patellar Grind: negative  J-Sign: J sign absent    Other   Meniscal Cyst: absent  Popliteal (Baker's) Cyst: absent  Sensation: normal    Right Hip Exam     Tests   Jorden: negative  Left Hip Exam     Tests   Jorden: negative          Muscle Strength   Right Lower Extremity   Hip Abduction: 5/5   Quadriceps:  5/5   Hamstrin/5   Left Lower Extremity   Hip Abduction: 5/5   Quadriceps:  5/5   Hamstrin/5     Reflexes     Left Side  Quadriceps:  2+  Achilles:  2+    Right Side   Quadriceps:  2+  Achilles:  2+    Vascular Exam     Right Pulses  Dorsalis Pedis:      2+  Posterior Tibial:      2+        Left Pulses  Dorsalis Pedis:      2+  Posterior Tibial:      2+          EXAMINATION:  XR KNEE ORTHO BILAT WITH FLEXION    CLINICAL HISTORY:  Pain in left knee    TECHNIQUE:  AP standing of both knees, PA flexion standing views of both knees, and Merchant views of both knees were performed.  Lateral views of both knees were also performed.    COMPARISON:  None 12/10/2018    FINDINGS:  DJD with narrowing of the patellofemoral and the medial tibiofemoral joint spaces bilaterally.  Patchy sclerotic changes noted in the left distal femur and proximal tibia.  No acute fracture or dislocation.  No bone destruction identified   Impression       See above     Narrative     EXAMINATION:  XR KNEE ORTHO BILAT WITH FLEXION    CLINICAL HISTORY:  Pain in left knee    TECHNIQUE:  AP standing of both knees, PA flexion standing views of both knees, and Merchant views of both knees were performed.  Lateral views of both knees were also performed.    COMPARISON:  None 12/10/2018    FINDINGS:  DJD with narrowing of the patellofemoral and the medial tibiofemoral joint spaces bilaterally.  Patchy sclerotic changes noted in the left distal femur and proximal tibia.  No acute fracture or  dislocation.  No bone destruction identified      Impression       See above           Assessment:       Encounter Diagnoses   Name Primary?    Acute pain of left knee Yes    Complex tear of medial meniscus of left knee as current injury, subsequent encounter     Primary osteoarthritis of right knee     Internal derangement of right knee     Internal derangement of left knee           Plan:       1. IKDC, SF-12 and KOOS was not filled out today in clinic.     RTC in 3 months with Dr. Shalonda Altman. Patient will  fill out IKDC, SF-12 and KOOS on return.    2. D/c Celebrex Once Daily- due to concern of GI upset.    3. Physical Therapy- sent to [facility]    4. HEP 73832 - Shalonda Altman MD and SMA [place], instructed and demonstrated a CORE HEP. The patient then demonstrated understanding of exercises and proper technique. This program was performed for 16 minutes.     5. voltaren Gel topically to the arthroscopic portal sites left greater than right                        [facility] patient questionnaires have been collected today.

## 2019-05-29 ENCOUNTER — PES CALL (OUTPATIENT)
Dept: ADMINISTRATIVE | Facility: CLINIC | Age: 69
End: 2019-05-29

## 2019-06-24 ENCOUNTER — TELEPHONE (OUTPATIENT)
Dept: INTERVENTIONAL RADIOLOGY/VASCULAR | Facility: CLINIC | Age: 69
End: 2019-06-24

## 2019-06-24 NOTE — TELEPHONE ENCOUNTER
Patient call and has concerns about her speech, she has aneurysm, don't know due to her aneurysm is the reason why and need to schedule MRA of Brain. Stated she's not any any pain and will be going out of town this Friday 6/28/2019. I schedule patient for MRA Brain for tomorrow 6/25/2019 at 6 am. Forward message to Tati Angeles NP.

## 2019-06-25 ENCOUNTER — HOSPITAL ENCOUNTER (OUTPATIENT)
Dept: RADIOLOGY | Facility: HOSPITAL | Age: 69
Discharge: HOME | End: 2019-06-25
Attending: NURSE PRACTITIONER
Payer: MEDICARE

## 2019-06-25 DIAGNOSIS — I67.1 CEREBRAL ANEURYSM: ICD-10-CM

## 2019-06-25 LAB
CREAT SERPL-MCNC: 0.8 MG/DL (ref 0.5–1.4)
SAMPLE: NORMAL

## 2019-06-25 PROCEDURE — 70546 MRA BRAIN WITH AND WITHOUT: ICD-10-PCS | Mod: 26,,, | Performed by: RADIOLOGY

## 2019-06-25 PROCEDURE — 70546 MR ANGIOGRAPH HEAD W/O&W/DYE: CPT | Mod: TC

## 2019-06-25 PROCEDURE — A9585 GADOBUTROL INJECTION: HCPCS | Performed by: NURSE PRACTITIONER

## 2019-06-25 PROCEDURE — 70546 MR ANGIOGRAPH HEAD W/O&W/DYE: CPT | Mod: 26,,, | Performed by: RADIOLOGY

## 2019-06-25 PROCEDURE — 25500020 PHARM REV CODE 255: Performed by: NURSE PRACTITIONER

## 2019-06-25 RX ORDER — GADOBUTROL 604.72 MG/ML
10 INJECTION INTRAVENOUS
Status: COMPLETED | OUTPATIENT
Start: 2019-06-25 | End: 2019-06-25

## 2019-06-25 RX ADMIN — GADOBUTROL 10 ML: 604.72 INJECTION INTRAVENOUS at 07:06

## 2019-06-26 ENCOUNTER — TELEPHONE (OUTPATIENT)
Dept: INTERVENTIONAL RADIOLOGY/VASCULAR | Facility: CLINIC | Age: 69
End: 2019-06-26

## 2019-06-26 DIAGNOSIS — I67.1 CEREBRAL ANEURYSM: ICD-10-CM

## 2019-06-26 NOTE — PROGRESS NOTES
Reviewed finding of scan with patient. MRA appears stable, will cancel angiogram per Dr. Soria (Covering neurointerventional radiologist). MD recommending repeat MRA in year to observe for new aneurysms and growth of current aneurysm. Reviewed with patient. Discussed signs and symptoms of stroke and when to go to ED. Patient verbalized understanding and ammenable.

## 2019-06-26 NOTE — TELEPHONE ENCOUNTER
Spoke to patient and inform her that her MRA Brain is normal, no longer need IR Cerebral Angiogram. Patient understands. F/U MRA in 1 yr.

## 2019-07-15 ENCOUNTER — TELEPHONE (OUTPATIENT)
Dept: SPORTS MEDICINE | Facility: CLINIC | Age: 69
End: 2019-07-15

## 2019-07-15 NOTE — TELEPHONE ENCOUNTER
The patient was contacted regarding their call to the office earlier and a voice mail was left to call the office back at their convenience.    ----- Message from Sincere Bliss sent at 7/15/2019  3:25 PM CDT -----  Contact: Self   Needs Advice    Reason for call: reschedule post op appt.         Communication Preference: [phone]    Additional Information:N/A

## 2019-07-19 ENCOUNTER — TELEPHONE (OUTPATIENT)
Dept: SPORTS MEDICINE | Facility: CLINIC | Age: 69
End: 2019-07-19

## 2019-07-22 ENCOUNTER — OFFICE VISIT (OUTPATIENT)
Dept: SPORTS MEDICINE | Facility: CLINIC | Age: 69
End: 2019-07-22
Payer: MEDICARE

## 2019-07-22 ENCOUNTER — HOSPITAL ENCOUNTER (OUTPATIENT)
Dept: RADIOLOGY | Facility: HOSPITAL | Age: 69
Discharge: HOME | End: 2019-07-22
Attending: ORTHOPAEDIC SURGERY
Payer: MEDICARE

## 2019-07-22 VITALS
HEIGHT: 62 IN | HEART RATE: 80 BPM | WEIGHT: 130 LBS | BODY MASS INDEX: 23.92 KG/M2 | SYSTOLIC BLOOD PRESSURE: 127 MMHG | DIASTOLIC BLOOD PRESSURE: 79 MMHG

## 2019-07-22 DIAGNOSIS — M17.31 POST-TRAUMATIC OSTEOARTHRITIS OF RIGHT KNEE: ICD-10-CM

## 2019-07-22 DIAGNOSIS — M17.32 POST-TRAUMATIC OSTEOARTHRITIS OF LEFT KNEE: ICD-10-CM

## 2019-07-22 DIAGNOSIS — M21.162 GENU VARUM OF BOTH LOWER EXTREMITIES: ICD-10-CM

## 2019-07-22 DIAGNOSIS — M21.161 GENU VARUM OF BOTH LOWER EXTREMITIES: ICD-10-CM

## 2019-07-22 DIAGNOSIS — M25.562 LEFT KNEE PAIN, UNSPECIFIED CHRONICITY: ICD-10-CM

## 2019-07-22 DIAGNOSIS — M25.562 LEFT KNEE PAIN, UNSPECIFIED CHRONICITY: Primary | ICD-10-CM

## 2019-07-22 DIAGNOSIS — S83.232D COMPLEX TEAR OF MEDIAL MENISCUS OF LEFT KNEE AS CURRENT INJURY, SUBSEQUENT ENCOUNTER: ICD-10-CM

## 2019-07-22 DIAGNOSIS — M23.92 INTERNAL DERANGEMENT OF LEFT KNEE: ICD-10-CM

## 2019-07-22 PROCEDURE — 3074F PR MOST RECENT SYSTOLIC BLOOD PRESSURE < 130 MM HG: ICD-10-PCS | Mod: CPTII,S$GLB,, | Performed by: ORTHOPAEDIC SURGERY

## 2019-07-22 PROCEDURE — 73564 X-RAY EXAM KNEE 4 OR MORE: CPT | Mod: TC,50,FY,PO

## 2019-07-22 PROCEDURE — 1101F PR PT FALLS ASSESS DOC 0-1 FALLS W/OUT INJ PAST YR: ICD-10-PCS | Mod: CPTII,S$GLB,, | Performed by: ORTHOPAEDIC SURGERY

## 2019-07-22 PROCEDURE — 3074F SYST BP LT 130 MM HG: CPT | Mod: CPTII,S$GLB,, | Performed by: ORTHOPAEDIC SURGERY

## 2019-07-22 PROCEDURE — 1101F PT FALLS ASSESS-DOCD LE1/YR: CPT | Mod: CPTII,S$GLB,, | Performed by: ORTHOPAEDIC SURGERY

## 2019-07-22 PROCEDURE — 3078F PR MOST RECENT DIASTOLIC BLOOD PRESSURE < 80 MM HG: ICD-10-PCS | Mod: CPTII,S$GLB,, | Performed by: ORTHOPAEDIC SURGERY

## 2019-07-22 PROCEDURE — 99999 PR PBB SHADOW E&M-EST. PATIENT-LVL IV: CPT | Mod: PBBFAC,,, | Performed by: ORTHOPAEDIC SURGERY

## 2019-07-22 PROCEDURE — 73564 XR KNEE ORTHO BILAT WITH FLEXION: ICD-10-PCS | Mod: 26,50,, | Performed by: RADIOLOGY

## 2019-07-22 PROCEDURE — 73564 X-RAY EXAM KNEE 4 OR MORE: CPT | Mod: 26,50,, | Performed by: RADIOLOGY

## 2019-07-22 PROCEDURE — 99214 OFFICE O/P EST MOD 30 MIN: CPT | Mod: S$GLB,,, | Performed by: ORTHOPAEDIC SURGERY

## 2019-07-22 PROCEDURE — 99999 PR PBB SHADOW E&M-EST. PATIENT-LVL IV: ICD-10-PCS | Mod: PBBFAC,,, | Performed by: ORTHOPAEDIC SURGERY

## 2019-07-22 PROCEDURE — 3078F DIAST BP <80 MM HG: CPT | Mod: CPTII,S$GLB,, | Performed by: ORTHOPAEDIC SURGERY

## 2019-07-22 PROCEDURE — 99214 PR OFFICE/OUTPT VISIT, EST, LEVL IV, 30-39 MIN: ICD-10-PCS | Mod: S$GLB,,, | Performed by: ORTHOPAEDIC SURGERY

## 2019-07-22 NOTE — PROGRESS NOTES
Subjective:          Chief Complaint: Bernadette Aquino is a 68 y.o. female who had concerns including Pain of the Left Knee.    Patient is here for 4 months post op follow up for her left knee. She is doing well. She has started working out at the gym. She is completing an HEP and has returned to playing doubles tennis. She has been wearing her  braces while she is active. She is complaining of hamstring pain and tightness.    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 LEFT  Femoral Subchondroplasty, 73163  2.  LEFT  Tibial Subchondroplasty, 21910  3.  LEFT  Arthroscopy, with meniscectomy (medial OR lateral) 66216  4LEFT  Arthroscopy, debridement/shaving of articular cartilage (chondroplasty) 33486  5. LEFT  Arthroscopy, knee, synovectomy, limited 50009      DATE OF PROCEDURE: 12/11/2015     ATTENDING SURGEON: Surgeon(s) and Role:     * Shalonda Altman MD - Primary     * La Tang DO - Fellow     * Laquita Vail PA-C - assistant     PREOPERATIVE DIAGNOSIS:    Pre-operative Diagnosis: Right shoulder   Tear, biceps tendon, non-traumatic M66.829 Rotator Cuff Syndrome/Disorder  M75.100, Tear, Biceps Tendon, Non-Tramatic M66.829, Tear, Rotator Cuff, Tramatic S46.012A, S46.011A and Labral tear and chondromalacia     Post-operative Diagnosis:  Right shoulder   Tear, biceps tendon, non-traumatic M66.829  Rotator Cuff Syndrome/Disorder  M75.100, Tear, Rotator Cuff, Tramatic S46.012A, S46.011A and Labral Tear, Chondromalacia Shoulder joint      Procedures Performed:  1. Right shoulder Arthroscopic rotator cuff repair CPT - 26379, COMPLEX     2. Right shoulder Biceps tenodesis CPT - 84601, COMPLEX     3. Right shoulder Arthroscopic labral debridement CPT - 10605     4. Right shoulder Arthroscopic chondroplasty          Review of Systems   Constitution: Negative for fever and night sweats.   HENT: Negative for hearing loss.    Eyes: Negative for blurred vision and visual disturbance.   Cardiovascular: Negative for chest pain and leg swelling.   Respiratory: Negative for shortness of breath.    Endocrine: Negative for polyuria.   Hematologic/Lymphatic: Negative for bleeding problem.   Skin: Negative for rash.   Musculoskeletal: Positive for joint pain and joint swelling. Negative for back pain, muscle cramps and muscle weakness.   Gastrointestinal: Negative for melena.   Genitourinary: Negative for hematuria.   Neurological: Negative for loss of balance, numbness and paresthesias.   Psychiatric/Behavioral: Negative for altered mental status.       Pain Related Questions  Over the past 3 days, what was your average pain during activity? (I.e. running, jogging, walking, climbing stairs, getting dressed, ect.): 4  Over the past 3 days, what was your highest pain level?: 4  Over the past 3 days, what was your lowest pain level? : 2    Other  How many nights a week are you awakened by your affected body part?: 0  Was the patient's HEIGHT measured or patient reported?: Patient Reported  Was the patient's WEIGHT measured or patient reported?: Measured      Objective:        General: Bernadette is well-developed, well-nourished, appears stated age, in no acute distress, alert and oriented to time, place and person.     General    Vitals reviewed.  Constitutional: She is oriented to person, place, and time. She appears well-developed  and well-nourished. No distress.   HENT:   Mouth/Throat: No oropharyngeal exudate.   Eyes: Right eye exhibits no discharge. Left eye exhibits no discharge.   Neck: Normal range of motion.   Pulmonary/Chest: Effort normal and breath sounds normal. No respiratory distress.   Neurological: She is alert and oriented to person, place, and time. She has normal reflexes. No cranial nerve deficit. Coordination normal.   Psychiatric: She has a normal mood and affect. Her behavior is normal. Judgment and thought content normal.     General Musculoskeletal Exam   Gait: normal       Right Knee Exam     Inspection   Erythema: absent  Scars: present  Swelling: absent  Effusion: absent  Deformity: absent  Bruising: absent    Tenderness   The patient is tender to palpation of the medial joint line and patella.    Crepitus   The patient has crepitus of the patella.    Range of Motion   Extension:  5 abnormal   Flexion: 130     Tests   Meniscus   Neeta:  Medial - positive Lateral - positive  Ligament Examination Lachman: normal (-1 to 2mm) PCL-Posterior Drawer: normal (0 to 2mm)     MCL - Valgus: normal (0 to 2mm)  LCL - Varus: normalPivot Shift: normal (Equal)Reverse Pivot Shift: normal (Equal)Dial Test at 30 degrees: normal (< 5 degrees)Dial Test at 90 degrees: normal (< 5 degrees)  Posterior Sag Test: negative  Posterolateral Corner: unstable (>15 degrees difference)  Patella   Patellar apprehension: negative  Passive Patellar Tilt: neutral  Patellar Tracking: normal  Patellar Glide (quadrants): Lateral - 1   Medial - 2  Q-Angle at 90 degrees: normal  Patellar Grind: positive  J-Sign: none    Other   Meniscal Cyst: absent  Popliteal (Baker's) Cyst: absent  Sensation: normal    Left Knee Exam     Inspection   Erythema: absent  Scars: present  Swelling: absent  Effusion: absent  Deformity: absent  Bruising: absent    Tenderness   The patient is experiencing no tenderness.     Range of Motion   Extension: 0   Flexion: 140      Tests   Meniscus   Neeta:  Medial - negative Lateral - negative  Stability Lachman: normal (-1 to 2mm) PCL-Posterior Drawer: normal (0 to 2mm)  MCL - Valgus: normal (0 to 2mm)  LCL - Varus: normal (0 to 2mm)Pivot Shift: normal (Equal)Reverse Pivot Shift: normal (Equal)Dial Test at 30 degrees: normal (< 5 degrees)Dial Test at 90 degrees: normal (< 5 degrees)  Posterior Sag Test: negative  Posterolateral Corner: unstable (>15 degrees difference)  Patella   Patellar apprehension: negative  Passive Patellar Tilt: neutral  Patellar Tracking: normal  Patellar Glide (Quadrants): Lateral - 1 Medial - 2  Q-Angle at 90 degrees: normal  Patellar Grind: negative  J-Sign: J sign absent    Other   Meniscal Cyst: absent  Popliteal (Baker's) Cyst: absent  Sensation: normal    Right Hip Exam     Tests   Jorden: negative  Left Hip Exam     Tests   Jorden: negative          Muscle Strength   Right Lower Extremity   Hip Abduction: 5/5   Quadriceps:  5/5   Hamstrin/5   Left Lower Extremity   Hip Abduction: 5/5   Quadriceps:  5/5   Hamstrin/5     Reflexes     Left Side  Quadriceps:  2+  Achilles:  2+    Right Side   Quadriceps:  2+  Achilles:  2+    Vascular Exam     Right Pulses  Dorsalis Pedis:      2+  Posterior Tibial:      2+        Left Pulses  Dorsalis Pedis:      2+  Posterior Tibial:      2+          EXAMINATION:  XR KNEE ORTHO BILAT WITH FLEXION    CLINICAL HISTORY:  Pain in left knee    TECHNIQUE:  AP standing of both knees, PA flexion standing views of both knees, and Merchant views of both knees were performed.  Lateral views of both knees were also performed.    COMPARISON:  2019, 12/10/2018    FINDINGS:  In right knee: Severe medial knee compartment joint space narrowing.  Tricompartment moderate sized osteophyte formation.  There is a small joint effusion.  There is moderate femoral patellar joint space narrowing.  No fracture, no osseous lesions.    Left knee: Abnormal patchy sclerotic appearance of  the medial femoral condyle and medial tibial plateau, unclear etiology, unchanged from the prior study, possibly postop, this is new from 12/10/2018.  The knee joint space is preserved.  There is mild femoral patellar joint space narrowing and osteophyte formation.  Trace of joint fluid.  The soft tissues appear normal.      Impression       There is no significant change from the prior recent study 04/29/2019.             Assessment:       Encounter Diagnoses   Name Primary?    Left knee pain, unspecified chronicity Yes    Internal derangement of left knee     Genu varum of both lower extremities     Complex tear of medial meniscus of left knee as current injury, subsequent encounter     Post-traumatic osteoarthritis of right knee     Post-traumatic osteoarthritis of left knee           Plan:       1. IKDC, SF-12 and KOOS was filled out today in clinic.     RTC in 2-3 weeks with Dr. Shalonda Altman for bilateral Zilretta injections. Patient will  Not fill out IKDC, SF-12 and KOOS on return.    2. D/c Celebrex Once Daily- due to concern of GI upset.    3. Physical Therapy- sent to [facility] - Address Hamstring Pain/Tightness    4. Continues to complete HEP Core Program    5. voltaren Gel topically to the arthroscopic portal sites left greater than right    6. Dry needling and modality to bilateral hamstring regions    Increase terminal hamstring flexibility and strength    7. Zilretta authorization placed                        [facility] patient questionnaires have been collected today.

## 2019-07-22 NOTE — PATIENT INSTRUCTIONS
SO WHAT'S IN MY KNEE?    You've got ZILRETTA (triamcinolone acetonide extended-release injectable suspension). That means you've got microspheres -- tiny particles that deliver medication for about 3 months.     Zilretta is the first and only FDA-approved treatment for osteoarthritis knee pain to use extended release microsphere technology.    ZILRETTA microspheres slowly release pain medication for about 3 months:      What microspheres are doing:   The microspheres slowly and continually release medicine for about 3 months.   They stay in your knee joint -- right where the pain is.   After they've released all their medicine, they turn into carbon dioxide and water, which are found naturally in the body.    After 3 months, they're gone.  This would be a good time to reassess your pain level.    IMPORTANT RISK INFORMATION    What should you tell your doctor BEFORE receiving a ZILRETTA injection?  Tell your doctor about all of the medications you are taking (including both prescription and over-the-counter medicines) and about any medical conditions, especially if you have high blood pressure, heart disease, ulcers, diverticulitis or other gastrointestinal disorders, kidney problems, diabetes, glaucoma, behavior or mood disorders, and/or infections.

## 2019-08-19 ENCOUNTER — OFFICE VISIT (OUTPATIENT)
Dept: SPORTS MEDICINE | Facility: CLINIC | Age: 69
End: 2019-08-19
Payer: MEDICARE

## 2019-08-19 VITALS
DIASTOLIC BLOOD PRESSURE: 77 MMHG | HEART RATE: 77 BPM | WEIGHT: 130 LBS | HEIGHT: 62 IN | BODY MASS INDEX: 23.92 KG/M2 | SYSTOLIC BLOOD PRESSURE: 119 MMHG

## 2019-08-19 DIAGNOSIS — M25.562 LEFT KNEE PAIN, UNSPECIFIED CHRONICITY: Primary | ICD-10-CM

## 2019-08-19 DIAGNOSIS — M17.32 POST-TRAUMATIC OSTEOARTHRITIS OF LEFT KNEE: ICD-10-CM

## 2019-08-19 DIAGNOSIS — M17.31 POST-TRAUMATIC OSTEOARTHRITIS OF RIGHT KNEE: ICD-10-CM

## 2019-08-19 DIAGNOSIS — M23.91 INTERNAL DERANGEMENT OF RIGHT KNEE: ICD-10-CM

## 2019-08-19 DIAGNOSIS — M17.11 PRIMARY OSTEOARTHRITIS OF RIGHT KNEE: ICD-10-CM

## 2019-08-19 PROCEDURE — 3078F DIAST BP <80 MM HG: CPT | Mod: CPTII,S$GLB,, | Performed by: ORTHOPAEDIC SURGERY

## 2019-08-19 PROCEDURE — 99499 NO LOS: ICD-10-PCS | Mod: S$GLB,,, | Performed by: ORTHOPAEDIC SURGERY

## 2019-08-19 PROCEDURE — 20611 PR DRAIN/ASP/INJECT MAJOR JOINT/BURSA W/US GUIDANCE: ICD-10-PCS | Mod: 50,S$GLB,, | Performed by: ORTHOPAEDIC SURGERY

## 2019-08-19 PROCEDURE — 3078F PR MOST RECENT DIASTOLIC BLOOD PRESSURE < 80 MM HG: ICD-10-PCS | Mod: CPTII,S$GLB,, | Performed by: ORTHOPAEDIC SURGERY

## 2019-08-19 PROCEDURE — 3074F PR MOST RECENT SYSTOLIC BLOOD PRESSURE < 130 MM HG: ICD-10-PCS | Mod: CPTII,S$GLB,, | Performed by: ORTHOPAEDIC SURGERY

## 2019-08-19 PROCEDURE — 1101F PT FALLS ASSESS-DOCD LE1/YR: CPT | Mod: CPTII,S$GLB,, | Performed by: ORTHOPAEDIC SURGERY

## 2019-08-19 PROCEDURE — 99999 PR PBB SHADOW E&M-EST. PATIENT-LVL III: ICD-10-PCS | Mod: PBBFAC,,, | Performed by: ORTHOPAEDIC SURGERY

## 2019-08-19 PROCEDURE — 3074F SYST BP LT 130 MM HG: CPT | Mod: CPTII,S$GLB,, | Performed by: ORTHOPAEDIC SURGERY

## 2019-08-19 PROCEDURE — 99499 UNLISTED E&M SERVICE: CPT | Mod: S$GLB,,, | Performed by: ORTHOPAEDIC SURGERY

## 2019-08-19 PROCEDURE — 20611 DRAIN/INJ JOINT/BURSA W/US: CPT | Mod: 50,S$GLB,, | Performed by: ORTHOPAEDIC SURGERY

## 2019-08-19 PROCEDURE — 99999 PR PBB SHADOW E&M-EST. PATIENT-LVL III: CPT | Mod: PBBFAC,,, | Performed by: ORTHOPAEDIC SURGERY

## 2019-08-19 PROCEDURE — 1101F PR PT FALLS ASSESS DOC 0-1 FALLS W/OUT INJ PAST YR: ICD-10-PCS | Mod: CPTII,S$GLB,, | Performed by: ORTHOPAEDIC SURGERY

## 2019-08-19 NOTE — PROGRESS NOTES
Subjective:          Chief Complaint: Bernadette Aquino is a 69 y.o. female who had concerns including Injections of the Right Knee and Injections of the Left Knee.    Patient is here for bilateral knee Zilretta injection. She is doing well. She has started working out at the gym. She is completing an HEP and has returned to playing doubles tennis. She has been wearing her  braces while she is active. She is complaining of hamstring pain and tightness.    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 LEFT  Femoral Subchondroplasty, 73351  2.  LEFT  Tibial Subchondroplasty, 19701  3.  LEFT  Arthroscopy, with meniscectomy (medial OR lateral) 30863  4LEFT  Arthroscopy, debridement/shaving of articular cartilage (chondroplasty) 83204  5. LEFT  Arthroscopy, knee, synovectomy, limited 30391      DATE OF PROCEDURE: 12/11/2015     ATTENDING SURGEON: Surgeon(s) and Role:     * Shalonda Altman MD - Primary     * La Tang DO - Fellow     * Laquita Vail PA-C - assistant     PREOPERATIVE DIAGNOSIS:    Pre-operative Diagnosis: Right shoulder   Tear, biceps tendon, non-traumatic M66.829 Rotator Cuff Syndrome/Disorder  M75.100, Tear, Biceps Tendon, Non-Tramatic M66.829, Tear, Rotator Cuff, Tramatic S46.012A, S46.011A and Labral tear and chondromalacia     Post-operative Diagnosis:  Right shoulder   Tear, biceps  tendon, non-traumatic M66.829 Rotator Cuff Syndrome/Disorder  M75.100, Tear, Rotator Cuff, Tramatic S46.012A, S46.011A and Labral Tear, Chondromalacia Shoulder joint      Procedures Performed:  1. Right shoulder Arthroscopic rotator cuff repair CPT - 96448, COMPLEX     2. Right shoulder Biceps tenodesis CPT - 23444, COMPLEX     3. Right shoulder Arthroscopic labral debridement CPT - 23937     4. Right shoulder Arthroscopic chondroplasty          Review of Systems   Constitution: Negative for fever and night sweats.   HENT: Negative for hearing loss.    Eyes: Negative for blurred vision and visual disturbance.   Cardiovascular: Negative for chest pain and leg swelling.   Respiratory: Negative for shortness of breath.    Endocrine: Negative for polyuria.   Hematologic/Lymphatic: Negative for bleeding problem.   Skin: Negative for rash.   Musculoskeletal: Positive for joint pain and joint swelling. Negative for back pain, muscle cramps and muscle weakness.   Gastrointestinal: Negative for melena.   Genitourinary: Negative for hematuria.   Neurological: Negative for loss of balance, numbness and paresthesias.   Psychiatric/Behavioral: Negative for altered mental status.                   Objective:        General: Bernadette is well-developed, well-nourished, appears stated age, in no acute distress, alert and oriented to time, place and person.     General    Vitals reviewed.  Constitutional: She is oriented to person, place, and time. She appears well-developed and well-nourished. No distress.   HENT:   Mouth/Throat: No oropharyngeal exudate.   Eyes: Right eye exhibits no discharge. Left eye exhibits no discharge.   Neck: Normal range of motion.   Pulmonary/Chest: Effort normal and breath sounds normal. No respiratory distress.   Neurological: She is alert and oriented to person, place, and time. She has normal reflexes. No cranial nerve deficit. Coordination normal.   Psychiatric: She has a normal mood and affect. Her  behavior is normal. Judgment and thought content normal.     General Musculoskeletal Exam   Gait: normal       Right Knee Exam     Inspection   Erythema: absent  Scars: present  Swelling: absent  Effusion: absent  Deformity: absent  Bruising: absent    Tenderness   The patient is tender to palpation of the medial joint line and patella.    Crepitus   The patient has crepitus of the patella.    Range of Motion   Extension:  5 abnormal   Flexion: 130     Tests   Meniscus   Neeta:  Medial - positive Lateral - positive  Ligament Examination Lachman: normal (-1 to 2mm) PCL-Posterior Drawer: normal (0 to 2mm)     MCL - Valgus: normal (0 to 2mm)  LCL - Varus: normalPivot Shift: normal (Equal)Reverse Pivot Shift: normal (Equal)Dial Test at 30 degrees: normal (< 5 degrees)Dial Test at 90 degrees: normal (< 5 degrees)  Posterior Sag Test: negative  Posterolateral Corner: unstable (>15 degrees difference)  Patella   Patellar apprehension: negative  Passive Patellar Tilt: neutral  Patellar Tracking: normal  Patellar Glide (quadrants): Lateral - 1   Medial - 2  Q-Angle at 90 degrees: normal  Patellar Grind: positive  J-Sign: none    Other   Meniscal Cyst: absent  Popliteal (Baker's) Cyst: absent  Sensation: normal    Left Knee Exam     Inspection   Erythema: absent  Scars: present  Swelling: absent  Effusion: absent  Deformity: absent  Bruising: absent    Tenderness   The patient is experiencing no tenderness.     Range of Motion   Extension: 0   Flexion: 140     Tests   Meniscus   Neeta:  Medial - negative Lateral - negative  Stability Lachman: normal (-1 to 2mm) PCL-Posterior Drawer: normal (0 to 2mm)  MCL - Valgus: normal (0 to 2mm)  LCL - Varus: normal (0 to 2mm)Pivot Shift: normal (Equal)Reverse Pivot Shift: normal (Equal)Dial Test at 30 degrees: normal (< 5 degrees)Dial Test at 90 degrees: normal (< 5 degrees)  Posterior Sag Test: negative  Posterolateral Corner: unstable (>15 degrees difference)  Patella   Patellar  apprehension: negative  Passive Patellar Tilt: neutral  Patellar Tracking: normal  Patellar Glide (Quadrants): Lateral - 1 Medial - 2  Q-Angle at 90 degrees: normal  Patellar Grind: negative  J-Sign: J sign absent    Other   Meniscal Cyst: absent  Popliteal (Baker's) Cyst: absent  Sensation: normal    Right Hip Exam     Tests   Jorden: negative  Left Hip Exam     Tests   Jorden: negative          Muscle Strength   Right Lower Extremity   Hip Abduction: 5/5   Quadriceps:  5/5   Hamstrin/5   Left Lower Extremity   Hip Abduction: 5/5   Quadriceps:  5/5   Hamstrin/5     Reflexes     Left Side  Quadriceps:  2+  Achilles:  2+    Right Side   Quadriceps:  2+  Achilles:  2+    Vascular Exam     Right Pulses  Dorsalis Pedis:      2+  Posterior Tibial:      2+        Left Pulses  Dorsalis Pedis:      2+  Posterior Tibial:      2+          EXAMINATION:  XR KNEE ORTHO BILAT WITH FLEXION    CLINICAL HISTORY:  Pain in left knee    TECHNIQUE:  AP standing of both knees, PA flexion standing views of both knees, and Merchant views of both knees were performed.  Lateral views of both knees were also performed.    COMPARISON:  2019, 12/10/2018    FINDINGS:  In right knee: Severe medial knee compartment joint space narrowing.  Tricompartment moderate sized osteophyte formation.  There is a small joint effusion.  There is moderate femoral patellar joint space narrowing.  No fracture, no osseous lesions.    Left knee: Abnormal patchy sclerotic appearance of the medial femoral condyle and medial tibial plateau, unclear etiology, unchanged from the prior study, possibly postop, this is new from 12/10/2018.  The knee joint space is preserved.  There is mild femoral patellar joint space narrowing and osteophyte formation.  Trace of joint fluid.  The soft tissues appear normal.      Impression       There is no significant change from the prior recent study 2019.             Assessment:       Encounter Diagnoses   Name  Primary?    Left knee pain, unspecified chronicity Yes    Internal derangement of right knee     Primary osteoarthritis of right knee     Post-traumatic osteoarthritis of right knee     Post-traumatic osteoarthritis of left knee           Plan:       1. IKDC, SF-12 and KOOS was filled out today in clinic.     RTC in 3 months with Dr. Shalonda Altman for follow up. Patient will  Not fill out IKDC, SF-12 and KOOS on return.    2. D/c Celebrex Once Daily- due to concern of GI upset.    3.  Continues to complete HEP Core Program    4. voltaren Gel topically to the arthroscopic portal sites left greater than right    5. Injection Procedure-Right Knee    After time out was performed, including verification of patient ID, procedure, site and side, availability of information and equipment, review of safety issues, and agreement with consent, the procedure site was marked and the patient was prepped aseptically. A diagnostic and therapeutic injection of 5cc Zilretta and ethly chloride spray was given under sterile technique using a 22g x 1.5 needle into the right Knee Joint in seated position.     The patient had no adverse reactions to the medication. Pain decreased. The patient was instructed to apply ice to the joint for 20 minutes and avoid strenuous activities for 24-36 hours following the injection. Patient was warned of possible blood sugar and/or blood pressure changes during that time. Following that time, patient can resume regular activities.    Patient was reminded to call the clinic immediately for any adverse side effects as explained in clinic today.     6. Aspiration/Injection Procedure-Left Knee    After time out was performed, including verification of patient ID, procedure, site and side, availability of information and equipment, review of safety issues, and agreement with consent, the procedure site was marked and the patient was prepped aseptically. 11cc's of normal joint fluid was aspirated from the  left Knee Joint using a 22g x 1.5 needle in sterile fashion without complication. Following aspiration, a diagnostic and therapeutic injection of 5cc Zilretta and ethly chloride spray was given under sterile technique using a 22g x 1.5 needle into the left Knee Joint in seated position.     The patient had no adverse reactions to the medication. Pain decreased. The patient was instructed to apply ice to the joint for 20 minutes and avoid strenuous activities for 24-36 hours following the injection. Patient was warned of possible blood sugar and/or blood pressure changes during that time. Following that time, patient can resume regular activities.         Ultrasound Guidance Procedure  bilateral Knee Joint visualized with documented bilateral knee joint. Dynamic visualization of the 22g x 1.5 needle performed.                    [facility] patient questionnaires have been collected today.

## 2019-10-22 ENCOUNTER — TELEPHONE (OUTPATIENT)
Dept: SPORTS MEDICINE | Facility: CLINIC | Age: 69
End: 2019-10-22

## 2019-10-22 NOTE — TELEPHONE ENCOUNTER
Spoke to the patient and will call her friend to offer her an appt.    ----- Message from Radha Willson sent at 10/22/2019 11:20 AM CDT -----  Contact: pt  Pt would like a call back today    Called this morning    Ph [phone]  Thanks

## 2020-01-17 NOTE — PROGRESS NOTES
Subjective:          Chief Complaint: Bernadette Aquino is a 69 y.o. female who had concerns including Pain of the Right Knee and Pain of the Left Knee.    Patient is here for follow up after bilateral knee Zilretta injection. She is doing well. She has started working out at the gym. She is completing an HEP and has returned to playing doubles tennis. She has been wearing her  braces while she is active.     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 LEFT  Femoral Subchondroplasty, 33323  2.  LEFT  Tibial Subchondroplasty, 62529  3.  LEFT  Arthroscopy, with meniscectomy (medial OR lateral) 71663  4LEFT  Arthroscopy, debridement/shaving of articular cartilage (chondroplasty) 33280  5. LEFT  Arthroscopy, knee, synovectomy, limited 03338      DATE OF PROCEDURE: 12/11/2015     ATTENDING SURGEON: Surgeon(s) and Role:     * Shalonda Altman MD - Primary     * La Tang DO - Fellow     * Laquita Vail PA-C - assistant     PREOPERATIVE DIAGNOSIS:    Pre-operative Diagnosis: Right shoulder   Tear, biceps tendon, non-traumatic M66.829 Rotator Cuff Syndrome/Disorder  M75.100, Tear, Biceps Tendon, Non-Tramatic M66.829, Tear, Rotator Cuff, Tramatic S46.012A, S46.011A and Labral tear and chondromalacia     Post-operative Diagnosis:  Right shoulder   Tear, biceps tendon, non-traumatic M66.829 Rotator Cuff  Syndrome/Disorder  M75.100, Tear, Rotator Cuff, Tramatic S46.012A, S46.011A and Labral Tear, Chondromalacia Shoulder joint      Procedures Performed:  1. Right shoulder Arthroscopic rotator cuff repair CPT - 99430, COMPLEX     2. Right shoulder Biceps tenodesis CPT - 37416, COMPLEX     3. Right shoulder Arthroscopic labral debridement CPT - 93446     4. Right shoulder Arthroscopic chondroplasty          Review of Systems   Constitution: Negative. Negative for fever and night sweats.   HENT: Negative.  Negative for hearing loss.    Eyes: Negative.  Negative for blurred vision and visual disturbance.   Cardiovascular: Negative.  Negative for chest pain and leg swelling.   Respiratory: Negative.  Negative for shortness of breath.    Endocrine: Negative.  Negative for polyuria.   Hematologic/Lymphatic: Negative.  Negative for bleeding problem.   Skin: Negative.  Negative for rash.   Musculoskeletal: Positive for joint pain and joint swelling. Negative for back pain, muscle cramps and muscle weakness.   Gastrointestinal: Negative.  Negative for melena.   Genitourinary: Negative.  Negative for hematuria.   Neurological: Negative.  Negative for loss of balance, numbness and paresthesias.   Psychiatric/Behavioral: Negative.  Negative for altered mental status.   Allergic/Immunologic: Negative.                    Objective:        General: Bernadette is well-developed, well-nourished, appears stated age, in no acute distress, alert and oriented to time, place and person.     General    Vitals reviewed.  Constitutional: She is oriented to person, place, and time. She appears well-developed and well-nourished. No distress.   HENT:   Mouth/Throat: No oropharyngeal exudate.   Eyes: Right eye exhibits no discharge. Left eye exhibits no discharge.   Neck: Normal range of motion.   Pulmonary/Chest: Effort normal and breath sounds normal. No respiratory distress.   Neurological: She is alert and oriented to person, place, and time. She  has normal reflexes. No cranial nerve deficit. Coordination normal.   Psychiatric: She has a normal mood and affect. Her behavior is normal. Judgment and thought content normal.     General Musculoskeletal Exam   Gait: normal       Right Knee Exam     Inspection   Erythema: absent  Scars: present  Swelling: absent  Effusion: absent  Deformity: absent  Bruising: absent    Tenderness   The patient is tender to palpation of the medial joint line and patella.    Crepitus   The patient has crepitus of the patella.    Range of Motion   Extension:  5 abnormal   Flexion: 130     Tests   Meniscus   Neeta:  Medial - positive Lateral - positive  Ligament Examination Lachman: normal (-1 to 2mm) PCL-Posterior Drawer: normal (0 to 2mm)     MCL - Valgus: normal (0 to 2mm)  LCL - Varus: normalPivot Shift: normal (Equal)Reverse Pivot Shift: normal (Equal)Dial Test at 30 degrees: normal (< 5 degrees)Dial Test at 90 degrees: normal (< 5 degrees)  Posterior Sag Test: negative  Posterolateral Corner: unstable (>15 degrees difference)  Patella   Patellar apprehension: negative  Passive Patellar Tilt: neutral  Patellar Tracking: normal  Patellar Glide (quadrants): Lateral - 1   Medial - 2  Q-Angle at 90 degrees: normal  Patellar Grind: positive  J-Sign: none    Other   Meniscal Cyst: absent  Popliteal (Baker's) Cyst: absent  Sensation: normal    Left Knee Exam     Inspection   Erythema: absent  Scars: present  Swelling: absent  Effusion: absent  Deformity: absent  Bruising: absent    Tenderness   The patient is experiencing no tenderness.     Range of Motion   Extension: 0   Flexion: 140     Tests   Meniscus   Neeta:  Medial - negative Lateral - negative  Stability Lachman: normal (-1 to 2mm) PCL-Posterior Drawer: normal (0 to 2mm)  MCL - Valgus: normal (0 to 2mm)  LCL - Varus: normal (0 to 2mm)Pivot Shift: normal (Equal)Reverse Pivot Shift: normal (Equal)Dial Test at 30 degrees: normal (< 5 degrees)Dial Test at 90 degrees: normal  (< 5 degrees)  Posterior Sag Test: negative  Posterolateral Corner: unstable (>15 degrees difference)  Patella   Patellar apprehension: negative  Passive Patellar Tilt: neutral  Patellar Tracking: normal  Patellar Glide (Quadrants): Lateral - 1 Medial - 2  Q-Angle at 90 degrees: normal  Patellar Grind: negative  J-Sign: J sign absent    Other   Meniscal Cyst: absent  Popliteal (Baker's) Cyst: absent  Sensation: normal    Right Hip Exam     Tests   Jorden: negative  Left Hip Exam     Tests   Jorden: negative          Muscle Strength   Right Lower Extremity   Hip Abduction: 5/5   Quadriceps:  5/5   Hamstrin/5   Left Lower Extremity   Hip Abduction: 5/5   Quadriceps:  5/5   Hamstrin/5     Reflexes     Left Side  Quadriceps:  2+  Achilles:  2+    Right Side   Quadriceps:  2+  Achilles:  2+    Vascular Exam     Right Pulses  Dorsalis Pedis:      2+  Posterior Tibial:      2+        Left Pulses  Dorsalis Pedis:      2+  Posterior Tibial:      2+          EXAMINATION:  XR KNEE ORTHO BILAT WITH FLEXION    CLINICAL HISTORY:  Pain in left knee    TECHNIQUE:  AP standing of both knees, PA flexion standing views of both knees, and Merchant views of both knees were performed.  Lateral views of both knees were also performed.    COMPARISON:  2019, 12/10/2018    FINDINGS:  In right knee: Severe medial knee compartment joint space narrowing.  Tricompartment moderate sized osteophyte formation.  There is a small joint effusion.  There is moderate femoral patellar joint space narrowing.  No fracture, no osseous lesions.    Left knee: Abnormal patchy sclerotic appearance of the medial femoral condyle and medial tibial plateau, unclear etiology, unchanged from the prior study, possibly postop, this is new from 12/10/2018.  The knee joint space is preserved.  There is mild femoral patellar joint space narrowing and osteophyte formation.  Trace of joint fluid.  The soft tissues appear normal.      Impression       There is no  significant change from the prior recent study 04/29/2019.             Assessment:       Encounter Diagnosis   Name Primary?    Primary osteoarthritis of both knees Yes          Plan:       1. IKDC, SF-12 and KOOS was not filled out today in clinic.     RTC in 1 months with Dr. Shalonda Almtan for bilateral knee Zilretta. Patient will fill out IKDC, SF-12 and KOOS on return.    2. D/C Celebrex Once Daily- due to concern of GI upset.    3.  Continues to complete HEP Core Program    4. Voltaren Gel topically to the arthroscopic portal sites left greater than right     5. [id number] B/L Knee Zilretta Injection    6. PT superior for increase extension right knee                  [facility] patient questionnaires have been collected today.

## 2020-01-20 ENCOUNTER — OFFICE VISIT (OUTPATIENT)
Dept: SPORTS MEDICINE | Facility: CLINIC | Age: 70
End: 2020-01-20
Payer: MEDICARE

## 2020-01-20 VITALS
DIASTOLIC BLOOD PRESSURE: 80 MMHG | HEART RATE: 83 BPM | SYSTOLIC BLOOD PRESSURE: 131 MMHG | BODY MASS INDEX: 23.92 KG/M2 | WEIGHT: 130 LBS | HEIGHT: 62 IN

## 2020-01-20 DIAGNOSIS — M17.0 PRIMARY OSTEOARTHRITIS OF BOTH KNEES: Primary | ICD-10-CM

## 2020-01-20 PROCEDURE — 1125F PR PAIN SEVERITY QUANTIFIED, PAIN PRESENT: ICD-10-PCS | Mod: S$GLB,,, | Performed by: ORTHOPAEDIC SURGERY

## 2020-01-20 PROCEDURE — 1159F PR MEDICATION LIST DOCUMENTED IN MEDICAL RECORD: ICD-10-PCS | Mod: S$GLB,,, | Performed by: ORTHOPAEDIC SURGERY

## 2020-01-20 PROCEDURE — 99999 PR PBB SHADOW E&M-EST. PATIENT-LVL III: ICD-10-PCS | Mod: PBBFAC,,, | Performed by: ORTHOPAEDIC SURGERY

## 2020-01-20 PROCEDURE — 3075F SYST BP GE 130 - 139MM HG: CPT | Mod: CPTII,S$GLB,, | Performed by: ORTHOPAEDIC SURGERY

## 2020-01-20 PROCEDURE — 1101F PT FALLS ASSESS-DOCD LE1/YR: CPT | Mod: CPTII,S$GLB,, | Performed by: ORTHOPAEDIC SURGERY

## 2020-01-20 PROCEDURE — 3079F PR MOST RECENT DIASTOLIC BLOOD PRESSURE 80-89 MM HG: ICD-10-PCS | Mod: CPTII,S$GLB,, | Performed by: ORTHOPAEDIC SURGERY

## 2020-01-20 PROCEDURE — 3075F PR MOST RECENT SYSTOLIC BLOOD PRESS GE 130-139MM HG: ICD-10-PCS | Mod: CPTII,S$GLB,, | Performed by: ORTHOPAEDIC SURGERY

## 2020-01-20 PROCEDURE — 1125F AMNT PAIN NOTED PAIN PRSNT: CPT | Mod: S$GLB,,, | Performed by: ORTHOPAEDIC SURGERY

## 2020-01-20 PROCEDURE — 99999 PR PBB SHADOW E&M-EST. PATIENT-LVL III: CPT | Mod: PBBFAC,,, | Performed by: ORTHOPAEDIC SURGERY

## 2020-01-20 PROCEDURE — 3079F DIAST BP 80-89 MM HG: CPT | Mod: CPTII,S$GLB,, | Performed by: ORTHOPAEDIC SURGERY

## 2020-01-20 PROCEDURE — 1101F PR PT FALLS ASSESS DOC 0-1 FALLS W/OUT INJ PAST YR: ICD-10-PCS | Mod: CPTII,S$GLB,, | Performed by: ORTHOPAEDIC SURGERY

## 2020-01-20 PROCEDURE — 99214 OFFICE O/P EST MOD 30 MIN: CPT | Mod: S$GLB,,, | Performed by: ORTHOPAEDIC SURGERY

## 2020-01-20 PROCEDURE — 1159F MED LIST DOCD IN RCRD: CPT | Mod: S$GLB,,, | Performed by: ORTHOPAEDIC SURGERY

## 2020-01-20 PROCEDURE — 99214 PR OFFICE/OUTPT VISIT, EST, LEVL IV, 30-39 MIN: ICD-10-PCS | Mod: S$GLB,,, | Performed by: ORTHOPAEDIC SURGERY

## 2020-02-18 ENCOUNTER — TELEPHONE (OUTPATIENT)
Dept: SPORTS MEDICINE | Facility: CLINIC | Age: 70
End: 2020-02-18

## 2020-02-18 NOTE — TELEPHONE ENCOUNTER
LVM for patient in regard to appointment on 2/19. Would like to reschedule the patient for an evening appt.

## 2020-02-20 ENCOUNTER — TELEPHONE (OUTPATIENT)
Dept: SPORTS MEDICINE | Facility: CLINIC | Age: 70
End: 2020-02-20

## 2020-02-20 NOTE — TELEPHONE ENCOUNTER
LVM for patient in regard to appointment on yesterday, 2/19. Would like to explain to the patient that the pharmacy had a delay with getting the medication to us. Would like to offer her to reschedule for when Dr. Altman returns.

## 2020-02-20 NOTE — TELEPHONE ENCOUNTER
----- Message from Maricel Moore sent at 2/20/2020 11:58 AM CST -----  Contact: self  Pt was returning your call and would like a call back.      Contact info [phone]

## 2020-02-21 ENCOUNTER — TELEPHONE (OUTPATIENT)
Dept: SPORTS MEDICINE | Facility: CLINIC | Age: 70
End: 2020-02-21

## 2020-02-21 NOTE — TELEPHONE ENCOUNTER
----- Message from Maricel Moore sent at 2/21/2020 12:00 PM CST -----  Contact: self  Pt is asking for a call back regarding appt.        Contact info [phone]

## 2020-02-21 NOTE — TELEPHONE ENCOUNTER
Spoke to patient about message below. She was calling because she was charged for appt on 1/15 and she should not have been.

## 2020-03-11 ENCOUNTER — OFFICE VISIT (OUTPATIENT)
Dept: SPORTS MEDICINE | Facility: CLINIC | Age: 70
End: 2020-03-11
Payer: MEDICARE

## 2020-03-11 VITALS
SYSTOLIC BLOOD PRESSURE: 120 MMHG | HEIGHT: 62 IN | BODY MASS INDEX: 23.92 KG/M2 | HEART RATE: 77 BPM | DIASTOLIC BLOOD PRESSURE: 72 MMHG | WEIGHT: 130 LBS

## 2020-03-11 DIAGNOSIS — M17.11 PRIMARY OSTEOARTHRITIS OF RIGHT KNEE: Primary | ICD-10-CM

## 2020-03-11 PROCEDURE — 1159F MED LIST DOCD IN RCRD: CPT | Mod: S$GLB,,, | Performed by: ORTHOPAEDIC SURGERY

## 2020-03-11 PROCEDURE — 3078F DIAST BP <80 MM HG: CPT | Mod: CPTII,S$GLB,, | Performed by: ORTHOPAEDIC SURGERY

## 2020-03-11 PROCEDURE — 1101F PT FALLS ASSESS-DOCD LE1/YR: CPT | Mod: CPTII,S$GLB,, | Performed by: ORTHOPAEDIC SURGERY

## 2020-03-11 PROCEDURE — 1159F PR MEDICATION LIST DOCUMENTED IN MEDICAL RECORD: ICD-10-PCS | Mod: S$GLB,,, | Performed by: ORTHOPAEDIC SURGERY

## 2020-03-11 PROCEDURE — 1125F PR PAIN SEVERITY QUANTIFIED, PAIN PRESENT: ICD-10-PCS | Mod: S$GLB,,, | Performed by: ORTHOPAEDIC SURGERY

## 2020-03-11 PROCEDURE — 20611 LARGE JOINT ASPIRATION/INJECTION: R KNEE: ICD-10-PCS | Mod: RT,S$GLB,, | Performed by: ORTHOPAEDIC SURGERY

## 2020-03-11 PROCEDURE — 1125F AMNT PAIN NOTED PAIN PRSNT: CPT | Mod: S$GLB,,, | Performed by: ORTHOPAEDIC SURGERY

## 2020-03-11 PROCEDURE — 99999 PR PBB SHADOW E&M-EST. PATIENT-LVL III: CPT | Mod: PBBFAC,,, | Performed by: ORTHOPAEDIC SURGERY

## 2020-03-11 PROCEDURE — 3078F PR MOST RECENT DIASTOLIC BLOOD PRESSURE < 80 MM HG: ICD-10-PCS | Mod: CPTII,S$GLB,, | Performed by: ORTHOPAEDIC SURGERY

## 2020-03-11 PROCEDURE — 20611 DRAIN/INJ JOINT/BURSA W/US: CPT | Mod: RT,S$GLB,, | Performed by: ORTHOPAEDIC SURGERY

## 2020-03-11 PROCEDURE — 99499 UNLISTED E&M SERVICE: CPT | Mod: S$GLB,,, | Performed by: ORTHOPAEDIC SURGERY

## 2020-03-11 PROCEDURE — 1101F PR PT FALLS ASSESS DOC 0-1 FALLS W/OUT INJ PAST YR: ICD-10-PCS | Mod: CPTII,S$GLB,, | Performed by: ORTHOPAEDIC SURGERY

## 2020-03-11 PROCEDURE — 99499 NO LOS: ICD-10-PCS | Mod: S$GLB,,, | Performed by: ORTHOPAEDIC SURGERY

## 2020-03-11 PROCEDURE — 99999 PR PBB SHADOW E&M-EST. PATIENT-LVL III: ICD-10-PCS | Mod: PBBFAC,,, | Performed by: ORTHOPAEDIC SURGERY

## 2020-03-11 PROCEDURE — 3074F PR MOST RECENT SYSTOLIC BLOOD PRESSURE < 130 MM HG: ICD-10-PCS | Mod: CPTII,S$GLB,, | Performed by: ORTHOPAEDIC SURGERY

## 2020-03-11 PROCEDURE — 3074F SYST BP LT 130 MM HG: CPT | Mod: CPTII,S$GLB,, | Performed by: ORTHOPAEDIC SURGERY

## 2020-03-11 RX ORDER — DICLOFENAC SODIUM 10 MG/G
2 GEL TOPICAL 2 TIMES DAILY
Qty: 1 TUBE | Refills: 3 | Status: SHIPPED | OUTPATIENT
Start: 2020-03-11 | End: 2022-07-16

## 2020-03-11 NOTE — PROCEDURES
Large Joint Aspiration/Injection: R knee  Performed by: Shalonda Altman MD  Authorized by: Shalonda Altman MD  Date/Time: 3/11/2020 7:15 AM    Consent Done?:  Yes (Verbal)  Indications:  pain  Timeout: Immediately prior to procedure a time out was called to verify the correct patient, procedure, equipment, support staff and site/side marked as required.  Prep:Patient was prepped and draped in the usual sterile fashion.  Procedure site marked: Yes     Anesthesia  Local anesthesia used  Anesthetic: topical anesthetic    Details:   Needle size: 22 G  Ultrasonic Guidance for needle placement: Yes  Images are saved and documented.   Approach: anterolateral  Location:  Knee  Site:  R knee    Medications: 32 mg triamcinolone acetonide 32 mg  Patient tolerance:  patient tolerated the procedure well with no immediate complications

## 2020-03-11 NOTE — PROGRESS NOTES
Subjective:          Chief Complaint: Bernadette Aquino is a 69 y.o. female who had concerns including Pain of the Right Knee.    Patient is here for follow up after bilateral knee Zilretta injection. She is doing well. She has started working out at the gym. She is completing an HEP and has returned to playing doubles tennis. She has been wearing her  braces while she is active.     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 LEFT  Femoral Subchondroplasty, 50048  2.  LEFT  Tibial Subchondroplasty, 06880  3.  LEFT  Arthroscopy, with meniscectomy (medial OR lateral) 30052  4LEFT  Arthroscopy, debridement/shaving of articular cartilage (chondroplasty) 25690  5. LEFT  Arthroscopy, knee, synovectomy, limited 10710      DATE OF PROCEDURE: 12/11/2015     ATTENDING SURGEON: Surgeon(s) and Role:     * Shalonda Altman MD - Primary     * La Tang DO - Fellow     * Laquita Vail PA-C - assistant     PREOPERATIVE DIAGNOSIS:    Pre-operative Diagnosis: Right shoulder   Tear, biceps tendon, non-traumatic M66.829 Rotator Cuff Syndrome/Disorder  M75.100, Tear, Biceps Tendon, Non-Tramatic M66.829, Tear, Rotator Cuff, Tramatic S46.012A, S46.011A and Labral tear and chondromalacia     Post-operative Diagnosis:  Right shoulder   Tear, biceps tendon, non-traumatic M66.829 Rotator Cuff Syndrome/Disorder  M75.100, Tear,  Rotator Cuff, Tramatic S46.012A, S46.011A and Labral Tear, Chondromalacia Shoulder joint      Procedures Performed:  1. Right shoulder Arthroscopic rotator cuff repair CPT - 21371, COMPLEX     2. Right shoulder Biceps tenodesis CPT - 40015, COMPLEX     3. Right shoulder Arthroscopic labral debridement CPT - 21237     4. Right shoulder Arthroscopic chondroplasty      Pain   Associated symptoms include joint swelling. Pertinent negatives include no chest pain, fever, numbness or rash.       Review of Systems   Constitution: Negative. Negative for fever and night sweats.   HENT: Negative.  Negative for hearing loss.    Eyes: Negative.  Negative for blurred vision and visual disturbance.   Cardiovascular: Negative.  Negative for chest pain and leg swelling.   Respiratory: Negative.  Negative for shortness of breath.    Endocrine: Negative.  Negative for polyuria.   Hematologic/Lymphatic: Negative.  Negative for bleeding problem.   Skin: Negative.  Negative for rash.   Musculoskeletal: Positive for joint pain and joint swelling. Negative for back pain, muscle cramps and muscle weakness.   Gastrointestinal: Negative.  Negative for melena.   Genitourinary: Negative.  Negative for hematuria.   Neurological: Negative.  Negative for loss of balance, numbness and paresthesias.   Psychiatric/Behavioral: Negative.  Negative for altered mental status.   Allergic/Immunologic: Negative.        Pain Related Questions  Over the past 3 days, what was your average pain during activity? (I.e. running, jogging, walking, climbing stairs, getting dressed, ect.): 7  Over the past 3 days, what was your highest pain level?: 7  Over the past 3 days, what was your lowest pain level? : 3    Other  How many nights a week are you awakened by your affected body part?: 2  Was the patient's HEIGHT measured or patient reported?: Patient Reported  Was the patient's WEIGHT measured or patient reported?: Measured      Objective:        General: Bernadette velázquez  well-developed, well-nourished, appears stated age, in no acute distress, alert and oriented to time, place and person.     General    Vitals reviewed.  Constitutional: She is oriented to person, place, and time. She appears well-developed and well-nourished. No distress.   HENT:   Mouth/Throat: No oropharyngeal exudate.   Eyes: Right eye exhibits no discharge. Left eye exhibits no discharge.   Neck: Normal range of motion.   Pulmonary/Chest: Effort normal and breath sounds normal. No respiratory distress.   Neurological: She is alert and oriented to person, place, and time. She has normal reflexes. No cranial nerve deficit. Coordination normal.   Psychiatric: She has a normal mood and affect. Her behavior is normal. Judgment and thought content normal.     General Musculoskeletal Exam   Gait: normal       Right Knee Exam     Inspection   Erythema: absent  Scars: present  Swelling: absent  Effusion: absent  Deformity: absent  Bruising: absent    Tenderness   The patient is tender to palpation of the medial joint line and patella.    Crepitus   The patient has crepitus of the patella.    Range of Motion   Extension:  10 abnormal   Flexion: 130     Tests   Meniscus   Neeta:  Medial - positive Lateral - positive  Ligament Examination Lachman: normal (-1 to 2mm) PCL-Posterior Drawer: normal (0 to 2mm)     MCL - Valgus: normal (0 to 2mm)  LCL - Varus: normalPivot Shift: normal (Equal)Reverse Pivot Shift: normal (Equal)Dial Test at 30 degrees: normal (< 5 degrees)Dial Test at 90 degrees: normal (< 5 degrees)  Posterior Sag Test: negative  Posterolateral Corner: unstable (>15 degrees difference)  Patella   Patellar apprehension: negative  Passive Patellar Tilt: neutral  Patellar Tracking: normal  Patellar Glide (quadrants): Lateral - 1   Medial - 2  Q-Angle at 90 degrees: normal  Patellar Grind: positive  J-Sign: none    Other   Meniscal Cyst: absent  Popliteal (Baker's) Cyst: absent  Sensation: normal    Left Knee Exam      Inspection   Erythema: absent  Scars: present  Swelling: absent  Effusion: absent  Deformity: absent  Bruising: absent    Tenderness   The patient is experiencing no tenderness.     Range of Motion   Extension:  5 abnormal   Flexion: 130     Tests   Meniscus   Neeta:  Medial - negative Lateral - negative  Stability Lachman: normal (-1 to 2mm) PCL-Posterior Drawer: normal (0 to 2mm)  MCL - Valgus: normal (0 to 2mm)  LCL - Varus: normal (0 to 2mm)Pivot Shift: normal (Equal)Reverse Pivot Shift: normal (Equal)Dial Test at 30 degrees: normal (< 5 degrees)Dial Test at 90 degrees: normal (< 5 degrees)  Posterior Sag Test: negative  Posterolateral Corner: unstable (>15 degrees difference)  Patella   Patellar apprehension: negative  Passive Patellar Tilt: neutral  Patellar Tracking: normal  Patellar Glide (Quadrants): Lateral - 1 Medial - 2  Q-Angle at 90 degrees: normal  Patellar Grind: negative  J-Sign: J sign absent    Other   Meniscal Cyst: absent  Popliteal (Baker's) Cyst: absent  Sensation: normal    Right Hip Exam     Tests   Jorden: negative  Left Hip Exam     Tests   Jorden: negative          Muscle Strength   Right Lower Extremity   Hip Abduction: 5/5   Quadriceps:  5/5   Hamstrin/5   Left Lower Extremity   Hip Abduction: 5/5   Quadriceps:  5/5   Hamstrin/5     Reflexes     Left Side  Quadriceps:  2+  Achilles:  2+    Right Side   Quadriceps:  2+  Achilles:  2+    Vascular Exam     Right Pulses  Dorsalis Pedis:      2+  Posterior Tibial:      2+        Left Pulses  Dorsalis Pedis:      2+  Posterior Tibial:      2+          EXAMINATION:  XR KNEE ORTHO BILAT WITH FLEXION    CLINICAL HISTORY:  Pain in left knee    TECHNIQUE:  AP standing of both knees, PA flexion standing views of both knees, and Merchant views of both knees were performed.  Lateral views of both knees were also performed.    COMPARISON:  2019, 12/10/2018    FINDINGS:  In right knee: Severe medial knee compartment joint space  narrowing.  Tricompartment moderate sized osteophyte formation.  There is a small joint effusion.  There is moderate femoral patellar joint space narrowing.  No fracture, no osseous lesions.    Left knee: Abnormal patchy sclerotic appearance of the medial femoral condyle and medial tibial plateau, unclear etiology, unchanged from the prior study, possibly postop, this is new from 12/10/2018.  The knee joint space is preserved.  There is mild femoral patellar joint space narrowing and osteophyte formation.  Trace of joint fluid.  The soft tissues appear normal.      Impression       There is no significant change from the prior recent study 04/29/2019.             Assessment:       Encounter Diagnosis   Name Primary?    Primary osteoarthritis of right knee Yes          Plan:       1. IKDC, SF-12 and KOOS was not filled out today in clinic.     RTC in 3 months with Mid-level provider for bilateral knee Zilretta. Patient will fill out IKDC, SF-12 and KOOS on return.    2. D/C Celebrex Once Daily- due to concern of GI upset.    3.  Continues to complete HEP Core Program    4. Voltaren Gel topically to the arthroscopic portal sites left greater than right     5.                 [facility] patient questionnaires have been collected today.

## 2020-05-20 ENCOUNTER — PATIENT OUTREACH (OUTPATIENT)
Dept: ADMINISTRATIVE | Facility: HOSPITAL | Age: 70
End: 2020-05-20

## 2020-05-20 NOTE — LETTER
AUTHORIZATION FOR RELEASE OF   CONFIDENTIAL INFORMATION    Dear [facility],    We are seeing Bernadette Aquino, date of birth 1950, in the clinic at [facility]. Bernadette Aquino has an outstanding lab/procedure at the time we reviewed her chart. In order to help keep her health information updated, she has authorized us to request the following medical record(s):            ( X )  MAMMOGRAM                             Please fax records to us at [phone].     Attention: Jeanne Isabel     If you have any questions, please contact me at [phone].           Patient Name: Bernadette Aquino  : 1950  Patient Phone #: [phone]

## 2020-06-08 ENCOUNTER — OFFICE VISIT (OUTPATIENT)
Dept: NEUROSURGERY | Facility: CLINIC | Age: 70
End: 2020-06-08
Payer: MEDICARE

## 2020-06-08 ENCOUNTER — HOSPITAL ENCOUNTER (OUTPATIENT)
Dept: RADIOLOGY | Facility: HOSPITAL | Age: 70
Discharge: HOME | End: 2020-06-08
Attending: NURSE PRACTITIONER
Payer: MEDICARE

## 2020-06-08 VITALS
HEART RATE: 71 BPM | BODY MASS INDEX: 23.12 KG/M2 | DIASTOLIC BLOOD PRESSURE: 70 MMHG | HEIGHT: 63 IN | SYSTOLIC BLOOD PRESSURE: 143 MMHG | WEIGHT: 130.5 LBS

## 2020-06-08 DIAGNOSIS — E78.2 MIXED HYPERLIPIDEMIA: ICD-10-CM

## 2020-06-08 DIAGNOSIS — I67.1 CEREBRAL ANEURYSM: ICD-10-CM

## 2020-06-08 DIAGNOSIS — I10 ESSENTIAL HYPERTENSION: ICD-10-CM

## 2020-06-08 DIAGNOSIS — I67.1 CEREBRAL ANEURYSM, NONRUPTURED: Primary | ICD-10-CM

## 2020-06-08 LAB
CREAT SERPL-MCNC: 0.8 MG/DL (ref 0.5–1.4)
SAMPLE: NORMAL

## 2020-06-08 PROCEDURE — 1159F PR MEDICATION LIST DOCUMENTED IN MEDICAL RECORD: ICD-10-PCS | Mod: S$GLB,,, | Performed by: RADIOLOGY

## 2020-06-08 PROCEDURE — 1101F PR PT FALLS ASSESS DOC 0-1 FALLS W/OUT INJ PAST YR: ICD-10-PCS | Mod: CPTII,S$GLB,, | Performed by: RADIOLOGY

## 2020-06-08 PROCEDURE — 99999 PR PBB SHADOW E&M-EST. PATIENT-LVL III: ICD-10-PCS | Mod: PBBFAC,,,

## 2020-06-08 PROCEDURE — 3077F PR MOST RECENT SYSTOLIC BLOOD PRESSURE >= 140 MM HG: ICD-10-PCS | Mod: CPTII,S$GLB,, | Performed by: RADIOLOGY

## 2020-06-08 PROCEDURE — 3078F PR MOST RECENT DIASTOLIC BLOOD PRESSURE < 80 MM HG: ICD-10-PCS | Mod: CPTII,S$GLB,, | Performed by: RADIOLOGY

## 2020-06-08 PROCEDURE — 70546 MR ANGIOGRAPH HEAD W/O&W/DYE: CPT | Mod: TC

## 2020-06-08 PROCEDURE — 1159F MED LIST DOCD IN RCRD: CPT | Mod: S$GLB,,, | Performed by: RADIOLOGY

## 2020-06-08 PROCEDURE — 70546 MRA BRAIN WITH AND WITHOUT: ICD-10-PCS | Mod: 26,,, | Performed by: RADIOLOGY

## 2020-06-08 PROCEDURE — 99999 PR PBB SHADOW E&M-EST. PATIENT-LVL III: CPT | Mod: PBBFAC,,,

## 2020-06-08 PROCEDURE — 3078F DIAST BP <80 MM HG: CPT | Mod: CPTII,S$GLB,, | Performed by: RADIOLOGY

## 2020-06-08 PROCEDURE — 99214 OFFICE O/P EST MOD 30 MIN: CPT | Mod: S$GLB,,, | Performed by: RADIOLOGY

## 2020-06-08 PROCEDURE — A9585 GADOBUTROL INJECTION: HCPCS | Performed by: NURSE PRACTITIONER

## 2020-06-08 PROCEDURE — 99214 PR OFFICE/OUTPT VISIT, EST, LEVL IV, 30-39 MIN: ICD-10-PCS | Mod: S$GLB,,, | Performed by: RADIOLOGY

## 2020-06-08 PROCEDURE — 70546 MR ANGIOGRAPH HEAD W/O&W/DYE: CPT | Mod: 26,,, | Performed by: RADIOLOGY

## 2020-06-08 PROCEDURE — 1101F PT FALLS ASSESS-DOCD LE1/YR: CPT | Mod: CPTII,S$GLB,, | Performed by: RADIOLOGY

## 2020-06-08 PROCEDURE — 3077F SYST BP >= 140 MM HG: CPT | Mod: CPTII,S$GLB,, | Performed by: RADIOLOGY

## 2020-06-08 PROCEDURE — 25500020 PHARM REV CODE 255: Performed by: NURSE PRACTITIONER

## 2020-06-08 RX ORDER — GADOBUTROL 604.72 MG/ML
10 INJECTION INTRAVENOUS
Status: COMPLETED | OUTPATIENT
Start: 2020-06-08 | End: 2020-06-08

## 2020-06-08 RX ADMIN — GADOBUTROL 10 ML: 604.72 INJECTION INTRAVENOUS at 08:06

## 2020-06-08 NOTE — ASSESSMENT & PLAN NOTE
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    2.  Wide necked left MCA bifurcation aneurysm measuring 2.4 x 2.0 mm, slightly increased in size from 2010.    3.  Small shallow blister type aneurysm from the left paraophthalmic ICA measuring 2.7 x 1.1 mm.    - rec to cont follow up with MRA brain w and wo @ 2 years   - Risk of treatment outweighs the benefit   - rec risk factor management   - Exercise and mediterranean diet

## 2020-06-08 NOTE — PROGRESS NOTES
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    2.  Wide necked left MCA bifurcation aneurysm measuring 2.4 x 2.0 mm, slightly increased in size from .    3.  Small shallow blister type aneurysm from the left paraophthalmic ICA measuring 2.7 x 1.1 mm.    - rec to cont follow up with MRA brain w and wo @ 2 years   - Risk of treatment outweighs the benefit   - rec risk factor management   - Exercise and mediterranean diet            Cardiac/Vascular    Hyperlipidemia    Current Assessment & Plan     - risk factor for stroke   - rec LDL '< 70  - Cont Zocor           Essential hypertension    Current Assessment & Plan      - rec SBP < 130 mmHg   - exercise and mediterranean diet                  Reason For Visit (Chief Complaint): follow up post cerebral aneurysm treatment     HPI: 69 y.o. female presented to the clinic for follow up.     Pt is dong well without any symptoms concerning for sentinel hemorrhage. No weakness, numbness or changes in vision. Pt had Left ICA paraophthalmic aneurysm treated with coiling about 10 years ago and left MCA bifurcation aneurysm untreated and seen on Angiogram in .     Follow up MRA done today revealed small stable untreated unruptured left ICA paraophthalmic aneurysm and stable left MCA bifurcation aneurysm untreated.          Vessel Imagin . 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    2.  Wide necked left MCA bifurcation aneurysm measuring 2.4 x 2.0 mm, slightly increased in size from  2010.    3.  Small shallow blister type aneurysm from the left paraophthalmic ICA measuring 2.7 x 1.1 mm.            Stable left MCA bifurcation aneurysm - untreated    Relevant Labwork:  Recent Labs   Lab 04/10/18  0757   Hemoglobin A1C 5.5   LDL Cholesterol 73.4   HDL 69   Triglycerides 88   Cholesterol 160       I independently viewed the above imaging studies in addition to reviewing the report.  I reviewed the above labwork.    Review of Systems   Constitutional: Negative for chills and fever.   HENT: Negative for hearing loss and tinnitus.    Eyes: Negative for blurred vision and double vision.   Respiratory: Negative for cough and hemoptysis.    Cardiovascular: Negative for chest pain and palpitations.   Gastrointestinal: Negative for heartburn and nausea.   Genitourinary: Negative for dysuria and urgency.   Musculoskeletal: Positive for back pain and joint pain. Negative for myalgias and neck pain.   Skin: Negative for itching and rash.   Neurological: Negative for dizziness and headaches.   Endo/Heme/Allergies: Negative for environmental allergies. Does not bruise/bleed easily.   Psychiatric/Behavioral: Negative for depression and suicidal ideas.     Past Medical History  Past Medical History:   Diagnosis Date    Appendicitis with perforation     Status post appendectomy, January 2011    Celiac disease     Cerebral aneurysm     Right internal carotid artery, nonruptured, status post coil    DDD (degenerative disc disease), cervical     Paresthesia of hands    Elevated liver enzymes     Prior history    Encounter for blood transfusion     GERD (gastroesophageal reflux disease)     HTN (hypertension)     Hyperlipidemia     Mild depression     Multinodular thyroid     u/s 9/11    Osteopenia     Prior history of osteoporosis on bisphosphonate    Other specified acquired hypothyroidism     On replacement    Single cyst of left breast      Family History  No relevant history   Social History  Never  "Smoker     Medication List with Changes/Refills   Current Medications    AMLODIPINE (NORVASC) 2.5 MG TABLET    Take 2.5 mg by mouth once daily.    ASCORBIC ACID, VITAMIN C, (VITAMIN C) 1000 MG TABLET    Take 1,000 mg by mouth once daily.    ASPIRIN (ECOTRIN) 81 MG EC TABLET    Take 81 mg by mouth once daily.     CELECOXIB (CELEBREX) 200 MG CAPSULE    Take 1 capsule (200 mg total) by mouth 2 (two) times daily.    CHOLECALCIFEROL, VITAMIN D3, (VITAMIN D3) 5,000 UNIT TAB    Take 5,000 Units by mouth once daily.    DICLOFENAC SODIUM (VOLTAREN) 1 % GEL    Apply 2 g topically 2 (two) times daily. for 10 days    OMEPRAZOLE (PRILOSEC) 20 MG CAPSULE    Take 1 capsule (20 mg total) by mouth 2 (two) times daily before meals.    OXYCODONE-ACETAMINOPHEN (PERCOCET)  MG PER TABLET    Take 1 tablet by mouth every 6 (six) hours as needed for Pain.    PROMETHAZINE (PHENERGAN) 25 MG TABLET    Take 1 tablet (25 mg total) by mouth every 4 (four) hours as needed for Nausea.    SERTRALINE (ZOLOFT) 100 MG TABLET    Take 1 tablet (100 mg total) by mouth once daily.    SIMVASTATIN (ZOCOR) 40 MG TABLET    Take 1 tablet (40 mg total) by mouth nightly.    SYNTHROID 25 MCG TABLET    1 po daily except 2 po daily on Saturday and Sunday.       EXAM  Vital Signs:Blood pressure (!) 143/70, pulse 71, height 5' 3" (1.6 m), weight 59.2 kg (130 lb 8.2 oz).  General: well appearing without discomfort   Mental Status:alert, oriented to person - place - age - month   Language: able to name, repeat, comprehend commands   Cranial Nerves: EOMI, PERRL, no facial asymmetry, tongue to midline, palate midline  Motor: 5/5 power in all extremities, normal tone  Sensory: intact light touch bilaterally  Coordination: no ataxia   Gait & Stance: normal        Tereso Bauman MD  NeuroIR fellow      "

## 2020-09-16 ENCOUNTER — TELEPHONE (OUTPATIENT)
Dept: SPORTS MEDICINE | Facility: CLINIC | Age: 70
End: 2020-09-16

## 2020-09-16 DIAGNOSIS — M17.0 PRIMARY OSTEOARTHRITIS OF BOTH KNEES: Primary | ICD-10-CM

## 2020-09-16 NOTE — TELEPHONE ENCOUNTER
Confirmed patient appt.   ----- Message from Padmini Johns sent at 9/16/2020 12:00 PM CDT -----  Pt would like to receive a call back regarding her injection appt.    Contact info [phone]

## 2020-09-16 NOTE — TELEPHONE ENCOUNTER
Spoke to the patient and scheduled an appointment 10/7 at 8:15a for Zilretta  ----- Message from Maricel Moore sent at 9/16/2020  8:44 AM CDT -----  Regarding: self  Pt is calling about a 2nd injection. Asking for a call back.    Contact info [phone]

## 2020-10-06 NOTE — PROGRESS NOTES
Subjective:          Chief Complaint: Bernadette Aquino is a 70 y.o. female who had concerns including Injections of the Right Knee.    Patient is here for R knee Zilretta injection. She is doing well. She has started working out at the gym. She is completing an HEP and has returned to playing doubles tennis. She has been wearing her  brace on the R Knee while she is active.     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 LEFT  Femoral Subchondroplasty, 68820  2.  LEFT  Tibial Subchondroplasty, 45140  3.  LEFT  Arthroscopy, with meniscectomy (medial OR lateral) 47993  4LEFT  Arthroscopy, debridement/shaving of articular cartilage (chondroplasty) 35715  5. LEFT  Arthroscopy, knee, synovectomy, limited 45227      DATE OF PROCEDURE: 12/11/2015     ATTENDING SURGEON: Surgeon(s) and Role:     * Shalonda Altman MD - Primary     * La Tang DO - Fellow     * Laquita Vail PA-C - assistant     PREOPERATIVE DIAGNOSIS:    Pre-operative Diagnosis: Right shoulder   Tear, biceps tendon, non-traumatic M66.829 Rotator Cuff Syndrome/Disorder  M75.100, Tear, Biceps Tendon, Non-Tramatic M66.829, Tear, Rotator Cuff, Tramatic S46.012A, S46.011A and Labral tear and chondromalacia     Post-operative Diagnosis:  Right shoulder   Tear, biceps tendon, non-traumatic M66.829 Rotator Cuff Syndrome/Disorder  M75.100, Tear,  Rotator Cuff, Tramatic S46.012A, S46.011A and Labral Tear, Chondromalacia Shoulder joint      Procedures Performed:  1. Right shoulder Arthroscopic rotator cuff repair CPT - 15307, COMPLEX     2. Right shoulder Biceps tenodesis CPT - 26133, COMPLEX     3. Right shoulder Arthroscopic labral debridement CPT - 52643     4. Right shoulder Arthroscopic chondroplasty      Pain  Associated symptoms include joint swelling. Pertinent negatives include no chest pain, fever, numbness or rash.       Review of Systems   Constitution: Negative. Negative for fever and night sweats.   HENT: Negative.  Negative for hearing loss.    Eyes: Negative.  Negative for blurred vision and visual disturbance.   Cardiovascular: Negative.  Negative for chest pain and leg swelling.   Respiratory: Negative.  Negative for shortness of breath.    Endocrine: Negative.  Negative for polyuria.   Hematologic/Lymphatic: Negative.  Negative for bleeding problem.   Skin: Negative.  Negative for rash.   Musculoskeletal: Positive for joint pain and joint swelling. Negative for back pain, muscle cramps and muscle weakness.   Gastrointestinal: Negative.  Negative for melena.   Genitourinary: Negative.  Negative for hematuria.   Neurological: Negative.  Negative for loss of balance, numbness and paresthesias.   Psychiatric/Behavioral: Negative.  Negative for altered mental status.   Allergic/Immunologic: Negative.                    Objective:        General: Bernadette is well-developed, well-nourished, appears stated age, in no acute distress, alert and oriented to time, place and person.     General    Vitals reviewed.  Constitutional: She is oriented to person, place, and time. She appears well-developed and well-nourished. No distress.   HENT:   Mouth/Throat: No oropharyngeal exudate.   Eyes: Right eye exhibits no discharge. Left eye exhibits no discharge.   Neck: Normal range of motion.   Pulmonary/Chest: Effort normal and breath sounds normal. No respiratory  distress.   Neurological: She is alert and oriented to person, place, and time. She has normal reflexes. No cranial nerve deficit. Coordination normal.   Psychiatric: She has a normal mood and affect. Her behavior is normal. Judgment and thought content normal.     General Musculoskeletal Exam   Gait: normal       Right Knee Exam     Inspection   Erythema: absent  Scars: present  Swelling: absent  Effusion: absent  Deformity: absent  Bruising: absent    Tenderness   The patient is tender to palpation of the medial joint line and patella.    Crepitus   The patient has crepitus of the patella.    Range of Motion   Extension:  10 abnormal   Flexion: 130     Tests   Meniscus   Neeta:  Medial - positive Lateral - positive  Ligament Examination Lachman: normal (-1 to 2mm) PCL-Posterior Drawer: normal (0 to 2mm)     MCL - Valgus: normal (0 to 2mm)  LCL - Varus: normalPivot Shift: normal (Equal)Reverse Pivot Shift: normal (Equal)Dial Test at 30 degrees: normal (< 5 degrees)Dial Test at 90 degrees: normal (< 5 degrees)  Posterior Sag Test: negative  Posterolateral Corner: unstable (>15 degrees difference)  Patella   Patellar apprehension: negative  Passive Patellar Tilt: neutral  Patellar Tracking: normal  Patellar Glide (quadrants): Lateral - 1   Medial - 2  Q-Angle at 90 degrees: normal  Patellar Grind: positive  J-Sign: none    Other   Meniscal Cyst: absent  Popliteal (Baker's) Cyst: absent  Sensation: normal    Left Knee Exam     Inspection   Erythema: absent  Scars: present  Swelling: absent  Effusion: absent  Deformity: absent  Bruising: absent    Tenderness   The patient is experiencing no tenderness.     Range of Motion   Extension:  5 abnormal   Flexion: 130     Tests   Meniscus   Neeta:  Medial - negative Lateral - negative  Stability Lachman: normal (-1 to 2mm) PCL-Posterior Drawer: normal (0 to 2mm)  MCL - Valgus: normal (0 to 2mm)  LCL - Varus: normal (0 to 2mm)Pivot Shift: normal (Equal)Reverse Pivot  Shift: normal (Equal)Dial Test at 30 degrees: normal (< 5 degrees)Dial Test at 90 degrees: normal (< 5 degrees)  Posterior Sag Test: negative  Posterolateral Corner: unstable (>15 degrees difference)  Patella   Patellar apprehension: negative  Passive Patellar Tilt: neutral  Patellar Tracking: normal  Patellar Glide (Quadrants): Lateral - 1 Medial - 2  Q-Angle at 90 degrees: normal  Patellar Grind: negative  J-Sign: J sign absent    Other   Meniscal Cyst: absent  Popliteal (Baker's) Cyst: absent  Sensation: normal    Right Hip Exam     Tests   Jorden: negative  Left Hip Exam     Tests   Jorden: negative          Muscle Strength   Right Lower Extremity   Hip Abduction: 5/5   Quadriceps:  5/5   Hamstrin/5   Left Lower Extremity   Hip Abduction: 5/5   Quadriceps:  5/5   Hamstrin/5     Reflexes     Left Side  Achilles:  2+  Quadriceps:  2+    Right Side   Achilles:  2+  Quadriceps:  2+    Vascular Exam     Right Pulses  Dorsalis Pedis:      2+  Posterior Tibial:      2+        Left Pulses  Dorsalis Pedis:      2+  Posterior Tibial:      2+          EXAMINATION:  XR KNEE ORTHO BILAT WITH FLEXION    CLINICAL HISTORY:  Pain in left knee    TECHNIQUE:  AP standing of both knees, PA flexion standing views of both knees, and Merchant views of both knees were performed.  Lateral views of both knees were also performed.    COMPARISON:  2019, 12/10/2018    FINDINGS:  In right knee: Severe medial knee compartment joint space narrowing.  Tricompartment moderate sized osteophyte formation.  There is a small joint effusion.  There is moderate femoral patellar joint space narrowing.  No fracture, no osseous lesions.    Left knee: Abnormal patchy sclerotic appearance of the medial femoral condyle and medial tibial plateau, unclear etiology, unchanged from the prior study, possibly postop, this is new from 12/10/2018.  The knee joint space is preserved.  There is mild femoral patellar joint space narrowing and osteophyte  formation.  Trace of joint fluid.  The soft tissues appear normal.      Impression       There is no significant change from the prior recent study 04/29/2019.             Assessment:       Encounter Diagnoses   Name Primary?    Chronic pain of right knee Yes    Primary osteoarthritis of both knees     Primary osteoarthritis of right knee           Plan:       1. IKDC, SF-12 and KOOS was not filled out today in clinic.     RTC in 3 months with Mid-level provider virtual visit. Patient will fill out IKDC, SF-12 and KOOS on return.    2. D/C Celebrex Once Daily- due to concern of GI upset.    3.  Continues to complete HEP Core Program    4. Voltaren Gel topically to the arthroscopic portal sites left greater than right     5.  See Procedure Note- R Knee Zilretta injection    6. 04989 - Shalonda Altman MD and SMA Carolina, performed a custom orthotic / brace adjustment, fitting and training with the patient. The patient demonstrated understanding and proper care. This was performed for 18 minutes.    ViscoSkin Left knee                  [facility] patient questionnaires have been collected today.

## 2020-10-07 ENCOUNTER — OFFICE VISIT (OUTPATIENT)
Dept: SPORTS MEDICINE | Facility: CLINIC | Age: 70
End: 2020-10-07
Payer: MEDICARE

## 2020-10-07 VITALS
BODY MASS INDEX: 23.39 KG/M2 | WEIGHT: 132 LBS | HEIGHT: 63 IN | DIASTOLIC BLOOD PRESSURE: 81 MMHG | HEART RATE: 82 BPM | SYSTOLIC BLOOD PRESSURE: 128 MMHG

## 2020-10-07 DIAGNOSIS — M17.0 PRIMARY OSTEOARTHRITIS OF BOTH KNEES: ICD-10-CM

## 2020-10-07 DIAGNOSIS — M17.11 PRIMARY OSTEOARTHRITIS OF RIGHT KNEE: ICD-10-CM

## 2020-10-07 DIAGNOSIS — G89.29 CHRONIC PAIN OF RIGHT KNEE: Primary | ICD-10-CM

## 2020-10-07 DIAGNOSIS — M25.561 CHRONIC PAIN OF RIGHT KNEE: Primary | ICD-10-CM

## 2020-10-07 PROCEDURE — 1101F PT FALLS ASSESS-DOCD LE1/YR: CPT | Mod: CPTII,S$GLB,, | Performed by: ORTHOPAEDIC SURGERY

## 2020-10-07 PROCEDURE — 1125F PR PAIN SEVERITY QUANTIFIED, PAIN PRESENT: ICD-10-PCS | Mod: S$GLB,,, | Performed by: ORTHOPAEDIC SURGERY

## 2020-10-07 PROCEDURE — 99499 LARGE JOINT ASPIRATION/INJECTION: R KNEE: ICD-10-PCS | Mod: S$GLB,,, | Performed by: ORTHOPAEDIC SURGERY

## 2020-10-07 PROCEDURE — 3074F PR MOST RECENT SYSTOLIC BLOOD PRESSURE < 130 MM HG: ICD-10-PCS | Mod: CPTII,S$GLB,, | Performed by: ORTHOPAEDIC SURGERY

## 2020-10-07 PROCEDURE — 3079F DIAST BP 80-89 MM HG: CPT | Mod: CPTII,S$GLB,, | Performed by: ORTHOPAEDIC SURGERY

## 2020-10-07 PROCEDURE — 1159F PR MEDICATION LIST DOCUMENTED IN MEDICAL RECORD: ICD-10-PCS | Mod: S$GLB,,, | Performed by: ORTHOPAEDIC SURGERY

## 2020-10-07 PROCEDURE — 97760 ORTHOTIC MGMT&TRAING 1ST ENC: CPT | Mod: GP,S$GLB,, | Performed by: ORTHOPAEDIC SURGERY

## 2020-10-07 PROCEDURE — 3074F SYST BP LT 130 MM HG: CPT | Mod: CPTII,S$GLB,, | Performed by: ORTHOPAEDIC SURGERY

## 2020-10-07 PROCEDURE — 3008F PR BODY MASS INDEX (BMI) DOCUMENTED: ICD-10-PCS | Mod: CPTII,S$GLB,, | Performed by: ORTHOPAEDIC SURGERY

## 2020-10-07 PROCEDURE — 1101F PR PT FALLS ASSESS DOC 0-1 FALLS W/OUT INJ PAST YR: ICD-10-PCS | Mod: CPTII,S$GLB,, | Performed by: ORTHOPAEDIC SURGERY

## 2020-10-07 PROCEDURE — 20611 DRAIN/INJ JOINT/BURSA W/US: CPT | Mod: RT,S$GLB,, | Performed by: ORTHOPAEDIC SURGERY

## 2020-10-07 PROCEDURE — 97760 PR ORTHOTIC MGMT&TRAINJ INITIAL ENC EA 15 MINS: ICD-10-PCS | Mod: GP,S$GLB,, | Performed by: ORTHOPAEDIC SURGERY

## 2020-10-07 PROCEDURE — 3008F BODY MASS INDEX DOCD: CPT | Mod: CPTII,S$GLB,, | Performed by: ORTHOPAEDIC SURGERY

## 2020-10-07 PROCEDURE — 20611 PR DRAIN/ASP/INJECT MAJOR JOINT/BURSA W/US GUIDANCE: ICD-10-PCS | Mod: RT,S$GLB,, | Performed by: ORTHOPAEDIC SURGERY

## 2020-10-07 PROCEDURE — 1125F AMNT PAIN NOTED PAIN PRSNT: CPT | Mod: S$GLB,,, | Performed by: ORTHOPAEDIC SURGERY

## 2020-10-07 PROCEDURE — 99999 PR PBB SHADOW E&M-EST. PATIENT-LVL IV: ICD-10-PCS | Mod: PBBFAC,,, | Performed by: ORTHOPAEDIC SURGERY

## 2020-10-07 PROCEDURE — 99999 PR PBB SHADOW E&M-EST. PATIENT-LVL IV: CPT | Mod: PBBFAC,,, | Performed by: ORTHOPAEDIC SURGERY

## 2020-10-07 PROCEDURE — 3079F PR MOST RECENT DIASTOLIC BLOOD PRESSURE 80-89 MM HG: ICD-10-PCS | Mod: CPTII,S$GLB,, | Performed by: ORTHOPAEDIC SURGERY

## 2020-10-07 PROCEDURE — 99499 UNLISTED E&M SERVICE: CPT | Mod: S$GLB,,, | Performed by: ORTHOPAEDIC SURGERY

## 2020-10-07 PROCEDURE — 1159F MED LIST DOCD IN RCRD: CPT | Mod: S$GLB,,, | Performed by: ORTHOPAEDIC SURGERY

## 2020-10-07 NOTE — PROCEDURES
Large Joint Aspiration/Injection: R knee    Date/Time: 10/7/2020 8:15 AM  Performed by: Shalonda Altman MD  Authorized by: Shalonda Altman MD     Consent Done?:  Yes (Verbal)  Indications:  Pain  Site marked: the procedure site was marked    Timeout: prior to procedure the correct patient, procedure, and site was verified    Prep: patient was prepped and draped in usual sterile fashion      Local anesthesia used?: Yes    Local anesthetic:  Topical anesthetic    Details:  Needle Size:  22 G  Ultrasonic Guidance for needle placement?: Yes    Images are saved and documented.  Approach:  Anterolateral  Location:  Knee  Site:  R knee  Medications:  32 mg triamcinolone acetonide 32 mg  Patient tolerance:  Patient tolerated the procedure well with no immediate complications

## 2020-10-07 NOTE — LETTER
October 7, 2020      Laquita Vail PA-C  [address]  [facility] [address]           [facility]  [address]  [facility] [address]  Phone: [phone]          Patient: Bernadette Aquino   MR Number: [id number]   YOB: 1950   Date of Visit: 10/7/2020       Dear Laquita Vail:    Thank you for referring Bernadette Aquino to me for evaluation. Attached you will find relevant portions of my assessment and plan of care.    If you have questions, please do not hesitate to call me. I look forward to following Bernadette Aquino along with you.    Sincerely,    Shalonda Altman MD    Enclosure  CC:  No Recipients    If you would like to receive this communication electronically, please contact [email or web address] or [phone] to request more information on [employer] Link access.    For providers and/or their staff who would like to refer a patient to [facility], please contact us through our one-stop-shop provider referral line, [facility], at [phone].    If you feel you have received this communication in error or would no longer like to receive these types of communications, please e-mail [email or web address]

## 2020-10-30 NOTE — TELEPHONE ENCOUNTER
Spoke to the patient and she says she is cleared for surgery from her neurologist. I will have Autumn reach out to hear to schedule her surgery.    ----- Message from Armand Emery sent at 2/14/2019  8:19 AM CST -----  Contact: Self/ [phone]  Patient would a call back to speak with Kenia.      The patient has been examined and the H&P has been reviewed:    I concur with the findings and no changes have occurred since H&P was written.    Surgery risks, benefits and alternative options discussed and understood by patient/family.          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

## 2021-05-06 ENCOUNTER — TELEPHONE (OUTPATIENT)
Dept: SPORTS MEDICINE | Facility: CLINIC | Age: 71
End: 2021-05-06

## 2022-02-27 ENCOUNTER — OFFICE VISIT (OUTPATIENT)
Dept: URGENT CARE | Facility: CLINIC | Age: 72
End: 2022-02-27
Payer: MEDICARE

## 2022-02-27 VITALS
HEIGHT: 63 IN | TEMPERATURE: 99 F | HEART RATE: 87 BPM | OXYGEN SATURATION: 95 % | BODY MASS INDEX: 23.39 KG/M2 | WEIGHT: 132 LBS | RESPIRATION RATE: 16 BRPM | SYSTOLIC BLOOD PRESSURE: 140 MMHG | DIASTOLIC BLOOD PRESSURE: 80 MMHG

## 2022-02-27 DIAGNOSIS — L55.9 SUNBURN: ICD-10-CM

## 2022-02-27 DIAGNOSIS — L50.9 URTICARIA: Primary | ICD-10-CM

## 2022-02-27 PROCEDURE — 3077F PR MOST RECENT SYSTOLIC BLOOD PRESSURE >= 140 MM HG: ICD-10-PCS | Mod: CPTII,S$GLB,, | Performed by: NURSE PRACTITIONER

## 2022-02-27 PROCEDURE — 3079F PR MOST RECENT DIASTOLIC BLOOD PRESSURE 80-89 MM HG: ICD-10-PCS | Mod: CPTII,S$GLB,, | Performed by: NURSE PRACTITIONER

## 2022-02-27 PROCEDURE — 96372 PR INJECTION,THERAP/PROPH/DIAG2ST, IM OR SUBCUT: ICD-10-PCS | Mod: S$GLB,,, | Performed by: FAMILY MEDICINE

## 2022-02-27 PROCEDURE — 1157F PR ADVANCE CARE PLAN OR EQUIV PRESENT IN MEDICAL RECORD: ICD-10-PCS | Mod: CPTII,S$GLB,, | Performed by: NURSE PRACTITIONER

## 2022-02-27 PROCEDURE — 99203 PR OFFICE/OUTPT VISIT, NEW, LEVL III, 30-44 MIN: ICD-10-PCS | Mod: 25,S$GLB,, | Performed by: NURSE PRACTITIONER

## 2022-02-27 PROCEDURE — 1159F MED LIST DOCD IN RCRD: CPT | Mod: CPTII,S$GLB,, | Performed by: NURSE PRACTITIONER

## 2022-02-27 PROCEDURE — 99203 OFFICE O/P NEW LOW 30 MIN: CPT | Mod: 25,S$GLB,, | Performed by: NURSE PRACTITIONER

## 2022-02-27 PROCEDURE — 96372 THER/PROPH/DIAG INJ SC/IM: CPT | Mod: S$GLB,,, | Performed by: FAMILY MEDICINE

## 2022-02-27 PROCEDURE — 3008F BODY MASS INDEX DOCD: CPT | Mod: CPTII,S$GLB,, | Performed by: NURSE PRACTITIONER

## 2022-02-27 PROCEDURE — 1159F PR MEDICATION LIST DOCUMENTED IN MEDICAL RECORD: ICD-10-PCS | Mod: CPTII,S$GLB,, | Performed by: NURSE PRACTITIONER

## 2022-02-27 PROCEDURE — 3008F PR BODY MASS INDEX (BMI) DOCUMENTED: ICD-10-PCS | Mod: CPTII,S$GLB,, | Performed by: NURSE PRACTITIONER

## 2022-02-27 PROCEDURE — 3077F SYST BP >= 140 MM HG: CPT | Mod: CPTII,S$GLB,, | Performed by: NURSE PRACTITIONER

## 2022-02-27 PROCEDURE — 1157F ADVNC CARE PLAN IN RCRD: CPT | Mod: CPTII,S$GLB,, | Performed by: NURSE PRACTITIONER

## 2022-02-27 PROCEDURE — 1160F RVW MEDS BY RX/DR IN RCRD: CPT | Mod: CPTII,S$GLB,, | Performed by: NURSE PRACTITIONER

## 2022-02-27 PROCEDURE — 3079F DIAST BP 80-89 MM HG: CPT | Mod: CPTII,S$GLB,, | Performed by: NURSE PRACTITIONER

## 2022-02-27 PROCEDURE — 1160F PR REVIEW ALL MEDS BY PRESCRIBER/CLIN PHARMACIST DOCUMENTED: ICD-10-PCS | Mod: CPTII,S$GLB,, | Performed by: NURSE PRACTITIONER

## 2022-02-27 RX ORDER — HYDROXYZINE HYDROCHLORIDE 25 MG/1
25 TABLET, FILM COATED ORAL 3 TIMES DAILY PRN
Qty: 30 TABLET | Refills: 0 | Status: SHIPPED | OUTPATIENT
Start: 2022-02-27 | End: 2022-07-16

## 2022-02-27 RX ORDER — DEXAMETHASONE SODIUM PHOSPHATE 100 MG/10ML
10 INJECTION INTRAMUSCULAR; INTRAVENOUS
Status: COMPLETED | OUTPATIENT
Start: 2022-02-27 | End: 2022-02-27

## 2022-02-27 RX ORDER — HYDROCORTISONE 25 MG/G
CREAM TOPICAL 2 TIMES DAILY
Qty: 28 G | Refills: 0 | Status: SHIPPED | OUTPATIENT
Start: 2022-02-27 | End: 2022-07-16

## 2022-02-27 RX ADMIN — DEXAMETHASONE SODIUM PHOSPHATE 10 MG: 100 INJECTION INTRAMUSCULAR; INTRAVENOUS at 01:02

## 2022-02-27 NOTE — PATIENT INSTRUCTIONS
See patient forms  Avoid heat/hot showers  Avoid scratching  Use gentle skin products  Aloe vera gel

## 2022-02-27 NOTE — PROGRESS NOTES
"Subjective:       Patient ID: Bernadette Aquino is a 71 y.o. female.    Vitals:  height is 5' 3" (1.6 m) and weight is 59.9 kg (132 lb). Her temperature is 98.5 °F (36.9 °C). Her blood pressure is 140/80 (abnormal) and her pulse is 87. Her respiration is 16 and oxygen saturation is 95%.     Chief Complaint: Rash    70 y/o female, recently back from [place], c/o red, itchy skin to bilateral lower arms, and upper chest x3 days. Denies any new food or skin products. Denies pain to the rash. She spoke to PCP who advised her to come to  for steroid shot. Pt reports itchy areas had increased sun exposure.  Denies fever and illness.     Rash  This is a new problem. The current episode started in the past 7 days. The problem is unchanged. The affected locations include the chest, left arm, right arm, right elbow and left elbow. The rash is characterized by redness and itchiness. It is unknown if there was an exposure to a precipitant. Treatments tried: Benasdryl and Pepsid. The treatment provided mild relief.       Skin: Positive for rash. Negative for erythema.       Objective:      Physical Exam   Constitutional: She is oriented to person, place, and time. She appears well-developed.   HENT:   Head: Normocephalic and atraumatic. Head is without abrasion, without contusion and without laceration.   Ears:   Right Ear: External ear normal.   Left Ear: External ear normal.   Nose: Nose normal.   Mouth/Throat: Oropharynx is clear and moist and mucous membranes are normal.   Eyes: Conjunctivae, EOM and lids are normal. Pupils are equal, round, and reactive to light.   Neck: Trachea normal and phonation normal. Neck supple.   Cardiovascular: Normal rate, regular rhythm and normal heart sounds.   Pulmonary/Chest: Effort normal and breath sounds normal. No stridor. No respiratory distress.   Musculoskeletal: Normal range of motion.         General: Normal range of motion.   Neurological: She is alert and oriented to " person, place, and time.   Skin: Skin is warm, dry, intact, rash, urticarial, not pustular, not purpuric and not vesicular. Capillary refill takes less than 2 seconds. No abrasion, No burn, No bruising, No erythema and No ecchymosis        Psychiatric: Her speech is normal and behavior is normal. Judgment and thought content normal.   Nursing note and vitals reviewed.        Assessment:       1. Urticaria    2. Sunburn          Plan:         Urticaria  -     dexamethasone injection 10 mg  -     hydrocortisone 2.5 % cream; Apply topically 2 (two) times daily. for 10 days  Dispense: 28 g; Refill: 0  -     hydrOXYzine HCL (ATARAX) 25 MG tablet; Take 1 tablet (25 mg total) by mouth 3 (three) times daily as needed for Itching.  Dispense: 30 tablet; Refill: 0    Sunburn

## 2022-02-28 DIAGNOSIS — D84.9 IMMUNOSUPPRESSED STATUS: ICD-10-CM

## 2022-04-27 DIAGNOSIS — I67.1 CEREBRAL ANEURYSM: Primary | ICD-10-CM

## 2022-05-25 ENCOUNTER — HOSPITAL ENCOUNTER (OUTPATIENT)
Dept: RADIOLOGY | Facility: HOSPITAL | Age: 72
Discharge: HOME | End: 2022-05-25
Attending: PHYSICIAN ASSISTANT
Payer: MEDICARE

## 2022-05-25 DIAGNOSIS — M25.511 RIGHT SHOULDER PAIN: ICD-10-CM

## 2022-05-25 DIAGNOSIS — S43.431A TEAR OF RIGHT GLENOID LABRUM: ICD-10-CM

## 2022-05-25 DIAGNOSIS — M54.2 CERVICALGIA: ICD-10-CM

## 2022-05-25 DIAGNOSIS — I10 ESSENTIAL HYPERTENSION: ICD-10-CM

## 2022-05-25 DIAGNOSIS — M75.21 BICEPS TENDINITIS ON RIGHT: ICD-10-CM

## 2022-05-25 DIAGNOSIS — E06.3 ACQUIRED AUTOIMMUNE HYPOTHYROIDISM: ICD-10-CM

## 2022-05-25 DIAGNOSIS — D75.89 MACROCYTOSIS: ICD-10-CM

## 2022-05-25 DIAGNOSIS — N60.02 SINGLE CYST OF LEFT BREAST: ICD-10-CM

## 2022-05-25 DIAGNOSIS — M21.162 GENU VARUM OF BOTH LOWER EXTREMITIES: ICD-10-CM

## 2022-05-25 DIAGNOSIS — M48.02 SPINAL STENOSIS IN CERVICAL REGION: ICD-10-CM

## 2022-05-25 DIAGNOSIS — I67.1 CEREBRAL ANEURYSM: ICD-10-CM

## 2022-05-25 DIAGNOSIS — S83.232A COMPLEX TEAR OF MEDIAL MENISCUS OF LEFT KNEE AS CURRENT INJURY: ICD-10-CM

## 2022-05-25 DIAGNOSIS — G89.29 CHRONIC PAIN OF RIGHT KNEE: ICD-10-CM

## 2022-05-25 DIAGNOSIS — M23.92 INTERNAL DERANGEMENT OF LEFT KNEE: ICD-10-CM

## 2022-05-25 DIAGNOSIS — E78.5 HYPERLIPIDEMIA: ICD-10-CM

## 2022-05-25 DIAGNOSIS — F34.1 DYSTHYMIC DISORDER: ICD-10-CM

## 2022-05-25 DIAGNOSIS — N60.19 FIBROCYSTIC BREAST DISEASE: ICD-10-CM

## 2022-05-25 DIAGNOSIS — R73.03 PREDIABETES: ICD-10-CM

## 2022-05-25 DIAGNOSIS — I67.1 CEREBRAL ANEURYSM, NONRUPTURED: ICD-10-CM

## 2022-05-25 DIAGNOSIS — M54.12 CERVICAL RADICULOPATHY: ICD-10-CM

## 2022-05-25 DIAGNOSIS — M21.161 GENU VARUM OF BOTH LOWER EXTREMITIES: ICD-10-CM

## 2022-05-25 DIAGNOSIS — M25.561 CHRONIC PAIN OF RIGHT KNEE: ICD-10-CM

## 2022-05-25 DIAGNOSIS — R94.31 ABNORMAL ECG: ICD-10-CM

## 2022-05-25 DIAGNOSIS — E04.2 MULTINODULAR THYROID: ICD-10-CM

## 2022-05-25 DIAGNOSIS — M47.812 FACET ARTHRITIS OF CERVICAL REGION: ICD-10-CM

## 2022-05-25 DIAGNOSIS — K25.9 GASTRIC ULCER: ICD-10-CM

## 2022-05-25 DIAGNOSIS — M17.11 PRIMARY OSTEOARTHRITIS OF RIGHT KNEE: ICD-10-CM

## 2022-05-25 DIAGNOSIS — K90.0 CELIAC DISEASE: ICD-10-CM

## 2022-05-25 DIAGNOSIS — M85.80 OSTEOPENIA: ICD-10-CM

## 2022-05-25 DIAGNOSIS — Z87.11 HISTORY OF PEPTIC ULCER: ICD-10-CM

## 2022-05-25 DIAGNOSIS — R55 SYNCOPE: ICD-10-CM

## 2022-05-25 DIAGNOSIS — M76.30 ITB SYNDROME: ICD-10-CM

## 2022-05-25 DIAGNOSIS — K21.9 GERD (GASTROESOPHAGEAL REFLUX DISEASE): ICD-10-CM

## 2022-05-25 DIAGNOSIS — G54.2 CERVICAL SYNDROME: ICD-10-CM

## 2022-05-25 DIAGNOSIS — F32.A MILD DEPRESSION: ICD-10-CM

## 2022-05-25 DIAGNOSIS — M23.91 INTERNAL DERANGEMENT OF RIGHT KNEE: ICD-10-CM

## 2022-05-25 DIAGNOSIS — M17.0 PRIMARY OSTEOARTHRITIS OF BOTH KNEES: Primary | ICD-10-CM

## 2022-05-25 LAB
CREAT SERPL-MCNC: 0.8 MG/DL (ref 0.5–1.4)
SAMPLE: NORMAL

## 2022-05-25 PROCEDURE — 25500020 PHARM REV CODE 255: Performed by: PHYSICIAN ASSISTANT

## 2022-05-25 PROCEDURE — 70546 MR ANGIOGRAPH HEAD W/O&W/DYE: CPT | Mod: 26,,, | Performed by: RADIOLOGY

## 2022-05-25 PROCEDURE — A9585 GADOBUTROL INJECTION: HCPCS | Performed by: PHYSICIAN ASSISTANT

## 2022-05-25 PROCEDURE — 70546 MR ANGIOGRAPH HEAD W/O&W/DYE: CPT | Mod: TC

## 2022-05-25 PROCEDURE — 70546 MRA BRAIN WITH AND WITHOUT: ICD-10-PCS | Mod: 26,,, | Performed by: RADIOLOGY

## 2022-05-25 RX ORDER — GADOBUTROL 604.72 MG/ML
10 INJECTION INTRAVENOUS
Status: COMPLETED | OUTPATIENT
Start: 2022-05-25 | End: 2022-05-25

## 2022-05-25 RX ADMIN — GADOBUTROL 10 ML: 604.72 INJECTION INTRAVENOUS at 02:05

## 2022-06-06 ENCOUNTER — OFFICE VISIT (OUTPATIENT)
Dept: INTERVENTIONAL RADIOLOGY/VASCULAR | Facility: CLINIC | Age: 72
End: 2022-06-06
Payer: MEDICARE

## 2022-06-06 VITALS
HEART RATE: 88 BPM | SYSTOLIC BLOOD PRESSURE: 127 MMHG | DIASTOLIC BLOOD PRESSURE: 72 MMHG | HEIGHT: 63 IN | BODY MASS INDEX: 23.36 KG/M2 | WEIGHT: 131.81 LBS

## 2022-06-06 DIAGNOSIS — I67.1 CEREBRAL ANEURYSM: Primary | ICD-10-CM

## 2022-06-06 PROCEDURE — 1157F ADVNC CARE PLAN IN RCRD: CPT | Mod: CPTII,S$GLB,, | Performed by: RADIOLOGY

## 2022-06-06 PROCEDURE — 1160F PR REVIEW ALL MEDS BY PRESCRIBER/CLIN PHARMACIST DOCUMENTED: ICD-10-PCS | Mod: CPTII,S$GLB,, | Performed by: RADIOLOGY

## 2022-06-06 PROCEDURE — 3074F SYST BP LT 130 MM HG: CPT | Mod: CPTII,S$GLB,, | Performed by: RADIOLOGY

## 2022-06-06 PROCEDURE — 1159F PR MEDICATION LIST DOCUMENTED IN MEDICAL RECORD: ICD-10-PCS | Mod: CPTII,S$GLB,, | Performed by: RADIOLOGY

## 2022-06-06 PROCEDURE — 3074F PR MOST RECENT SYSTOLIC BLOOD PRESSURE < 130 MM HG: ICD-10-PCS | Mod: CPTII,S$GLB,, | Performed by: RADIOLOGY

## 2022-06-06 PROCEDURE — 3078F DIAST BP <80 MM HG: CPT | Mod: CPTII,S$GLB,, | Performed by: RADIOLOGY

## 2022-06-06 PROCEDURE — 99999 PR PBB SHADOW E&M-EST. PATIENT-LVL III: ICD-10-PCS | Mod: PBBFAC,,, | Performed by: RADIOLOGY

## 2022-06-06 PROCEDURE — 1157F PR ADVANCE CARE PLAN OR EQUIV PRESENT IN MEDICAL RECORD: ICD-10-PCS | Mod: CPTII,S$GLB,, | Performed by: RADIOLOGY

## 2022-06-06 PROCEDURE — 1159F MED LIST DOCD IN RCRD: CPT | Mod: CPTII,S$GLB,, | Performed by: RADIOLOGY

## 2022-06-06 PROCEDURE — 1160F RVW MEDS BY RX/DR IN RCRD: CPT | Mod: CPTII,S$GLB,, | Performed by: RADIOLOGY

## 2022-06-06 PROCEDURE — 99999 PR PBB SHADOW E&M-EST. PATIENT-LVL III: CPT | Mod: PBBFAC,,, | Performed by: RADIOLOGY

## 2022-06-06 PROCEDURE — 3008F PR BODY MASS INDEX (BMI) DOCUMENTED: ICD-10-PCS | Mod: CPTII,S$GLB,, | Performed by: RADIOLOGY

## 2022-06-06 PROCEDURE — 3078F PR MOST RECENT DIASTOLIC BLOOD PRESSURE < 80 MM HG: ICD-10-PCS | Mod: CPTII,S$GLB,, | Performed by: RADIOLOGY

## 2022-06-06 PROCEDURE — 3008F BODY MASS INDEX DOCD: CPT | Mod: CPTII,S$GLB,, | Performed by: RADIOLOGY

## 2022-06-06 PROCEDURE — 99213 OFFICE O/P EST LOW 20 MIN: CPT | Mod: S$GLB,,, | Performed by: RADIOLOGY

## 2022-06-06 PROCEDURE — 99213 PR OFFICE/OUTPT VISIT, EST, LEVL III, 20-29 MIN: ICD-10-PCS | Mod: S$GLB,,, | Performed by: RADIOLOGY

## 2022-06-06 NOTE — PROGRESS NOTES
Neurointerventional Clinic Note      Reason for Visit: *Follow up bilateral brain aneurysms    SUBJECTIVE:     Chief Complaint: No chief complaint on file.      History of Present Illness: Bernadette Aquino is a 71 y.o. 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  She has no new symptoms from her aneurysms.  Denies HA, Weakness, Numbness, other stroke sx.  She had F/U MRA on 5/25/22 which was unchanged from prior studies.      She has occasional right hand little finger and hand tingling and pain, for which she receives cervical injections at  with relief.    Past Medical History:   Diagnosis Date    Appendicitis with perforation     Status post appendectomy, January 2011    Celiac disease     Cerebral aneurysm     Right internal carotid artery, nonruptured, status post coil    DDD (degenerative disc disease), cervical     Paresthesia of hands    Elevated liver enzymes     Prior history    Encounter for blood transfusion     GERD (gastroesophageal reflux disease)     HTN (hypertension)     Hyperlipidemia     Mild depression     Multinodular thyroid     u/s 9/11    Osteopenia     Prior history of osteoporosis on bisphosphonate    Other specified acquired hypothyroidism     On replacement    Single cyst of left breast      Past Surgical History:   Procedure Laterality Date    APPENDECTOMY      January 2011    ARTHROSCOPIC CHONDROPLASTY OF KNEE JOINT Left 3/22/2019    Procedure: ARTHROSCOPY, KNEE, WITH CHONDROPLASTY;  Surgeon: Shalonda Altman MD;  Location: [facility] OR [address] [place] [address];  Service: Orthopedics;  Laterality: Left;    BREAST BIOPSY Left     BREAST LUMPECTOMY Right     BREAST SURGERY      BUNIONECTOMY  10/2013    right foot    CARPAL TUNNEL RELEASE Left     CEREBRAL ANEURYSM REPAIR      CEREBRAL ANGIOGRAM N/A 12/17/2018    Procedure: ANGIOGRAM-CEREBRAL;  Surgeon: Mike  Diagnostic Provider;  Location: [facility] OR 2ND FLR;  Service: Radiology;  Laterality: N/A;     SECTION      CHOLECYSTECTOMY      CHONDROPLASTY OF KNEE Left 3/22/2019    Procedure: SUBCHONDROPLASTY-Femur;  Surgeon: Shalonda Altman MD;  Location: [facility] OR 2ND FLR;  Service: Orthopedics;  Laterality: Left;  SUBCHONDROPLASTY-Femur    COLONOSCOPY N/A 10/6/2016    Procedure: COLONOSCOPY;  Surgeon: Tom Gonzales MD;  Location: [facility] ENDO (4TH FLR);  Service: Endoscopy;  Laterality: N/A;  Patient reports PONV.    EYE SURGERY Left     pterygium removal    KNEE ARTHROSCOPY Right     KNEE ARTHROSCOPY W/ MENISCECTOMY Left 3/22/2019    Procedure: ARTHROSCOPY, KNEE, WITH MENISCECTOMY,(medial and lateral);  Surgeon: Shalonda Altman MD;  [facility];  Service: Orthopedics;  Laterality: Left;    SHOULDER ARTHROSCOPY Right 2015    Dr Altman    SYNOVECTOMY OF KNEE Left 3/22/2019    Procedure: SYNOVECTOMY, KNEE;  Surgeon: Shalonda Atlman MD;  [facility];  Service: Orthopedics;  Laterality: Left;    TOTAL ABDOMINAL HYSTERECTOMY       Family History   Problem Relation Age of Onset    Hyperlipidemia Mother     Diverticulitis Mother     Hypertension Father     Diabetes Father     COPD Father     Heart disease Father     Hypertension Sister     Hyperlipidemia Sister     Heart disease Sister     Gout Brother     Celiac disease Neg Hx     Colon cancer Neg Hx     Cirrhosis Neg Hx     Crohn's disease Neg Hx     Esophageal cancer Neg Hx     Irritable bowel syndrome Neg Hx     Liver cancer Neg Hx     Rectal cancer Neg Hx     Stomach cancer Neg Hx     Ulcerative colitis Neg Hx      Social History     Tobacco Use    Smoking status: Never Smoker    Smokeless tobacco: Never Used   Substance Use Topics    Alcohol use: Yes     Comment: socially, rare    Drug use: No       Review of patient's allergies indicates:   Allergen Reactions    No known drug allergies         Review of  "Systems:  Review of Systems   Constitutional: Negative.    HENT: Negative.    Eyes: Negative.    Respiratory: Negative.    Cardiovascular: Negative.    Musculoskeletal: Positive for arthralgias, back pain and neck pain.   Neurological: Positive for numbness.   Psychiatric/Behavioral: Negative.         OBJECTIVE:     Vital Signs Range:  /72 (BP Location: Left arm, Patient Position: Sitting)   Pulse 88   Ht 5' 3" (1.6 m)   Wt 59.8 kg (131 lb 13.4 oz)   BMI 23.35 kg/m²     Physical Exam:  Physical Exam    Laboratory:  Results for orders placed or performed in visit on 12/17/18   Protime-INR   Result Value Ref Range    Prothrombin Time 10.0 9.0 - 12.5 sec    INR 0.9 0.8 - 1.2   Results for orders placed or performed during the hospital encounter of 12/08/15   Protime-INR   Result Value Ref Range    Prothrombin Time 10.2 9.0 - 12.5 sec    INR 1.0 0.8 - 1.2     Results for orders placed or performed in visit on 12/17/18   CBC auto differential   Result Value Ref Range    WBC 5.64 3.90 - 12.70 K/uL    RBC 4.02 4.00 - 5.40 M/uL    Hemoglobin 13.5 12.0 - 16.0 g/dL    Hematocrit 40.0 37.0 - 48.5 %     (H) 82 - 98 fL    MCH 33.6 (H) 27.0 - 31.0 pg    MCHC 33.8 32.0 - 36.0 g/dL    RDW 12.4 11.5 - 14.5 %    Platelets 269 150 - 350 K/uL    MPV 9.3 9.2 - 12.9 fL    Immature Granulocytes 0.2 0.0 - 0.5 %    Gran # (ANC) 2.8 1.8 - 7.7 K/uL    Immature Grans (Abs) 0.01 0.00 - 0.04 K/uL    Lymph # 2.0 1.0 - 4.8 K/uL    Mono # 0.5 0.3 - 1.0 K/uL    Eos # 0.3 0.0 - 0.5 K/uL    Baso # 0.04 0.00 - 0.20 K/uL    nRBC 0 0 /100 WBC    Gran % 49.9 38.0 - 73.0 %    Lymph % 35.6 18.0 - 48.0 %    Mono % 8.3 4.0 - 15.0 %    Eosinophil % 5.3 0.0 - 8.0 %    Basophil % 0.7 0.0 - 1.9 %    Differential Method Automated    Results for orders placed or performed in visit on 04/10/18   CBC auto differential   Result Value Ref Range    WBC 4.39 3.90 - 12.70 K/uL    RBC 3.74 (L) 4.00 - 5.40 M/uL    Hemoglobin 12.6 12.0 - 16.0 g/dL    " Hematocrit 37.1 37.0 - 48.5 %    MCV 99 (H) 82 - 98 fL    MCH 33.7 (H) 27.0 - 31.0 pg    MCHC 34.0 32.0 - 36.0 g/dL    RDW 13.0 11.5 - 14.5 %    Platelets 239 150 - 350 K/uL    MPV 9.8 9.2 - 12.9 fL    Immature Granulocytes 0.2 0.0 - 0.5 %    Gran # (ANC) 2.3 1.8 - 7.7 K/uL    Immature Grans (Abs) 0.01 0.00 - 0.04 K/uL    Lymph # 1.5 1.0 - 4.8 K/uL    Mono # 0.3 0.3 - 1.0 K/uL    Eos # 0.2 0.0 - 0.5 K/uL    Baso # 0.03 0.00 - 0.20 K/uL    nRBC 0 0 /100 WBC    Gran % 52.6 38.0 - 73.0 %    Lymph % 33.5 18.0 - 48.0 %    Mono % 7.5 4.0 - 15.0 %    Eosinophil % 5.5 0.0 - 8.0 %    Basophil % 0.7 0.0 - 1.9 %    Differential Method Automated      Results for orders placed or performed in visit on 07/27/12   Basic metabolic panel   Result Value Ref Range    BUN 14 6 - 20 mg/dl    Sodium 139 136 - 145 mmol/L    Potassium 4.2 3.5 - 5.1 mmol/L    Chloride 102 95 - 110 mmol/L    CO2 24 23 - 29 mmol/L    Glucose 88 70 - 110 mg/dl    Creatinine 0.6 0.5 - 1.4 mg/dl    Calcium 9.3 8.7 - 10.5 mg/dl    Anion Gap 13.0 8 - 16 mmol/L    eGFR if non African American >60 >60 mL/min    eGFR if African American >60 >60 mL/min   Results for orders placed or performed during the hospital encounter of 01/09/11   Basic metabolic panel   Result Value Ref Range    BUN 10 6 - 20 mg/dl    Sodium 135 (L) 136 - 145 mmol/L    Potassium 3.6 3.5 - 5.1 mmol/L    Chloride 102 95 - 110 mmol/L    CO2 24 23.0 - 29.0 mmol/L    Glucose 109 70 - 110 mg/dl    Creatinine 0.7 0.5 - 1.4 mg/dl    Calcium 7.8 (L) 8.7 - 10.5 mg/dl    Anion Gap 14 10 - 20 mmol/L    eGFR if non African American >60 >60 mL/min    eGFR if African American >60 >60 mL/min     Results for orders placed or performed in visit on 10/01/12   Creatinine, serum   Result Value Ref Range    Creatinine 0.7 0.5 - 1.4 mg/dl    eGFR if non African American >60 >60 mL/min    eGFR if African American >60 >60 mL/min     No results found for this or any previous visit.  No results found for this or any  previous visit.      Diagnostic Results:  MRA Brain 5/25/22          ASSESSMENT/PLAN:     Right ICA aneurysm appears well coiled on multiple follow up scans and angiograms after her treatment in 2010.      Left MCA aneurysm measures about 2.5mm with wide neck and no significant change in size in 12 years.      She should continue surveillance with MRA in 2 years.  She does not require contrast for her MRA.      Tom Engle MD  Interventional Neuroradiology  [facility]

## 2022-06-06 NOTE — PATIENT INSTRUCTIONS
Continue all meds, including baby aspirin.  Maintain good blood pressure control.  Recommend follow up MRA brain without contrast in 2 years.

## 2022-06-14 ENCOUNTER — OFFICE VISIT (OUTPATIENT)
Dept: SPORTS MEDICINE | Facility: CLINIC | Age: 72
End: 2022-06-14
Payer: MEDICARE

## 2022-06-14 ENCOUNTER — HOSPITAL ENCOUNTER (OUTPATIENT)
Dept: RADIOLOGY | Facility: HOSPITAL | Age: 72
Discharge: HOME | End: 2022-06-14
Attending: PHYSICIAN ASSISTANT
Payer: MEDICARE

## 2022-06-14 VITALS
SYSTOLIC BLOOD PRESSURE: 120 MMHG | BODY MASS INDEX: 23.36 KG/M2 | WEIGHT: 131.81 LBS | HEIGHT: 63 IN | DIASTOLIC BLOOD PRESSURE: 70 MMHG

## 2022-06-14 DIAGNOSIS — M17.0 PRIMARY OSTEOARTHRITIS OF BOTH KNEES: Primary | ICD-10-CM

## 2022-06-14 DIAGNOSIS — M17.11 PRIMARY OSTEOARTHRITIS OF RIGHT KNEE: ICD-10-CM

## 2022-06-14 DIAGNOSIS — M17.12 PRIMARY OSTEOARTHRITIS OF LEFT KNEE: ICD-10-CM

## 2022-06-14 DIAGNOSIS — M17.0 PRIMARY OSTEOARTHRITIS OF BOTH KNEES: ICD-10-CM

## 2022-06-14 PROCEDURE — 1160F RVW MEDS BY RX/DR IN RCRD: CPT | Mod: CPTII,S$GLB,, | Performed by: PHYSICIAN ASSISTANT

## 2022-06-14 PROCEDURE — 99214 OFFICE O/P EST MOD 30 MIN: CPT | Mod: S$GLB,,, | Performed by: PHYSICIAN ASSISTANT

## 2022-06-14 PROCEDURE — 1126F AMNT PAIN NOTED NONE PRSNT: CPT | Mod: CPTII,S$GLB,, | Performed by: PHYSICIAN ASSISTANT

## 2022-06-14 PROCEDURE — 3074F PR MOST RECENT SYSTOLIC BLOOD PRESSURE < 130 MM HG: ICD-10-PCS | Mod: CPTII,S$GLB,, | Performed by: PHYSICIAN ASSISTANT

## 2022-06-14 PROCEDURE — 1157F ADVNC CARE PLAN IN RCRD: CPT | Mod: CPTII,S$GLB,, | Performed by: PHYSICIAN ASSISTANT

## 2022-06-14 PROCEDURE — 3008F PR BODY MASS INDEX (BMI) DOCUMENTED: ICD-10-PCS | Mod: CPTII,S$GLB,, | Performed by: PHYSICIAN ASSISTANT

## 2022-06-14 PROCEDURE — 1126F PR PAIN SEVERITY QUANTIFIED, NO PAIN PRESENT: ICD-10-PCS | Mod: CPTII,S$GLB,, | Performed by: PHYSICIAN ASSISTANT

## 2022-06-14 PROCEDURE — 99999 PR PBB SHADOW E&M-EST. PATIENT-LVL IV: CPT | Mod: PBBFAC,,, | Performed by: PHYSICIAN ASSISTANT

## 2022-06-14 PROCEDURE — 99999 PR PBB SHADOW E&M-EST. PATIENT-LVL IV: ICD-10-PCS | Mod: PBBFAC,,, | Performed by: PHYSICIAN ASSISTANT

## 2022-06-14 PROCEDURE — 1160F PR REVIEW ALL MEDS BY PRESCRIBER/CLIN PHARMACIST DOCUMENTED: ICD-10-PCS | Mod: CPTII,S$GLB,, | Performed by: PHYSICIAN ASSISTANT

## 2022-06-14 PROCEDURE — 3078F DIAST BP <80 MM HG: CPT | Mod: CPTII,S$GLB,, | Performed by: PHYSICIAN ASSISTANT

## 2022-06-14 PROCEDURE — 1157F PR ADVANCE CARE PLAN OR EQUIV PRESENT IN MEDICAL RECORD: ICD-10-PCS | Mod: CPTII,S$GLB,, | Performed by: PHYSICIAN ASSISTANT

## 2022-06-14 PROCEDURE — 99214 PR OFFICE/OUTPT VISIT, EST, LEVL IV, 30-39 MIN: ICD-10-PCS | Mod: S$GLB,,, | Performed by: PHYSICIAN ASSISTANT

## 2022-06-14 PROCEDURE — 3008F BODY MASS INDEX DOCD: CPT | Mod: CPTII,S$GLB,, | Performed by: PHYSICIAN ASSISTANT

## 2022-06-14 PROCEDURE — 1159F PR MEDICATION LIST DOCUMENTED IN MEDICAL RECORD: ICD-10-PCS | Mod: CPTII,S$GLB,, | Performed by: PHYSICIAN ASSISTANT

## 2022-06-14 PROCEDURE — 73564 X-RAY EXAM KNEE 4 OR MORE: CPT | Mod: TC,50

## 2022-06-14 PROCEDURE — 3078F PR MOST RECENT DIASTOLIC BLOOD PRESSURE < 80 MM HG: ICD-10-PCS | Mod: CPTII,S$GLB,, | Performed by: PHYSICIAN ASSISTANT

## 2022-06-14 PROCEDURE — 73564 X-RAY EXAM KNEE 4 OR MORE: CPT | Mod: 26,50,, | Performed by: INTERNAL MEDICINE

## 2022-06-14 PROCEDURE — 73564 XR KNEE ORTHO BILAT WITH FLEXION: ICD-10-PCS | Mod: 26,50,, | Performed by: INTERNAL MEDICINE

## 2022-06-14 PROCEDURE — 3074F SYST BP LT 130 MM HG: CPT | Mod: CPTII,S$GLB,, | Performed by: PHYSICIAN ASSISTANT

## 2022-06-14 PROCEDURE — 1159F MED LIST DOCD IN RCRD: CPT | Mod: CPTII,S$GLB,, | Performed by: PHYSICIAN ASSISTANT

## 2022-06-14 NOTE — PROGRESS NOTES
Subjective:          Chief Complaint: Bernadette Aquino is a 71 y.o. female who had concerns including Pain of the Left Knee and Pain of the Right Knee.    Interval Hx: Patient presents for 2 year follow-up of bilateral knees (R>L). Reports symptoms are worsening with recently. Aching 8/10 pain right knee and 4/10 pain left knee. She received right knee Zilretta injection October 2020 with Shalonda Altman MD with significant relief until recently. Denies new injury or trauma.  Patient presents to discuss continued treatment options. She continues to play tennis for activity, but would like to break for a while.    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 LEFT  Femoral Subchondroplasty, 08942  2.  LEFT  Tibial Subchondroplasty, 27302  3.  LEFT  Arthroscopy, with meniscectomy (medial OR lateral) 33555  4LEFT  Arthroscopy, debridement/shaving of articular cartilage (chondroplasty) 10380  5. LEFT  Arthroscopy, knee, synovectomy, limited 54705      DATE OF PROCEDURE: 12/11/2015     ATTENDING SURGEON: Surgeon(s) and Role:     * Shalonda Altman MD - Primary     * La Tang DO - Fellow     * Laquita Vail PA-C - assistant     PREOPERATIVE DIAGNOSIS:    Pre-operative Diagnosis: Right shoulder   Tear, biceps tendon, non-traumatic M66.829 Rotator Cuff Syndrome/Disorder  M75.100, Tear, Biceps Tendon, Non-Tramatic  M66.829, Tear, Rotator Cuff, Tramatic S46.012A, S46.011A and Labral tear and chondromalacia     Post-operative Diagnosis:  Right shoulder   Tear, biceps tendon, non-traumatic M66.829 Rotator Cuff Syndrome/Disorder  M75.100, Tear, Rotator Cuff, Tramatic S46.012A, S46.011A and Labral Tear, Chondromalacia Shoulder joint      Procedures Performed:  1. Right shoulder Arthroscopic rotator cuff repair CPT - 50217, COMPLEX     2. Right shoulder Biceps tenodesis CPT - 26042, COMPLEX     3. Right shoulder Arthroscopic labral debridement CPT - 32144     4. Right shoulder Arthroscopic chondroplasty    Pain  Associated symptoms include joint swelling. Pertinent negatives include no chest pain, fever, numbness or rash.       Review of Systems   Constitutional: Negative. Negative for fever and night sweats.   HENT: Negative.  Negative for hearing loss.    Eyes: Negative.  Negative for blurred vision and visual disturbance.   Cardiovascular: Negative.  Negative for chest pain and leg swelling.   Respiratory: Negative.  Negative for shortness of breath.    Endocrine: Negative.  Negative for polyuria.   Hematologic/Lymphatic: Negative.  Negative for bleeding problem.   Skin: Negative.  Negative for rash.   Musculoskeletal: Positive for joint pain and joint swelling. Negative for back pain, muscle cramps and muscle weakness.   Gastrointestinal: Negative.  Negative for melena.   Genitourinary: Negative.  Negative for hematuria.   Neurological: Negative.  Negative for loss of balance, numbness and paresthesias.   Psychiatric/Behavioral: Negative.  Negative for altered mental status.   Allergic/Immunologic: Negative.                    Objective:        General: Bernadette is well-developed, well-nourished, appears stated age, in no acute distress, alert and oriented to time, place and person.     General    Vitals reviewed.  Constitutional: She is oriented to person, place, and time. She appears well-developed and well-nourished. No  distress.   HENT:   Mouth/Throat: No oropharyngeal exudate.   Eyes: Right eye exhibits no discharge. Left eye exhibits no discharge.   Pulmonary/Chest: Effort normal and breath sounds normal. No respiratory distress.   Neurological: She is alert and oriented to person, place, and time. She has normal reflexes. No cranial nerve deficit. Coordination normal.   Psychiatric: She has a normal mood and affect. Her behavior is normal. Judgment and thought content normal.     General Musculoskeletal Exam   Gait: normal       Right Knee Exam     Inspection   Erythema: absent  Scars: present  Swelling: absent  Effusion: absent  Deformity: absent  Bruising: absent    Tenderness   The patient is tender to palpation of the medial joint line and patella.    Crepitus   The patient has crepitus of the patella.    Range of Motion   Extension:  10 abnormal   Flexion: abnormal Right knee flexion: 125.     Tests   Meniscus   Neeta:  Medial - positive Lateral - positive  Ligament Examination Lachman: normal (-1 to 2mm) PCL-Posterior Drawer: normal (0 to 2mm)     MCL - Valgus: normal (0 to 2mm)  LCL - Varus: normalPivot Shift: normal (Equal)Reverse Pivot Shift: normal (Equal)Dial Test at 30 degrees: normal (< 5 degrees)Dial Test at 90 degrees: normal (< 5 degrees)  Posterior Sag Test: negative  Posterolateral Corner: stable  Patella   Patellar apprehension: negative  Passive Patellar Tilt: neutral  Patellar Tracking: normal  Patellar Glide (quadrants): Lateral - 1   Medial - 2  Q-Angle at 90 degrees: normal  Patellar Grind: positive  J-Sign: none    Other   Meniscal Cyst: absent  Popliteal (Baker's) Cyst: absent  Sensation: normal    Left Knee Exam     Inspection   Erythema: absent  Scars: present  Swelling: absent  Effusion: absent  Deformity: absent  Bruising: absent    Tenderness   The patient tender to palpation of the medial joint line.    Range of Motion   Extension:  5 abnormal   Flexion: 130     Tests   Meniscus   Neeta:   Medial - positive Lateral - negative  Stability Lachman: normal (-1 to 2mm) PCL-Posterior Drawer: normal (0 to 2mm)  MCL - Valgus: normal (0 to 2mm)  LCL - Varus: normal (0 to 2mm)Pivot Shift: normal (Equal)Reverse Pivot Shift: normal (Equal)Dial Test at 30 degrees: normal (< 5 degrees)Dial Test at 90 degrees: normal (< 5 degrees)  Posterior Sag Test: negative  Posterolateral Corner: stable  Patella   Patellar apprehension: negative  Passive Patellar Tilt: neutral  Patellar Tracking: normal  Patellar Glide (Quadrants): Lateral - 1 Medial - 2  Q-Angle at 90 degrees: normal  Patellar Grind: negative  J-Sign: J sign absent    Other   Meniscal Cyst: absent  Popliteal (Baker's) Cyst: absent  Sensation: normal    Right Hip Exam     Tests   Jorden: negative  Left Hip Exam     Tests   Jorden: negative          Muscle Strength   Right Lower Extremity   Hip Abduction: 5/5   Quadriceps:  4/5   Hamstrin/5   Left Lower Extremity   Hip Abduction: 5/5   Quadriceps:  4/5   Hamstrin/5     Reflexes     Left Side  Achilles:  2+  Quadriceps:  2+    Right Side   Achilles:  2+  Quadriceps:  2+    Vascular Exam     Right Pulses  Dorsalis Pedis:      2+  Posterior Tibial:      2+        Left Pulses  Dorsalis Pedis:      2+  Posterior Tibial:      2+          Radiographic Findings 2022:    Diffuse osteopenia.  No acute fracture or dislocation.  Small corticated bone fragment adjacent to the right medial tibial plateau, likely degenerative.  Unchanged ill-defined sclerosis in the left medial femoral condyle and medial tibial plateau, likely postoperative from prior subchondroplasty.  Tricompartmental degenerative changes in both knees, most severe in the medial compartments bilaterally, overall progressed from 2019.  No significant joint effusions.     Impression:     Progressive tricompartmental osteoarthritis in both knees as described.    Kellgren-Cheo Classification: 3    Xrays of the knees were ordered and reviewed by me  today. These findings were discussed and reviewed with the patient.            Assessment:       Encounter Diagnoses   Name Primary?    Primary osteoarthritis of both knees Yes    Primary osteoarthritis of right knee     Primary osteoarthritis of left knee           Plan:     We have discussed a variety of treatment options including medications, injections, physical therapy and other alternative treatments. I also explained the indications, risks and benefits of surgery.  Patient's pain is refractory to long-term use of PO/topical NSAIDs, physical therapy 6+ weeks, aerobic exercise, weightloss, walking aids, visco-supplementation, multiple corticosteroid injections, and he current treatment is the only effective treatment that relieved the patient's pain.  Recommending to continue Zilretta injection at this time.  Also recommending to continue HEP.  Patient agrees with treatment plan.      1. IKDC, SF-12 and KOOS was not filled out today in clinic.     RTC in 2 weeks with Shalonda Altman MD for bilateral knee Zilretta injection. Patient will fill out IKDC, SF-12 and KOOS on return.    2.  Pre authorization placed for bilateral knee Zilretta injection.    3.  Continue Core HEP.    4. Continue Voltaren, D/C Celebrex Once Daily- due to concern of GI upset.    5.  brace was uncomfortable, continue ViscoSkin Left knee    All of the patient's questions were answered and the patient will contact us if they have any questions or concerns in the interim.                [facility] patient questionnaires have been collected today.

## 2022-07-11 ENCOUNTER — OFFICE VISIT (OUTPATIENT)
Dept: SPORTS MEDICINE | Facility: CLINIC | Age: 72
End: 2022-07-11
Payer: MEDICARE

## 2022-07-11 VITALS
DIASTOLIC BLOOD PRESSURE: 78 MMHG | HEIGHT: 63 IN | TEMPERATURE: 98 F | WEIGHT: 130 LBS | SYSTOLIC BLOOD PRESSURE: 120 MMHG | BODY MASS INDEX: 23.04 KG/M2 | HEART RATE: 84 BPM

## 2022-07-11 DIAGNOSIS — M17.0 PRIMARY OSTEOARTHRITIS OF BOTH KNEES: Primary | ICD-10-CM

## 2022-07-11 PROCEDURE — 1159F MED LIST DOCD IN RCRD: CPT | Mod: CPTII,S$GLB,, | Performed by: ORTHOPAEDIC SURGERY

## 2022-07-11 PROCEDURE — 3078F DIAST BP <80 MM HG: CPT | Mod: CPTII,S$GLB,, | Performed by: ORTHOPAEDIC SURGERY

## 2022-07-11 PROCEDURE — 20611 DRAIN/INJ JOINT/BURSA W/US: CPT | Mod: 50,S$GLB,, | Performed by: ORTHOPAEDIC SURGERY

## 2022-07-11 PROCEDURE — 99214 PR OFFICE/OUTPT VISIT, EST, LEVL IV, 30-39 MIN: ICD-10-PCS | Mod: 25,S$GLB,, | Performed by: ORTHOPAEDIC SURGERY

## 2022-07-11 PROCEDURE — 1157F ADVNC CARE PLAN IN RCRD: CPT | Mod: CPTII,S$GLB,, | Performed by: ORTHOPAEDIC SURGERY

## 2022-07-11 PROCEDURE — 1125F AMNT PAIN NOTED PAIN PRSNT: CPT | Mod: CPTII,S$GLB,, | Performed by: ORTHOPAEDIC SURGERY

## 2022-07-11 PROCEDURE — 99999 PR PBB SHADOW E&M-EST. PATIENT-LVL III: ICD-10-PCS | Mod: PBBFAC,,, | Performed by: ORTHOPAEDIC SURGERY

## 2022-07-11 PROCEDURE — 3074F SYST BP LT 130 MM HG: CPT | Mod: CPTII,S$GLB,, | Performed by: ORTHOPAEDIC SURGERY

## 2022-07-11 PROCEDURE — 3074F PR MOST RECENT SYSTOLIC BLOOD PRESSURE < 130 MM HG: ICD-10-PCS | Mod: CPTII,S$GLB,, | Performed by: ORTHOPAEDIC SURGERY

## 2022-07-11 PROCEDURE — 1159F PR MEDICATION LIST DOCUMENTED IN MEDICAL RECORD: ICD-10-PCS | Mod: CPTII,S$GLB,, | Performed by: ORTHOPAEDIC SURGERY

## 2022-07-11 PROCEDURE — 1125F PR PAIN SEVERITY QUANTIFIED, PAIN PRESENT: ICD-10-PCS | Mod: CPTII,S$GLB,, | Performed by: ORTHOPAEDIC SURGERY

## 2022-07-11 PROCEDURE — 20611 LARGE JOINT ASPIRATION/INJECTION: BILATERAL KNEE: ICD-10-PCS | Mod: 50,S$GLB,, | Performed by: ORTHOPAEDIC SURGERY

## 2022-07-11 PROCEDURE — 3008F BODY MASS INDEX DOCD: CPT | Mod: CPTII,S$GLB,, | Performed by: ORTHOPAEDIC SURGERY

## 2022-07-11 PROCEDURE — 1157F PR ADVANCE CARE PLAN OR EQUIV PRESENT IN MEDICAL RECORD: ICD-10-PCS | Mod: CPTII,S$GLB,, | Performed by: ORTHOPAEDIC SURGERY

## 2022-07-11 PROCEDURE — 3008F PR BODY MASS INDEX (BMI) DOCUMENTED: ICD-10-PCS | Mod: CPTII,S$GLB,, | Performed by: ORTHOPAEDIC SURGERY

## 2022-07-11 PROCEDURE — 3078F PR MOST RECENT DIASTOLIC BLOOD PRESSURE < 80 MM HG: ICD-10-PCS | Mod: CPTII,S$GLB,, | Performed by: ORTHOPAEDIC SURGERY

## 2022-07-11 PROCEDURE — 99214 OFFICE O/P EST MOD 30 MIN: CPT | Mod: 25,S$GLB,, | Performed by: ORTHOPAEDIC SURGERY

## 2022-07-11 PROCEDURE — 99999 PR PBB SHADOW E&M-EST. PATIENT-LVL III: CPT | Mod: PBBFAC,,, | Performed by: ORTHOPAEDIC SURGERY

## 2022-07-11 RX ORDER — DICLOFENAC SODIUM 10 MG/G
2 GEL TOPICAL 2 TIMES DAILY
Qty: 1 EACH | Refills: 6 | Status: SHIPPED | OUTPATIENT
Start: 2022-07-11 | End: 2022-07-21

## 2022-07-11 NOTE — PROGRESS NOTES
Subjective:          Chief Complaint: Bernadette Aquino is a 71 y.o. female who had concerns including Injections of the Left Knee and Injections of the Right Knee.    Interval Hx: Patient presents for 2 year follow-up of bilateral knees (R>L). Reports symptoms are worsening with recently. Aching 8/10 pain right knee and 4/10 pain left knee. She received right knee Zilretta injection October 2020 with Shalonda Altman MD with significant relief until recently. Denies new injury or trauma.  Patient presents to discuss continued treatment options. She continues to play tennis for activity, but would like to break for a while.    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 LEFT  Femoral Subchondroplasty, 94454  2.  LEFT  Tibial Subchondroplasty, 26934  3.  LEFT  Arthroscopy, with meniscectomy (medial OR lateral) 88734  4LEFT  Arthroscopy, debridement/shaving of articular cartilage (chondroplasty) 90897  5. LEFT  Arthroscopy, knee, synovectomy, limited 27049      DATE OF PROCEDURE: 12/11/2015     ATTENDING SURGEON: Surgeon(s) and Role:     * Shalonda Altman MD - Primary     * La Tang DO - Fellow     * Laquita Vail PA-C - assistant     PREOPERATIVE DIAGNOSIS:    Pre-operative Diagnosis: Right shoulder   Tear, biceps tendon, non-traumatic M66.829 Rotator Cuff Syndrome/Disorder  M75.100, Tear, Biceps Tendon,  Non-Tramatic M66.829, Tear, Rotator Cuff, Tramatic S46.012A, S46.011A and Labral tear and chondromalacia     Post-operative Diagnosis:  Right shoulder   Tear, biceps tendon, non-traumatic M66.829 Rotator Cuff Syndrome/Disorder  M75.100, Tear, Rotator Cuff, Tramatic S46.012A, S46.011A and Labral Tear, Chondromalacia Shoulder joint      Procedures Performed:  1. Right shoulder Arthroscopic rotator cuff repair CPT - 91880, COMPLEX     2. Right shoulder Biceps tenodesis CPT - 29298, COMPLEX     3. Right shoulder Arthroscopic labral debridement CPT - 44308     4. Right shoulder Arthroscopic chondroplasty    Pain  Associated symptoms include joint swelling. Pertinent negatives include no chest pain, fever, numbness or rash.       Review of Systems   Constitutional: Negative. Negative for fever and night sweats.   HENT: Negative.  Negative for hearing loss.    Eyes: Negative.  Negative for blurred vision and visual disturbance.   Cardiovascular: Negative.  Negative for chest pain and leg swelling.   Respiratory: Negative.  Negative for shortness of breath.    Endocrine: Negative.  Negative for polyuria.   Hematologic/Lymphatic: Negative.  Negative for bleeding problem.   Skin: Negative.  Negative for rash.   Musculoskeletal: Positive for joint pain and joint swelling. Negative for back pain, muscle cramps and muscle weakness.   Gastrointestinal: Negative.  Negative for melena.   Genitourinary: Negative.  Negative for hematuria.   Neurological: Negative.  Negative for loss of balance, numbness and paresthesias.   Psychiatric/Behavioral: Negative.  Negative for altered mental status.   Allergic/Immunologic: Negative.                    Objective:        General: Bernadette is well-developed, well-nourished, appears stated age, in no acute distress, alert and oriented to time, place and person.     General    Vitals reviewed.  Constitutional: She is oriented to person, place, and time. She appears well-developed and  well-nourished. No distress.   HENT:   Mouth/Throat: No oropharyngeal exudate.   Eyes: Right eye exhibits no discharge. Left eye exhibits no discharge.   Pulmonary/Chest: Effort normal and breath sounds normal. No respiratory distress.   Neurological: She is alert and oriented to person, place, and time. She has normal reflexes. No cranial nerve deficit. Coordination normal.   Psychiatric: She has a normal mood and affect. Her behavior is normal. Judgment and thought content normal.     General Musculoskeletal Exam   Gait: normal       Right Knee Exam     Inspection   Erythema: absent  Scars: present  Swelling: absent  Effusion: absent  Deformity: absent  Bruising: absent    Tenderness   The patient is tender to palpation of the medial joint line and patella.    Crepitus   The patient has crepitus of the patella.    Range of Motion   Extension:  10 abnormal   Flexion: abnormal Right knee flexion: 125.     Tests   Meniscus   Neeta:  Medial - negative Lateral - negative  Ligament Examination Lachman: normal (-1 to 2mm) PCL-Posterior Drawer: normal (0 to 2mm)     MCL - Valgus: normal (0 to 2mm)  LCL - Varus: normalPivot Shift: normal (Equal)Reverse Pivot Shift: normal (Equal)Dial Test at 30 degrees: normal (< 5 degrees)Dial Test at 90 degrees: normal (< 5 degrees)  Posterior Sag Test: negative  Posterolateral Corner: stable  Patella   Patellar apprehension: negative  Passive Patellar Tilt: neutral  Patellar Tracking: normal  Patellar Glide (quadrants): Lateral - 1   Medial - 2  Q-Angle at 90 degrees: normal  Patellar Grind: positive  J-Sign: none    Other   Meniscal Cyst: absent  Popliteal (Baker's) Cyst: absent  Sensation: normal    Left Knee Exam     Inspection   Erythema: absent  Scars: present  Swelling: absent  Effusion: absent  Deformity: absent  Bruising: absent    Tenderness   The patient tender to palpation of the medial joint line.    Range of Motion   Extension:  5 abnormal   Flexion:  130 abnormal      Tests   Meniscus   Neeta:  Medial - negative Lateral - negative  Stability Lachman: normal (-1 to 2mm) PCL-Posterior Drawer: normal (0 to 2mm)  MCL - Valgus: normal (0 to 2mm)  LCL - Varus: normal (0 to 2mm)Pivot Shift: normal (Equal)Reverse Pivot Shift: normal (Equal)Dial Test at 30 degrees: normal (< 5 degrees)Dial Test at 90 degrees: normal (< 5 degrees)  Posterior Sag Test: negative  Posterolateral Corner: stable  Patella   Patellar apprehension: negative  Passive Patellar Tilt: neutral  Patellar Tracking: normal  Patellar Glide (Quadrants): Lateral - 1 Medial - 2  Q-Angle at 90 degrees: normal  Patellar Grind: negative  J-Sign: J sign absent    Other   Meniscal Cyst: absent  Popliteal (Baker's) Cyst: absent  Sensation: normal    Right Hip Exam     Tests   Jorden: negative  Left Hip Exam     Tests   Jorden: negative          Muscle Strength   Right Lower Extremity   Hip Abduction: 5/5   Quadriceps:  4/5   Hamstrin/5   Left Lower Extremity   Hip Abduction: 5/5   Quadriceps:  4/5   Hamstrin/5     Reflexes     Left Side  Achilles:  2+  Quadriceps:  2+    Right Side   Achilles:  2+  Quadriceps:  2+    Vascular Exam     Right Pulses  Dorsalis Pedis:      2+  Posterior Tibial:      2+        Left Pulses  Dorsalis Pedis:      2+  Posterior Tibial:      2+          Radiographic Findings 22    Diffuse osteopenia.  No acute fracture or dislocation.  Small corticated bone fragment adjacent to the right medial tibial plateau, likely degenerative.  Unchanged ill-defined sclerosis in the left medial femoral condyle and medial tibial plateau, likely postoperative from prior subchondroplasty.  Tricompartmental degenerative changes in both knees, most severe in the medial compartments bilaterally, overall progressed from 2019.  No significant joint effusions.     Impression:     Progressive tricompartmental osteoarthritis in both knees as described.    Kellgren-Cheo Classification: 3    Xrays of the knees  were ordered and reviewed by me today. These findings were discussed and reviewed with the patient.            Assessment:       Encounter Diagnosis   Name Primary?    Primary osteoarthritis of both knees Yes          Plan:       1. IKDC, SF-12 and KOOS was filled out today in clinic.     RTC in 5 months with Mid-level provider Patient will fill out IKDC, SF-12 and KOOS on return.    2. Medications: Refills of the following Rx were sent to patients preferred Pharmacy:  No Refills Needed Today    3. Physical Therapy:     4. HEP: N/A    5. Procedures/Procedural Planning:   We reviewed with Bernadette today, the pathology and natural history of her diagnosis. We had an extensive discussion as to the conservative treatment and management of their condition. We also discussed the variety of treatment options to include medication, physical therapy, diagnostic testing as well as other treatments.The decision was made to go forward with:     knee - bilateral    1. Zilretta 32mg performed today, see procedure note.        6. DME: 22339 - , performed a custom orthotic / brace adjustment, fitting and training with the patient. The patient demonstrated understanding and proper care. This was performed for 15 minutes.    7. Work/Sport Status: retired    8. Visit Summary: Patient to return to clinic in 5 months with PA for Zilretta check in .                     [facility] patient questionnaires have been collected today.

## 2022-07-11 NOTE — PROCEDURES
"Large Joint Aspiration/Injection: bilateral knee    Date/Time: 7/11/2022 10:00 AM  Performed by: Shalonda Altman MD  Authorized by: Shalonda Altman MD     Consent Done?:  Yes (Verbal)  Indications:  Pain  Site marked: the procedure site was marked    Timeout: prior to procedure the correct patient, procedure, and site was verified    Prep: patient was prepped and draped in usual sterile fashion    Local anesthesia used?: No    Local anesthetic:  Topical anesthetic    Details:  Needle Size:  22 G  Ultrasonic Guidance for needle placement?: Yes    Images are saved and documented.  Approach: superolateral.  Location:  Knee  Laterality:  Bilateral  Site:  Bilateral knee  Medications (Right):  32 mg triamcinolone acetonide 32 mg  Medications (Left):  32 mg triamcinolone acetonide 32 mg  Patient tolerance:  Patient tolerated the procedure well with no immediate complications     Description of ultrasound utilization for needle guidance:   Ultrasound guidance used for needle localization. Images saved and stored for documentation. The knee joint was visualized. Dynamic visualization of the 22g x 1.5" needle was continuous throughout the procedure.         "

## 2022-07-11 NOTE — PATIENT INSTRUCTIONS
SO WHAT'S IN MY KNEE?    You've got ZILRETTA (triamcinolone acetonide extended-release injectable suspension). That means you've got microspheres -- tiny particles that deliver medication for about 3 months.     Zilretta is the first and only FDA-approved treatment for osteoarthritis knee pain to use extended release microsphere technology.    ZILRETTA microspheres slowly release pain medication for about 3 months:      What microspheres are doing:  The microspheres slowly and continually release medicine for about 3 months.  They stay in your knee joint -- right where the pain is.  After they've released all their medicine, they turn into carbon dioxide and water, which are found naturally in the body.    After 3 months, they're gone.  This would be a good time to reassess your pain level.    IMPORTANT RISK INFORMATION    What should you tell your doctor BEFORE receiving a ZILRETTA injection?  Tell your doctor about all of the medications you are taking (including both prescription and over-the-counter medicines) and about any medical conditions, especially if you have high blood pressure, heart disease, ulcers, diverticulitis or other gastrointestinal disorders, kidney problems, diabetes, glaucoma, behavior or mood disorders, and/or infections.

## 2022-07-27 ENCOUNTER — TELEPHONE (OUTPATIENT)
Dept: SPORTS MEDICINE | Facility: CLINIC | Age: 72
End: 2022-07-27
Payer: MEDICARE

## 2022-07-27 NOTE — TELEPHONE ENCOUNTER
"----- Message from Meet Kamara MD sent at 7/27/2022 12:40 PM CDT -----  Regarding: RE: Pt Advice/peer to peer consult  Contact: Clara @ [phone]  He said he would review it again but its most likely denied cause the insurance will only cover those braces for "recent surgery or knee instability" and we're ordering it for arthritis. He said they potentially could approve a different brand of  but we'd have to "Google [id number] to find out specific requirements for approval"    ----- Message -----  From: Kirti Leon  Sent: 7/26/2022   4:36 PM CDT  To: Meet Kamara MD  Subject: FW: Pt Advice/peer to peer consult               basilio Marlow needed by noon tomorrow. Let me know when it is set up for! Thank you    Kirti Leon   Clinical Assistant to Dr. Shalonda Altman   ----- Message -----  From: Erin Wylie  Sent: 7/26/2022   4:32 PM CDT  To: Kirti Leon  Subject: RE: Pt Advice/peer to peer consult               [place] bilateral medial unloaders      is the [facility] code       ----- Message -----  From: Kirti Leon  Sent: 7/26/2022   2:04 PM CDT  To: Erin Wylie  Subject: RE: Pt Advice/peer to peer consult               What was the brace that was ordered?    Kirti Leon   Clinical Assistant to Dr. Shalonda Altman   ----- Message -----  From: Erin Wylie  Sent: 7/26/2022   1:31 PM CDT  To: Kirti Leon  Subject: RE: Pt Advice/peer to peer consult               Dr. Altman will need to schedule the peer to peer.    Thank you,   Erin         ----- Message -----  From: Kirti Leon  Sent: 7/25/2022   9:17 AM CDT  To: Erin Wylie  Subject: FW: Pt Advice/peer to peer consult                 ----- Message -----  From: Tremontana Chevalier  Sent: 7/25/2022   9:14 AM CDT  To: Altman Deryk G Staff  Subject: Pt Advice/peer to peer consult                    Clara-RN with [facility] calling to discuss DME that was ordered by Shalonda Altman. Clara says that the claim went up to the Medical director " who denied the bilateral knee brace, but request for DME  does not meet Medicare criteria. Medical director at [facility] is offering peer to peer (NP, PA, or doctor).  Dr. Altman need to accept or deny the peer to peer. Must send by response by 06/27/2022 (Wed) at 12 noon CT time. Pls call  Clara @ [phone] to advise of who is going to perform that peer to peer and what phone number to attached. If no response by 06/27th the claim will be denied.

## 2022-12-08 ENCOUNTER — TELEPHONE (OUTPATIENT)
Dept: SPORTS MEDICINE | Facility: CLINIC | Age: 72
End: 2022-12-08
Payer: MEDICARE

## 2023-03-13 ENCOUNTER — OFFICE VISIT (OUTPATIENT)
Dept: INTERVENTIONAL RADIOLOGY/VASCULAR | Facility: CLINIC | Age: 73
End: 2023-03-13
Payer: MEDICARE

## 2023-03-13 ENCOUNTER — LAB VISIT (OUTPATIENT)
Dept: LAB | Facility: HOSPITAL | Age: 73
End: 2023-03-13
Payer: MEDICARE

## 2023-03-13 VITALS
SYSTOLIC BLOOD PRESSURE: 130 MMHG | WEIGHT: 132.63 LBS | BODY MASS INDEX: 23.5 KG/M2 | HEART RATE: 85 BPM | HEIGHT: 63 IN | DIASTOLIC BLOOD PRESSURE: 82 MMHG

## 2023-03-13 DIAGNOSIS — I67.1 CEREBRAL ANEURYSM: ICD-10-CM

## 2023-03-13 DIAGNOSIS — M47.812 ARTHROPATHY OF CERVICAL FACET JOINT: ICD-10-CM

## 2023-03-13 DIAGNOSIS — M50.30 DDD (DEGENERATIVE DISC DISEASE), CERVICAL: ICD-10-CM

## 2023-03-13 DIAGNOSIS — M47.819 DEGENERATIVE ARTHROPATHY OF SPINAL FACET JOINT: ICD-10-CM

## 2023-03-13 DIAGNOSIS — M47.893 OTHER OSTEOARTHRITIS OF SPINE, CERVICOTHORACIC REGION: Primary | ICD-10-CM

## 2023-03-13 LAB
BASOPHILS # BLD AUTO: 0.04 K/UL (ref 0–0.2)
BASOPHILS NFR BLD: 0.7 % (ref 0–1.9)
DIFFERENTIAL METHOD: ABNORMAL
EOSINOPHIL # BLD AUTO: 0.2 K/UL (ref 0–0.5)
EOSINOPHIL NFR BLD: 3.4 % (ref 0–8)
ERYTHROCYTE [DISTWIDTH] IN BLOOD BY AUTOMATED COUNT: 12.9 % (ref 11.5–14.5)
HCT VFR BLD AUTO: 39 % (ref 37–48.5)
HGB BLD-MCNC: 12.7 G/DL (ref 12–16)
IMM GRANULOCYTES # BLD AUTO: 0.01 K/UL (ref 0–0.04)
IMM GRANULOCYTES NFR BLD AUTO: 0.2 % (ref 0–0.5)
INR PPP: 1 (ref 0.8–1.2)
LYMPHOCYTES # BLD AUTO: 1.6 K/UL (ref 1–4.8)
LYMPHOCYTES NFR BLD: 27.6 % (ref 18–48)
MCH RBC QN AUTO: 32.8 PG (ref 27–31)
MCHC RBC AUTO-ENTMCNC: 32.6 G/DL (ref 32–36)
MCV RBC AUTO: 101 FL (ref 82–98)
MONOCYTES # BLD AUTO: 0.5 K/UL (ref 0.3–1)
MONOCYTES NFR BLD: 8 % (ref 4–15)
NEUTROPHILS # BLD AUTO: 3.4 K/UL (ref 1.8–7.7)
NEUTROPHILS NFR BLD: 60.1 % (ref 38–73)
NRBC BLD-RTO: 0 /100 WBC
PLATELET # BLD AUTO: 275 K/UL (ref 150–450)
PMV BLD AUTO: 9.3 FL (ref 9.2–12.9)
PROTHROMBIN TIME: 10.7 SEC (ref 9–12.5)
RBC # BLD AUTO: 3.87 M/UL (ref 4–5.4)
WBC # BLD AUTO: 5.65 K/UL (ref 3.9–12.7)

## 2023-03-13 PROCEDURE — 3079F DIAST BP 80-89 MM HG: CPT | Mod: CPTII,S$GLB,,

## 2023-03-13 PROCEDURE — 85610 PROTHROMBIN TIME: CPT

## 2023-03-13 PROCEDURE — 1157F ADVNC CARE PLAN IN RCRD: CPT | Mod: CPTII,S$GLB,,

## 2023-03-13 PROCEDURE — 85025 COMPLETE CBC W/AUTO DIFF WBC: CPT

## 2023-03-13 PROCEDURE — 3008F PR BODY MASS INDEX (BMI) DOCUMENTED: ICD-10-PCS | Mod: CPTII,S$GLB,,

## 2023-03-13 PROCEDURE — 99999 PR PBB SHADOW E&M-EST. PATIENT-LVL V: ICD-10-PCS | Mod: PBBFAC,,,

## 2023-03-13 PROCEDURE — 1159F MED LIST DOCD IN RCRD: CPT | Mod: CPTII,S$GLB,,

## 2023-03-13 PROCEDURE — 99999 PR PBB SHADOW E&M-EST. PATIENT-LVL V: CPT | Mod: PBBFAC,,,

## 2023-03-13 PROCEDURE — 3075F SYST BP GE 130 - 139MM HG: CPT | Mod: CPTII,S$GLB,,

## 2023-03-13 PROCEDURE — 1160F PR REVIEW ALL MEDS BY PRESCRIBER/CLIN PHARMACIST DOCUMENTED: ICD-10-PCS | Mod: CPTII,S$GLB,,

## 2023-03-13 PROCEDURE — 1159F PR MEDICATION LIST DOCUMENTED IN MEDICAL RECORD: ICD-10-PCS | Mod: CPTII,S$GLB,,

## 2023-03-13 PROCEDURE — 99214 OFFICE O/P EST MOD 30 MIN: CPT | Mod: S$GLB,,,

## 2023-03-13 PROCEDURE — 3008F BODY MASS INDEX DOCD: CPT | Mod: CPTII,S$GLB,,

## 2023-03-13 PROCEDURE — 1157F PR ADVANCE CARE PLAN OR EQUIV PRESENT IN MEDICAL RECORD: ICD-10-PCS | Mod: CPTII,S$GLB,,

## 2023-03-13 PROCEDURE — 1160F RVW MEDS BY RX/DR IN RCRD: CPT | Mod: CPTII,S$GLB,,

## 2023-03-13 PROCEDURE — 99214 PR OFFICE/OUTPT VISIT, EST, LEVL IV, 30-39 MIN: ICD-10-PCS | Mod: S$GLB,,,

## 2023-03-13 PROCEDURE — 3075F PR MOST RECENT SYSTOLIC BLOOD PRESS GE 130-139MM HG: ICD-10-PCS | Mod: CPTII,S$GLB,,

## 2023-03-13 PROCEDURE — 36415 COLL VENOUS BLD VENIPUNCTURE: CPT

## 2023-03-13 PROCEDURE — 3079F PR MOST RECENT DIASTOLIC BLOOD PRESSURE 80-89 MM HG: ICD-10-PCS | Mod: CPTII,S$GLB,,

## 2023-03-13 NOTE — PROGRESS NOTES
Subjective:       Patient ID: Bernadette Aquino is a 72 y.o. female.    Chief Complaint: Neck Pain    73 yo female presents to discuss neck pain L>R. Patient has seen Dr. Aguero for ESIs in the past. Patient reports he neck pain has been worsening over the past couple months. Currently pain level 7/10. She has had excellent pain relief from Cervical ESIs in the past. Relief usually last up to 1 year. Patient denies any radicular symptoms today. This pain is different than previously radicular pain. She states she feeling cracking and crunching in her neck when she turns her head. She takes tylenol with some relief and tried PT in the past with some relief. Interested in trying PT again with injection. She is very active and plays tennis 4 to 5 times a week. MRI shows multilevel degenerative changes and facet arthropathy.     Bilateral hand numbness at night only (S/p bilateral carpal tunnel surgery in the past). Recommend resting hand splint at night or returning to her hand specialist at an outside facility.    Review of Systems   Constitutional:  Negative for activity change and fever.   Respiratory:  Negative for cough and shortness of breath.    Cardiovascular:  Negative for chest pain.   Musculoskeletal:  Positive for arthralgias, back pain, neck pain and neck stiffness.   Neurological:  Positive for numbness (Bilateral hands at night.).   Psychiatric/Behavioral:  Negative for behavioral problems and confusion.        Objective:      Physical Exam  Constitutional:       Appearance: Normal appearance.   HENT:      Head: Normocephalic and atraumatic.      Right Ear: External ear normal.   Eyes:      Conjunctiva/sclera: Conjunctivae normal.   Neck:      Comments: Limited ROM due to pain.  Crepitus noted.  Pulmonary:      Effort: Pulmonary effort is normal.   Musculoskeletal:      Cervical back: Rigidity and tenderness present.   Neurological:      General: No focal deficit present.      Mental Status: She is  alert and oriented to person, place, and time.      Coordination: Coordination normal.         Li Fernandez MD - 02/28/2023    Formatting of this note might be different from the original.  Clinical data: Neck pain. Left upper extremity numbness.     MRI CERVICAL SPINE    Procedure: Sagittal and axial, multi-sequence MR images through the cervical spine were obtained. Comparison, 8/3/2020.    FINDINGS:   There is degenerative straightening of the normal lordotic curvature of the cervical spine.     There is minimal degenerative anterolisthesis of C4 on C5, likely due to facet arthropathy, left worse than right.     The remainder of the alignment is maintained.     The vertebral body heights are normal.     There is multilevel degenerative loss of disc space height and signal. Degenerative endplate changes are demonstrated.    C1-2: There are hypertrophic degenerative changes at C1-2. There has been interval decrease in size of the synovial cyst on the right at C1-2, which no longer indents the right paracentral ventral thecal sac. Otherwise there is no abnormality at the craniocervical junction. The cord is normal in caliber and signal intensity.     C2-3: There is bilateral facet arthropathy, left worse than right. No significant central spinal stenosis or neural foraminal narrowing.    C3-4: There is bilateral facet arthropathy, left worse than right, resulting in severe left neural foraminal narrowing. No significant central spinal stenosis.    C4-5: There is a broad central and left paracentral disc osteophyte complex, left uncovertebral joint hypertrophy, and left-sided facet arthropathy. Findings result in severe left neural foraminal narrowing. There is moderate central spinal stenosis, approximately 9 mm AP nerve canal.    C5-6: There is a broad posterior osteophyte complex with a right paracentral component, and bilateral uncovertebral joint hypertrophy. Findings result in moderately severe central  spinal stenosis, 8-9 mm AP diameter of canal, and severe bilateral neural foraminal narrowing.    C6-7: There is a broad posterior disc osteophyte complex and bilateral uncovertebral joint hypertrophy, resulting in moderately severe central spinal stenosis, 8-9 mm AP diameter of canal, and moderately severe bilateral neural foraminal narrowing.    C7-T1: There is bilateral facet arthropathy and uncovertebral joint hypertrophy, resulting in moderate bilateral neural foraminal narrowing. No significant central spinal stenosis.    IMPRESSION:   Multilevel degenerative disc disease and facet arthropathy, left worse than right.   Moderately severe central spinal stenosis at C5-6 and C6-7, and bilateral neural foraminal narrowing.   C3-4 and C4-5 severe left neural foraminal narrowing.   Hypertrophic degenerative changes at C1-2.    Assessment and plan       Other osteoarthritis of spine, cervicothoracic region  -     IR Facet Inj Cervical Thoracic 1st Vert Bi; Future; Expected date: 03/13/2023  -     IR Facet Inj Cerv Thoracic 2nd Vert Bi; Future; Expected date: 03/13/2023    Arthropathy of cervical facet joint    Cerebral aneurysm  -     CBC Auto Differential; Future; Expected date: 03/13/2023  -     Protime-INR; Future; Expected date: 03/13/2023    DDD (degenerative disc disease), cervical    Degenerative arthropathy of spinal facet joint  -     Ambulatory referral/consult to [facility]; Future; Expected date: 03/20/2023      - Discussed the [facility] program. Patient has tried PT before with some relief. Patient would like to try. Referral sent.   - Due to patients symptoms and tenderness. We will proceed with Bilateral C4-5, C5-6, and C6-7 Facet joint injection.   - CBC and INR ordered.   - Ok for patient to continue on 81 mg of Aspirin for the procedure.     Patient was given the opportunity to ask questions and these were answered. She has been encouraged to call the office with any new questions or  concerns. A card with my information, including the clinic phone number was provided.    This case was discussed with Dr. Engle, images were reviewed by MD, and above plan of care agreed upon.    Tasneem Elizalde PA-C  Interventional Radiology  [phone]

## 2023-04-03 ENCOUNTER — TELEPHONE (OUTPATIENT)
Dept: INTERVENTIONAL RADIOLOGY/VASCULAR | Facility: HOSPITAL | Age: 73
End: 2023-04-03
Payer: MEDICARE

## 2023-04-04 ENCOUNTER — HOSPITAL ENCOUNTER (OUTPATIENT)
Dept: INTERVENTIONAL RADIOLOGY/VASCULAR | Facility: HOSPITAL | Age: 73
Discharge: HOME | End: 2023-04-04
Admitting: RADIOLOGY
Payer: MEDICARE

## 2023-04-04 VITALS
TEMPERATURE: 98 F | HEART RATE: 78 BPM | BODY MASS INDEX: 23.39 KG/M2 | RESPIRATION RATE: 16 BRPM | OXYGEN SATURATION: 100 % | DIASTOLIC BLOOD PRESSURE: 74 MMHG | SYSTOLIC BLOOD PRESSURE: 132 MMHG | HEIGHT: 63 IN | WEIGHT: 132 LBS

## 2023-04-04 DIAGNOSIS — M47.893 OTHER OSTEOARTHRITIS OF SPINE, CERVICOTHORACIC REGION: ICD-10-CM

## 2023-04-04 PROCEDURE — 64492 INJ PARAVERT F JNT C/T 3 LEV: CPT | Mod: 50 | Performed by: RADIOLOGY

## 2023-04-04 PROCEDURE — 64491 INJ PARAVERT F JNT C/T 2 LEV: CPT | Mod: 50 | Performed by: RADIOLOGY

## 2023-04-04 PROCEDURE — 64490 INJ PARAVERT F JNT C/T 1 LEV: CPT | Mod: 50 | Performed by: RADIOLOGY

## 2023-04-04 PROCEDURE — 64491 INJ PARAVERT F JNT C/T 2 LEV: CPT | Mod: 50,,, | Performed by: RADIOLOGY

## 2023-04-04 PROCEDURE — 64490 IR FACET INJ CERVICAL THORACIC 2ND VERT BI: ICD-10-PCS | Mod: 50,,, | Performed by: RADIOLOGY

## 2023-04-04 PROCEDURE — 64491 PR INJ DX/THER AGNT PARAVERT FACET JOINT,IMG GUIDE,CERV/THORAC, 2ND LEVEL: ICD-10-PCS | Mod: 50,,, | Performed by: RADIOLOGY

## 2023-04-04 PROCEDURE — 63600175 PHARM REV CODE 636 W HCPCS: Performed by: STUDENT IN AN ORGANIZED HEALTH CARE EDUCATION/TRAINING PROGRAM

## 2023-04-04 PROCEDURE — 64490 INJ PARAVERT F JNT C/T 1 LEV: CPT | Mod: 50,,, | Performed by: RADIOLOGY

## 2023-04-04 PROCEDURE — A4550 SURGICAL TRAYS: HCPCS

## 2023-04-04 PROCEDURE — 64492 PR INJ DX/THER AGNT PARAVERT FACET JOINT,IMG GUIDE,CERV/THORAC, ADD LEVEL: ICD-10-PCS | Mod: 50,,, | Performed by: RADIOLOGY

## 2023-04-04 PROCEDURE — 64492 INJ PARAVERT F JNT C/T 3 LEV: CPT | Mod: 50,,, | Performed by: RADIOLOGY

## 2023-04-04 PROCEDURE — 25000003 PHARM REV CODE 250: Performed by: STUDENT IN AN ORGANIZED HEALTH CARE EDUCATION/TRAINING PROGRAM

## 2023-04-04 PROCEDURE — A4215 STERILE NEEDLE: HCPCS

## 2023-04-04 RX ORDER — ONDANSETRON 2 MG/ML
4 INJECTION INTRAMUSCULAR; INTRAVENOUS EVERY 6 HOURS PRN
Status: CANCELLED | OUTPATIENT
Start: 2023-04-04

## 2023-04-04 RX ORDER — SODIUM CHLORIDE 9 MG/ML
75 INJECTION, SOLUTION INTRAVENOUS CONTINUOUS
Status: DISCONTINUED | OUTPATIENT
Start: 2023-04-04 | End: 2023-04-05 | Stop reason: HOSPADM

## 2023-04-04 RX ORDER — DEXAMETHASONE SODIUM PHOSPHATE 100 MG/10ML
INJECTION INTRAMUSCULAR; INTRAVENOUS
Status: COMPLETED | OUTPATIENT
Start: 2023-04-04 | End: 2023-04-04

## 2023-04-04 RX ORDER — LIDOCAINE HYDROCHLORIDE 10 MG/ML
1 INJECTION, SOLUTION EPIDURAL; INFILTRATION; INTRACAUDAL; PERINEURAL ONCE
Status: DISCONTINUED | OUTPATIENT
Start: 2023-04-04 | End: 2023-04-05 | Stop reason: HOSPADM

## 2023-04-04 RX ORDER — MIDAZOLAM HYDROCHLORIDE 1 MG/ML
INJECTION INTRAMUSCULAR; INTRAVENOUS
Status: COMPLETED | OUTPATIENT
Start: 2023-04-04 | End: 2023-04-04

## 2023-04-04 RX ORDER — LIDOCAINE HYDROCHLORIDE 10 MG/ML
INJECTION INFILTRATION; PERINEURAL
Status: COMPLETED | OUTPATIENT
Start: 2023-04-04 | End: 2023-04-04

## 2023-04-04 RX ORDER — FENTANYL CITRATE 50 UG/ML
INJECTION, SOLUTION INTRAMUSCULAR; INTRAVENOUS
Status: COMPLETED | OUTPATIENT
Start: 2023-04-04 | End: 2023-04-04

## 2023-04-04 RX ADMIN — MIDAZOLAM HYDROCHLORIDE 1 MG: 1 INJECTION INTRAMUSCULAR; INTRAVENOUS at 08:04

## 2023-04-04 RX ADMIN — LIDOCAINE HYDROCHLORIDE 5 ML: 10 INJECTION, SOLUTION INFILTRATION; PERINEURAL at 08:04

## 2023-04-04 RX ADMIN — FENTANYL CITRATE 25 MCG: 50 INJECTION, SOLUTION INTRAMUSCULAR; INTRAVENOUS at 08:04

## 2023-04-04 RX ADMIN — MIDAZOLAM HYDROCHLORIDE 0.5 MG: 1 INJECTION INTRAMUSCULAR; INTRAVENOUS at 08:04

## 2023-04-04 RX ADMIN — FENTANYL CITRATE 50 MCG: 50 INJECTION, SOLUTION INTRAMUSCULAR; INTRAVENOUS at 08:04

## 2023-04-04 RX ADMIN — DEXAMETHASONE SODIUM PHOSPHATE 10 MG: 10 INJECTION INTRAMUSCULAR; INTRAVENOUS at 08:04

## 2023-04-04 NOTE — H&P
Radiology History & Physical      SUBJECTIVE:     Chief Complaint: Neck pain     History of Present Illness:  Bernadette Aquino is a 72 y.o. female with medical history of pre-diabetes; HTN, chronic neck pain - multilevel cervical facet arthropathy, referred for bilateral C4-5, C5-6, C6-7 facet joint steroid injections for further management and symptomatology control.      Past Medical History:   Diagnosis Date    Appendicitis with perforation     Status post appendectomy, 2011    Celiac disease     Cerebral aneurysm     Right internal carotid artery, nonruptured, status post coil    DDD (degenerative disc disease), cervical     Paresthesia of hands    Elevated liver enzymes     Prior history    Encounter for blood transfusion     GERD (gastroesophageal reflux disease)     HTN (hypertension)     Hyperlipidemia     Mild depression     Multinodular thyroid     u/s     Osteopenia     Prior history of osteoporosis on bisphosphonate    Other specified acquired hypothyroidism     On replacement    Single cyst of left breast      Past Surgical History:   Procedure Laterality Date    APPENDECTOMY      2011    ARTHROSCOPIC CHONDROPLASTY OF KNEE JOINT Left 3/22/2019    Procedure: ARTHROSCOPY, KNEE, WITH CHONDROPLASTY;  Surgeon: Shalonda Altman MD;  Location: [facility] OR [address] [place] [address];  Service: Orthopedics;  Laterality: Left;    BREAST BIOPSY Left     BREAST LUMPECTOMY Right     BREAST SURGERY      BUNIONECTOMY  10/2013    right foot    CARPAL TUNNEL RELEASE Left     CEREBRAL ANEURYSM REPAIR      CEREBRAL ANGIOGRAM N/A 2018    Procedure: ANGIOGRAM-CEREBRAL;  Surgeon: [facility] Provider;  Location: [facility] OR [address] [place] [address];  Service: Radiology;  Laterality: N/A;     SECTION      CHOLECYSTECTOMY      CHONDROPLASTY OF KNEE Left 3/22/2019    Procedure: SUBCHONDROPLASTY-Femur;  Surgeon: Shalonda Altman MD;  Location: [facility] OR [address] [place] [address];  Service: Orthopedics;  Laterality: Left;  SUBCHONDROPLASTY-Femur     COLONOSCOPY N/A 10/6/2016    Procedure: COLONOSCOPY;  Surgeon: Tom Gonzales MD;  Location: [facility] (4TH FLR);  Service: Endoscopy;  Laterality: N/A;  Patient reports PONV.    EYE SURGERY Left     pterygium removal    KNEE ARTHROSCOPY Right     KNEE ARTHROSCOPY W/ MENISCECTOMY Left 3/22/2019    Procedure: ARTHROSCOPY, KNEE, WITH MENISCECTOMY,(medial and lateral);  Surgeon: Shalonda Altman MD;  Location: [facility] OR 2ND FLR;  Service: Orthopedics;  Laterality: Left;    SHOULDER ARTHROSCOPY Right 12/11/2015    Dr Altman    SYNOVECTOMY OF KNEE Left 3/22/2019    Procedure: SYNOVECTOMY, KNEE;  Surgeon: Shalonda Altman MD;  Location: [facility] OR 2ND FLR;  Service: Orthopedics;  Laterality: Left;    TOTAL ABDOMINAL HYSTERECTOMY         Home Meds:   Prior to Admission medications    Medication Sig Start Date End Date Taking? Authorizing Provider   amLODIPine (NORVASC) 2.5 MG tablet Take 2.5 mg by mouth once daily.    Historical Provider   ascorbic acid, vitamin C, (VITAMIN C) 1000 MG tablet Take 1,000 mg by mouth once daily.    Historical Provider   aspirin (ECOTRIN) 81 MG EC tablet Take 81 mg by mouth once daily.  12/8/15   Historical Provider   celecoxib (CELEBREX) 200 MG capsule Take 1 capsule (200 mg total) by mouth 2 (two) times daily. 3/11/19   Deniz Barba PA-C   cholecalciferol, vitamin D3, 125 mcg (5,000 unit) Tab Take 5,000 Units by mouth once daily.    Historical Provider   diclofenac sodium (VOLTAREN) 1 % Gel Apply 2 g topically 2 (two) times daily. for 10 days 7/11/22 7/21/22  Shalonda Altman MD   omeprazole (PRILOSEC) 20 MG capsule Take 1 capsule (20 mg total) by mouth 2 (two) times daily before meals. 7/12/17   James Covarrubias MD   sertraline (ZOLOFT) 100 MG tablet Take 1 tablet (100 mg total) by mouth once daily. 7/12/17   James Covarrubias MD   simvastatin (ZOCOR) 40 MG tablet Take 1 tablet (40 mg total) by mouth nightly. 7/12/17   James Covarrubias MD   SYNTHROID 25 mcg tablet 1 po daily except  2 po daily on Saturday and Sunday. 7/12/17   James Covarrubias MD     Anticoagulants/Antiplatelets: no anticoagulation    Allergies:   Review of patient's allergies indicates:   Allergen Reactions    No known drug allergies      Sedation History:  no adverse reactions    Review of Systems:   Hematological: no known coagulopathies  Respiratory: no shortness of breath  Cardiovascular: no chest pain  Gastrointestinal: no abdominal pain  Genito-Urinary: no dysuria  Musculoskeletal: negative  Neurological: no TIA or stroke symptoms         OBJECTIVE:     Vital Signs (Most Recent)       Physical Exam:  ASA: 2  Mallampati: 2    General: no acute distress  Mental Status: alert and oriented to person, place and time  HEENT: normocephalic, atraumatic  Chest: unlabored breathing  Heart: regular heart rate  Abdomen: nondistended  Extremity: moves all extremities    Laboratory  Lab Results   Component Value Date    INR 1.0 03/13/2023       Lab Results   Component Value Date    WBC 5.65 03/13/2023    HGB 12.7 03/13/2023    HCT 39.0 03/13/2023     (H) 03/13/2023     03/13/2023      Lab Results   Component Value Date     (H) 12/17/2018     12/17/2018    K 4.0 12/17/2018     12/17/2018    CO2 28 12/17/2018    BUN 15 12/17/2018    CREATININE 0.8 12/17/2018    CALCIUM 9.8 12/17/2018    MG 2.4 08/25/2015    ALT 25 12/17/2018    AST 23 12/17/2018    ALBUMIN 3.9 12/17/2018    BILITOT 0.3 12/17/2018    BILIDIR 0.2 09/17/2008       ASSESSMENT/PLAN:     Sedation Plan: Moderate sedation      Patient will undergo: bilateral C4-5, C5-6, C6-7 facet joint steroid injections for further management and symptomatology control.           Joseph Etienne MD     Neuro Endovascular Surgery Fellow   [facility]

## 2023-04-04 NOTE — PLAN OF CARE
Pt arrived to IR room 200 for bilateral C4-5, C5-6, C6-7 facet joint steroid injections. Pt oriented to unit and staff. Plan of care reviewed with patient, patient verbalizes understanding. Comfort measures utilized. Pt safely transferred from stretcher to procedural table. Fall risk reviewed with patient, fall risk interventions maintained. Safety strap applied, positioner pillows utilized to minimize pressure points. Blankets applied. Pt prepped and draped utilizing standard sterile technique. Patient placed on continuous monitoring, as required by sedation policy. Timeouts completed utilizing standard universal time-out, per department and facility policy. RN to remain at bedside, continuous monitoring maintained. Pt resting comfortably. Denies pain/discomfort. Will continue to monitor. See flow sheets for monitoring, medication administration, and updates.

## 2023-04-04 NOTE — DISCHARGE INSTRUCTIONS
For scheduling: Call [facility] at [phone]    For questions or concerns call: [facility] 8 AM- 5PM [phone]. Radiology resident on call [phone].    For immediate concerns that are not emergent, you may call our radiology clinic at: [phone]

## 2023-04-04 NOTE — BRIEF OP NOTE
Radiology Post-Procedure Note    Pre Op Diagnosis: Cervical artthropathy    Post Op Diagnosis: Same    Procedure: Cervical Facet    Procedure performed by: Tom Engle MD    Written Informed Consent Obtained: Yes    Specimen Removed: NO    Estimated Blood Loss: Minimal    Findings: Needle visualized in facets bilaterally    Level injected: Bilateral C4-C5, C5-C6, C6-C7  Needle used: 22 gauge  Dose:  20 mg Depo-methylprednisolone   12 mL Lidocaine 1% MPF    Patient tolerated procedure well.    @SIG@

## 2023-04-04 NOTE — PROGRESS NOTES
Daughter present in room and to assist pt / review S&S of infection as well  as D/C ionst and precautions / pt AAO w/ NAD and waiting on transport

## 2023-04-04 NOTE — PLAN OF CARE
Bilateral C4-5, C5-6, C6-7 facet joint steroid injections completed, pt tolerated well. No apparent distress noted. Dressings applied CDI. Pt to be transfer to MPU for 1 hour recovery per Dr Etienne. Report to be given at bedside.

## 2023-04-26 ENCOUNTER — CLINICAL SUPPORT (OUTPATIENT)
Dept: REHABILITATION | Facility: HOSPITAL | Age: 73
End: 2023-04-26
Payer: MEDICARE

## 2023-04-26 DIAGNOSIS — R29.898 DECREASED RANGE OF MOTION OF NECK: ICD-10-CM

## 2023-04-26 DIAGNOSIS — M47.819 DEGENERATIVE ARTHROPATHY OF SPINAL FACET JOINT: ICD-10-CM

## 2023-04-26 DIAGNOSIS — M53.82 WEAKNESS OF NECK: ICD-10-CM

## 2023-04-26 PROCEDURE — 97750 PHYSICAL PERFORMANCE TEST: CPT | Mod: 32

## 2023-04-26 PROCEDURE — 97112 NEUROMUSCULAR REEDUCATION: CPT

## 2023-04-26 PROCEDURE — 97110 THERAPEUTIC EXERCISES: CPT

## 2023-04-28 PROBLEM — R29.898 DECREASED RANGE OF MOTION OF NECK: Status: ACTIVE | Noted: 2023-04-28

## 2023-04-28 PROBLEM — M53.82 WEAKNESS OF NECK: Status: ACTIVE | Noted: 2023-04-28

## 2023-04-28 NOTE — PLAN OF CARE
[facility] - PHYSICAL THERAPY EVALUATION     Name: Bernadette Aqunio  Clinic Number: [id number]    Therapy Diagnosis:   Encounter Diagnoses   Name Primary?    Degenerative arthropathy of spinal facet joint     Decreased range of motion of neck     Weakness of neck      Physician: Tasneem Elizalde, [place]    Physician Orders: PT Eval and Treat   Medical Diagnosis from Referral:   M47.819 (ICD-10-CM) - Degenerative arthropathy of spinal facet joint   M47.812 (ICD-10-CM) - Spondylosis without myelopathy or radiculopathy, cervical region   Evaluation Date: 4/26/2023  Authorization Period Expiration: 12/31/2023  Plan of Care Expiration: 8/26/2023  Reassessment Due: 5/26/2023  Visit # / Visits authorized: 1/ 1    Time In: 2:50 PM  Time Out: 4:10 PM  Total Billable Time: 80 minutes  Insurance: Fee for service Insurance Patient    Precautions: Standard, HTN, Osteopenia, Hx of R RTC repair    Pattern of pain determined: 1 OLEKSANDR    Subjective   Date of onset: chronic condition     History of current condition - Bernadette reports history of chronic neck pain with insidious onset. She attended physical therapy in the past with temporary pain relief. She reports neck pain has progressively worsened in the last few months. She had 2 injections in her neck about a month ago and states she is feeling slightly better since. She describes pain as achy, stiffness, and sharp. She states she feels numbness in B hands which tends to be worse at night. Aggravating factors include turning her head, looking down, and playing tennis. She reports histroy of Right RTC repair about 3 years ago.      Medical History:   Past Medical History:   Diagnosis Date    Appendicitis with perforation     Status post appendectomy, January 2011    Celiac disease     Cerebral aneurysm     Right internal carotid artery, nonruptured, status post coil    DDD (degenerative disc disease), cervical     Paresthesia of hands    Elevated liver enzymes     Prior  history    Encounter for blood transfusion     GERD (gastroesophageal reflux disease)     HTN (hypertension)     Hyperlipidemia     Mild depression     Multinodular thyroid     u/s     Osteopenia     Prior history of osteoporosis on bisphosphonate    Other specified acquired hypothyroidism     On replacement    Single cyst of left breast      Surgical History:   Bernadette Aquino  has a past surgical history that includes Appendectomy; Cholecystectomy; Total abdominal hysterectomy; Bunionectomy (10/2013); Shoulder arthroscopy (Right, 2015); Colonoscopy (N/A, 10/6/2016); Breast surgery; Breast lumpectomy (Right); Breast biopsy (Left); Knee arthroscopy (Right); Cerebral aneurysm repair; Eye surgery (Left);  section; Carpal tunnel release (Left); Cerebral angiogram (N/A, 2018); Chondroplasty of knee (Left, 3/22/2019); Synovectomy of knee (Left, 3/22/2019); Arthroscopic chondroplasty of knee joint (Left, 3/22/2019); and Knee arthroscopy w/ meniscectomy (Left, 3/22/2019).    Medications:   Bernadette has a current medication list which includes the following prescription(s): amlodipine, ascorbic acid (vitamin c), aspirin, celecoxib, cholecalciferol (vitamin d3), diclofenac sodium, omeprazole, sertraline, simvastatin, and synthroid.    Allergies:   Review of patient's allergies indicates:   Allergen Reactions    No known drug allergies       Imaging: none recently    Prior Therapy: yes   Prior Treatment: PT, dry needling, injections  Social History: lives with spouse   Occupation: retired   Leisure: tennis       Prior Level of Function: no difficulty with turning head or looking down  Current Level of Function: difficulty with turning head, looking down while reading  DME owned/used: none  Gym Membership: yes     Pain:  Current 3/10, worst 4/10, best 2/10   Location: neck bilateral L > R  Description: tightness, sharp, achy  Aggravating Factors: turning head, looking down   Easing Factors: massage and  pain medication  Disturbed Sleep: yes    Pattern of pain questions:  1.  Where is your pain the worst? Left neck   2.  Is your pain constant or intermittent? Intermittent   3.  Does bending forward make your typical pain worse? yes  4.  Since the start of your neck pain, has there been a change in your bowel or bladder? No   5.  What can't you do now that you use to be able to do?     Pts goals: decrease pain and move better, drive with less difficulty, tennis with less pain    Red Flag Screening:   Cough  Sneeze  Strain: (--)  Bladder/ bowel: (--)  Falls: (--)  Night pain: (+)  Unexplained weight loss: (--)  General health: active lifestyle    OBJECTIVE   Postural examination/scapula alignment: Rounded shoulder  Sitting: slouched, increased thoracic kyphosis  Correction of posture: better with lumbar roll    Range of Motion - MOVEMENT LOSS    ROM Loss   Flexion minimal loss   Extension minimal loss   Side bending Right moderate loss   Side bending Left moderate loss   Rotation Right moderate loss   Rotation Left moderate loss   Protraction moderate loss   Retraction  moderate loss     Upper Extremity Strength  (R) UE  (L) UE    Shoulder flexion: 5/5 Shoulder flexion: 5/5   Shoulder Abduction: 5/5 Shoulder abduction: 5/5   Elbow flexion: 5/5 Elbow flexion: 5/5   Elbow extension: 5/5 Elbow extension: 5/5   Wrist flexion: 5/5 Wrist flexion: 5/5   Wrist extension: 5/5 Wrist extension: 5/5     NEUROLOGICAL SCREEN    Sensory deficit: Intact B UE    Special Tests:   Test Name  Testing Result   Compression (--)   Distraction (--)   Neural Tension Test NT   Saddle Sensation (--)     Reflexes:    Left Right   Biceps  2+ 2+   Brachioradialis  2+ 2+   Clonus (--) (--)       REPEATED TEST MOVEMENTS:   Repeated Protraction in Sitting end range pain  no effect   Repeated Flexion in Sitting end range pain  worse   Repeated Retraction in Sitting  no effect   Repeated Retraction Extension in Sitting end range pain  no effect  "  Repeated Protraction in lying no effect   Repeated Flexion in lying end range pain  no worse  no better   Repeated Retraction in lying no effect     Baseline Isometric Testing on Med X equipment:  Testing administered by PT  Date of testin2023  ROM 33-84 deg   Max Peak Torque 144    Min Peak Torque 48    Flex/Ext Ratio 3:1   % below normative data 47%       GAIT:  Assistive Device used: none  Level of Assistance: independent  Patient displays the following gait deviations:  no gait deviations observed.     Limitation/Restriction for FOTO Neck Survey    Therapist reviewed FOTO scores for Bernadette Aquino on 2023.   FOTO documents entered into EPIC - see Media section.    Limitation Score: TBD%  (perform FOTO next visit, wrong body part)         Treatment   Treatment Time In: 3:40 PM  Treatment Time Out: 4:10 PM  Total Treatment time separate from Evaluation: 30 minutes    Bernadette received therapeutic exercises to develop/improve posture, lumbar/cervical ROM, strength and muscular endurance for 10 minutes including the following exercises:     Upper trap stretch 2x20"  Levator scap stretch 2x20"  RIL x10  Seated scap retraction x10    Bernadette received the following manual therapy techniques:  were applied to the:  for 0 minutes. Not performed      Bernadetet received neuromuscular education  to engage spinal musculature correctly for motor control, and engagement of musculature  for 20 minutes including the MedX exercise component and practice and standard testing.  Med x dynamic exercise and baseline IM test performed with instructions to guide the patient safely through the isometric testing.  Patient informed to perform isometric test correctly, and safely, building best force they safely can and not pushing through pain.  Patient demonstrated understanding of information     Written Home Exercises Provided: yes.  HEP AS FOLLOWS:    Upper trap stretch  Levator scap stretch  RIL  Seated scap retraction "     Exercises were reviewed and Bernadette was able to demonstrate them prior to the end of the session.  Bernadette demonstrated good  understanding of the education provided.     See EMR under Patient Instructions for exercises provided 4/26/2023.      Education provided:   - Patient received education regarding proper posture and body mechanics.  Patient was given top [facility] Visit 1 handouts which discuss what to expect in therapy, the purpose and opportunity for health coaching, the program,  wellness when discharged from therapy, back education and care specifically for posture seated, standing, lifting correctly, components of exercise, importance of nutrition and hydration, and importance of sleep.   Information on lumbar rolls provided.  Patient received education regarding proper posture and body mechanics.  - Steevn roll tried, recommended, and purchase information was provided.    - Patient received a handout regarding anticipated muscular soreness following the isometric test and strategies for management were reviewed with patient including stretching, using ice and scheduled rest.   - Patient received education on the [facility] program, purpose of the isometric test, progression of neck strengthening as well as wellness approach and systemic strengthening.  Details of the program were discussed.  Reviewed that patient should feel support/pressure from med ex restraints but no pain or discomfort and patient expressed understanding.  Med x dynamic exercise and baseline IM test performed with instructions to guide the patient safely through the isometric testing.  Patient informed to perform isometric test correctly, and safely, building best force they safely can and not pushing through pain.  Patient demonstrated understanding of information      Bernadette received cold pack for 10 minutes to neck.    Assessment   Bernadette is a 72 y.o. female referred to [facility] with a medical diagnosis  of M47.819 (ICD-10-CM) - Degenerative arthropathy of spinal facet joint, M47.812 (ICD-10-CM) - Spondylosis without myelopathy or radiculopathy, cervical region. Upon physical assessment, pt demonstrates slouched posture in sitting, decreased cervical ROM, and trunk mobility deficits. Per cervical MedX testing, pt was 47% below average in strength when compared to those of  same age/height/weight/gender. All of the above noted supports potential cervical classification as a pattern 1 OLEKSANDR with recurrent/or consistent symptoms, thus pt is a good candidate for the [facility] Program. Pt would benefit from cervical and trunk mobility training, stability training,  improved cardiovascular and muscular endurance, neuromuscular re-education for posture, coordination, and muscular recruitment and education on positional offloading techniques to decrease the intensity and frequency of flare-ups.      Pain Pattern: 1 OLEKSANDR       Pt prognosis is Good.   Pt will benefit from skilled outpatient Physical Therapy to address the deficits stated above and in the chart below, provide pt/family education, and to maximize pt's level of independence.     Plan of care discussed with patient: Yes  Pt's spiritual, cultural and educational needs considered and patient is agreeable to the plan of care and goals as stated below:     Anticipated Barriers for therapy: none    PT Evaluation Completed? Yes    Medical necessity is demonstrated by the following problem list.    Pt presents with the following impairments:     History  Co-morbidities and personal factors that may impact the plan of care Co-morbidities:   HTN, osteopenia     Personal Factors:   age     low   Examination  Body Structures and Functions, activity limitations and participation restrictions that may impact the plan of care Body Regions:   neck    Body Systems:    ROM  strength  posture    Participation Restrictions:   Driving, sitting, reading    Activity limitations:    Learning and applying knowledge  no deficits    General Tasks and Commands  no deficits    Communication  no deficits    Mobility  lifting and carrying objects  driving (bike, car, motorcycle)    Self care  no deficits    Domestic Life  doing house work (cleaning house, washing dishes, laundry)    Interactions/Relationships  no deficits    Life Areas  no deficits    Community and Social Life  no deficits         low   Clinical Presentation stable and uncomplicated low   Decision Making/ Complexity Score: low       GOALS: Pt is in agreement with the following goals.    Short term goals: 6 weeks or 10 visits   1.  Pt will demonstratte increased cervical ROM as measured by med ex by 4 degrees from initial test which results in improved  ROM of neck for ease with ADLs and driving  (approp and ongoing)  2. Pt will demonstrate independence with reducing or controlling symptoms with ther ex, movement, or position independently, able to reduce pain 1-2 points on pain scale using strategies taught in therapy  (approp and ongoing)  3. Pt will demonstrate increased MedX average isometric strength value by 25% with  when compared to the initial testing resulting in improved ability to perform bending, lifting, and carrying activities safely, confidently.  (approp and ongoing)    Long term goals: 10 weeks or 20 visits  1. Pt will demonstratte increased cervical ROM as measured by med ex by 7 degrees from initial test which results in functional ROM of neck for ease with ADLs and driving  (approp and ongoing)  2. Pt will demonstrate increased MedX average isometric strength value  by 50% from initial test to improve ability to lift and carry, and sustain good posture while performing ADL's  (approp and ongoing)  3.Pt will demonstrate reduced pain and improved functional outcomes as reported on the FOTO by reaching a limitation score of < or = 10% decrease in limitation% or less in order to demonstrate subjective improvement in  "pt's condition.    (approp and ongoing)  4. Pt will demonstrate independence with reducing or controlling symptoms with ther ex, movement, or position independently, able to reduce pain 2-4 points on pain scale using strategies taught in therapy  (approp and ongoing)  5. Pt will demonstrate independence with the HEP at discharge.   (approp and ongoing)  6.  Pt will be able to drive with no difficulty. (patient goal)  (approp and ongoing)    Plan   Outpatient physical therapy 2x week for 10 weeks or 20 visits to include the following:   - Patient education  - Therapeutic exercise  - Manual therapy  - Performance testing   - Neuromuscular Re-education  - Therapeutic activity   - Modalities    Pt may be seen by PTA as part of the rehabilitation team.     Therapist: Trice Piña, PT  4/28/2023      "I certify the need for these services furnished under this plan of treatment and while under my care."    ____________________________________  Physician/Referring Practitioner    _______________  Date of Signature      "

## 2023-05-12 NOTE — PROGRESS NOTES
[facility] Physical Therapy Treatment      Name: Bernadette Aquino  Clinic Number: [id number]    Therapy Diagnosis:   Encounter Diagnoses   Name Primary?    Decreased range of motion of neck Yes    Weakness of neck      Physician:  Tasneem Elizalde PA-C    Visit Date: 2023    Physician Orders: PT Eval and Treat   Medical Diagnosis from Referral:   M47.819 (ICD-10-CM) - Degenerative arthropathy of spinal facet joint   M47.812 (ICD-10-CM) - Spondylosis without myelopathy or radiculopathy, cervical region   Evaluation Date: 2023  Authorization Period Expiration: 2023  Plan of Care Expiration: 2023  Reassessment Due: 2023  Visit # / Visits authorized:      Time In:8:10 AM (Late arrival)  Time Out: 9:00 am  Total Billable Time: 50 minutes  Insurance: Fee for service Insurance Patient     Precautions: Standard, HTN, Osteopenia, Hx of R RTC repair     Pattern of pain determined: 1 OLEKSANDR    Subjective   Bernadette reports chronic neck pain/stiffness which is rated as tolerable 3/10 presently.  She reports compliance with HEP. She reports having an UE EMG study last week which was uncomfortable and has not received any results yet. She reports compliance with HEP.    Patient reports tolerating previous visit Min soreness from eval   Patient reports their pain to be 3//10 on a 0-10 scale with 0 being no pain and 10 being the worst pain imaginable.  Pain Location: neck bilateral       Work and leisure: Retired/Tennis  Pt goals: decrease pain and move better, drive with less difficulty, tennis with less pain    Objective     Baseline Isometric Testing on Med X equipment:  Testing administered by PT  Date of testin2023  ROM 33-84 deg   Max Peak Torque 144    Min Peak Torque 48    Flex/Ext Ratio 3:1   % below normative data 47%     Outcomes:  Initial score: 51% limitation (completed 2nd visit 23)  Visit 5 score:  Goal: 42% Limitation      Treatment    Bernadette received the treatments  listed below:      Bernadette received neuromuscular education  to isolate and engage spinal stabilization musculature correctly for motor control and coordination to aid in function and posture for 15 minutes on the Medical Medx Machine.  Patient performed MedX dynamic exercise with emphasis on spinal muscular control using pacer throughout  active range of motion. Therapist assisted patient in achieving optimal exertion for neural reeducation and endurance training by using the  Brayden Exertion Rating scale, by instructing the patient to aim for mid range of exertion, performing 15-20 repetitions, slowly, correctly,and safely.        [facility] - Short 5/16/2023   Visit Number 2   VAS Pain Rating 3   Retraction in Lying 10   Retraction in Sitting 10   Rotation 10   Scapular Retraction 10   Cervical Extension - Seat Pad -   Seat Adjustment -   Top Dead Center -   Counterweight -   Cervical Flexion -   Cervical Extension -   Cervical Peak Torque -   Cervical Weight 105   Repetitions 15   Rating of Perceived Exertion 3      therapeutic exercises to develop strength, endurance, ROM, flexibility, posture, and core stabilization for 35 minutes including:    UT stretch 2 x 20sec  Levator scap stretch 2 x 20sec  + RIS x 10 (cues)  +Cervical rotational stretch with towel 10 x 5 sec  Seated scap retract/no money x 10, 3 sec  RIL x 10    Peripheral muscle strengthening which included 1 set of 15-20 repetitions at a slow, controlled 10-13 second per rep pace focused on strengthening supporting musculature for improved body mechanics and functional mobility.  Pt and therapist focused on proper form during treatment to ensure optimal strengthening of each targeted muscle group.  Machines were utilized including torso rotation, leg press, hip abd and hip add, leg ext.  Leg curl, triceps, biceps, chest and row added visit 3    manual therapy techniques:   for 00 minutes, including:    cold pack for 5 minutes to neck.    Home  Exercises Provided and Patient Education Provided   Home exercises include:Upper trap stretch ,Levator scap stretch,RIL ,Seated scap retraction   Cardio program:Plan visit 5  Lifting education date:Plan visit 11  Posture/Lumbar roll: information provided on eval, not received yet.  Fridge Magnet Discharge handout (date given):  Equipment at home/gym membership: Belongs to [employer]    Education provided:   - Cues w/ex's  - MedX performance  - Precor Ex performance  - Brayden exertional scale    Written Home Exercises Provided: Patient instructed to cont prior HEP.  Exercises were reviewed and Bernadette was able to demonstrate them prior to the end of the session.  Bernadette demonstrated good  understanding of the education provided.     See EMR under Patient Instructions for exercises provided prior visit and today's visit.    Assessment   Bernadette returns for  her first follow-up visit with chronic report of neck discomfort/stiffness which is rated as tolerable presently.  Treatment consisted of a review and performance of HEP provided on the eval along with additional ex's to include: RIS and cervical rotation with towel. She requires minimal verbal and tactile cues for correct ex performance but was able to perform without increased pain. Patient's Cervical MedX resistance was initiated at 105 in/lbs (< 80% max Peak torque due to higher flex/ext ratio) and she completed 15 reps with a RPE = 3/10. Pt was also able to complete half of the peripheral strengthening exercises without increased discomfort and will plan to complete the full circuit next visit as tolerated.     Patient is making  progress towards established goals.  Pt will continue to benefit from skilled outpatient physical therapy to address the deficits stated in the impairment chart, provide pt/family education and to maximize pt's level of independence in the home and community environment.     Anticipated Barriers for therapy: none  Pt's spiritual, cultural  and educational needs considered and pt agreeable to plan of care and goals as stated below:     GOALS: Pt is in agreement with the following goals.     Short term goals: 6 weeks or 10 visits   1.  Pt will demonstratte increased cervical ROM as measured by med ex by 4 degrees from initial test which results in improved  ROM of neck for ease with ADLs and driving  (approp and ongoing)  2. Pt will demonstrate independence with reducing or controlling symptoms with ther ex, movement, or position independently, able to reduce pain 1-2 points on pain scale using strategies taught in therapy  (approp and ongoing)  3. Pt will demonstrate increased MedX average isometric strength value by 25% with  when compared to the initial testing resulting in improved ability to perform bending, lifting, and carrying activities safely, confidently.  (approp and ongoing)     Long term goals: 10 weeks or 20 visits  1. Pt will demonstratte increased cervical ROM as measured by med ex by 7 degrees from initial test which results in functional ROM of neck for ease with ADLs and driving  (approp and ongoing)  2. Pt will demonstrate increased MedX average isometric strength value  by 50% from initial test to improve ability to lift and carry, and sustain good posture while performing ADL's  (approp and ongoing)  3.Pt will demonstrate reduced pain and improved functional outcomes as reported on the FOTO by reaching a limitation score of < or = 10% decrease in limitation% or less in order to demonstrate subjective improvement in pt's condition.    (approp and ongoing)  4. Pt will demonstrate independence with reducing or controlling symptoms with ther ex, movement, or position independently, able to reduce pain 2-4 points on pain scale using strategies taught in therapy  (approp and ongoing)  5. Pt will demonstrate independence with the HEP at discharge.   (approp and ongoing)  6.  Pt will be able to drive with no difficulty. (patient goal)   (approp and ongoing)    Plan   Continue with established Plan of Care towards established PT goals.     Therapist: Noe Mckeon, PTA  5/12/2023

## 2023-05-16 ENCOUNTER — CLINICAL SUPPORT (OUTPATIENT)
Dept: REHABILITATION | Facility: HOSPITAL | Age: 73
End: 2023-05-16
Payer: MEDICARE

## 2023-05-16 DIAGNOSIS — R29.898 DECREASED RANGE OF MOTION OF NECK: Primary | ICD-10-CM

## 2023-05-16 DIAGNOSIS — M53.82 WEAKNESS OF NECK: ICD-10-CM

## 2023-05-16 PROCEDURE — 97110 THERAPEUTIC EXERCISES: CPT | Mod: CQ

## 2023-05-16 PROCEDURE — 97112 NEUROMUSCULAR REEDUCATION: CPT | Mod: CQ

## 2023-05-16 NOTE — PROGRESS NOTES
Health  Consult Note    Name: Bernadette Aquino  Clinic Number: [id number]  Physician: No ref. provider found  Past Medical History:   Diagnosis Date    Appendicitis with perforation     Status post appendectomy, January 2011    Celiac disease     Cerebral aneurysm     Right internal carotid artery, nonruptured, status post coil    DDD (degenerative disc disease), cervical     Paresthesia of hands    Elevated liver enzymes     Prior history    Encounter for blood transfusion     GERD (gastroesophageal reflux disease)     HTN (hypertension)     Hyperlipidemia     Mild depression     Multinodular thyroid     u/s 9/11    Osteopenia     Prior history of osteoporosis on bisphosphonate    Other specified acquired hypothyroidism     On replacement    Single cyst of left breast      Time In: 3:30 pm  Time Out: 4:00 pm    Health  Agreement signed: 5/16/23    Coaching performed:   5/16/23: Initial consult 30 mins virtual    Subjective:   Patient reports with the following:    Vision:      Values:    Strengths:  likes to play tennis and ready for muscle strengthening    Challenges:  celiac disease, allergic to daily    Support:      Hobbies:  tennis    Objective:  Bernadette was instructed to continue to explore areas of wellbeing      INITIAL date: 5/16/23  One a scale of 1-10, with 10 being 100% happy, how would you rate your happiness in each of the wellness areas below?    Happiness:         1     2     3     4     5     6     7     8     9     10    Initial Date: DC Date: +/- Total Change   Exercise/Movement 8     Physical Health 6     Stress Level 8     Nutrition 6     Sleep 7     Play      Body Image      Relationships      Energy/Vitality        Assessment:   5/16/23: exploring areas of wellbeing.  Wants to get stronger and sleep better.    Plan:  Patient goals for next consult include   5/16/23: continue with happiness scale    Health : Cristy Cheung  5/16/2023      Health Coaching Agreement  This Agreement  was reviewed by  and client and verbal agreement given from client to receive  coaching services  focusing on topics such as nutrition, meditation, stress management, anxiety, goal setting, sleep deprivation, and etc.      Description of Coaching: Coaching is partnership (defined as an alliance, not a legal business partnership) between the  and the Client in a thought-provoking and creative process that inspires the client to maximize their overall potential.    Health coaches are change agents who help their clients set and achieve health goals and build new habits.  Health coaches are not just a source of information but a catalyst for transformation.     How is Coaching Different from Therapy?  Coaching is not therapy.  We do not look for causation in the past nor healing, though healing may happen as a side effect of increasing awareness. Our work will be as much as possible in the present and focused on your desires and present time objectives. I will support you to move forward, set personal or professional goals, and take the necessary action to create what you desire. If for any reason I feel other therapeutic professional services are required, I will request you get that help.    Confidentiality  This coaching relationship, as well as all information (documented or verbal) that the Client shares with the  as part of this relationship, is bound by the principles of confidentiality set forth in the [employer] Code of Ethics.  The  agrees not to disclose any information pertaining to the Client without the Client's written consent. The  will not disclose the Client's name as a reference without the Client's consent.      Procedure/Operations  Coaching sessions can be conducted in person or virtually with a time allotment of 30 minutes    and client will meet in a suitably private location without distractions in order to be fully present for the in person coaching sessions    and client will commit to being present in a quiet space or room in order to be fully present for virtual coaching visits  Sessions will be scheduled ahead of time and reasonable cancel notice is expected if circumstances change    Client responsibilities   Choose area of focus for the coaching sessions  The client agrees to communicate honestly. To be open to feedback and assistance, and to create the time and energy to participate fully in his/her sessions  Create specific goals, strategies, actions, and decide the time frame   Make your time with me a priority.  Show up on time, be prepared to work, have fun, and be ready to explore your life in a new and more meaningful way     responsibilities  Create a safe and supportive environment  Apply effective communication skills, such as use of open-ended questions to promote a new perspective, awareness, and thought processes.  The  reserves the right to ask challenging questions which could lead to client's thinking pattern being challenged  Your  will occasionally interrupt you in order to make sure they are understanding you and promoting clarity  Help clients develop achievable and measurable goals by supporting in the clarifying, setting, and maintaining of those goals  Help clients develop and make full use of their strengths in order to support successful behavioral changes. Hold client accountable and keep him/her focused  Encourage patients during every session.  Your  truly cares about you and is passionate about helping you succeed and improve your wellness. We want to cheer you on and celebrate your victories       Client understands that coaching does not provide treatment, therapy, or diagnosis and agrees  to see their physician, therapist or qualified helping professional for such needs.

## 2023-05-18 ENCOUNTER — CLINICAL SUPPORT (OUTPATIENT)
Dept: REHABILITATION | Facility: HOSPITAL | Age: 73
End: 2023-05-18
Payer: MEDICARE

## 2023-05-18 DIAGNOSIS — R29.898 DECREASED RANGE OF MOTION OF NECK: Primary | ICD-10-CM

## 2023-05-18 DIAGNOSIS — M53.82 WEAKNESS OF NECK: ICD-10-CM

## 2023-05-18 PROCEDURE — 97112 NEUROMUSCULAR REEDUCATION: CPT | Mod: CQ

## 2023-05-18 PROCEDURE — 97110 THERAPEUTIC EXERCISES: CPT | Mod: CQ

## 2023-05-18 NOTE — PROGRESS NOTES
[facility] Back Physical Therapy Treatment      Name: Bernadette Aquino  Clinic Number: [id number]    Therapy Diagnosis:   Encounter Diagnoses   Name Primary?    Decreased range of motion of neck Yes    Weakness of neck      Physician:  Tasneem Elizalde PA-C    Visit Date: 2023    Physician Orders: PT Eval and Treat   Medical Diagnosis from Referral:   M47.819 (ICD-10-CM) - Degenerative arthropathy of spinal facet joint   M47.812 (ICD-10-CM) - Spondylosis without myelopathy or radiculopathy, cervical region   Evaluation Date: 2023  Authorization Period Expiration: 2023  Plan of Care Expiration: 2023  Reassessment Due: 2023  Visit # / Visits authorized: 3/20     Time In: 3:30 PM  Time Out:  4:30 PM  Total Billable Time: 55 minutes  Insurance: Fee for service Insurance Patient     Precautions: Standard, HTN, Osteopenia, Hx of R RTC repair     Pattern of pain determined: 1 OLEKSANDR    Subjective   Bernadette reports chronic neck pain/stiffness which is rated as tolerable 3/10 presently.  She reports mild residual soreness after her first follow-up visit but not too bad. States that she remains active with playing tennis.     Patient reports tolerating previous visit No c/o.  Patient reports their pain to be 3//10 on a 0-10 scale with 0 being no pain and 10 being the worst pain imaginable.  Pain Location: neck bilateral       Work and leisure: Retired/Tennis  Pt goals: decrease pain and move better, drive with less difficulty, tennis with less pain    Objective     Baseline Isometric Testing on Med X equipment:  Testing administered by PT  Date of testin2023  ROM 33-84 deg   Max Peak Torque 144    Min Peak Torque 48    Flex/Ext Ratio 3:1   % below normative data 47%     Outcomes:  Initial score: 51% limitation (completed 2nd visit 23)  Visit 5 score:  Goal: 42% Limitation      Treatment    Bernadette received the treatments listed below:      Bernadette received neuromuscular education  to isolate  and engage spinal stabilization musculature correctly for motor control and coordination to aid in function and posture for 10 minutes on the Medical Medx Machine.  Patient performed MedX dynamic exercise with emphasis on spinal muscular control using pacer throughout  active range of motion. Therapist assisted patient in achieving optimal exertion for neural reeducation and endurance training by using the  Brayden Exertion Rating scale, by instructing the patient to aim for mid range of exertion, performing 15-20 repetitions, slowly, correctly,and safely.        [facility] - Short 5/18/2023   Visit Number 3   VAS Pain Rating 3   Treadmill Time (in min.) 5   Retraction in Lying 10   Retraction in Sitting 20   Retraction with Extension 10   Rotation 10   Scapular Retraction 10   Cervical Extension - Seat Pad -   Seat Adjustment -   Top Dead Center -   Counterweight -   Cervical Flexion -   Cervical Extension -   Cervical Peak Torque -   Cervical Weight 105   Repetitions 20   Rating of Perceived Exertion 3       therapeutic exercises to develop strength, endurance, ROM, flexibility, posture, and core stabilization for 45 minutes including:    UT stretch 2 x 20 sec  Levator scap stretch 2 x 20 sec  RIS x 10, x 10 Self OP (cues)  Cervical rotational stretch with towel 10 x 5 sec  +Cervical retract w/extension w/towel x 10  Seated scap retract/no money w/ RTB x 10, 3 sec  RIL x 10      Peripheral muscle strengthening which included 1 set of 15-20 repetitions at a slow, controlled 10-13 second per rep pace focused on strengthening supporting musculature for improved body mechanics and functional mobility.  Pt and therapist focused on proper form during treatment to ensure optimal strengthening of each targeted muscle group.  Machines were utilized including torso rotation, leg press, hip abd and hip add, leg ext.  Leg curl, triceps, biceps, chest and row added visit 3    manual therapy techniques:   for 00 minutes,  including:    cold pack for 5 minutes to neck.    Home Exercises Provided and Patient Education Provided   Home exercises include:Upper trap stretch ,Levator scap stretch,RIL ,Seated scap retraction   Cardio program:Plan visit 5  Lifting education date:Plan visit 11  Posture/Lumbar roll: information provided on eval, not received yet.  Fridge Magnet Discharge handout (date given):  Equipment at home/gym membership: Belongs to [employer]    Education provided:   - Cues w/ex's  - MedX performance  - Precor Ex performance  - Brayden exertional scale    Written Home Exercises Provided: Patient instructed to cont prior HEP.  Exercises were reviewed and Bernadette was able to demonstrate them prior to the end of the session.  Bernadette demonstrated good  understanding of the education provided.     See EMR under Patient Instructions for exercises provided prior visits    Assessment   Bernadette returns for  her 2nd follow-up visit with chronic report of neck discomfort/stiffness which is rated as tolerable presently.  Treatment continued with cervical/scapulothoracic flexibility and strengthening ex's.  Added light resistance (RTB) for scap retract/no money ex and also added  cervical retract/w extension and self OP with RIS.  Still requires some verbal, visual and tactile cues for retraction ex's but able to perform without increased symptoms. She requires minimal verbal and tactile cues for correct ex performance but was able to perform without increased pain. Cervical MedX resistance was maintained at 105 in/lbs  progressing to complete 20 reps with a RPE = 3/10. Pt was also able to complete the full circuit of peripheral strengthening exercises without increased discomfort. Will look to progress per HB protocol and patient tolerance.    Patient is making  progress towards established goals.  Pt will continue to benefit from skilled outpatient physical therapy to address the deficits stated in the impairment chart, provide pt/family  education and to maximize pt's level of independence in the home and community environment.     Anticipated Barriers for therapy: none  Pt's spiritual, cultural and educational needs considered and pt agreeable to plan of care and goals as stated below:     GOALS: Pt is in agreement with the following goals.     Short term goals: 6 weeks or 10 visits   1.  Pt will demonstratte increased cervical ROM as measured by med ex by 4 degrees from initial test which results in improved  ROM of neck for ease with ADLs and driving  (approp and ongoing)  2. Pt will demonstrate independence with reducing or controlling symptoms with ther ex, movement, or position independently, able to reduce pain 1-2 points on pain scale using strategies taught in therapy  (approp and ongoing)  3. Pt will demonstrate increased MedX average isometric strength value by 25% with  when compared to the initial testing resulting in improved ability to perform bending, lifting, and carrying activities safely, confidently.  (approp and ongoing)     Long term goals: 10 weeks or 20 visits  1. Pt will demonstratte increased cervical ROM as measured by med ex by 7 degrees from initial test which results in functional ROM of neck for ease with ADLs and driving  (approp and ongoing)  2. Pt will demonstrate increased MedX average isometric strength value  by 50% from initial test to improve ability to lift and carry, and sustain good posture while performing ADL's  (approp and ongoing)  3.Pt will demonstrate reduced pain and improved functional outcomes as reported on the FOTO by reaching a limitation score of < or = 10% decrease in limitation% or less in order to demonstrate subjective improvement in pt's condition.    (approp and ongoing)  4. Pt will demonstrate independence with reducing or controlling symptoms with ther ex, movement, or position independently, able to reduce pain 2-4 points on pain scale using strategies taught in therapy  (approp and  ongoing)  5. Pt will demonstrate independence with the HEP at discharge.   (approp and ongoing)  6.  Pt will be able to drive with no difficulty. (patient goal)  (approp and ongoing)    Plan   Continue with established Plan of Care towards established PT goals.     Therapist: Noe Mckeon, PTA  5/18/2023

## 2023-05-23 ENCOUNTER — CLINICAL SUPPORT (OUTPATIENT)
Dept: REHABILITATION | Facility: HOSPITAL | Age: 73
End: 2023-05-23
Payer: MEDICARE

## 2023-05-23 DIAGNOSIS — R29.898 DECREASED RANGE OF MOTION OF NECK: Primary | ICD-10-CM

## 2023-05-23 DIAGNOSIS — M53.82 WEAKNESS OF NECK: ICD-10-CM

## 2023-05-23 PROCEDURE — 97110 THERAPEUTIC EXERCISES: CPT | Mod: CQ

## 2023-05-23 PROCEDURE — 97112 NEUROMUSCULAR REEDUCATION: CPT | Mod: CQ

## 2023-05-23 NOTE — PROGRESS NOTES
[facility] Back Physical Therapy Treatment      Name: Bernadette Aquino  Clinic Number: [id number]    Therapy Diagnosis:   Encounter Diagnoses   Name Primary?    Decreased range of motion of neck Yes    Weakness of neck      Physician:  Tasneem Elizalde PA-C    Visit Date: 2023    Physician Orders: PT Eval and Treat   Medical Diagnosis from Referral:   M47.819 (ICD-10-CM) - Degenerative arthropathy of spinal facet joint   M47.812 (ICD-10-CM) - Spondylosis without myelopathy or radiculopathy, cervical region   Evaluation Date: 2023  Authorization Period Expiration: 2023  Plan of Care Expiration: 2023  Reassessment Due: 2023  Visit # / Visits authorized:      Time In: 9:02 AM  Time Out:  10:07 AM  Total Billable Time:  60 minutes  Insurance: Fee for service Insurance Patient     Precautions: Standard, HTN, Osteopenia, Hx of R RTC repair     Pattern of pain determined: 1 OLEKSANDR    Subjective   Bernadette reports chronic neck pain/stiffness which is remains as tolerable 3/10 presently. States that she usually feels a little better initially after treatment sessions but doesn't last as long as she would like.     Patient reports tolerating previous visit No c/o.  Patient reports their pain to be 3//10 on a 0-10 scale with 0 being no pain and 10 being the worst pain imaginable.  Pain Location: neck bilateral       Work and leisure: Retired/Tennis  Pt goals: decrease pain and move better, drive with less difficulty, tennis with less pain    Objective     Baseline Isometric Testing on Med X equipment:  Testing administered by PT  Date of testin2023  ROM 33-84 deg   Max Peak Torque 144    Min Peak Torque 48    Flex/Ext Ratio 3:1   % below normative data 47%     Outcomes:  Initial score: 51% limitation (completed 2nd visit 23)  Visit 5 score:  Goal: 42% Limitation      Treatment    Bernadette received the treatments listed below:      Bernadette received neuromuscular education  to isolate and  engage spinal stabilization musculature correctly for motor control and coordination to aid in function and posture for 10 minutes on the Medical Medx Machine.  Patient performed MedX dynamic exercise with emphasis on spinal muscular control using pacer throughout  active range of motion. Therapist assisted patient in achieving optimal exertion for neural reeducation and endurance training by using the  Brayden Exertion Rating scale, by instructing the patient to aim for mid range of exertion, performing 15-20 repetitions, slowly, correctly,and safely.        [facility] - Short 5/23/2023   Visit Number 4   VAS Pain Rating 3   Treadmill Time (in min.) 5   Retraction in Lying -   Retraction in Sitting -   Retraction with Extension -   Rotation -   Scapular Retraction -   Manual Therapy 5   Cervical Extension - Seat Pad -   Seat Adjustment -   Top Dead Center -   Counterweight -   Cervical Flexion -   Cervical Extension -   Cervical Peak Torque -   Cervical Weight 111   Repetitions 15   Rating of Perceived Exertion 3        therapeutic exercises to develop strength, endurance, ROM, flexibility, posture, and core stabilization for 50 minutes including:    UT stretch 2 x 20 sec  Levator scap stretch 2 x 20 sec  RIS x 10, x 10 Self OP (cues)  Cervical rotational stretch with towel 10 x 5 sec  Cervical retract w/extension w/towel x 10  Seated scap retract/no money w/ RTB x 10, 3 sec  RIL x 10  +open book x 10    Peripheral muscle strengthening which included 1 set of 15-20 repetitions at a slow, controlled 10-13 second per rep pace focused on strengthening supporting musculature for improved body mechanics and functional mobility.  Pt and therapist focused on proper form during treatment to ensure optimal strengthening of each targeted muscle group.  Machines were utilized including torso rotation, leg press, hip abd and hip add, leg ext.  Leg curl, triceps, biceps, chest and row added visit 3    manual therapy  techniques:   for 5 minutes, including:STM to cervical paraspinals and UT    cold pack for 5 minutes to neck.    Home Exercises Provided and Patient Education Provided   Home exercises include:Upper trap stretch ,Levator scap stretch,RIL ,Seated scap retraction, open book   Cardio program:Plan visit 5  Lifting education date:Plan visit 11  Posture/Lumbar roll: information provided on eval, not received yet.  Fridge Magnet Discharge handout (date given):  Equipment at home/gym membership: Belongs to [employer]    Education provided:   - Cues w/ex's  - MedX performance  - Precor Ex performance  - Brayden exertional scale    Written Home Exercises Provided: Patient instructed to cont prior HEP. Added Open book 5/23/23 ( handout provided-unable to add to patient instructions).  Exercises were reviewed and Bernadette was able to demonstrate them prior to the end of the session.  Bernadette demonstrated good  understanding of the education provided.     See EMR under Patient Instructions for exercises provided prior visits    Assessment   Bernadette returns with some continued report of neck discomfort/stiffness which is rated as tolerable.  Treatment continued with cervical/scapulothoracic flexibility and strengthening ex's.  Added open book for additional flexibility. Also added manual techniques to include STM which provides some relief and increased mobility. She is more challenged with rotational mobility toward (L). She requires minimal verbal and tactile cues for correct ex performance but was able to perform without increased pain. Cervical MedX Flexion ROM was increased to 87 degrees and resistance was increased to 111 in/lbs completing 15 reps with a RPE = 3/10.  Will look to progress per HB protocol and patient tolerance.    Patient is making  progress towards established goals.  Pt will continue to benefit from skilled outpatient physical therapy to address the deficits stated in the impairment chart, provide pt/family  education and to maximize pt's level of independence in the home and community environment.     Anticipated Barriers for therapy: none  Pt's spiritual, cultural and educational needs considered and pt agreeable to plan of care and goals as stated below:     GOALS: Pt is in agreement with the following goals.     Short term goals: 6 weeks or 10 visits   1.  Pt will demonstratte increased cervical ROM as measured by med ex by 4 degrees from initial test which results in improved  ROM of neck for ease with ADLs and driving  (approp and ongoing)  2. Pt will demonstrate independence with reducing or controlling symptoms with ther ex, movement, or position independently, able to reduce pain 1-2 points on pain scale using strategies taught in therapy  (approp and ongoing)  3. Pt will demonstrate increased MedX average isometric strength value by 25% with  when compared to the initial testing resulting in improved ability to perform bending, lifting, and carrying activities safely, confidently.  (approp and ongoing)     Long term goals: 10 weeks or 20 visits  1. Pt will demonstratte increased cervical ROM as measured by med ex by 7 degrees from initial test which results in functional ROM of neck for ease with ADLs and driving  (approp and ongoing)  2. Pt will demonstrate increased MedX average isometric strength value  by 50% from initial test to improve ability to lift and carry, and sustain good posture while performing ADL's  (approp and ongoing)  3.Pt will demonstrate reduced pain and improved functional outcomes as reported on the FOTO by reaching a limitation score of < or = 10% decrease in limitation% or less in order to demonstrate subjective improvement in pt's condition.    (approp and ongoing)  4. Pt will demonstrate independence with reducing or controlling symptoms with ther ex, movement, or position independently, able to reduce pain 2-4 points on pain scale using strategies taught in therapy  (approp and  ongoing)  5. Pt will demonstrate independence with the HEP at discharge.   (approp and ongoing)  6.  Pt will be able to drive with no difficulty. (patient goal)  (approp and ongoing)    Plan   Continue with established Plan of Care towards established PT goals.     Therapist: Noe Mckeon, PTA  5/23/2023

## 2023-06-01 ENCOUNTER — CLINICAL SUPPORT (OUTPATIENT)
Dept: REHABILITATION | Facility: HOSPITAL | Age: 73
End: 2023-06-01
Payer: MEDICARE

## 2023-06-01 DIAGNOSIS — M53.82 WEAKNESS OF NECK: ICD-10-CM

## 2023-06-01 DIAGNOSIS — R29.898 DECREASED RANGE OF MOTION OF NECK: Primary | ICD-10-CM

## 2023-06-01 PROCEDURE — 97110 THERAPEUTIC EXERCISES: CPT

## 2023-06-01 PROCEDURE — 97112 NEUROMUSCULAR REEDUCATION: CPT

## 2023-06-01 NOTE — PROGRESS NOTES
[facility] Back Physical Therapy Treatment      Name: Bernadette Aquino  Clinic Number: [id number]    Therapy Diagnosis:   Encounter Diagnoses   Name Primary?    Decreased range of motion of neck Yes    Weakness of neck      Physician:  Tasneem Elizalde PA-C    Visit Date: 2023    Physician Orders: PT Eval and Treat   Medical Diagnosis from Referral:   M47.819 (ICD-10-CM) - Degenerative arthropathy of spinal facet joint   M47.812 (ICD-10-CM) - Spondylosis without myelopathy or radiculopathy, cervical region   Evaluation Date: 2023  Authorization Period Expiration: 2023  Plan of Care Expiration: 2023  Reassessment Due: 2023  Visit # / Visits authorized:      Time In: 7:06 AM (late arrival)  Time Out: 8:06 AM  Total Billable Time: 55 minutes  Insurance: Fee for service Insurance Patient     Precautions: Standard, HTN, Osteopenia, Hx of R RTC repair     Pattern of pain determined: 1 OLEKSANDR    Subjective   Bernadette reports continued chronic neck pain and usual morning stiffness.     Patient reports tolerating previous visit No c/o.  Patient reports their pain to be 5/10 on a 0-10 scale with 0 being no pain and 10 being the worst pain imaginable.  Pain Location: neck bilateral       Work and leisure: Retired/Tennis  Pt goals: decrease pain and move better, drive with less difficulty, tennis with less pain    Objective     Baseline Isometric Testing on Med X equipment:  Testing administered by PT  Date of testin2023  ROM 33-84 deg   Max Peak Torque 144    Min Peak Torque 48    Flex/Ext Ratio 3:1   % below normative data 47%       Cervical Range of Motion     ROM Loss Initial  ROM Loss 2023   Flexion minimal loss Minimal loss   Extension minimal loss Minimal loss   Side bending Right moderate loss Moderate loss   Side bending Left moderate loss Moderate loss   Rotation Right moderate loss Moderate loss   Rotation Left moderate loss Major loss   Protraction moderate loss    Retraction   moderate loss         Outcomes:  Initial score: 51% limitation (completed 2nd visit 5/16/23)  Visit 5 score:  Goal: 42% Limitation      Treatment    Bernadette received the treatments listed below:      Bernadette received neuromuscular education  to isolate and engage spinal stabilization musculature correctly for motor control and coordination to aid in function and posture for 10 minutes on the Medical Medx Machine.  Patient performed MedX dynamic exercise with emphasis on spinal muscular control using pacer throughout  active range of motion. Therapist assisted patient in achieving optimal exertion for neural reeducation and endurance training by using the  Brayden Exertion Rating scale, by instructing the patient to aim for mid range of exertion, performing 15-20 repetitions, slowly, correctly,and safely.        [facility] 6/1/2023   Visit Number 5   VAS Pain Rating 5   Treadmill Time (in min.) 5   Cervical Stretches - Retraction In Lying -   Retraction in Sitting 20   Retraction with Extension 10   Rotation 10   Scapular Retraction 10   Manual Therapy -   Cervical Extension Seat Pad -   Seat Adjustment -   Top Dead Center -   Counterweight -   Cervical Flexion -   Cervical Extension -   Cervical Peak Torque -   Cervical Weight 111   Repetitions 20   Rating of Perceived Exertion 3   Ice - Z Lie (in min.) 5           therapeutic exercises to develop strength, endurance, ROM, flexibility, posture, and core stabilization for 45 minutes including:    UT stretch 2 x 20 sec  Levator scap stretch 2 x 20 sec  RIS x 10, x 10 Self OP (cues)  Cervical rotational stretch with towel 10 x 5 sec  Cervical retract w/extension w/towel x 10  Seated scap retract/no money w/ RTB x 10, 3 sec  RIL x 10  open book x 10    Peripheral muscle strengthening which included 1 set of 15-20 repetitions at a slow, controlled 10-13 second per rep pace focused on strengthening supporting musculature for improved body mechanics and functional  mobility.  Pt and therapist focused on proper form during treatment to ensure optimal strengthening of each targeted muscle group.  Machines were utilized including torso rotation, leg press, hip abd and hip add, leg ext.  Leg curl, triceps, biceps, chest and row added visit 3    manual therapy techniques:   for 0 minutes, including: STM to cervical paraspinals and UT    cold pack for 5 minutes to neck.    Home Exercises Provided and Patient Education Provided   Home exercises include:Upper trap stretch ,Levator scap stretch,RIL ,Seated scap retraction, open book   Cardio program: 6/1/2023  Lifting education date:Plan visit 11  Posture/Lumbar roll: information provided on eval, not received yet.  Fridge Magnet Discharge handout (date given):  Equipment at home/gym membership: Belongs to [employer]    Education provided:   - Cues w/ex's  - MedX performance  - Precor Ex performance  - Brayden exertional scale    Written Home Exercises Provided: Patient instructed to cont prior HEP. Added Open book 5/23/23 ( handout provided-unable to add to patient instructions).  Exercises were reviewed and Bernadette was able to demonstrate them prior to the end of the session.  Bernadette demonstrated good  understanding of the education provided.     See EMR under Patient Instructions for exercises provided prior visits    Assessment   Bernadette returns with continued report of neck pain/stiffness which is rated as 5/10 today.  Treatment continued with cervical/scapulothoracic flexibility and strengthening ex's. She continues to demonstrate more limitation with rotational mobility toward (L). She requires minimal verbal cues for correct ex performance. Cervical MedX resistance was maintained at 111 in/lbs with pt progressing to reps 20 with a RPE = 3/10. Pt reported feeling better and less stiff following completion of exercises at end of session. Reviewed and provided handout for cardio program today. Will look to progress per HB protocol and  patient tolerance.     Patient is making  progress towards established goals.  Pt will continue to benefit from skilled outpatient physical therapy to address the deficits stated in the impairment chart, provide pt/family education and to maximize pt's level of independence in the home and community environment.     Anticipated Barriers for therapy: none  Pt's spiritual, cultural and educational needs considered and pt agreeable to plan of care and goals as stated below:     GOALS: Pt is in agreement with the following goals.     Short term goals: 6 weeks or 10 visits   1.  Pt will demonstratte increased cervical ROM as measured by med ex by 4 degrees from initial test which results in improved  ROM of neck for ease with ADLs and driving  (approp and ongoing)  2. Pt will demonstrate independence with reducing or controlling symptoms with ther ex, movement, or position independently, able to reduce pain 1-2 points on pain scale using strategies taught in therapy  (approp and ongoing)  3. Pt will demonstrate increased MedX average isometric strength value by 25% with  when compared to the initial testing resulting in improved ability to perform bending, lifting, and carrying activities safely, confidently.  (approp and ongoing)     Long term goals: 10 weeks or 20 visits  1. Pt will demonstratte increased cervical ROM as measured by med ex by 7 degrees from initial test which results in functional ROM of neck for ease with ADLs and driving  (approp and ongoing)  2. Pt will demonstrate increased MedX average isometric strength value  by 50% from initial test to improve ability to lift and carry, and sustain good posture while performing ADL's  (approp and ongoing)  3.Pt will demonstrate reduced pain and improved functional outcomes as reported on the FOTO by reaching a limitation score of < or = 10% decrease in limitation% or less in order to demonstrate subjective improvement in pt's condition.    (approp and  ongoing)  4. Pt will demonstrate independence with reducing or controlling symptoms with ther ex, movement, or position independently, able to reduce pain 2-4 points on pain scale using strategies taught in therapy  (approp and ongoing)  5. Pt will demonstrate independence with the HEP at discharge.   (approp and ongoing)  6.  Pt will be able to drive with no difficulty. (patient goal)  (approp and ongoing)    Plan   Continue with established Plan of Care towards established PT goals.     Therapist: Trice Piña, PT  6/1/2023

## 2023-06-07 ENCOUNTER — CLINICAL SUPPORT (OUTPATIENT)
Dept: REHABILITATION | Facility: HOSPITAL | Age: 73
End: 2023-06-07
Payer: MEDICARE

## 2023-06-07 DIAGNOSIS — M53.82 WEAKNESS OF NECK: ICD-10-CM

## 2023-06-07 DIAGNOSIS — R29.898 DECREASED RANGE OF MOTION OF NECK: Primary | ICD-10-CM

## 2023-06-07 PROCEDURE — 97112 NEUROMUSCULAR REEDUCATION: CPT | Mod: CQ

## 2023-06-07 PROCEDURE — 97110 THERAPEUTIC EXERCISES: CPT | Mod: CQ

## 2023-06-07 NOTE — PROGRESS NOTES
[facility] Back Physical Therapy Treatment      Name: Bernadette Auqino  Clinic Number: [id number]    Therapy Diagnosis:   Encounter Diagnoses   Name Primary?    Decreased range of motion of neck Yes    Weakness of neck      Physician:  Tasneem Elizalde PA-C    Visit Date: 2023    Physician Orders: PT Eval and Treat   Medical Diagnosis from Referral:   M47.819 (ICD-10-CM) - Degenerative arthropathy of spinal facet joint   M47.812 (ICD-10-CM) - Spondylosis without myelopathy or radiculopathy, cervical region   Evaluation Date: 2023  Authorization Period Expiration: 2023  Plan of Care Expiration: 2023  Reassessment Due: 2023  Visit # / Visits authorized:      Time In: 2:35  PM  Time Out:  3:35 PM  Total Billable Time: 55 minutes  Insurance: Fee for service Insurance Patient     Precautions: Standard, HTN, Osteopenia, Hx of R RTC repair     Pattern of pain determined: 1 OLEKSANDR    Zaid Fleming reports continued chronic neck pain/stiffness that is rated as moderate currently. States that she usually feels better after ex's here but doesn't last as long as she would like. States that she remains active with playing tennis and played just prior to her treatment today.     Patient reports tolerating previous visit No c/o.  Patient reports their pain to be 5/10 on a 0-10 scale with 0 being no pain and 10 being the worst pain imaginable.  Pain Location: neck bilateral       Work and leisure: Retired/Tennis  Pt goals: decrease pain and move better, drive with less difficulty, tennis with less pain    Objective     Baseline Isometric Testing on Med X equipment:  Testing administered by PT  Date of testin2023  ROM 33-84 deg   Max Peak Torque 144    Min Peak Torque 48    Flex/Ext Ratio 3:1   % below normative data 47%       Cervical Range of Motion     ROM Loss Initial  ROM Loss 2023   Flexion minimal loss Minimal loss   Extension minimal loss Minimal loss   Side bending Right  moderate loss Moderate loss   Side bending Left moderate loss Moderate loss   Rotation Right moderate loss Moderate loss   Rotation Left moderate loss Major loss   Protraction moderate loss    Retraction  moderate loss         Outcomes:  Initial score: 51% limitation (completed 2nd visit 5/16/23)  Visit 5 score: 41%  Goal: 42% Limitation        Treatment    Bernadette received the treatments listed below:      Bernadette received neuromuscular education  to isolate and engage spinal stabilization musculature correctly for motor control and coordination to aid in function and posture for 10 minutes on the Medical Medx Machine.  Patient performed MedX dynamic exercise with emphasis on spinal muscular control using pacer throughout  active range of motion. Therapist assisted patient in achieving optimal exertion for neural reeducation and endurance training by using the  Brayden Exertion Rating scale, by instructing the patient to aim for mid range of exertion, performing 15-20 repetitions, slowly, correctly,and safely.        [facility] - Short 6/7/2023   Visit Number 6   VAS Pain Rating 5   Treadmill Time (in min.) -   Retraction in Lying 10   Retraction in Sitting 20   Retraction with Extension 10   Rotation 10   Scapular Retraction 10   Manual Therapy 5   Cervical Extension - Seat Pad -   Seat Adjustment -   Top Dead Center -   Counterweight -   Cervical Flexion -   Cervical Extension -   Cervical Peak Torque -   Cervical Weight 120   Repetitions 15   Rating of Perceived Exertion 3         therapeutic exercises to develop strength, endurance, ROM, flexibility, posture, and core stabilization for 45 minutes including:    UT stretch 2 x 20 sec  Levator scap stretch 2 x 20 sec  RIS x 10, x 10 Self OP (cues)  Cervical rotational stretch with towel 10 x 5 sec  Cervical retract w/extension w/towel x 10  Seated scap retract/no money w/ RTB x 15, 3 sec  RIL x 10  open book x 10  +serratus wall slides x 10    Peripheral muscle  strengthening which included 1 set of 15-20 repetitions at a slow, controlled 10-13 second per rep pace focused on strengthening supporting musculature for improved body mechanics and functional mobility.  Pt and therapist focused on proper form during treatment to ensure optimal strengthening of each targeted muscle group.  Machines were utilized including torso rotation, leg press, hip abd and hip add, leg ext.  Leg curl, triceps, biceps, chest and row added visit 3    manual therapy techniques:   for 5 minutes, including: STM to cervical paraspinals and UT    cold pack for 5 minutes to neck.    Home Exercises Provided and Patient Education Provided   Home exercises include:Upper trap stretch ,Levator scap stretch,RIL ,Seated scap retraction, open book   Cardio program: 6/1/2023  Lifting education date:Plan visit 11  Posture/Lumbar roll: information provided on eval, not received yet.  Fridge Magnet Discharge handout (date given):  Equipment at home/gym membership: Belongs to [employer]    Education provided:   - Cues w/ex's    Written Home Exercises Provided: Patient instructed to cont prior HEP.   Exercises were reviewed and Bernadette was able to demonstrate them prior to the end of the session.  Bernadette demonstrated good  understanding of the education provided.     See EMR under Patient Instructions for exercises provided prior visits    Assessment    Bernadette returns with continued report of neck discomfort/stiffness which is rated as 5/10 today.  Cardio Warm-up was deferred today due to patient playing tennis just prior to arrival. Treatment continued with cervical/scapulothoracic flexibility and strengthening ex's. Added serratus wall slides and also progressed reps for scap retract/no money ex. She still requires some verbal and tactile cues for ex's at times but able to perform without increased symptoms.  Added manual techniques to include STM prior to MedX performance which provided fair relief. Cervical MedX  resistance was increased to 120 in/lbs in/lbs completing 15 reps with a RPE = 3/10. Peripheral Tricep ext and chest press were deferred today due to some (L) shoulder discomfort.  Will look to progress per HB protocol and patient tolerance. Updated FOTO scoring today reflects some improvements since the start of care.    Patient is making  progress towards established goals.  Pt will continue to benefit from skilled outpatient physical therapy to address the deficits stated in the impairment chart, provide pt/family education and to maximize pt's level of independence in the home and community environment.     Anticipated Barriers for therapy: none  Pt's spiritual, cultural and educational needs considered and pt agreeable to plan of care and goals as stated below:     GOALS: Pt is in agreement with the following goals.     Short term goals: 6 weeks or 10 visits   1.  Pt will demonstratte increased cervical ROM as measured by med ex by 4 degrees from initial test which results in improved  ROM of neck for ease with ADLs and driving  (approp and ongoing)  2. Pt will demonstrate independence with reducing or controlling symptoms with ther ex, movement, or position independently, able to reduce pain 1-2 points on pain scale using strategies taught in therapy  (approp and ongoing)  3. Pt will demonstrate increased MedX average isometric strength value by 25% with  when compared to the initial testing resulting in improved ability to perform bending, lifting, and carrying activities safely, confidently.  (approp and ongoing)     Long term goals: 10 weeks or 20 visits  1. Pt will demonstratte increased cervical ROM as measured by med ex by 7 degrees from initial test which results in functional ROM of neck for ease with ADLs and driving  (approp and ongoing)  2. Pt will demonstrate increased MedX average isometric strength value  by 50% from initial test to improve ability to lift and carry, and sustain good posture while  performing ADL's  (approp and ongoing)  3.Pt will demonstrate reduced pain and improved functional outcomes as reported on the FOTO by reaching a limitation score of < or = 10% decrease in limitation% or less in order to demonstrate subjective improvement in pt's condition.    (approp and ongoing)  4. Pt will demonstrate independence with reducing or controlling symptoms with ther ex, movement, or position independently, able to reduce pain 2-4 points on pain scale using strategies taught in therapy  (approp and ongoing)  5. Pt will demonstrate independence with the HEP at discharge.   (approp and ongoing)  6.  Pt will be able to drive with no difficulty. (patient goal)  (approp and ongoing)    Plan   Continue with established Plan of Care towards established PT goals.     Therapist: Noe Mckeon, PTA  6/7/2023

## 2023-06-09 NOTE — H&P
Bernadette Aquino  is here for a completion of her perioperative paperwork. she  Is scheduled to undergo 1. Left knee femoral subchondroplasty  2. Left knee tibial subchondroplasty  3. Left knee arthroscopic medial menisectomy  4. Left knee arthroscopic chondroplasty  5. Left knee arthroscopic synovectomy     on 3/22/2019.  She is a healthy individual and does need clearance for this procedure.     Risks, indications and benefits of the surgical procedure were discussed with the patient. All questions with regard to surgery, rehab, expected return to functional activities, activities of daily living and recreational endeavors were answered to her satisfaction.    Patient was informed and understands the risks of surgery are greater for patients with a current condition or history of heart disease, obesity, clotting disorders, recurrent infections, steroid use, current or past smoking, and factors such as sedentary lifestyle and noncompliance with medications, therapy or follow-up. The degree of the increased risk is hard to estimate with any degree of precision.    Once no other questions were asked, a brief history and physical exam was then performed.    PAST MEDICAL HISTORY:   Past Medical History:   Diagnosis Date    Appendicitis with perforation     Status post appendectomy, January 2011    Celiac disease     Cerebral aneurysm     Right internal carotid artery, nonruptured, status post coil    DDD (degenerative disc disease), cervical     Paresthesia of hands    Elevated liver enzymes     Prior history    Encounter for blood transfusion     GERD (gastroesophageal reflux disease)     HTN (hypertension)     Hyperlipidemia     Mild depression     Multinodular thyroid     u/s 9/11    Osteopenia     Prior history of osteoporosis on bisphosphonate    Other specified acquired hypothyroidism     On replacement    Single cyst of left breast      PAST SURGICAL HISTORY:   Past Surgical History:   Procedure  Laterality Date    ANGIOGRAM-CEREBRAL N/A 2018    Performed by [facility] at [facility] OR 2ND FLR    APPENDECTOMY      2011    ARTHROSCOPY-SHOULDER Right 2015    Performed by Shalonda Altman MD at [facility] [place], operated by [facility] OR    ARTHROSCOPY-SHOULDER WITH SUBACROMIAL DECOMPRESSION Right 2015    Performed by Shalonda Altman MD at [facility] [place] operated by [facility] OR    BICEP TENODESIS ARTHROSCOPIC (TENDON FIXATION) Left 2015    Performed by Shalonda Altman MD at [facility] [place], operated by [facility] OR    BREAST BIOPSY Left     BREAST LUMPECTOMY Right     BREAST SURGERY      BUNIONECTOMY  10/2013    right foot    CARPAL TUNNEL RELEASE Left     CEREBRAL ANEURYSM REPAIR       SECTION      CHOLECYSTECTOMY      COLONOSCOPY N/A 10/6/2016    Performed by Tom Gonzales MD at [facility] (4TH FLR)    EGD (ESOPHAGOGASTRODUODENOSCOPY) N/A 10/3/2013    Performed by Tom Gonzales [facility] at [facility] (4TH FLR)    ESOPHAGOGASTRODUODENOSCOPY (EGD) N/A 10/6/2016    Performed by Tom Gonzales MD at [facility] (4TH FLR)    EYE SURGERY Left     pterygium removal    INJECTION-STEROID-EPIDURAL-CERVICAL N/A 2015    Performed by Margaret Rand MD at [facility] [place] operated by [facility] PAIN MGT    INJECTION-STEROID-EPIDURAL-CERVICAL N/A 2014    Performed by Margaret Rand MD at [facility] [place], operated [facility] PAIN MGT    KNEE ARTHROSCOPY Right     LABRAL DEBRIDEMENT-SHOULDER-ARTHROSCOPIC Right 2015    Performed by Shalonda Altman MD at [facility] [place] operated [facility] OR    REPAIR ROTATOR CUFF ARTHROSCOPIC Right 2015    Performed by Shalonda Altman MD at [facility] [place] operated [facility] OR    SHOULDER ARTHROSCOPY Right 2015    Dr Altman    TOTAL ABDOMINAL HYSTERECTOMY       FAMILY HISTORY:   Family History   Problem Relation Age of Onset    Hyperlipidemia Mother     Diverticulitis Mother     Hypertension Father     Diabetes Father     COPD Father     Heart disease Father     Hypertension Sister     Hyperlipidemia Sister     Heart disease Sister     Gout Brother     Celiac disease Neg Hx     Colon cancer Neg Hx     Cirrhosis Neg  Hx     Crohn's disease Neg Hx     Esophageal cancer Neg Hx     Irritable bowel syndrome Neg Hx     Liver cancer Neg Hx     Rectal cancer Neg Hx     Stomach cancer Neg Hx     Ulcerative colitis Neg Hx      SOCIAL HISTORY:   Social History     Socioeconomic History    Marital status:      Spouse name: Not on file    Number of children: 3    Years of education: college    Highest education level: Not on file   Social Needs    Financial resource strain: Not on file    Food insecurity - worry: Not on file    Food insecurity - inability: Not on file    Transportation needs - medical: Not on file    Transportation needs - non-medical: Not on file   Occupational History    Occupation: [employer]     Employer: [employer]     Comment: retired     Employer: [employer]   Tobacco Use    Smoking status: Never Smoker    Smokeless tobacco: Never Used   Substance and Sexual Activity    Alcohol use: Yes     Comment: socially, rare    Drug use: No    Sexual activity: Yes     Partners: Male   Other Topics Concern    Not on file   Social History Narrative    Plays tennis regularly    Healthy diet       MEDICATIONS:   Current Outpatient Medications:     amLODIPine (NORVASC) 2.5 MG tablet, Take 2.5 mg by mouth once daily., Disp: , Rfl:     ascorbic acid, vitamin C, (VITAMIN C) 1000 MG tablet, Take 1,000 mg by mouth once daily., Disp: , Rfl:     aspirin (ECOTRIN) 81 MG EC tablet, Take 81 mg by mouth once daily. , Disp: , Rfl: 0    biotin 1 mg tablet, Take 1,000 mcg by mouth once daily., Disp: , Rfl:     cholecalciferol, vitamin D3, (VITAMIN D3) 5,000 unit Tab, Take 5,000 Units by mouth once daily., Disp: , Rfl:     diclofenac sodium (VOLTAREN) 1 % Gel, Apply 2 g topically 3 (three) times daily., Disp: 1 Tube, Rfl: 0    fish oil-omega-3 fatty acids 300-1,000 mg capsule, Take 2 g by mouth once daily., Disp: , Rfl:     omeprazole (PRILOSEC) 20 MG capsule, Take 1 capsule (20 mg total) by mouth 2 (two)  times daily before meals., Disp: 180 capsule, Rfl: 3    sertraline (ZOLOFT) 100 MG tablet, Take 1 tablet (100 mg total) by mouth once daily., Disp: 90 tablet, Rfl: 3    simvastatin (ZOCOR) 40 MG tablet, Take 1 tablet (40 mg total) by mouth nightly., Disp: 90 tablet, Rfl: 3    SYNTHROID 25 mcg tablet, 1 po daily except 2 po daily on Saturday and Sunday., Disp: 135 tablet, Rfl: 3  ALLERGIES:   Review of patient's allergies indicates:   Allergen Reactions    No known drug allergies        Review of Systems   Constitution: Negative. Negative for chills, fever and night sweats.   HENT: Negative for congestion and headaches.    Eyes: Negative for blurred vision, left vision loss and right vision loss.   Cardiovascular: Negative for chest pain and syncope.   Respiratory: Negative for cough and shortness of breath.    Endocrine: Negative for polydipsia, polyphagia and polyuria.   Hematologic/Lymphatic: Negative for bleeding problem. Does not bruise/bleed easily.   Skin: Negative for dry skin, itching and rash.   Musculoskeletal: Negative for falls and muscle weakness.   Gastrointestinal: Negative for abdominal pain and bowel incontinence.   Genitourinary: Negative for bladder incontinence and nocturia.   Neurological: Negative for disturbances in coordination, loss of balance and seizures.   Psychiatric/Behavioral: Negative for depression. The patient does not have insomnia.    Allergic/Immunologic: Negative for hives and persistent infections.     PHYSICAL EXAM:  GEN: A&Ox3, WD WN NAD  HEENT: WNL  CHEST: CTAB, no W/R/R  HEART: RRR, no M/R/G   ABD: Soft, NT ND, BS x4 QUADS  MS: Refer to previous note for detailed MS exam  NEURO: CN II-XII intact       The surgical consent was then reviewed with the patient, who agreed with all the contents of the consent form and it was signed. she was then given the [facility] surgery packet to bring with her to [facility] for the anesthesia portion of her perioperative paperwork.      PHYSICAL THERAPY:  She was also instructed regarding physical therapy and will begin POD # 1-3. She was given a copy of the original prescription to schedule. Another copy of this prescription was also faxed to [facility].    POST OP CARE:instructions were reviewed including care of the wound and dressing after surgery and when she can shower.     PAIN MANAGEMENT: Bernadette Aquino was also given a pain management regime, which includes the TENS unit given to her by James De La Rosa along with the education required for its use. She was also instructed regarding the Polar ice unit that will be in place after surgery and her postoperative pain medications.     PAIN MEDICATION:  Percocet 10/325mg 1 po q 4-6 hours prn pain  Ultram 50 mg one p.o. q.4-6 hours p.r.n. breakthrough pain,   Phenergan 25 mg one p.o. q.4-6 hours p.r.n. nausea and vomiting.  Celebrex 200 mg BID    Patient denies history of seizures.     Patient was instructed to buy and take:  Aspirin 325mg daily x 6 weeks for DVT prophylaxis starting on the evening after surgery.  Patient will also use bilateral TEDs on lower extremities, SCDs during surgery, and early ambulation post-op. If the patient was previously taking 81mg baby aspirin, they were told to not take it will using the above stated aspirin and to restart the 81mg aspirin after completion of the aspirin dose.       Patient was also told to buy over the counter Prilosec medication and take it once daily for GI protection as long as they are taking NSAIDs or Aspirin.    DVT prophylaxis was discussed with the patient today including risk factors for developing DVTs and history of DVTs. The patient was asked if any specific recommendations were given from the doctor/s that did pre-operative surgical clearance.      Patient was asked if they were taking or using OCP pills or devices. If they answered yes, then they were instructed to stop using OCPs at this pre-operative appointment until 2  months post-op to help prevent DVT development. They understand that there are other forms of birth control that do not involve hormones. They expressed understanding that ignoring/not following this instruction could result in a DVT which could turn into a deadly pulmonary embolism.      The patient was told that narcotic pain medications may make them drowsy and instructions were given to not sign legal documents, drive or operate heavy machinery, cars, or equipment while under the influence of narcotic medications.     As there were no other questions to be asked, she was given my business card along with Shalonda Altman MD business card if she has any questions or concerns prior to surgery or in the postop period.      Topical Sulfur Applications Counseling: Topical Sulfur Counseling: Patient counseled that this medication may cause skin irritation or allergic reactions.  In the event of skin irritation, the patient was advised to reduce the amount of the drug applied or use it less frequently.   The patient verbalized understanding of the proper use and possible adverse effects of topical sulfur application.  All of the patient's questions and concerns were addressed.

## 2023-06-14 ENCOUNTER — CLINICAL SUPPORT (OUTPATIENT)
Dept: REHABILITATION | Facility: HOSPITAL | Age: 73
End: 2023-06-14
Payer: MEDICARE

## 2023-06-14 DIAGNOSIS — M53.82 WEAKNESS OF NECK: ICD-10-CM

## 2023-06-14 DIAGNOSIS — R29.898 DECREASED RANGE OF MOTION OF NECK: Primary | ICD-10-CM

## 2023-06-14 PROCEDURE — 97112 NEUROMUSCULAR REEDUCATION: CPT | Mod: CQ

## 2023-06-14 PROCEDURE — 97110 THERAPEUTIC EXERCISES: CPT | Mod: CQ

## 2023-06-14 NOTE — PROGRESS NOTES
[facility]      Name: Bernadette Aquino  Clinic Number: [id number]    Therapy Diagnosis:   Encounter Diagnoses   Name Primary?    Decreased range of motion of neck Yes    Weakness of neck      Physician:  Tasneem Elizalde PA-C    Visit Date: 2023    Physician Orders: PT Eval and Treat   Medical Diagnosis from Referral:   M47.819 (ICD-10-CM) - Degenerative arthropathy of spinal facet joint   M47.812 (ICD-10-CM) - Spondylosis without myelopathy or radiculopathy, cervical region   Evaluation Date: 2023  Authorization Period Expiration: 2023  Plan of Care Expiration: 2023  Reassessment Due: 2023  Visit # / Visits authorized:      Time In: 2:25 PM  Time Out:  3:25 PM  Total Billable Time: 55 minutes  Insurance: Fee for service Insurance Patient     Precautions: Standard, HTN, Osteopenia, Hx of R RTC repair     Pattern of pain determined: 1 OLEKSANDR    Subjective   Bernadette reports continued chronic neck pain/stiffness that is rated as tolerable currently and slightly improved from last week. States that she usually feels better after ex's here but doesn't last as long as she would like. States that she remains active with playing tennis without c/o.     Patient reports tolerating previous visit No c/o.  Patient reports their pain to be 3/10 on a 0-10 scale with 0 being no pain and 10 being the worst pain imaginable.  Pain Location: neck bilateral       Work and leisure: Retired/Tennis  Pt goals: decrease pain and move better, drive with less difficulty, tennis with less pain    Objective     Baseline Isometric Testing on Med X equipment:  Testing administered by PT  Date of testin2023  ROM 33-84 deg   Max Peak Torque 144    Min Peak Torque 48    Flex/Ext Ratio 3:1   % below normative data 47%       Cervical Range of Motion     ROM Loss Initial  ROM Loss 2023   Flexion minimal loss Minimal loss   Extension minimal loss Minimal loss   Side bending Right  moderate loss Moderate loss   Side bending Left moderate loss Moderate loss   Rotation Right moderate loss Moderate loss   Rotation Left moderate loss Major loss   Protraction moderate loss    Retraction  moderate loss         Outcomes:  Initial score: 51% limitation (completed 2nd visit 5/16/23)  Visit 5 score: 41%  Goal: 42% Limitation    Treatment    Bernadette received the treatments listed below:      Bernadette received neuromuscular education  to isolate and engage spinal stabilization musculature correctly for motor control and coordination to aid in function and posture for 10 minutes on the Medical Medx Machine.  Patient performed MedX dynamic exercise with emphasis on spinal muscular control using pacer throughout  active range of motion. Therapist assisted patient in achieving optimal exertion for neural reeducation and endurance training by using the  Brayden Exertion Rating scale, by instructing the patient to aim for mid range of exertion, performing 15-20 repetitions, slowly, correctly,and safely.        [facility] - Short 6/14/2023   Visit Number 7   VAS Pain Rating 3   Treadmill Time (in min.) 5   Retraction in Lying 10   Retraction in Sitting 20   Retraction with Extension 10   Rotation 5   Scapular Retraction 10   Manual Therapy 5   Cervical Extension - Seat Pad -   Seat Adjustment -   Top Dead Center -   Counterweight -   Cervical Flexion 90   Cervical Extension 33   Cervical Peak Torque -   Cervical Weight 120   Repetitions 18   Rating of Perceived Exertion 4          therapeutic exercises to develop strength, endurance, ROM, flexibility, posture, and core stabilization for 45 minutes including:    UT stretch 2 x 20 sec  Levator scap stretch 2 x 20 sec  RIS x 10, x 10 Self OP (cues)  Cervical rotational stretch with towel 5 x 5 sec  Cervical retract w/extension w/towel x 10  Seated scap retract/no money w/ RTB x 15, 3 sec  RIL x 10  open book x 10  serratus wall slides x 10  +seated thoracic ext  w/1/2 foam roll x 10    Peripheral muscle strengthening which included 1 set of 15-20 repetitions at a slow, controlled 10-13 second per rep pace focused on strengthening supporting musculature for improved body mechanics and functional mobility.  Pt and therapist focused on proper form during treatment to ensure optimal strengthening of each targeted muscle group.  Machines were utilized including torso rotation, leg press, hip abd and hip add, leg ext.  Leg curl, triceps, bicep (DB), chest (T-band) and row added visit 3    manual therapy techniques:   for 5 minutes, including: STM to cervical paraspinals and UT    cold pack for 5 minutes to neck.    Home Exercises Provided and Patient Education Provided   Home exercises include:Upper trap stretch ,Levator scap stretch,RIL ,Seated scap retraction, open book   Cardio program: 6/1/2023  Lifting education date:Plan visit 11  Posture/Lumbar roll: information provided on eval, not received yet.  Fridge Magnet Discharge handout (date given):  Equipment at home/gym membership: Belongs to [employer]    Education provided:   - Cues w/ex's    Written Home Exercises Provided: Patient instructed to cont prior HEP.   Exercises were reviewed and Bernadette was able to demonstrate them prior to the end of the session.  Bernadette demonstrated good  understanding of the education provided.     See EMR under Patient Instructions for exercises provided prior visits    Assessment   Bernadette returns with continued report of neck discomfort/stiffness which is improved from last week and rated as 3/10.  Treatment continued with cervical/scapulothoracic flexibility and strengthening ex's. Added seated thoracic extension stretch with 1/2 foam roll which felt good per subjective report.   She still requires some verbal and tactile cues for ex's at times but able to perform without increased symptoms. Continued manual techniques to include STM prior to MedX performance which provides some relief.  Cervical MedX flexion ROM was increased to 90 degrees and resistance was maintained at 120 in/lbs in/lbs completing 18 reps with a RPE = 4/10. Modifications were made for peripheral strengthening for Bicep curls (5# DB)  and chest press (GTB) due to previous report of shoulder pain. Will look to progress per HB protocol and patient tolerance.      Patient is making  progress towards established goals.  Pt will continue to benefit from skilled outpatient physical therapy to address the deficits stated in the impairment chart, provide pt/family education and to maximize pt's level of independence in the home and community environment.     Anticipated Barriers for therapy: none  Pt's spiritual, cultural and educational needs considered and pt agreeable to plan of care and goals as stated below:     GOALS: Pt is in agreement with the following goals.     Short term goals: 6 weeks or 10 visits   1.  Pt will demonstratte increased cervical ROM as measured by med ex by 4 degrees from initial test which results in improved  ROM of neck for ease with ADLs and driving  (approp and ongoing)  2. Pt will demonstrate independence with reducing or controlling symptoms with ther ex, movement, or position independently, able to reduce pain 1-2 points on pain scale using strategies taught in therapy  (approp and ongoing)  3. Pt will demonstrate increased MedX average isometric strength value by 25% with  when compared to the initial testing resulting in improved ability to perform bending, lifting, and carrying activities safely, confidently.  (approp and ongoing)     Long term goals: 10 weeks or 20 visits  1. Pt will demonstratte increased cervical ROM as measured by med ex by 7 degrees from initial test which results in functional ROM of neck for ease with ADLs and driving  (approp and ongoing)  2. Pt will demonstrate increased MedX average isometric strength value  by 50% from initial test to improve ability to lift and carry, and  sustain good posture while performing ADL's  (approp and ongoing)  3.Pt will demonstrate reduced pain and improved functional outcomes as reported on the FOTO by reaching a limitation score of < or = 10% decrease in limitation% or less in order to demonstrate subjective improvement in pt's condition.    (approp and ongoing)  4. Pt will demonstrate independence with reducing or controlling symptoms with ther ex, movement, or position independently, able to reduce pain 2-4 points on pain scale using strategies taught in therapy  (approp and ongoing)  5. Pt will demonstrate independence with the HEP at discharge.   (approp and ongoing)  6.  Pt will be able to drive with no difficulty. (patient goal)  (approp and ongoing)    Plan   Continue with established Plan of Care towards established PT goals.     Therapist: Noe Mckeon, PTA  6/14/2023

## 2023-06-20 ENCOUNTER — CLINICAL SUPPORT (OUTPATIENT)
Dept: REHABILITATION | Facility: HOSPITAL | Age: 73
End: 2023-06-20
Payer: MEDICARE

## 2023-06-20 DIAGNOSIS — R29.898 DECREASED RANGE OF MOTION OF NECK: Primary | ICD-10-CM

## 2023-06-20 DIAGNOSIS — M53.82 WEAKNESS OF NECK: ICD-10-CM

## 2023-06-20 PROCEDURE — 97112 NEUROMUSCULAR REEDUCATION: CPT

## 2023-06-20 PROCEDURE — 97110 THERAPEUTIC EXERCISES: CPT

## 2023-06-20 NOTE — PROGRESS NOTES
[facility] Back Physical Therapy Treatment      Name: Bernadette Aquino  Clinic Number: [id number]    Therapy Diagnosis:   Encounter Diagnoses   Name Primary?    Decreased range of motion of neck Yes    Weakness of neck      Physician:  Tasneem Elizalde PA-C    Visit Date: 2023    Physician Orders: PT Eval and Treat   Medical Diagnosis from Referral:   M47.819 (ICD-10-CM) - Degenerative arthropathy of spinal facet joint   M47.812 (ICD-10-CM) - Spondylosis without myelopathy or radiculopathy, cervical region   Evaluation Date: 2023  Authorization Period Expiration: 2023  Plan of Care Expiration: 2023  Reassessment Due: 2023  Visit # / Visits authorized:      Time In: 9:05 AM  Time Out:  10:05 AM  Total Billable Time: 55 minutes  Insurance: Fee for service Insurance Patient     Precautions: Standard, HTN, Osteopenia, Hx of R RTC repair     Pattern of pain determined: 1 OLEKSANDR Fleming reports she is having a good day so far with no c/o neck pain currently.    Patient reports tolerating previous visit No c/o.  Patient reports their pain to be 0/10 on a 0-10 scale with 0 being no pain and 10 being the worst pain imaginable.  Pain Location: neck bilateral       Work and leisure: Retired/Tennis  Pt goals: decrease pain and move better, drive with less difficulty, tennis with less pain    Objective     Baseline Isometric Testing on Med X equipment:  Testing administered by PT  Date of testin2023  ROM 33-84 deg   Max Peak Torque 144    Min Peak Torque 48    Flex/Ext Ratio 3:1   % below normative data 47%       Cervical Range of Motion     ROM Loss Initial  ROM Loss 2023   Flexion minimal loss Minimal loss   Extension minimal loss Minimal loss   Side bending Right moderate loss Moderate loss   Side bending Left moderate loss Moderate loss   Rotation Right moderate loss Moderate loss   Rotation Left moderate loss Major loss   Protraction moderate loss    Retraction   moderate loss         Outcomes:  Initial score: 51% limitation (completed 2nd visit 5/16/23)  Visit 5 score: 41%  Goal: 42% Limitation    Treatment    Bernadette received the treatments listed below:      Bernadette received neuromuscular education  to isolate and engage spinal stabilization musculature correctly for motor control and coordination to aid in function and posture for 10 minutes on the Medical Medx Machine.  Patient performed MedX dynamic exercise with emphasis on spinal muscular control using pacer throughout  active range of motion. Therapist assisted patient in achieving optimal exertion for neural reeducation and endurance training by using the  Brayden Exertion Rating scale, by instructing the patient to aim for mid range of exertion, performing 15-20 repetitions, slowly, correctly,and safely.        [facility] 6/14/2023   Visit Number 7   VAS Pain Rating 3   Treadmill Time (in min.) 5   Cervical Stretches - Retraction In Lying 10   Retraction in Sitting 20   Retraction with Extension 10   Rotation 5   Scapular Retraction 10   Manual Therapy 5   Cervical Extension Seat Pad -   Seat Adjustment -   Top Dead Center -   Counterweight -   Cervical Flexion 90   Cervical Extension 33   Cervical Peak Torque -   Cervical Weight 120   Repetitions 18   Rating of Perceived Exertion 4   Ice - Z Lie (in min.) 5           therapeutic exercises to develop strength, endurance, ROM, flexibility, posture, and core stabilization for 45 minutes including:    UT stretch 2 x 20 sec  Levator scap stretch 2 x 20 sec  RIS x 10, x 10 Self OP (cues)   Cervical rotational stretch with towel 10 x 5 sec  Cervical retract w/extension w/towel x 10  Seated scap retract/no money w/ GTB x 15, 3 sec  RIL x 10 -NP  open book x 10  serratus wall slides x 10 -NP  seated thoracic ext w/1/2 foam roll x 15    Peripheral muscle strengthening which included 1 set of 15-20 repetitions at a slow, controlled 10-13 second per rep pace focused on  strengthening supporting musculature for improved body mechanics and functional mobility.  Pt and therapist focused on proper form during treatment to ensure optimal strengthening of each targeted muscle group.  Machines were utilized including torso rotation, leg press, hip abd and hip add, leg ext.  Leg curl, triceps, bicep (DB), chest (T-band) and row added visit 3    manual therapy techniques:   for 0 minutes, including: STM to cervical paraspinals and UT - not performed    cold pack for 5 minutes to neck.    Home Exercises Provided and Patient Education Provided   Home exercises include:Upper trap stretch ,Levator scap stretch,RIL ,Seated scap retraction, open book   Cardio program: 6/1/2023  Lifting education date:Plan visit 11  Posture/Lumbar roll: information provided on eval, not received yet.  Fridge Magnet Discharge handout (date given):  Equipment at home/gym membership: Belongs to [employer]    Education provided:   - Cues w/ex's    Written Home Exercises Provided: Patient instructed to cont prior HEP.   Exercises were reviewed and Bernadette was able to demonstrate them prior to the end of the session.  Bernadette demonstrated good  understanding of the education provided.     See EMR under Patient Instructions for exercises provided prior visits    Assessment   Bernadette returns with no c/o neck pain/stiffness currently. Treatment continued with cervical/scapulothoracic flexibility and strengthening ex's. Progressed no money to GTB, progressed reps for cervical rotation and seated thoracic extension. She still requires some verbal and tactile cues for ex's at times but able to perform without increased symptoms. Cervical MedX resistance was maintained at 120 in/lbs with pt completing 20 reps with a RPE = 4/10. MedX ROM extension progressed to 30 degrees. Modifications were made for peripheral strengthening for Bicep curls (5# DB)  and chest press (GTB) due to previous report of shoulder pain. Increase MedX by 5%  next visit.     Patient is making  progress towards established goals.  Pt will continue to benefit from skilled outpatient physical therapy to address the deficits stated in the impairment chart, provide pt/family education and to maximize pt's level of independence in the home and community environment.     Anticipated Barriers for therapy: none  Pt's spiritual, cultural and educational needs considered and pt agreeable to plan of care and goals as stated below:     GOALS: Pt is in agreement with the following goals.     Short term goals: 6 weeks or 10 visits   1.  Pt will demonstratte increased cervical ROM as measured by med ex by 4 degrees from initial test which results in improved  ROM of neck for ease with ADLs and driving  (approp and ongoing)  2. Pt will demonstrate independence with reducing or controlling symptoms with ther ex, movement, or position independently, able to reduce pain 1-2 points on pain scale using strategies taught in therapy  (approp and ongoing)  3. Pt will demonstrate increased MedX average isometric strength value by 25% with  when compared to the initial testing resulting in improved ability to perform bending, lifting, and carrying activities safely, confidently.  (approp and ongoing)     Long term goals: 10 weeks or 20 visits  1. Pt will demonstratte increased cervical ROM as measured by med ex by 7 degrees from initial test which results in functional ROM of neck for ease with ADLs and driving  (approp and ongoing)  2. Pt will demonstrate increased MedX average isometric strength value  by 50% from initial test to improve ability to lift and carry, and sustain good posture while performing ADL's  (approp and ongoing)  3.Pt will demonstrate reduced pain and improved functional outcomes as reported on the FOTO by reaching a limitation score of < or = 10% decrease in limitation% or less in order to demonstrate subjective improvement in pt's condition.    (approp and ongoing)  4. Pt  will demonstrate independence with reducing or controlling symptoms with ther ex, movement, or position independently, able to reduce pain 2-4 points on pain scale using strategies taught in therapy  (approp and ongoing)  5. Pt will demonstrate independence with the HEP at discharge.   (approp and ongoing)  6.  Pt will be able to drive with no difficulty. (patient goal)  (approp and ongoing)    Plan   Continue with established Plan of Care towards established PT goals.     Therapist: Trice Piña, PT  6/20/2023

## 2023-06-22 ENCOUNTER — CLINICAL SUPPORT (OUTPATIENT)
Dept: REHABILITATION | Facility: HOSPITAL | Age: 73
End: 2023-06-22
Payer: MEDICARE

## 2023-06-22 DIAGNOSIS — M53.82 WEAKNESS OF NECK: ICD-10-CM

## 2023-06-22 DIAGNOSIS — R29.898 DECREASED RANGE OF MOTION OF NECK: Primary | ICD-10-CM

## 2023-06-22 PROCEDURE — 97112 NEUROMUSCULAR REEDUCATION: CPT | Mod: CQ

## 2023-06-22 PROCEDURE — 97110 THERAPEUTIC EXERCISES: CPT | Mod: CQ

## 2023-06-22 NOTE — PROGRESS NOTES
[facility]      Name: Bernadette Aquino  Clinic Number: [id number]    Therapy Diagnosis:   Encounter Diagnoses   Name Primary?    Decreased range of motion of neck Yes    Weakness of neck        Physician:  Tasneem Elizalde PA-C    Visit Date: 2023    Physician Orders: PT Eval and Treat   Medical Diagnosis from Referral:   M47.819 (ICD-10-CM) - Degenerative arthropathy of spinal facet joint   M47.812 (ICD-10-CM) - Spondylosis without myelopathy or radiculopathy, cervical region   Evaluation Date: 2023  Authorization Period Expiration: 2023  Plan of Care Expiration: 2023  Reassessment Due: 2023  Visit # / Visits authorized:      Time In: 1:30 PM    Time Out: 2:25 PM  Total Billable Time: 55 minutes  Insurance: Fee for service Insurance Patient     Precautions: Standard, HTN, Osteopenia, Hx of R RTC repair     Pattern of pain determined: 1 OLEKSANDR    Zaid Fleming reports having some good days and some not as good. She reports minimal neck pain and stiffness currently. States that she remains active w/playing tennis.  She also reports onset of some (L) lower back/glute and posterior leg symptoms recently.    Patient reports tolerating previous visit No c/o.  Patient reports their pain to be 3/10 on a 0-10 scale with 0 being no pain and 10 being the worst pain imaginable.  Pain Location: neck bilateral       Work and leisure: Retired/Tennis  Pt goals: decrease pain and move better, drive with less difficulty, tennis with less pain    Objective     Baseline Isometric Testing on Med X equipment:  Testing administered by PT  Date of testin2023  ROM 33-84 deg   Max Peak Torque 144    Min Peak Torque 48    Flex/Ext Ratio 3:1   % below normative data 47%       Cervical Range of Motion     ROM Loss Initial  ROM Loss 2023   Flexion minimal loss Minimal loss   Extension minimal loss Minimal loss   Side bending Right moderate loss Moderate loss   Side  bending Left moderate loss Moderate loss   Rotation Right moderate loss Moderate loss   Rotation Left moderate loss Major loss   Protraction moderate loss    Retraction  moderate loss         Outcomes:  Initial score: 51% limitation (completed 2nd visit 5/16/23)  Visit 5 score: 41%  Goal: 42% Limitation    Treatment    Bernadette received the treatments listed below:      Bernadette received neuromuscular education  to isolate and engage spinal stabilization musculature correctly for motor control and coordination to aid in function and posture for 10 minutes on the Medical Medx Machine.  Patient performed MedX dynamic exercise with emphasis on spinal muscular control using pacer throughout  active range of motion. Therapist assisted patient in achieving optimal exertion for neural reeducation and endurance training by using the  Brayden Exertion Rating scale, by instructing the patient to aim for mid range of exertion, performing 15-20 repetitions, slowly, correctly,and safely.        [facility] - Short 6/22/2023   Visit Number 9   VAS Pain Rating 3   Treadmill Time (in min.) 5   Retraction in Lying -   Retraction in Sitting 20   Retraction with Extension 10   Rotation 10   Scapular Retraction 15   Manual Therapy -   Cervical Extension - Seat Pad -   Seat Adjustment -   Top Dead Center -   Counterweight -   Cervical Flexion -   Cervical Extension -   Cervical Peak Torque -   Cervical Weight 126   Repetitions 15   Rating of Perceived Exertion 4            therapeutic exercises to develop strength, endurance, ROM, flexibility, posture, and core stabilization for 45 minutes including:    UT stretch 2 x 20 sec  Levator scap stretch 2 x 20 sec  RIS x 10, x 10 Self OP (cues)   Cervical rotational stretch with towel 10 x 5 sec  Cervical retract w/extension w/towel x 10  Seated scap retract/no money w/ GTB x 15, 3 sec  RIL x 10 -NP  open book x 10 (standing)  serratus wall slides x 10 -NP  seated thoracic ext w/1/2 foam roll x  10    Peripheral muscle strengthening which included 1 set of 15-20 repetitions at a slow, controlled 10-13 second per rep pace focused on strengthening supporting musculature for improved body mechanics and functional mobility.  Pt and therapist focused on proper form during treatment to ensure optimal strengthening of each targeted muscle group.  Machines were utilized including torso rotation, leg press, hip abd and hip add, leg ext.  Leg curl, triceps, bicep (DB), chest (T-band) and row added visit 3    manual therapy techniques:   for 0 minutes, including: STM to cervical paraspinals and UT - not performed    cold pack for 5 minutes to neck.    Home Exercises Provided and Patient Education Provided   Home exercises include:Upper trap stretch ,Levator scap stretch,RIL ,Seated scap retraction, open book   Cardio program: 6/1/2023  Lifting education date:Plan visit 11  Posture/Lumbar roll: information provided on eval, not received yet.  Fridge Magnet Discharge handout (date given):  Equipment at home/gym membership: Belongs to [employer]    Education provided:   - Cues w/ex's    Written Home Exercises Provided: Patient instructed to cont prior HEP.   Exercises were reviewed and Bernadette was able to demonstrate them prior to the end of the session.  Bernadette demonstrated good  understanding of the education provided.     See EMR under Patient Instructions for exercises provided prior visits    Assessment   Bernadette returns with minimal c/o neck pain/stiffness currently. Treatment continued with cervical/scapulothoracic flexibility and strengthening ex's. Open book ex performed in standing today as an alternative. She still requires some verbal and tactile cues for ex's at times but able to perform without increased symptoms. Cervical MedX resistance was increased to 126 in/lbs completing 15 reps with a RPE =4/10. Modifications were made for peripheral strengthening for Bicep curls (6# DB) and chest press (BTB) due to  previous report of shoulder pain.Will look to progress per HB protocol and patient tolerance.    Patient is making  progress towards established goals.  Pt will continue to benefit from skilled outpatient physical therapy to address the deficits stated in the impairment chart, provide pt/family education and to maximize pt's level of independence in the home and community environment.     Anticipated Barriers for therapy: none  Pt's spiritual, cultural and educational needs considered and pt agreeable to plan of care and goals as stated below:     GOALS: Pt is in agreement with the following goals.     Short term goals: 6 weeks or 10 visits   1.  Pt will demonstratte increased cervical ROM as measured by med ex by 4 degrees from initial test which results in improved  ROM of neck for ease with ADLs and driving  (approp and ongoing)  2. Pt will demonstrate independence with reducing or controlling symptoms with ther ex, movement, or position independently, able to reduce pain 1-2 points on pain scale using strategies taught in therapy  (approp and ongoing)  3. Pt will demonstrate increased MedX average isometric strength value by 25% with  when compared to the initial testing resulting in improved ability to perform bending, lifting, and carrying activities safely, confidently.  (approp and ongoing)     Long term goals: 10 weeks or 20 visits  1. Pt will demonstratte increased cervical ROM as measured by med ex by 7 degrees from initial test which results in functional ROM of neck for ease with ADLs and driving  (approp and ongoing)  2. Pt will demonstrate increased MedX average isometric strength value  by 50% from initial test to improve ability to lift and carry, and sustain good posture while performing ADL's  (approp and ongoing)  3.Pt will demonstrate reduced pain and improved functional outcomes as reported on the FOTO by reaching a limitation score of < or = 10% decrease in limitation% or less in order to  demonstrate subjective improvement in pt's condition.    (approp and ongoing)  4. Pt will demonstrate independence with reducing or controlling symptoms with ther ex, movement, or position independently, able to reduce pain 2-4 points on pain scale using strategies taught in therapy  (approp and ongoing)  5. Pt will demonstrate independence with the HEP at discharge.   (approp and ongoing)  6.  Pt will be able to drive with no difficulty. (patient goal)  (approp and ongoing)    Plan   Continue with established Plan of Care towards established PT goals.     Therapist: Noe Mckeon, PTA  6/22/2023

## 2023-06-27 ENCOUNTER — CLINICAL SUPPORT (OUTPATIENT)
Dept: REHABILITATION | Facility: HOSPITAL | Age: 73
End: 2023-06-27
Payer: MEDICARE

## 2023-06-27 DIAGNOSIS — M53.82 WEAKNESS OF NECK: ICD-10-CM

## 2023-06-27 DIAGNOSIS — R29.898 DECREASED RANGE OF MOTION OF NECK: Primary | ICD-10-CM

## 2023-06-27 PROCEDURE — 97112 NEUROMUSCULAR REEDUCATION: CPT

## 2023-06-27 PROCEDURE — 97110 THERAPEUTIC EXERCISES: CPT

## 2023-06-27 NOTE — PROGRESS NOTES
[facility] Back Physical Therapy Treatment      Name: Bernadette Aquino  Clinic Number: [id number]    Therapy Diagnosis:   Encounter Diagnoses   Name Primary?    Decreased range of motion of neck Yes    Weakness of neck      Physician:  Tasneem Elizalde PA-C    Visit Date: 2023    Physician Orders: PT Eval and Treat   Medical Diagnosis from Referral:   M47.819 (ICD-10-CM) - Degenerative arthropathy of spinal facet joint   M47.812 (ICD-10-CM) - Spondylosis without myelopathy or radiculopathy, cervical region   Evaluation Date: 2023  Authorization Period Expiration: 2023  Plan of Care Expiration: 2023  Reassessment Due: 2023  Visit # / Visits authorized: 10/20  (Do FOTO next visit)     Time In: 2:35 PM    Time Out: 3:35 PM  Total Billable Time: 55 minutes  Insurance: Fee for service Insurance Patient     Precautions: Standard, HTN, Osteopenia, Hx of R RTC repair     Pattern of pain determined: 1 OLEKSANDR    Zaid Fleming reports she continues to have some good days and some not as good. States she woke up with increased pain today and took tylenol to help calm it down. She reports also feeling mid back pain.     Patient reports tolerating previous visit No c/o.  Patient reports their pain to be 4/10 on a 0-10 scale with 0 being no pain and 10 being the worst pain imaginable.  Pain Location: neck bilateral       Work and leisure: Retired/Tennis  Pt goals: decrease pain and move better, drive with less difficulty, tennis with less pain    Objective     Baseline Isometric Testing on Med X equipment:  Testing administered by PT  Date of testin2023  ROM 33-84 deg   Max Peak Torque 144    Min Peak Torque 48    Flex/Ext Ratio 3:1   % below normative data 47%     Date of testin2023  ROM 30-90 deg   Max Peak Torque 167   Min Peak Torque 80   Flex/Ext Ratio 2.1:1   % below normative data 29%   27% strength improvement    Cervical Range of Motion     ROM Loss Initial  ROM Loss  6/1/2023   Flexion minimal loss Minimal loss   Extension minimal loss Minimal loss   Side bending Right moderate loss Moderate loss   Side bending Left moderate loss Moderate loss   Rotation Right moderate loss Moderate loss   Rotation Left moderate loss Major loss   Protraction moderate loss    Retraction  moderate loss         Outcomes:  Initial score: 51% limitation (completed 2nd visit 5/16/23)  Visit 5 score: 41%  Visit 11 score:  Goal: 42% Limitation    Treatment    Bernadette received the treatments listed below:      Bernadette received neuromuscular education  to isolate and engage spinal stabilization musculature correctly for motor control and coordination to aid in function and posture for 10 minutes on the Medical Medx Machine.  Patient performed MedX dynamic exercise with emphasis on spinal muscular control using pacer throughout  active range of motion. Therapist assisted patient in achieving optimal exertion for neural reeducation and endurance training by using the  Brayden Exertion Rating scale, by instructing the patient to aim for mid range of exertion, performing 15-20 repetitions, slowly, correctly,and safely.       [facility] 6/27/2023   Visit Number 10   VAS Pain Rating 4   Treadmill Time (in min.) 5   Cervical Stretches - Retraction In Lying -   Retraction in Sitting 20   Retraction with Extension 10   Rotation 10   Scapular Retraction 15   Manual Therapy -   Cervical Extension Seat Pad -   Seat Adjustment -   Top Dead Center -   Counterweight -   Cervical Flexion 90   Cervical Extension 30   Cervical Peak Torque 167   Cervical Weight -   Repetitions -   Rating of Perceived Exertion -   Ice - Z Lie (in min.) 5                therapeutic exercises to develop strength, endurance, ROM, flexibility, posture, and core stabilization for 45 minutes including:     UT stretch 2 x 20 sec  Levator scap stretch 2 x 20 sec  RIS x 10, x 10 Self OP (cues)   Cervical rotational stretch with towel 10 x 5  sec  Cervical retract w/extension w/towel x 10  Seated scap retract/no money w/ GTB x 15, 3 sec  RIL x 10 -NP  open book x 10 (standing)  serratus wall slides x 10 -NP  seated thoracic ext w/1/2 foam roll x15    Peripheral muscle strengthening which included 1 set of 15-20 repetitions at a slow, controlled 10-13 second per rep pace focused on strengthening supporting musculature for improved body mechanics and functional mobility.  Pt and therapist focused on proper form during treatment to ensure optimal strengthening of each targeted muscle group.  Machines were utilized including torso rotation, leg press, hip abd and hip add, leg ext.  Leg curl, triceps, bicep (DB), chest (T-band) and row added visit 3    manual therapy techniques:   for 0 minutes, including: STM to cervical paraspinals and UT - not performed    cold pack for 5 minutes to neck.    Home Exercises Provided and Patient Education Provided   Home exercises include:Upper trap stretch ,Levator scap stretch,RIL ,Seated scap retraction, open book   Cardio program: 6/1/2023  Lifting education date: Plan visit 11  Posture/Lumbar roll: information provided on eval, not received yet.  Fridge Magnet Discharge handout (date given):  Equipment at home/gym membership: Belongs to [employer]    Education provided:   - Cues w/ex's    Written Home Exercises Provided: Patient instructed to cont prior HEP.   Exercises were reviewed and Bernadette was able to demonstrate them prior to the end of the session.  Bernadette demonstrated good  understanding of the education provided.     See EMR under Patient Instructions for exercises provided prior visits    Assessment   Bernadette returns with continued report of chronic neck pain/stiffness. Treatment continued with cervical/scapulothoracic flexibility and strengthening ex's. Open book ex performed in standing today as an alternative. Cervical MedX retest performed today and she demonstrated 27% improvement in average isometric  strength. She demo improved ROM from 33-86 deg initially to 30-90 deg. Modifications were made for peripheral strengthening for Bicep curls (6# DB) and chest press (BTB) due to previous report of shoulder pain. Will look to progress per HB protocol and patient tolerance.    Patient is making  progress towards established goals.  Pt will continue to benefit from skilled outpatient physical therapy to address the deficits stated in the impairment chart, provide pt/family education and to maximize pt's level of independence in the home and community environment.     Anticipated Barriers for therapy: none  Pt's spiritual, cultural and educational needs considered and pt agreeable to plan of care and goals as stated below:     GOALS: Pt is in agreement with the following goals.     Short term goals: 6 weeks or 10 visits   1.  Pt will demonstratte increased cervical ROM as measured by med ex by 4 degrees from initial test which results in improved  ROM of neck for ease with ADLs and driving  (approp and ongoing)  2. Pt will demonstrate independence with reducing or controlling symptoms with ther ex, movement, or position independently, able to reduce pain 1-2 points on pain scale using strategies taught in therapy  (approp and ongoing)  3. Pt will demonstrate increased MedX average isometric strength value by 25% with  when compared to the initial testing resulting in improved ability to perform bending, lifting, and carrying activities safely, confidently.  (approp and ongoing)     Long term goals: 10 weeks or 20 visits  1. Pt will demonstratte increased cervical ROM as measured by med ex by 7 degrees from initial test which results in functional ROM of neck for ease with ADLs and driving  (approp and ongoing)  2. Pt will demonstrate increased MedX average isometric strength value  by 50% from initial test to improve ability to lift and carry, and sustain good posture while performing ADL's  (approp and ongoing)  3.Pt  will demonstrate reduced pain and improved functional outcomes as reported on the FOTO by reaching a limitation score of < or = 10% decrease in limitation% or less in order to demonstrate subjective improvement in pt's condition.    (approp and ongoing)  4. Pt will demonstrate independence with reducing or controlling symptoms with ther ex, movement, or position independently, able to reduce pain 2-4 points on pain scale using strategies taught in therapy  (approp and ongoing)  5. Pt will demonstrate independence with the HEP at discharge.   (approp and ongoing)  6.  Pt will be able to drive with no difficulty. (patient goal)  (approp and ongoing)    Plan   Continue with established Plan of Care towards established PT goals.     Therapist: Trice Piña, PT  6/27/2023

## 2023-06-29 ENCOUNTER — CLINICAL SUPPORT (OUTPATIENT)
Dept: REHABILITATION | Facility: HOSPITAL | Age: 73
End: 2023-06-29
Payer: MEDICARE

## 2023-06-29 DIAGNOSIS — M53.82 WEAKNESS OF NECK: ICD-10-CM

## 2023-06-29 DIAGNOSIS — R29.898 DECREASED RANGE OF MOTION OF NECK: Primary | ICD-10-CM

## 2023-06-29 PROCEDURE — 97110 THERAPEUTIC EXERCISES: CPT

## 2023-06-29 PROCEDURE — 97112 NEUROMUSCULAR REEDUCATION: CPT

## 2023-06-29 NOTE — PROGRESS NOTES
[facility] Back Physical Therapy Treatment      Name: Bernadette Aquino  Clinic Number: [id number]    Therapy Diagnosis:   Encounter Diagnoses   Name Primary?    Decreased range of motion of neck Yes    Weakness of neck      Physician:  Tasneem Elizalde PA-C    Visit Date: 2023    Physician Orders: PT Eval and Treat   Medical Diagnosis from Referral:   M47.819 (ICD-10-CM) - Degenerative arthropathy of spinal facet joint   M47.812 (ICD-10-CM) - Spondylosis without myelopathy or radiculopathy, cervical region   Evaluation Date: 2023  Authorization Period Expiration: 2023  Plan of Care Expiration: 2023  Reassessment Due: 2023  Visit # / Visits authorized:       Time In: 1:45 PM (late arrival)  Time Out: 2:35 PM  Total Billable Time: 45 minutes  Insurance: Fee for service Insurance Patient     Precautions: Standard, HTN, Osteopenia, Hx of R RTC repair     Pattern of pain determined: 1 OLEKSANDR    Subjective   Bernadette reports her neck is feeling better today. She arrives 15 min late.     Patient reports tolerating previous visit No c/o.  Patient reports their pain to be 3/10 on a 0-10 scale with 0 being no pain and 10 being the worst pain imaginable.  Pain Location: neck bilateral       Work and leisure: Retired/Tennis  Pt goals: decrease pain and move better, drive with less difficulty, tennis with less pain    Objective     Baseline Isometric Testing on Med X equipment:  Testing administered by PT  Date of testin2023  ROM 33-84 deg   Max Peak Torque 144    Min Peak Torque 48    Flex/Ext Ratio 3:1   % below normative data 47%     Date of testin2023  ROM 30-90 deg   Max Peak Torque 167   Min Peak Torque 80   Flex/Ext Ratio 2.1:1   % below normative data 29%   27% strength improvement    Cervical Range of Motion     ROM Loss Initial  ROM Loss 2023   Flexion minimal loss Minimal loss   Extension minimal loss Minimal loss   Side bending Right moderate loss Moderate loss   Side  bending Left moderate loss Moderate loss   Rotation Right moderate loss Moderate loss   Rotation Left moderate loss Major loss   Protraction moderate loss    Retraction  moderate loss         Outcomes:  Initial score: 51% limitation (completed 2nd visit 5/16/23)  Visit 5 score: 41%  Visit 11 score: 49%  Goal: 42% Limitation    Treatment    Bernadette received the treatments listed below:      Bernadette received neuromuscular education  to isolate and engage spinal stabilization musculature correctly for motor control and coordination to aid in function and posture for 10 minutes on the Medical Medx Machine.  Patient performed MedX dynamic exercise with emphasis on spinal muscular control using pacer throughout  active range of motion. Therapist assisted patient in achieving optimal exertion for neural reeducation and endurance training by using the  Brayden Exertion Rating scale, by instructing the patient to aim for mid range of exertion, performing 15-20 repetitions, slowly, correctly,and safely.       [facility] 6/29/2023   Visit Number 11   VAS Pain Rating 3   Treadmill Time (in min.) -   Cervical Stretches - Retraction In Lying -   Retraction in Sitting 20   Retraction with Extension 10   Rotation 10   Scapular Retraction 15   Manual Therapy -   Cervical Extension Seat Pad -   Seat Adjustment -   Top Dead Center -   Counterweight -   Cervical Flexion -   Cervical Extension -   Cervical Peak Torque -   Cervical Weight 126   Repetitions 20   Rating of Perceived Exertion 4   Ice - Z Lie (in min.) 5                 therapeutic exercises to develop strength, endurance, ROM, flexibility, posture, and core stabilization for 35 minutes including:      UT stretch 2 x 20 sec - not today due to time   Levator scap stretch 2 x 20 sec - not today due to time  RIS x 10, x 10 Self OP (cues)   Cervical rotational stretch with towel 10 x 5 sec  Cervical retract w/extension w/towel x 10  Seated scap retract/no money w/ GTB x 15, 3  sec  RIL x 10 -NP  open book x 10 (standing)  serratus wall slides x 10 -NP  seated thoracic ext w/1/2 foam roll x15    Peripheral muscle strengthening which included 1 set of 15-20 repetitions at a slow, controlled 10-13 second per rep pace focused on strengthening supporting musculature for improved body mechanics and functional mobility.  Pt and therapist focused on proper form during treatment to ensure optimal strengthening of each targeted muscle group.  Machines were utilized including torso rotation, leg press, hip abd and hip add, leg ext.  Leg curl, triceps, bicep (DB), chest (T-band) and row added visit 3    manual therapy techniques:   for 0 minutes, including: STM to cervical paraspinals and UT - not performed    cold pack for 5 minutes to neck.    Home Exercises Provided and Patient Education Provided   Home exercises include:Upper trap stretch ,Levator scap stretch,RIL ,Seated scap retraction, open book   Cardio program: 6/1/2023  Lifting education date: 6/29/2023  Posture/Lumbar roll: information provided on eval, not received yet.  Fridge Magnet Discharge handout (date given):  Equipment at home/gym membership: Belongs to [email or web address]    Education provided:   - Cues w/ex's    Written Home Exercises Provided: Patient instructed to cont prior HEP.   Exercises were reviewed and Bernadette was able to demonstrate them prior to the end of the session.  Bernadette demonstrated good  understanding of the education provided.     See EMR under Patient Instructions for exercises provided prior visits    Assessment   Bernadette returns with mild neck pain today rated 2-3/10. Treatment continued with cervical/scapulothoracic flexibility and strengthening ex's. Cervical MedX resistance maintained at 126 in/lbs with pt progressing to 20 reps with RPE = 4/10. Pt noted to have improved mobility and MedX ROM progressed to 27-90 degrees. Reviewed lifting do's and don'ts handout and pt demo understanding. Modifications were made for  peripheral strengthening for Bicep curls (7# DB) and chest press (BTB), weight reduced for tricep machine due to previous report of shoulder pain. Progress MedX by 5% next visit.     Patient is making  progress towards established goals.  Pt will continue to benefit from skilled outpatient physical therapy to address the deficits stated in the impairment chart, provide pt/family education and to maximize pt's level of independence in the home and community environment.     Anticipated Barriers for therapy: none  Pt's spiritual, cultural and educational needs considered and pt agreeable to plan of care and goals as stated below:     GOALS: Pt is in agreement with the following goals.     Short term goals: 6 weeks or 10 visits   1.  Pt will demonstratte increased cervical ROM as measured by med ex by 4 degrees from initial test which results in improved  ROM of neck for ease with ADLs and driving  Goal met  2. Pt will demonstrate independence with reducing or controlling symptoms with ther ex, movement, or position independently, able to reduce pain 1-2 points on pain scale using strategies taught in therapy  (approp and ongoing)  3. Pt will demonstrate increased MedX average isometric strength value by 25% with  when compared to the initial testing resulting in improved ability to perform bending, lifting, and carrying activities safely, confidently.  Goal met     Long term goals: 10 weeks or 20 visits  1. Pt will demonstratte increased cervical ROM as measured by med ex by 7 degrees from initial test which results in functional ROM of neck for ease with ADLs and driving  (approp and ongoing)  2. Pt will demonstrate increased MedX average isometric strength value  by 50% from initial test to improve ability to lift and carry, and sustain good posture while performing ADL's  (approp and ongoing)  3.Pt will demonstrate reduced pain and improved functional outcomes as reported on the FOTO by reaching a limitation score  of < or = 10% decrease in limitation% or less in order to demonstrate subjective improvement in pt's condition.    (approp and ongoing)  4. Pt will demonstrate independence with reducing or controlling symptoms with ther ex, movement, or position independently, able to reduce pain 2-4 points on pain scale using strategies taught in therapy  (approp and ongoing)  5. Pt will demonstrate independence with the HEP at discharge.   (approp and ongoing)  6.  Pt will be able to drive with no difficulty. (patient goal)  (approp and ongoing)    Plan   Continue with established Plan of Care towards established PT goals.     Therapist: Trice Piña, PT  6/30/2023

## 2023-07-05 ENCOUNTER — CLINICAL SUPPORT (OUTPATIENT)
Dept: REHABILITATION | Facility: HOSPITAL | Age: 73
End: 2023-07-05
Payer: MEDICARE

## 2023-07-05 DIAGNOSIS — R29.898 DECREASED RANGE OF MOTION OF NECK: Primary | ICD-10-CM

## 2023-07-05 DIAGNOSIS — M53.82 WEAKNESS OF NECK: ICD-10-CM

## 2023-07-05 PROCEDURE — 97112 NEUROMUSCULAR REEDUCATION: CPT

## 2023-07-05 PROCEDURE — 97110 THERAPEUTIC EXERCISES: CPT

## 2023-07-05 NOTE — PROGRESS NOTES
[facility]      Name: Bernadette Aquino  Clinic Number: [id number]    Therapy Diagnosis:   Encounter Diagnoses   Name Primary?    Decreased range of motion of neck Yes    Weakness of neck      Physician:  Tasneem Elizalde PA-C    Visit Date: 2023    Physician Orders: PT Eval and Treat   Medical Diagnosis from Referral:   M47.819 (ICD-10-CM) - Degenerative arthropathy of spinal facet joint   M47.812 (ICD-10-CM) - Spondylosis without myelopathy or radiculopathy, cervical region   Evaluation Date: 2023  Authorization Period Expiration: 2023  Plan of Care Expiration: 2023  Reassessment Due: 23  Visit # / Visits authorized:       Time In: 11  Time Out: 12  Total Billable Time: 55minutes  Insurance: Fee for service Insurance Patient     Precautions: Standard, HTN, Osteopenia, Hx of R RTC repair     Pattern of pain determined: 1 OLEKSANDR    Zaid Fleming reports her neck feels good today because she had a relaxing day yesterday.  Pt states pain is B across back of the cervical region and intensifies when she plays tennis.    Patient reports tolerating previous visit No c/o.  Patient reports their pain to be 3/10 on a 0-10 scale with 0 being no pain and 10 being the worst pain imaginable.  Pain Location: neck bilateral       Work and leisure: Retired/Tennis  Pt goals: decrease pain and move better, drive with less difficulty, tennis with less pain    Objective     Baseline Isometric Testing on Med X equipment:  Testing administered by PT  Date of testin2023  ROM 33-84 deg   Max Peak Torque 144    Min Peak Torque 48    Flex/Ext Ratio 3:1   % below normative data 47%     Date of testin2023  ROM 30-90 deg   Max Peak Torque 167   Min Peak Torque 80   Flex/Ext Ratio 2.1:1   % below normative data 29%   27% strength improvement    Cervical Range of Motion     ROM Loss Initial  ROM Loss 2023   Flexion minimal loss Minimal loss Min loss    Extension minimal loss Minimal loss Min loss   Side bending Right moderate loss Moderate loss Min-mod   Side bending Left moderate loss Moderate loss mod   Rotation Right moderate loss Moderate loss mod   Rotation Left moderate loss Major loss Major loss   Protraction moderate loss  Min loss   Retraction  moderate loss  moderate        Outcomes:  Initial score: 51% limitation (completed 2nd visit 5/16/23)  Visit 5 score: 41%  Visit 11 score: 49%  Goal: 42% Limitation    Treatment    Bernadette received the treatments listed below:      Bernadette received neuromuscular education  to isolate and engage spinal stabilization musculature correctly for motor control and coordination to aid in function and posture for 10 minutes on the Medical Medx Machine.  Patient performed MedX dynamic exercise with emphasis on spinal muscular control using pacer throughout  active range of motion. Therapist assisted patient in achieving optimal exertion for neural reeducation and endurance training by using the  Brayden Exertion Rating scale, by instructing the patient to aim for mid range of exertion, performing 15-20 repetitions, slowly, correctly,and safely.            [facility] 7/5/2023   Visit Number 12   VAS Pain Rating 3   Treadmill Time (in min.) 5   Cervical Stretches - Retraction In Lying -   Retraction in Sitting 20   Retraction with Extension 10   Rotation 10   Scapular Retraction -   Manual Therapy 5   Cervical Extension Seat Pad -   Seat Adjustment -   Top Dead Center -   Counterweight -   Cervical Flexion 96   Cervical Extension 27   Cervical Peak Torque -   Cervical Weight 132   Repetitions 15   Rating of Perceived Exertion 4   Ice - Z Lie (in min.) 5           therapeutic exercises to develop strength, endurance, ROM, flexibility, posture, and core stabilization for 50 minutes including:      UT stretch 2 x 20 sec -    Levator scap stretch 2 x 20 sec -   RIS x 10, x 10 Self OP (cues)   Cervical rotational stretch with  towel 10 x 5 sec  Cervical retract w/extension w/towel x 10  Seated scap retract/no money w/ GTB x 15, 3 sec  RIL x 10 -NP  open book x 10 (standing)  serratus wall slides x 10 -NP  seated thoracic ext w/1/2 foam roll x15    Peripheral muscle strengthening which included 1 set of 15-20 repetitions at a slow, controlled 10-13 second per rep pace focused on strengthening supporting musculature for improved body mechanics and functional mobility.  Pt and therapist focused on proper form during treatment to ensure optimal strengthening of each targeted muscle group.  Machines were utilized including torso rotation, leg press, hip abd and hip add, leg ext.  Leg curl, triceps(inspire), bicep (DB), chest (Inspire)and row added visit 3    manual therapy techniques:   for 5 minutes, including: sub occipital release/gentle manual traction    cold pack for 5 minutes to neck.    Home Exercises Provided and Patient Education Provided   Home exercises include:Upper trap stretch ,Levator scap stretch,RIL ,Seated scap retraction, open book   Cardio program: 6/1/2023  Lifting education date: 6/29/2023  Posture/Lumbar roll: information provided on eval, not received yet.  [employer] Discharge handout (date given):  Equipment at home/gym membership: Belongs to [employer]    Education provided:   - Cues w/ex's    Written Home Exercises Provided: Patient instructed to cont prior HEP.   Exercises were reviewed and Bernadette was able to demonstrate them prior to the end of the session.  Bernadette demonstrated good  understanding of the education provided.     See EMR under Patient Instructions for exercises provided prior visits    Assessment   Bernadette returns with mild neck pain today rated 3/10. Treatment continued with cervical/scapulothoracic flexibility and strengthening ex's. Cervical MedX resistance increased to 132in/lbs along with inc ROM from 27-90 to 27-96 degrees.  Pt completed 15 reps at 4/10 exertion level. Modifications for chest  press and triceps performed on inspire machine today to avoid increased shoulder pain.  Continued with DB bicep curls.  Added manual traction and sub occipital release today to attempt to improve mobility.  Patient is making  progress towards established goals.  Pt will continue to benefit from skilled outpatient physical therapy to address the deficits stated in the impairment chart, provide pt/family education and to maximize pt's level of independence in the home and community environment.     Anticipated Barriers for therapy: none  Pt's spiritual, cultural and educational needs considered and pt agreeable to plan of care and goals as stated below:     GOALS: Pt is in agreement with the following goals.     Short term goals: 6 weeks or 10 visits   1.  Pt will demonstratte increased cervical ROM as measured by med ex by 4 degrees from initial test which results in improved  ROM of neck for ease with ADLs and driving  Goal met  2. Pt will demonstrate independence with reducing or controlling symptoms with ther ex, movement, or position independently, able to reduce pain 1-2 points on pain scale using strategies taught in therapy  (approp and ongoing)  3. Pt will demonstrate increased MedX average isometric strength value by 25% with  when compared to the initial testing resulting in improved ability to perform bending, lifting, and carrying activities safely, confidently.  Goal met     Long term goals: 10 weeks or 20 visits  1. Pt will demonstratte increased cervical ROM as measured by med ex by 7 degrees from initial test which results in functional ROM of neck for ease with ADLs and driving  (approp and ongoing)  2. Pt will demonstrate increased MedX average isometric strength value  by 50% from initial test to improve ability to lift and carry, and sustain good posture while performing ADL's  (approp and ongoing)  3.Pt will demonstrate reduced pain and improved functional outcomes as reported on the FOTO by  reaching a limitation score of < or = 10% decrease in limitation% or less in order to demonstrate subjective improvement in pt's condition.    (approp and ongoing)  4. Pt will demonstrate independence with reducing or controlling symptoms with ther ex, movement, or position independently, able to reduce pain 2-4 points on pain scale using strategies taught in therapy  (approp and ongoing)  5. Pt will demonstrate independence with the HEP at discharge.   (approp and ongoing)  6.  Pt will be able to drive with no difficulty. (patient goal)  (approp and ongoing)    Plan   Continue with established Plan of Care towards established PT goals.     Therapist: Angela Tejeda, PT  7/5/2023

## 2023-07-11 ENCOUNTER — CLINICAL SUPPORT (OUTPATIENT)
Dept: REHABILITATION | Facility: HOSPITAL | Age: 73
End: 2023-07-11
Payer: MEDICARE

## 2023-07-11 PROCEDURE — 97112 NEUROMUSCULAR REEDUCATION: CPT

## 2023-07-11 PROCEDURE — 97110 THERAPEUTIC EXERCISES: CPT

## 2023-07-11 NOTE — PROGRESS NOTES
[facility] Back Physical Therapy Treatment      Name: Bernadette Aquino  Clinic Number: [id number]    Therapy Diagnosis:   Encounter Diagnoses   Name Primary?    Decreased range of motion of neck Yes    Weakness of neck      Physician:  Tasneem Elizalde PA-C    Visit Date: 2023    Physician Orders: PT Eval and Treat   Medical Diagnosis from Referral:   M47.819 (ICD-10-CM) - Degenerative arthropathy of spinal facet joint   M47.812 (ICD-10-CM) - Spondylosis without myelopathy or radiculopathy, cervical region   Evaluation Date: 2023  Authorization Period Expiration: 2023  Plan of Care Expiration: 2023  Reassessment Due: 23  Visit # / Visits authorized:       Time In: 11  Time Out: 12  Total Billable Time: 55minutes  Insurance: Fee for service Insurance Patient     Precautions: Standard, HTN, Osteopenia, Hx of R RTC repair     Pattern of pain determined: 1 OLEKSANDR    Subjective   Bernadette reports her neck feels good today .  Reports manual helped her pain level last visit  Patient reports tolerating previous visit No c/o.  Patient reports their pain to be 2/10 on a 0-10 scale with 0 being no pain and 10 being the worst pain imaginable.  Pain Location: neck bilateral       Work and leisure: Retired/Tennis  Pt goals: decrease pain and move better, drive with less difficulty, tennis with less pain    Objective     Baseline Isometric Testing on Med X equipment:  Testing administered by PT  Date of testin2023  ROM 33-84 deg   Max Peak Torque 144    Min Peak Torque 48    Flex/Ext Ratio 3:1   % below normative data 47%     Date of testin2023  ROM 30-90 deg   Max Peak Torque 167   Min Peak Torque 80   Flex/Ext Ratio 2.1:1   % below normative data 29%   27% strength improvement    Cervical Range of Motion     ROM Loss Initial  ROM Loss 2023   Flexion minimal loss Minimal loss Min loss   Extension minimal loss Minimal loss Min loss   Side bending Right moderate loss Moderate  loss Min-mod   Side bending Left moderate loss Moderate loss mod   Rotation Right moderate loss Moderate loss mod   Rotation Left moderate loss Major loss Major loss   Protraction moderate loss  Min loss   Retraction  moderate loss  moderate        Outcomes:  Initial score: 51% limitation (completed 2nd visit 5/16/23)  Visit 5 score: 41%  Visit 11 score: 49%  Goal: 42% Limitation    Treatment    Bernadette received the treatments listed below:      Bernadette received neuromuscular education  to isolate and engage spinal stabilization musculature correctly for motor control and coordination to aid in function and posture for 10 minutes on the Medical Medx Machine.  Patient performed MedX dynamic exercise with emphasis on spinal muscular control using pacer throughout  active range of motion. Therapist assisted patient in achieving optimal exertion for neural reeducation and endurance training by using the  Brayden Exertion Rating scale, by instructing the patient to aim for mid range of exertion, performing 15-20 repetitions, slowly, correctly,and safely.       [facility] 7/11/2023   Visit Number 13   VAS Pain Rating 2   Treadmill Time (in min.) 5   Cervical Stretches - Retraction In Lying -   Retraction in Sitting 20   Retraction with Extension 10   Rotation 10   Scapular Retraction -   Manual Therapy 5   Cervical Extension Seat Pad -   Seat Adjustment -   Top Dead Center -   Counterweight -   Cervical Flexion -   Cervical Extension -   Cervical Peak Torque -   Cervical Weight 132   Repetitions 20   Rating of Perceived Exertion 4   Ice - Z Lie (in min.) 5             therapeutic exercises to develop strength, endurance, ROM, flexibility, posture, and core stabilization for 50 minutes including:      UT stretch 2 x 20 sec -    Levator scap stretch 2 x 20 sec -   RIS x 10, x 10 Self OP (cues)   Cervical rotational stretch with towel 10 x 5 sec  Cervical retract w/extension w/towel x 10  Seated scap retract/no money w/  GTB x 15, 3 sec  RIL x 10 -NP  open book x 10 (standing)  serratus wall slides x 10 -NP  seated thoracic ext w/1/2 foam roll x15    Peripheral muscle strengthening which included 1 set of 15-20 repetitions at a slow, controlled 10-13 second per rep pace focused on strengthening supporting musculature for improved body mechanics and functional mobility.  Pt and therapist focused on proper form during treatment to ensure optimal strengthening of each targeted muscle group.  Machines were utilized including torso rotation, leg press, hip abd and hip add, leg ext.  Leg curl, triceps(inspire), bicep (DB), chest (Inspire)and row added visit 3    manual therapy techniques:   for 5 minutes, including: sub occipital release/gentle manual traction    cold pack for 5 minutes to neck.    Home Exercises Provided and Patient Education Provided   Home exercises include:Upper trap stretch ,Levator scap stretch,RIL ,Seated scap retraction, open book   Cardio program: 6/1/2023  Lifting education date: 6/29/2023  Posture/Lumbar roll: information provided on eval, not received yet.  [employer] Discharge handout (date given):  Equipment at home/gym membership: Belongs to [email or web address]    Education provided:   - Cues w/ex's    Written Home Exercises Provided: Patient instructed to cont prior HEP.   Exercises were reviewed and Bernadette was able to demonstrate them prior to the end of the session.  Bernadette demonstrated good  understanding of the education provided.     See EMR under Patient Instructions for exercises provided prior visits    Assessment   Bernadette returns with mild neck pain today rated 2/10. Treatment continued with cervical/scapulothoracic flexibility and strengthening ex's. Cervical MedX resistance maintained at 132in/lbs with pt completing 20 reps at 4/10 exertion level.  Inc 5% next visit. Cueing required for retraction with OP and retraction with extension to maximize ROM. Modifications for chest press and triceps performed  on inspire machine today to avoid increased shoulder pain.  Patient is making  progress towards established goals.  Pt will continue to benefit from skilled outpatient physical therapy to address the deficits stated in the impairment chart, provide pt/family education and to maximize pt's level of independence in the home and community environment.     Anticipated Barriers for therapy: none  Pt's spiritual, cultural and educational needs considered and pt agreeable to plan of care and goals as stated below:     GOALS: Pt is in agreement with the following goals.     Short term goals: 6 weeks or 10 visits   1.  Pt will demonstratte increased cervical ROM as measured by med ex by 4 degrees from initial test which results in improved  ROM of neck for ease with ADLs and driving  Goal met  2. Pt will demonstrate independence with reducing or controlling symptoms with ther ex, movement, or position independently, able to reduce pain 1-2 points on pain scale using strategies taught in therapy  (approp and ongoing)  3. Pt will demonstrate increased MedX average isometric strength value by 25% with  when compared to the initial testing resulting in improved ability to perform bending, lifting, and carrying activities safely, confidently.  Goal met     Long term goals: 10 weeks or 20 visits  1. Pt will demonstratte increased cervical ROM as measured by med ex by 7 degrees from initial test which results in functional ROM of neck for ease with ADLs and driving  (approp and ongoing)  2. Pt will demonstrate increased MedX average isometric strength value  by 50% from initial test to improve ability to lift and carry, and sustain good posture while performing ADL's  (approp and ongoing)  3.Pt will demonstrate reduced pain and improved functional outcomes as reported on the FOTO by reaching a limitation score of < or = 10% decrease in limitation% or less in order to demonstrate subjective improvement in pt's condition.    (approp  and ongoing)  4. Pt will demonstrate independence with reducing or controlling symptoms with ther ex, movement, or position independently, able to reduce pain 2-4 points on pain scale using strategies taught in therapy  (approp and ongoing)  5. Pt will demonstrate independence with the HEP at discharge.   (approp and ongoing)  6.  Pt will be able to drive with no difficulty. (patient goal)  (approp and ongoing)    Plan   Continue with established Plan of Care towards established PT goals.     Therapist: Angela Tejeda, PT  7/11/2023

## 2023-07-20 ENCOUNTER — CLINICAL SUPPORT (OUTPATIENT)
Dept: REHABILITATION | Facility: HOSPITAL | Age: 73
End: 2023-07-20
Payer: MEDICARE

## 2023-07-20 DIAGNOSIS — R29.898 DECREASED RANGE OF MOTION OF NECK: Primary | ICD-10-CM

## 2023-07-20 DIAGNOSIS — M53.82 WEAKNESS OF NECK: ICD-10-CM

## 2023-07-20 PROCEDURE — 97112 NEUROMUSCULAR REEDUCATION: CPT | Mod: CQ

## 2023-07-20 PROCEDURE — 97110 THERAPEUTIC EXERCISES: CPT | Mod: CQ

## 2023-07-20 NOTE — PROGRESS NOTES
[facility] Back Physical Therapy Treatment      Name: Bernadette Aquino  Clinic Number: [id number]    Therapy Diagnosis:   Encounter Diagnoses   Name Primary?    Decreased range of motion of neck Yes    Weakness of neck      Physician:  Tasneem Elizalde PA-C    Visit Date: 2023    Physician Orders: PT Eval and Treat   Medical Diagnosis from Referral:   M47.819 (ICD-10-CM) - Degenerative arthropathy of spinal facet joint   M47.812 (ICD-10-CM) - Spondylosis without myelopathy or radiculopathy, cervical region   Evaluation Date: 2023  Authorization Period Expiration: 2023  Plan of Care Expiration: 2023  Reassessment Due: 23  Visit # / Visits authorized:       Time In: 1:30 PM  Time Out: 2:30 PM  Total Billable Time:  55 minutes  Insurance: Fee for service Insurance Patient     Precautions: Standard, HTN, Osteopenia, Hx of R RTC repair     Pattern of pain determined: 1 OLEKSANDR    Zaid Fleming reports only mild neck discomfort/stiffness currently. States that she had some increased discomfort (5/10) yesterday as she played tennis again for the first time in a while.     Patient reports tolerating previous visit No c/o.  Patient reports their pain to be 2/10 on a 0-10 scale with 0 being no pain and 10 being the worst pain imaginable.  Pain Location: neck bilateral       Work and leisure: Retired/Tennis  Pt goals: decrease pain and move better, drive with less difficulty, tennis with less pain    Objective     Baseline Isometric Testing on Med X equipment:  Testing administered by PT  Date of testin2023  ROM 33-84 deg   Max Peak Torque 144    Min Peak Torque 48    Flex/Ext Ratio 3:1   % below normative data 47%     Date of testin2023  ROM 30-90 deg   Max Peak Torque 167   Min Peak Torque 80   Flex/Ext Ratio 2.1:1   % below normative data 29%   27% strength improvement    Cervical Range of Motion     ROM Loss Initial  ROM Loss 2023   Flexion minimal loss Minimal  loss Min loss   Extension minimal loss Minimal loss Min loss   Side bending Right moderate loss Moderate loss Min-mod   Side bending Left moderate loss Moderate loss mod   Rotation Right moderate loss Moderate loss mod   Rotation Left moderate loss Major loss Major loss   Protraction moderate loss  Min loss   Retraction  moderate loss  moderate        Outcomes:  Initial score: 51% limitation (completed 2nd visit 5/16/23)  Visit 5 score: 41%  Visit 11 score: 49%  Goal: 42% Limitation    Treatment    Bernadette received the treatments listed below:      Bernadette received neuromuscular education  to isolate and engage spinal stabilization musculature correctly for motor control and coordination to aid in function and posture for 10 minutes on the Medical Medx Machine.  Patient performed MedX dynamic exercise with emphasis on spinal muscular control using pacer throughout  active range of motion. Therapist assisted patient in achieving optimal exertion for neural reeducation and endurance training by using the  Brayden Exertion Rating scale, by instructing the patient to aim for mid range of exertion, performing 15-20 repetitions, slowly, correctly,and safely.       [facility] - Short 7/20/2023   Visit Number 14   VAS Pain Rating 2   Treadmill Time (in min.) -   Time 5   Retraction in Lying -   Retraction in Sitting 20   Retraction with Extension 10   Rotation 10   Scapular Retraction -   Manual Therapy -   Cervical Extension - Seat Pad -   Seat Adjustment -   Top Dead Center -   Counterweight -   Cervical Flexion -   Cervical Extension -   Cervical Peak Torque -   Cervical Weight 138   Repetitions 20   Rating of Perceived Exertion 4          therapeutic exercises to develop strength, endurance, ROM, flexibility, posture, and core stabilization for 45 minutes including:      UT stretch 2 x 20 sec -    Levator scap stretch 2 x 20 sec -   RIS x 10, x 10 Self OP (cues)   Cervical rotational stretch with towel 10 x 5  sec  Cervical retract w/extension w/towel x 10  Seated scap retract/no money w/ GTB x 15, 3 sec  RIL x 10 -NP  open book x 10 (standing)  serratus wall slides x 10 -NP  seated thoracic ext w/1/2 foam roll x10    Peripheral muscle strengthening which included 1 set of 15-20 repetitions at a slow, controlled 10-13 second per rep pace focused on strengthening supporting musculature for improved body mechanics and functional mobility.  Pt and therapist focused on proper form during treatment to ensure optimal strengthening of each targeted muscle group.  Machines were utilized including torso rotation, leg press, hip abd and hip add, leg ext.  Leg curl, triceps(inspire), bicep (DB), chest (Inspire)and row added visit 3    manual therapy techniques:   for 5 minutes, including: sub occipital release/gentle manual traction--NP    cold pack for 5 minutes to neck.    Home Exercises Provided and Patient Education Provided   Home exercises include:Upper trap stretch ,Levator scap stretch,RIL ,Seated scap retraction, open book   Cardio program: 6/1/2023  Lifting education date: 6/29/2023  Posture/Lumbar roll: information provided on eval, not received yet.  Fridge Magnet Discharge handout (date given):  Equipment at home/gym membership: Belongs to [employer]    Education provided:   - Cues w/ex's    Written Home Exercises Provided: Patient instructed to cont prior HEP.   Exercises were reviewed and Bernadette was able to demonstrate them prior to the end of the session.  Bernadette demonstrated good  understanding of the education provided.     See EMR under Patient Instructions for exercises provided prior visits    Assessment   Bernadette returns with mild report cervical discomfort/stiffness which is rated as 2/10 currently. Treatment continued with cervical/scapulothoracic flexibility and strengthening ex's which she was able to perform with minimal cuing without increased pain c/o.  Cervical MedX resistance was increased to 138 in/lbs  with pt completing 20 reps at 4/10 exertion level. Peripheral ex's included modifications for chest press and triceps which were performed on inspire machine  to avoid increased shoulder pain.  Will look to progress per HB protocol and patient tolerance.    Patient is making  progress towards established goals.  Pt will continue to benefit from skilled outpatient physical therapy to address the deficits stated in the impairment chart, provide pt/family education and to maximize pt's level of independence in the home and community environment.     Anticipated Barriers for therapy: none  Pt's spiritual, cultural and educational needs considered and pt agreeable to plan of care and goals as stated below:     GOALS: Pt is in agreement with the following goals.     Short term goals: 6 weeks or 10 visits   1.  Pt will demonstratte increased cervical ROM as measured by med ex by 4 degrees from initial test which results in improved  ROM of neck for ease with ADLs and driving  Goal met  2. Pt will demonstrate independence with reducing or controlling symptoms with ther ex, movement, or position independently, able to reduce pain 1-2 points on pain scale using strategies taught in therapy  (approp and ongoing)  3. Pt will demonstrate increased MedX average isometric strength value by 25% with  when compared to the initial testing resulting in improved ability to perform bending, lifting, and carrying activities safely, confidently.  Goal met     Long term goals: 10 weeks or 20 visits  1. Pt will demonstratte increased cervical ROM as measured by med ex by 7 degrees from initial test which results in functional ROM of neck for ease with ADLs and driving  (approp and ongoing)  2. Pt will demonstrate increased MedX average isometric strength value  by 50% from initial test to improve ability to lift and carry, and sustain good posture while performing ADL's  (approp and ongoing)  3.Pt will demonstrate reduced pain and improved  functional outcomes as reported on the FOTO by reaching a limitation score of < or = 10% decrease in limitation% or less in order to demonstrate subjective improvement in pt's condition.    (approp and ongoing)  4. Pt will demonstrate independence with reducing or controlling symptoms with ther ex, movement, or position independently, able to reduce pain 2-4 points on pain scale using strategies taught in therapy  (approp and ongoing)  5. Pt will demonstrate independence with the HEP at discharge.   (approp and ongoing)  6.  Pt will be able to drive with no difficulty. (patient goal)  (approp and ongoing)    Plan   Continue with established Plan of Care towards established PT goals.     Therapist: Noe Mckeon, PTA  7/20/2023

## 2023-07-28 ENCOUNTER — CLINICAL SUPPORT (OUTPATIENT)
Dept: REHABILITATION | Facility: HOSPITAL | Age: 73
End: 2023-07-28
Payer: MEDICARE

## 2023-07-28 DIAGNOSIS — R29.898 DECREASED RANGE OF MOTION OF NECK: Primary | ICD-10-CM

## 2023-07-28 DIAGNOSIS — M53.82 WEAKNESS OF NECK: ICD-10-CM

## 2023-07-28 PROCEDURE — 97110 THERAPEUTIC EXERCISES: CPT | Mod: CQ

## 2023-07-28 PROCEDURE — 97112 NEUROMUSCULAR REEDUCATION: CPT | Mod: CQ
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
## 2023-07-31 NOTE — PROGRESS NOTES
[facility] Back Physical Therapy Treatment      Name: Bernadette Aquino  Clinic Number: [id number]    Therapy Diagnosis:   Encounter Diagnoses   Name Primary?    Decreased range of motion of neck Yes    Weakness of neck      Physician:  Tasneem Elizalde PA-C    Visit Date: 2023    Physician Orders: PT Eval and Treat   Medical Diagnosis from Referral:   M47.819 (ICD-10-CM) - Degenerative arthropathy of spinal facet joint   M47.812 (ICD-10-CM) - Spondylosis without myelopathy or radiculopathy, cervical region   Evaluation Date: 2023  Authorization Period Expiration: 2023  Plan of Care Expiration: 2023  Reassessment Due: 23  Visit # / Visits authorized:       Time In: 10:00 AM  Time Out: 11:05 AM  Total Billable Time:  60 minutes  Insurance: Fee for service Insurance Patient     Precautions: Standard, HTN, Osteopenia, Hx of R RTC repair     Pattern of pain determined: 1 OLEKSANDR    Subjective   Bernadette reports some increased neck stiffness/soreness after playing pickleball over the weekend.    Patient reports tolerating previous visit No c/o.  Patient reports their pain to be 3/10 on a 0-10 scale with 0 being no pain and 10 being the worst pain imaginable.  Pain Location: neck bilateral       Work and leisure: Retired/Tennis  Pt goals: decrease pain and move better, drive with less difficulty, tennis with less pain    Objective     Baseline Isometric Testing on Med X equipment:  Testing administered by PT  Date of testin2023  ROM 33-84 deg   Max Peak Torque 144    Min Peak Torque 48    Flex/Ext Ratio 3:1   % below normative data 47%     Date of testin2023  ROM 30-90 deg   Max Peak Torque 167   Min Peak Torque 80   Flex/Ext Ratio 2.1:1   % below normative data 29%   27% strength improvement    Cervical Range of Motion     ROM Loss Initial  ROM Loss 2023   Flexion minimal loss Minimal loss Min loss   Extension minimal loss Minimal loss Min loss   Side bending Right  moderate loss Moderate loss Min-mod   Side bending Left moderate loss Moderate loss mod   Rotation Right moderate loss Moderate loss mod   Rotation Left moderate loss Major loss Major loss   Protraction moderate loss  Min loss   Retraction  moderate loss  moderate        Outcomes:  Initial score: 51% limitation (completed 2nd visit 5/16/23)  Visit 5 score: 41%  Visit 11 score: 49%  Goal: 42% Limitation    Treatment    Bernadette received the treatments listed below:      Bernadette received neuromuscular education  to isolate and engage spinal stabilization musculature correctly for motor control and coordination to aid in function and posture for 10 minutes on the Medical Medx Machine.  Patient performed MedX dynamic exercise with emphasis on spinal muscular control using pacer throughout  active range of motion. Therapist assisted patient in achieving optimal exertion for neural reeducation and endurance training by using the  Brayden Exertion Rating scale, by instructing the patient to aim for mid range of exertion, performing 15-20 repetitions, slowly, correctly,and safely.    [facility] - Short 8/1/2023   Visit Number 16   VAS Pain Rating 2   Treadmill Time (in min.) 5   Time -   Retraction in Lying -   Retraction in Sitting 20   Retraction with Extension 10   Rotation 10   Scapular Retraction -   Manual Therapy 5   Cervical Extension - Seat Pad -   Seat Adjustment -   Top Dead Center -   Counterweight -   Cervical Flexion -   Cervical Extension -   Cervical Peak Torque -   Cervical Weight 144   Repetitions 20   Rating of Perceived Exertion 4              therapeutic exercises to develop strength, endurance, ROM, flexibility, posture, and core stabilization for 45 minutes including:      UT stretch 2 x 20 sec     Levator scap stretch 2 x 20 sec   RIS x 10, x 10 Self OP (cues)   Cervical rotational stretch with towel 10 x 5 sec  Cervical retract w/extension w/towel x 10  Seated scap retract/no money w/ GTB x 15, 3  sec  RIL x 10 -NP  open book x 5 + x 5 with RTB resistance (standing)  serratus wall slides x 10 -NP  seated thoracic ext w/1/2 foam roll x10    Peripheral muscle strengthening which included 1 set of 15-20 repetitions at a slow, controlled 10-13 second per rep pace focused on strengthening supporting musculature for improved body mechanics and functional mobility.  Pt and therapist focused on proper form during treatment to ensure optimal strengthening of each targeted muscle group.  Machines were utilized including torso rotation, leg press, hip abd and hip add, leg ext.  Leg curl, triceps(inspire), bicep (DB), chest (Inspire)and row added visit 3    manual therapy techniques:   for 5 minutes, including:  STM cervical paraspinals/UT.     cold pack for 5 minutes to neck.    Home Exercises Provided and Patient Education Provided   Home exercises include:Upper trap stretch ,Levator scap stretch,RIL ,Seated scap retraction, open book   Cardio program: 6/1/2023  Lifting education date: 6/29/2023  Posture/Lumbar roll: information provided on eval, not received yet.  [employer] Discharge handout (date given):  Equipment at home/gym membership: Belongs to [employer]    Education provided:   - Cues w/ex's    Written Home Exercises Provided: Patient instructed to cont prior HEP.   Exercises were reviewed and Bernadette was able to demonstrate them prior to the end of the session.  Bernadette demonstrated good  understanding of the education provided.     See EMR under Patient Instructions for exercises provided prior visits    Assessment   Bernadette returns with some increased neck soreness/stiffness after performing a new activity over the weekend (Pickleball). cervical discomfort/stiffness  is rated as 3/10 currently. Treatment continued with cervical/scapulothoracic flexibility, strengthening and neuromuscular reeducation ex's. Added resisted open book with RTB for strengthening to go along with open book for mobility. She was able  to perform with minimal cuing without increased pain c/o. Added Manual techniques to include STM which provides some relief (increased tension/tightness noted prior to manual). Cervical MedX resistance was maintained 144 in/lbs with pt completing 20 reps at 4/10 exertion level. Peripheral ex's included modifications for chest press and triceps which were performed on inspire machine to avoid increased shoulder pain. Will look to progress per HB protocol and patient tolerance.     Patient is making progress towards established goals.  Pt will continue to benefit from skilled outpatient physical therapy to address the deficits stated in the impairment chart, provide pt/family education and to maximize pt's level of independence in the home and community environment.     Anticipated Barriers for therapy: none  Pt's spiritual, cultural and educational needs considered and pt agreeable to plan of care and goals as stated below:     GOALS: Pt is in agreement with the following goals.     Short term goals: 6 weeks or 10 visits   1.  Pt will demonstratte increased cervical ROM as measured by med ex by 4 degrees from initial test which results in improved  ROM of neck for ease with ADLs and driving  Goal met  2. Pt will demonstrate independence with reducing or controlling symptoms with ther ex, movement, or position independently, able to reduce pain 1-2 points on pain scale using strategies taught in therapy  (approp and ongoing)  3. Pt will demonstrate increased MedX average isometric strength value by 25% with  when compared to the initial testing resulting in improved ability to perform bending, lifting, and carrying activities safely, confidently.  Goal met     Long term goals: 10 weeks or 20 visits  1. Pt will demonstratte increased cervical ROM as measured by med ex by 7 degrees from initial test which results in functional ROM of neck for ease with ADLs and driving  (approp and ongoing)  2. Pt will demonstrate  increased MedX average isometric strength value  by 50% from initial test to improve ability to lift and carry, and sustain good posture while performing ADL's  (approp and ongoing)  3.Pt will demonstrate reduced pain and improved functional outcomes as reported on the FOTO by reaching a limitation score of < or = 10% decrease in limitation% or less in order to demonstrate subjective improvement in pt's condition.    (approp and ongoing)  4. Pt will demonstrate independence with reducing or controlling symptoms with ther ex, movement, or position independently, able to reduce pain 2-4 points on pain scale using strategies taught in therapy  (approp and ongoing)  5. Pt will demonstrate independence with the HEP at discharge.   (approp and ongoing)  6.  Pt will be able to drive with no difficulty. (patient goal)  (approp and ongoing)    Plan   Continue with established Plan of Care towards established PT goals.     Therapist: Noe Mckeon, ALLY  7/31/2023

## 2023-08-01 ENCOUNTER — CLINICAL SUPPORT (OUTPATIENT)
Dept: REHABILITATION | Facility: HOSPITAL | Age: 73
End: 2023-08-01
Payer: MEDICARE

## 2023-08-01 DIAGNOSIS — M53.82 WEAKNESS OF NECK: ICD-10-CM

## 2023-08-01 DIAGNOSIS — R29.898 DECREASED RANGE OF MOTION OF NECK: Primary | ICD-10-CM

## 2023-08-01 PROCEDURE — 97112 NEUROMUSCULAR REEDUCATION: CPT | Mod: CQ

## 2023-08-01 PROCEDURE — 97110 THERAPEUTIC EXERCISES: CPT | Mod: CQ

## 2023-08-07 ENCOUNTER — CLINICAL SUPPORT (OUTPATIENT)
Dept: REHABILITATION | Facility: HOSPITAL | Age: 73
End: 2023-08-07
Payer: MEDICARE

## 2023-08-07 DIAGNOSIS — R29.898 DECREASED RANGE OF MOTION OF NECK: Primary | ICD-10-CM

## 2023-08-07 DIAGNOSIS — M53.82 WEAKNESS OF NECK: ICD-10-CM

## 2023-08-07 PROCEDURE — 97110 THERAPEUTIC EXERCISES: CPT

## 2023-08-07 PROCEDURE — 97112 NEUROMUSCULAR REEDUCATION: CPT

## 2023-08-07 NOTE — PROGRESS NOTES
[facility] Back Physical Therapy Treatment      Name: Bernadette Aquino  Clinic Number: [id number]    Therapy Diagnosis:   Encounter Diagnoses   Name Primary?    Decreased range of motion of neck Yes    Weakness of neck      Physician:  Tasneem Elizalde PA-C    Visit Date: 2023    Physician Orders: PT Eval and Treat   Medical Diagnosis from Referral:   M47.819 (ICD-10-CM) - Degenerative arthropathy of spinal facet joint   M47.812 (ICD-10-CM) - Spondylosis without myelopathy or radiculopathy, cervical region   Evaluation Date: 2023  Authorization Period Expiration: 2023  Plan of Care Expiration: 2023  Reassessment Due:23  Visit # / Visits authorized:       Time In: 11:10  Time Out: 1200  Total Billable Time:  40 minutes  Insurance: Fee for service Insurance Patient     Precautions: Standard, HTN, Osteopenia, Hx of R RTC repair     Pattern of pain determined: 1 OLEKSANDR    Subjective   Bernadette reports her neck is feeling pretty good today    Patient reports tolerating previous visit No c/o.  Patient reports their pain to be 2/10 on a 0-10 scale with 0 being no pain and 10 being the worst pain imaginable.  Pain Location: neck bilateral       Work and leisure: Retired/Tennis  Pt goals: decrease pain and move better, drive with less difficulty, tennis with less pain    Objective     Baseline Isometric Testing on Med X equipment:  Testing administered by PT  Date of testin2023  ROM 33-84 deg   Max Peak Torque 144    Min Peak Torque 48    Flex/Ext Ratio 3:1   % below normative data 47%     Date of testin2023  ROM 30-90 deg   Max Peak Torque 167   Min Peak Torque 80   Flex/Ext Ratio 2.1:1   % below normative data 29%   27% strength improvement    Cervical Range of Motion     ROM Loss Initial  ROM Loss 2023   Flexion minimal loss Minimal loss Min loss min   Extension minimal loss Minimal loss Min loss min   Side bending Right moderate loss Moderate loss Min-mod min    Side bending Left moderate loss Moderate loss mod Min-mod   Rotation Right moderate loss Moderate loss mod mod   Rotation Left moderate loss Major loss Major loss Mod-major   Protraction moderate loss  Min loss Min loss   Retraction  moderate loss  moderate Min -mod        Outcomes:  Initial score: 51% limitation (completed 2nd visit 5/16/23)  Visit 5 score: 41%  Visit 11 score: 49%  Goal: 42% Limitation    Treatment    Bernadette received the treatments listed below:      Bernadette received neuromuscular education  to isolate and engage spinal stabilization musculature correctly for motor control and coordination to aid in function and posture for 10 minutes on the Medical Medx Machine.  Patient performed MedX dynamic exercise with emphasis on spinal muscular control using pacer throughout  active range of motion. Therapist assisted patient in achieving optimal exertion for neural reeducation and endurance training by using the  Brayden Exertion Rating scale, by instructing the patient to aim for mid range of exertion, performing 15-20 repetitions, slowly, correctly,and safely.    [facility] 8/7/2023   Visit Number 17   VAS Pain Rating 2   Treadmill Time (in min.) 5   Time -   Cervical Stretches - Retraction In Lying -   Retraction in Sitting 20   Retraction with Extension 10   Rotation 10   Scapular Retraction 10   Manual Therapy 8   Cervical Extension Seat Pad -   Seat Adjustment -   Top Dead Center -   Counterweight -   Cervical Flexion -   Cervical Extension -   Cervical Peak Torque -   Cervical Weight 150   Repetitions 15   Rating of Perceived Exertion 4   Ice - Z Lie (in min.) 5       therapeutic exercises to develop strength, endurance, ROM, flexibility, posture, and core stabilization for 40 minutes including:      UT stretch 2 x 20 sec     Levator scap stretch 2 x 20 sec   RIS x 10, x 10 Self OP (cues)   Cervical rotational stretch with towel 10 x 5 sec  Cervical retract w/extension w/towel x 10  Seated  scap retract/no money w/ GTB x 15, 3 sec  RIL x 10 -NP  open book x 5 + x 5 with RTB resistance (standing)  serratus wall slides x 10 -NP  seated thoracic ext w/1/2 foam roll x10    Peripheral muscle strengthening which included 1 set of 15-20 repetitions at a slow, controlled 10-13 second per rep pace focused on strengthening supporting musculature for improved body mechanics and functional mobility.  Pt and therapist focused on proper form during treatment to ensure optimal strengthening of each targeted muscle group.  Machines were utilized including torso rotation, leg press, hip abd and hip add, leg ext.  Leg curl, triceps(inspire), bicep (DB), chest (Inspire)and row added visit 3    manual therapy techniques:   for 8 minutes, including:  STM cervical paraspinals/UT.     cold pack for 5 minutes to neck.    Home Exercises Provided and Patient Education Provided   Home exercises include:Upper trap stretch ,Levator scap stretch,RIL ,Seated scap retraction, open book   Cardio program: 6/1/2023  Lifting education date: 6/29/2023  Posture/Lumbar roll: information provided on eval, not received yet.  [employer] Discharge handout (date given):  Equipment at home/gym membership: Belongs to [employer]    Education provided:   - Cues w/ex's    Written Home Exercises Provided: Patient instructed to cont prior HEP.   Exercises were reviewed and Bernadette was able to demonstrate them prior to the end of the session.  Bernadette demonstrated good  understanding of the education provided.     See EMR under Patient Instructions for exercises provided prior visits    Assessment   Bernadette returns today with min c/o pain.  Reassessed ROM which is still limited , especially with left rotation and SB.  Pt states she still has difficulty with turning to look over her R shoulder while driving. Performed manual occipital release and traction followed by PROM.  Discussed continuing with wellness program following DC. Pt able to complete 15 reps  at 5% increased weight on Med X with 4/10 exertion level.  Patient is making progress towards established goals.  Pt will continue to benefit from skilled outpatient physical therapy to address the deficits stated in the impairment chart, provide pt/family education and to maximize pt's level of independence in the home and community environment.     Anticipated Barriers for therapy: none  Pt's spiritual, cultural and educational needs considered and pt agreeable to plan of care and goals as stated below:     GOALS: Pt is in agreement with the following goals.     Short term goals: 6 weeks or 10 visits   1.  Pt will demonstratte increased cervical ROM as measured by med ex by 4 degrees from initial test which results in improved  ROM of neck for ease with ADLs and driving  Goal met  2. Pt will demonstrate independence with reducing or controlling symptoms with ther ex, movement, or position independently, able to reduce pain 1-2 points on pain scale using strategies taught in therapy  (approp and ongoing)  3. Pt will demonstrate increased MedX average isometric strength value by 25% with  when compared to the initial testing resulting in improved ability to perform bending, lifting, and carrying activities safely, confidently.  Goal met     Long term goals: 10 weeks or 20 visits  1. Pt will demonstratte increased cervical ROM as measured by med ex by 7 degrees from initial test which results in functional ROM of neck for ease with ADLs and driving  (approp and ongoing)  2. Pt will demonstrate increased MedX average isometric strength value  by 50% from initial test to improve ability to lift and carry, and sustain good posture while performing ADL's  (approp and ongoing)  3.Pt will demonstrate reduced pain and improved functional outcomes as reported on the FOTO by reaching a limitation score of < or = 10% decrease in limitation% or less in order to demonstrate subjective improvement in pt's condition.    (approp  and ongoing)  4. Pt will demonstrate independence with reducing or controlling symptoms with ther ex, movement, or position independently, able to reduce pain 2-4 points on pain scale using strategies taught in therapy  (approp and ongoing)  5. Pt will demonstrate independence with the HEP at discharge.   (approp and ongoing)  6.  Pt will be able to drive with no difficulty. (patient goal)  (approp and ongoing)    Plan   Continue with established Plan of Care towards established PT goals.     Therapist: Angela Tejeda, PT  8/7/2023

## 2023-08-09 ENCOUNTER — CLINICAL SUPPORT (OUTPATIENT)
Dept: REHABILITATION | Facility: HOSPITAL | Age: 73
End: 2023-08-09
Payer: MEDICARE

## 2023-08-09 DIAGNOSIS — R29.898 DECREASED RANGE OF MOTION OF NECK: Primary | ICD-10-CM

## 2023-08-09 DIAGNOSIS — M53.82 WEAKNESS OF NECK: ICD-10-CM

## 2023-08-09 PROCEDURE — 97112 NEUROMUSCULAR REEDUCATION: CPT

## 2023-08-09 PROCEDURE — 97110 THERAPEUTIC EXERCISES: CPT

## 2023-08-09 NOTE — PROGRESS NOTES
[facility] Back Physical Therapy Treatment      Name: Bernadette Aquino  Clinic Number: [id number]    Therapy Diagnosis:   Encounter Diagnoses   Name Primary?    Decreased range of motion of neck Yes    Weakness of neck      Physician:  Tasneem Elizalde PA-C    Visit Date: 2023    Physician Orders: PT Eval and Treat   Medical Diagnosis from Referral:   M47.819 (ICD-10-CM) - Degenerative arthropathy of spinal facet joint   M47.812 (ICD-10-CM) - Spondylosis without myelopathy or radiculopathy, cervical region   Evaluation Date: 2023  Authorization Period Expiration: 2023  Plan of Care Expiration: 2023  Reassessment Due: 23  Visit # / Visits authorized:   ([facility] ([place], [place]) next visit)     Time In: 11:30 AM  Time Out: 12:30 PM  Total Billable Time:  55 minutes  Insurance: Fee for service Insurance Patient     Precautions: Standard, HTN, Osteopenia, Hx of R RTC repair     Pattern of pain determined: 1 OLEKSANDR    Subjective   Bernadette reports her neck is feeling pretty good today. She still has some stiffness, but minimal neck pain currently. She just came from playing [employer] ball.     Patient reports tolerating previous visit No c/o.  Patient reports their pain to be 2/10 on a 0-10 scale with 0 being no pain and 10 being the worst pain imaginable.  Pain Location: neck bilateral       Work and leisure: Retired/Tennis  Pt goals: decrease pain and move better, drive with less difficulty, tennis with less pain    Objective     Baseline Isometric Testing on Med X equipment:  Testing administered by PT  Date of testin2023  ROM 33-84 deg   Max Peak Torque 144    Min Peak Torque 48    Flex/Ext Ratio 3:1   % below normative data 47%     Date of testin2023  ROM 30-90 deg   Max Peak Torque 167   Min Peak Torque 80   Flex/Ext Ratio 2.1:1   % below normative data 29%   27% strength improvement    Cervical Range of Motion     ROM Loss Initial  ROM Loss 2023   Flexion minimal  loss Minimal loss Min loss min   Extension minimal loss Minimal loss Min loss min   Side bending Right moderate loss Moderate loss Min-mod min   Side bending Left moderate loss Moderate loss mod Min-mod   Rotation Right moderate loss Moderate loss mod mod   Rotation Left moderate loss Major loss Major loss Mod-major   Protraction moderate loss  Min loss Min loss   Retraction  moderate loss  moderate Min -mod        Outcomes:  Initial score: 51% limitation (completed 2nd visit 5/16/23)  Visit 5 score: 41%  Visit 11 score: 49%  Goal: 42% Limitation    Treatment    Bernadette received the treatments listed below:      Bernadette received neuromuscular education  to isolate and engage spinal stabilization musculature correctly for motor control and coordination to aid in function and posture for 10 minutes on the Medical Medx Machine.  Patient performed MedX dynamic exercise with emphasis on spinal muscular control using pacer throughout  active range of motion. Therapist assisted patient in achieving optimal exertion for neural reeducation and endurance training by using the  Brayden Exertion Rating scale, by instructing the patient to aim for mid range of exertion, performing 15-20 repetitions, slowly, correctly,and safely.    [facility] 8/9/2023   Visit Number 18   VAS Pain Rating 2   Treadmill Time (in min.) -   Time -   Cervical Stretches - Retraction In Lying -   Retraction in Sitting 20   Retraction with Extension 10   Rotation 10   Scapular Retraction 10   Manual Therapy -   Cervical Extension Seat Pad -   Seat Adjustment -   Top Dead Center -   Counterweight -   Cervical Flexion -   Cervical Extension -   Cervical Peak Torque -   Cervical Weight 150   Repetitions 18   Rating of Perceived Exertion 5   Ice - Z Lie (in min.) 5        therapeutic exercises to develop strength, endurance, ROM, flexibility, posture, and core stabilization for 40 minutes including:      UT stretch 2 x 20 sec     Levator scap stretch 2 x  20 sec   RIS x 10, x 10 Self OP (cues)   Cervical rotational stretch with towel 10 x 5 sec  Cervical retract w/extension w/towel x 10  Seated scap retract/no money w/ GTB 2x10, 3 sec  RIL x 10 -NP  open book x 5 + x 5 with GTB resistance (standing)  serratus wall slides x 10 -NP  seated thoracic ext w/1/2 foam roll x10    Peripheral muscle strengthening which included 1 set of 15-20 repetitions at a slow, controlled 10-13 second per rep pace focused on strengthening supporting musculature for improved body mechanics and functional mobility.  Pt and therapist focused on proper form during treatment to ensure optimal strengthening of each targeted muscle group.  Machines were utilized including torso rotation, leg press, hip abd and hip add, leg ext.  Leg curl, triceps(inspire), bicep (DB), chest (Inspire)and row added visit 3    manual therapy techniques:   for 8 minutes, including:  STM cervical paraspinals/UT.     cold pack for 5 minutes to neck.    Home Exercises Provided and Patient Education Provided   Home exercises include:Upper trap stretch ,Levator scap stretch,RIL ,Seated scap retraction, open book   Cardio program: 6/1/2023  Lifting education date: 6/29/2023  Posture/Lumbar roll: information provided on eval, not received yet.  Fridge Magnet Discharge handout (date given):  Equipment at home/gym membership: Belongs to [employer]    Education provided:   - Cues w/ex's    Written Home Exercises Provided: Patient instructed to cont prior HEP.   Exercises were reviewed and Bernadette was able to demonstrate them prior to the end of the session.  Bernadette demonstrated good  understanding of the education provided.     See EMR under Patient Instructions for exercises provided prior visits    Assessment   Bernadette returns today with min c/o pain. Treatment continued with cervical & thoracic mobility, strengthening, and neuro re-education exercises. Progressed reps for no money and progressed to GTB for standing open books.  Cervical MedX resistance was maintained at 150 in/lbs with pt completing 18 reps with RPE = 5/10. She was able to complete peripheral machines without increased discomfort. Will provide fridge magnet handout next visit to prepare for discharge next week.       Pt will continue to benefit from skilled outpatient physical therapy to address the deficits stated in the impairment chart, provide pt/family education and to maximize pt's level of independence in the home and community environment.     Anticipated Barriers for therapy: none  Pt's spiritual, cultural and educational needs considered and pt agreeable to plan of care and goals as stated below:     GOALS: Pt is in agreement with the following goals.     Short term goals: 6 weeks or 10 visits   1.  Pt will demonstratte increased cervical ROM as measured by med ex by 4 degrees from initial test which results in improved  ROM of neck for ease with ADLs and driving  Goal met  2. Pt will demonstrate independence with reducing or controlling symptoms with ther ex, movement, or position independently, able to reduce pain 1-2 points on pain scale using strategies taught in therapy  (approp and ongoing)  3. Pt will demonstrate increased MedX average isometric strength value by 25% with  when compared to the initial testing resulting in improved ability to perform bending, lifting, and carrying activities safely, confidently.  Goal met     Long term goals: 10 weeks or 20 visits  1. Pt will demonstratte increased cervical ROM as measured by med ex by 7 degrees from initial test which results in functional ROM of neck for ease with ADLs and driving  (approp and ongoing)  2. Pt will demonstrate increased MedX average isometric strength value  by 50% from initial test to improve ability to lift and carry, and sustain good posture while performing ADL's  (approp and ongoing)  3.Pt will demonstrate reduced pain and improved functional outcomes as reported on the FOTO by  reaching a limitation score of < or = 10% decrease in limitation% or less in order to demonstrate subjective improvement in pt's condition.    (approp and ongoing)  4. Pt will demonstrate independence with reducing or controlling symptoms with ther ex, movement, or position independently, able to reduce pain 2-4 points on pain scale using strategies taught in therapy  (approp and ongoing)  5. Pt will demonstrate independence with the HEP at discharge.   (approp and ongoing)  6.  Pt will be able to drive with no difficulty. (patient goal)  (approp and ongoing)    Plan   Continue with established Plan of Care towards established PT goals.     Therapist: Trice Piña, PT  8/9/2023

## 2023-08-15 ENCOUNTER — CLINICAL SUPPORT (OUTPATIENT)
Dept: REHABILITATION | Facility: HOSPITAL | Age: 73
End: 2023-08-15
Payer: MEDICARE

## 2023-08-15 DIAGNOSIS — R29.898 DECREASED RANGE OF MOTION OF NECK: Primary | ICD-10-CM

## 2023-08-15 DIAGNOSIS — M53.82 WEAKNESS OF NECK: ICD-10-CM

## 2023-08-15 PROCEDURE — 97112 NEUROMUSCULAR REEDUCATION: CPT

## 2023-08-15 PROCEDURE — 97110 THERAPEUTIC EXERCISES: CPT

## 2023-08-15 NOTE — PATIENT INSTRUCTIONS
"HEALTHY BACK TOOLS        KEEP YOUR SPINE FEELING FINE   HEALTHY HABITS   Do your "GO TO" stretches 2/day   Get a good night's REST   Watch your POSTURE in sitting/standing Drink PLENTY of water   Use a lumbar roll Eat LOTS of fruits & vegetables   GET UP often (walk and/or stretch) Manage your STRESS   Make your workplace IDEAL FOR YOU  Don't smoke   Lift correctly EXERCISE                           WHAT TO DO WHEN SYMPTOMS FLARE UP  Back and neck pain may occasionally flare up.  If you experience a flare   up, remember your tools. Be encouraged, by remembering that flare-ups will   usually pass.   My Tools:    ~Use your "Go To" Stretches/Positions   ~Keep Moving-pain usually gets better if you move  ~Z lie (with or without ice)  10 min several times a day until symptoms reduce  ~Slowly resume normal activities   ~Practice Deep Breathing and Relaxation techniques                                                 MY EXERCISE PLAN  GO TO STRETCHES  2/day (like brushing your teeth) STRENGTHENING  2-3 times/week CARDIO PROGRAM   3-4 times per week   Lean head to side stretch  20 seconds x 3 Standing open book with band  x15 each side Walking   Chin to armpit stretch  20 seconds x 3 Elbows to side - pull band apart  2x10 Volleyball   Towel rotation stretch  hold 5 seconds x 10 each side     Towel neck extension stretch  hold 5 seconds x 15     Seated lean back stretch with towel roll behind back  hold 5 seconds x 15        "

## 2023-08-15 NOTE — PROGRESS NOTES
[facility]      Name: Bernadette Aquino  Clinic Number: [id number]    Therapy Diagnosis:   Encounter Diagnoses   Name Primary?    Decreased range of motion of neck Yes    Weakness of neck      Physician:  Tasneem Elizalde PA-C    Visit Date: 8/15/2023    Physician Orders: PT Eval and Treat   Medical Diagnosis from Referral:   M47.819 (ICD-10-CM) - Degenerative arthropathy of spinal facet joint   M47.812 (ICD-10-CM) - Spondylosis without myelopathy or radiculopathy, cervical region   Evaluation Date: 2023  Authorization Period Expiration: 2023  Plan of Care Expiration: 2023  Reassessment Due: 23  Visit # / Visits authorized:       Time In: 11:05 AM  Time Out: 12:10 PM  Total Billable Time:  55 minutes  Insurance: Fee for service Insurance Patient     Precautions: Standard, HTN, Osteopenia, Hx of R RTC repair     Pattern of pain determined: 1 OLEKSANDR    Subjective   Bernadette reports her mid back is bothering her today. States her neck is feeling pretty good and she feels minimal pain currently.     Patient reports tolerating previous visit No c/o.  Patient reports their pain to be 2-3/10 on a 0-10 scale with 0 being no pain and 10 being the worst pain imaginable.  Pain Location: neck bilateral       Work and leisure: Retired/Tennis  Pt goals: decrease pain and move better, drive with less difficulty, tennis with less pain    Objective     Baseline Isometric Testing on Med X equipment:  Testing administered by PT  Date of testin2023  ROM 33-84 deg   Max Peak Torque 144    Min Peak Torque 48    Flex/Ext Ratio 3:1   % below normative data 47%     Date of testin2023  ROM 30-90 deg   Max Peak Torque 167   Min Peak Torque 80   Flex/Ext Ratio 2.1:1   % below normative data 29%   27% strength improvement    Cervical Range of Motion     ROM Loss Initial  ROM Loss 2023   Flexion minimal loss Minimal loss Min loss min   Extension minimal loss  Minimal loss Min loss min   Side bending Right moderate loss Moderate loss Min-mod min   Side bending Left moderate loss Moderate loss mod Min-mod   Rotation Right moderate loss Moderate loss mod mod   Rotation Left moderate loss Major loss Major loss Mod-major   Protraction moderate loss  Min loss Min loss   Retraction  moderate loss  moderate Min -mod        Outcomes:  Initial score: 51% limitation (completed 2nd visit 5/16/23)  Visit 5 score: 41%  Visit 11 score: 49%  Goal: 42% Limitation    Treatment    Bernadette received the treatments listed below:      Bernadette received neuromuscular education  to isolate and engage spinal stabilization musculature correctly for motor control and coordination to aid in function and posture for 10 minutes on the Medical Medx Machine.  Patient performed MedX dynamic exercise with emphasis on spinal muscular control using pacer throughout  active range of motion. Therapist assisted patient in achieving optimal exertion for neural reeducation and endurance training by using the  Brayden Exertion Rating scale, by instructing the patient to aim for mid range of exertion, performing 15-20 repetitions, slowly, correctly,and safely.    [facility] 8/15/2023   Visit Number 19   VAS Pain Rating 3   Treadmill Time (in min.) 5   Time -   Cervical Stretches - Retraction In Lying -   Retraction in Sitting 20   Retraction with Extension 10   Rotation 10   Scapular Retraction 10   Manual Therapy -   Cervical Extension Seat Pad -   Seat Adjustment -   Top Dead Center -   Counterweight -   Cervical Flexion 99   Cervical Extension 27   Cervical Peak Torque -   Cervical Weight 150   Repetitions 20   Rating of Perceived Exertion 5   Ice - Z Lie (in min.) 5       therapeutic exercises to develop strength, endurance, ROM, flexibility, posture, and core stabilization for 45 minutes including:      UT stretch 2 x 20 sec     Levator scap stretch 2 x 20 sec   RIS x 10, x 10 Self OP (cues)   Cervical  rotational stretch with towel 10 x 5 sec  Cervical retract w/extension w/towel x 10  Seated scap retract/no money w/ GTB 2x10, 3 sec  RIL x 10 -NP  open book x 5 + x 5 with GTB resistance (standing)  serratus wall slides x 10 -NP  seated thoracic ext w/1/2 foam roll x10    Peripheral muscle strengthening which included 1 set of 15-20 repetitions at a slow, controlled 10-13 second per rep pace focused on strengthening supporting musculature for improved body mechanics and functional mobility.  Pt and therapist focused on proper form during treatment to ensure optimal strengthening of each targeted muscle group.  Machines were utilized including torso rotation, leg press, hip abd and hip add, leg ext.  Leg curl, triceps(inspire), bicep (DB), chest (Inspire)and row added visit 3    manual therapy techniques:   for 8 minutes, including:  STM cervical paraspinals/UT.     cold pack for 5 minutes to neck.    Home Exercises Provided and Patient Education Provided   Home exercises include:Upper trap stretch ,Levator scap stretch,RIL ,Seated scap retraction, open book   Cardio program: 6/1/2023  Lifting education date: 6/29/2023  Posture/Lumbar roll: information provided on [facility]  Fridge Magnet Discharge handout (date given): 8/15/2023  Equipment at home/gym membership: Belongs to [employer]    Education provided:   - Cues w/ex's    Written Home Exercises Provided: Patient instructed to cont prior HEP.   Exercises were reviewed and Bernadette was able to demonstrate them prior to the end of the session.  Bernadette demonstrated good  understanding of the education provided.     See EMR under Patient Instructions for exercises provided prior visits    Assessment   Bernadette returns today with min c/o neck pain/stiffness. Treatment continued with cervical & thoracic mobility, strengthening, and neuro re-education exercises.   Reviewed home exercises and provided fridge magnet handout today and pt demonstrated understanding with all exercises.  Cervical MedX resistance was maintained at 150 in/lbs with pt completing 20 reps with RPE = 5/10. She was able to complete peripheral machines without increased discomfort. Pt has attended 19 [facility] visits and will D/C next visit with plans to continue with wellness program.       Pt will continue to benefit from skilled outpatient physical therapy to address the deficits stated in the impairment chart, provide pt/family education and to maximize pt's level of independence in the home and community environment.     Anticipated Barriers for therapy: none  Pt's spiritual, cultural and educational needs considered and pt agreeable to plan of care and goals as stated below:     GOALS: Pt is in agreement with the following goals.     Short term goals: 6 weeks or 10 visits   1.  Pt will demonstratte increased cervical ROM as measured by med ex by 4 degrees from initial test which results in improved  ROM of neck for ease with ADLs and driving  Goal met  2. Pt will demonstrate independence with reducing or controlling symptoms with ther ex, movement, or position independently, able to reduce pain 1-2 points on pain scale using strategies taught in therapy  (approp and ongoing)  3. Pt will demonstrate increased MedX average isometric strength value by 25% with  when compared to the initial testing resulting in improved ability to perform bending, lifting, and carrying activities safely, confidently.  Goal met     Long term goals: 10 weeks or 20 visits  1. Pt will demonstratte increased cervical ROM as measured by med ex by 7 degrees from initial test which results in functional ROM of neck for ease with ADLs and driving  (approp and ongoing)  2. Pt will demonstrate increased MedX average isometric strength value  by 50% from initial test to improve ability to lift and carry, and sustain good posture while performing ADL's  (approp and ongoing)  3.Pt will demonstrate reduced pain and improved functional outcomes as  reported on the FOTO by reaching a limitation score of < or = 10% decrease in limitation% or less in order to demonstrate subjective improvement in pt's condition.    (approp and ongoing)  4. Pt will demonstrate independence with reducing or controlling symptoms with ther ex, movement, or position independently, able to reduce pain 2-4 points on pain scale using strategies taught in therapy  (approp and ongoing)  5. Pt will demonstrate independence with the HEP at discharge.   (approp and ongoing)  6.  Pt will be able to drive with no difficulty. (patient goal)  (approp and ongoing)    Plan   Continue with established Plan of Care towards established PT goals.     Therapist: Trice Piña, PT  8/15/2023

## 2023-08-22 ENCOUNTER — CLINICAL SUPPORT (OUTPATIENT)
Dept: REHABILITATION | Facility: HOSPITAL | Age: 73
End: 2023-08-22
Payer: MEDICARE

## 2023-08-22 DIAGNOSIS — R29.898 DECREASED RANGE OF MOTION OF NECK: Primary | ICD-10-CM

## 2023-08-22 DIAGNOSIS — M53.82 WEAKNESS OF NECK: ICD-10-CM

## 2023-08-22 PROCEDURE — 97112 NEUROMUSCULAR REEDUCATION: CPT

## 2023-08-22 PROCEDURE — 97110 THERAPEUTIC EXERCISES: CPT

## 2023-08-22 NOTE — PROGRESS NOTES
[facility]      Name: Bernadette Aquino  Clinic Number: [id number]    Therapy Diagnosis:   Encounter Diagnoses   Name Primary?    Decreased range of motion of neck Yes    Weakness of neck      Physician:  Tasneem Elizalde PA-C    Visit Date: 2023    Physician Orders: PT Eval and Treat   Medical Diagnosis from Referral:   M47.819 (ICD-10-CM) - Degenerative arthropathy of spinal facet joint   M47.812 (ICD-10-CM) - Spondylosis without myelopathy or radiculopathy, cervical region   Evaluation Date: 2023  Authorization Period Expiration: 2023  Plan of Care Expiration: 2023  Reassessment Due: 23  Visit # / Visits authorized:       Time In: 1040  Time Out: 1140  Total Billable Time:  55 minutes  Insurance: Fee for service Insurance Patient     Precautions: Standard, HTN, Osteopenia, Hx of R RTC repair     Pattern of pain determined: 1 OLEKSANDR    Zaid Fleming reports she feels healthy back has helped her with activity tolerance but she still has some mobility issues.  Patient reports tolerating previous visit No c/o.  Patient reports their pain to be 2-3/10 on a 0-10 scale with 0 being no pain and 10 being the worst pain imaginable.  Pain Location: neck bilateral       Work and leisure: Retired/Tennis  Pt goals: decrease pain and move better, drive with less difficulty, tennis with less pain    Objective     Baseline Isometric Testing on Med X equipment:  Testing administered by PT  Date of testin2023  ROM 33-84 deg   Max Peak Torque 144    Min Peak Torque 48    Flex/Ext Ratio 3:1   % below normative data 47%     Date of testin2023  ROM 30-90 deg   Max Peak Torque 167   Min Peak Torque 80   Flex/Ext Ratio 2.1:1   % below normative data 29%   27% strength improvement       Date of testin23  ROM 27-99 deg   Max Peak Torque 203   Min Peak Torque 95   Flex/Ext Ratio 2.1:1   % below normative data 24%   38%strength increase    Cervical  Range of Motion     ROM Loss Initial  ROM Loss 6/1/2023 7/5/23 8/7/23   Flexion minimal loss Minimal loss Min loss min   Extension minimal loss Minimal loss Min loss min   Side bending Right moderate loss Moderate loss Min-mod min   Side bending Left moderate loss Moderate loss mod Min-mod   Rotation Right moderate loss Moderate loss mod mod   Rotation Left moderate loss Major loss Major loss Mod-major   Protraction moderate loss  Min loss Min loss   Retraction  moderate loss  moderate Min -mod        Outcomes:  Initial score: 51% limitation (completed 2nd visit 5/16/23)  Visit 5 score: 41%  Visit 11 score: 49% Visit 20 52%  Goal: 42% Limitation    Treatment    Bernadette received the treatments listed below:      Bernadette received neuromuscular education  to isolate and engage spinal stabilization musculature correctly for motor control and coordination to aid in function and posture for 10 minutes on the [employer] MedInteractive Fate Machine.  Patient performed MedX dynamic exercise with emphasis on spinal muscular control using pacer throughout  active range of motion. Therapist assisted patient in achieving optimal exertion for neural reeducation and endurance training by using the  Brayden Exertion Rating scale, by instructing the patient to aim for mid range of exertion, performing 15-20 repetitions, slowly, correctly,and safely.  [facility] 8/22/2023   Visit Number 20   VAS Pain Rating 2   Treadmill Time (in min.) 5   Time -   Cervical Stretches - Retraction In Lying -   Retraction in Sitting 10   Retraction with Extension 10   Rotation 5   Scapular Retraction -   Manual Therapy 8   Cervical Extension Seat Pad -   Seat Adjustment -   Top Dead Center -   Counterweight -   Cervical Flexion 99   Cervical Extension 27   Cervical Peak Torque 203   Cervical Weight -   Repetitions -   Rating of Perceived Exertion -   Ice - Z Lie (in min.) 5           therapeutic exercises to develop strength, endurance, ROM, flexibility, posture, and  core stabilization for 50 minutes including:      UT stretch 2 x 20 sec     Levator scap stretch 2 x 20 sec   RIS x 10, x 10 Self OP (cues)   Cervical rotational stretch with towel 10 x 5 sec  Cervical retract w/extension w/towel x 10  Seated scap retract/no money w/ GTB 2x10, 3 sec  RIL x 10 -NP  open book x 5 + x 5 with GTB resistance (standing)  serratus wall slides x 10 -NP  seated thoracic ext w/1/2 foam roll x10    Peripheral muscle strengthening which included 1 set of 15-20 repetitions at a slow, controlled 10-13 second per rep pace focused on strengthening supporting musculature for improved body mechanics and functional mobility.  Pt and therapist focused on proper form during treatment to ensure optimal strengthening of each targeted muscle group.  Machines were utilized including torso rotation, leg press, hip abd and hip add, leg ext.  Leg curl, triceps(inspire), bicep (DB), chest (Inspire)and row added visit 3    manual therapy techniques:   for 8 minutes, including:  STM cervical paraspinals/UT.     cold pack for 5 minutes to neck.    Home Exercises Provided and Patient Education Provided   Home exercises include:Upper trap stretch ,Levator scap stretch,RIL ,Seated scap retraction, open book   Cardio program: 6/1/2023  Lifting education date: 6/29/2023  Posture/Lumbar roll: information provided on eval  Fridge [employer] Discharge handout (date given): 8/15/2023  Equipment at home/gym membership: Belongs to [employer]    Education provided:   - Cues w/ex's    Written Home Exercises Provided: Patient instructed to cont prior HEP.   Exercises were reviewed and Bernadette was able to demonstrate them prior to the end of the session.  Bernadette demonstrated good  understanding of the education provided.     See EMR under Patient Instructions for exercises provided prior visits    Assessment   Patient has attended 20 visits of the [facility] program focusing aerobic training, isometric testing with dynamic  strengthening on MedX machine for spine, whole body strengthening on peripheral strengthening equipment, HEP, and patient education. Patient has completed the [facility] Program and is ready to be transitioned into wellness program. Educated on the importance of wellness program and attending weekly in order to maintain strength. Stressed the importance of continuing exercise and maintaining proper body mechanics and ergonomics in order to fully participate in activities of daily living, work, and leisure activities. At this time, patient demonstrates improvement in ability to reduce symptoms, improved posture, improved spinal ROM and improved strength. Discharge handout with HEP given and reviewed all information given. Patient able to demonstrate and verbalize understanding. Patient does plan to attend wellness and is appropriate for transition.       -Improved 20 ROM, initially on MedX test 33-84 and currently 27-99.  -Improved strength at each test point on lumbar med ex IM test with 38% average improvement with reduced pain noted by patient.  -Initial outcome tool score 51 and current outcome tool score 52 indicating reduced pain and improved function.             GOALS: Pt is in agreement with the following goals.     Short term goals: 6 weeks or 10 visits   1.  Pt will demonstratte increased cervical ROM as measured by med ex by 4 degrees from initial test which results in improved  ROM of neck for ease with ADLs and driving  Goal met  2. Pt will demonstrate independence with reducing or controlling symptoms with ther ex, movement, or position independently, able to reduce pain 1-2 points on pain scale using strategies taught in therapy  MET  3. Pt will demonstrate increased MedX average isometric strength value by 25% with  when compared to the initial testing resulting in improved ability to perform bending, lifting, and carrying activities safely, confidently.  Goal met     Long term goals: 10 weeks or  20 visits  1. Pt will demonstratte increased cervical ROM as measured by med ex by 7 degrees from initial test which results in functional ROM of neck for ease with ADLs and driving  (MET  2. Pt will demonstrate increased MedX average isometric strength value  by 50% from initial test to improve ability to lift and carry, and sustain good posture while performing ADL's  (not met)  3.Pt will demonstrate reduced pain and improved functional outcomes as reported on the FOTO by reaching a limitation score of < or = 10% decrease in limitation% or less in order to demonstrate subjective improvement in pt's condition.   not met    4. Pt will demonstrate independence with reducing or controlling symptoms with ther ex, movement, or position independently, able to reduce pain 2-4 points on pain scale using strategies taught in therapy MET  5. Pt will demonstrate independence with the HEP at discharge.   MET  6.  Pt will be able to drive with no difficulty. (patient goal)  MET    Plan   Pt plans to continue with wellness  Therapist: Angela Tejeda, PT  8/22/2023

## 2023-09-06 ENCOUNTER — DOCUMENTATION ONLY (OUTPATIENT)
Dept: REHABILITATION | Facility: HOSPITAL | Age: 73
End: 2023-09-06
Payer: MEDICARE

## 2023-09-06 NOTE — PROGRESS NOTES
Health  Wellness Visit Note    Name: Bernadette Aquino  Clinic Number: [id number]  Physician: Tasneem Elizalde, PA-C  Diagnosis: No diagnosis found.  Past Medical History:   Diagnosis Date    Appendicitis with perforation     Status post appendectomy, January 2011    Celiac disease     Cerebral aneurysm     Right internal carotid artery, nonruptured, status post coil    DDD (degenerative disc disease), cervical     Paresthesia of hands    Elevated liver enzymes     Prior history    Encounter for blood transfusion     GERD (gastroesophageal reflux disease)     HTN (hypertension)     Hyperlipidemia     Mild depression     Multinodular thyroid     u/s 9/11    Osteopenia     Prior history of osteoporosis on bisphosphonate    Other specified acquired hypothyroidism     On replacement    Single cyst of left breast      Visit Number: 21  Precautions: hx of R RTC Repair, HTN, Osteopenia     1st PT visit:  04/26/2023  Year of care end date:  April 2024  Mindbody plan: 1F  Patient level: NP      Time In: 10:03 AM  Time Out: 11:00 AM  Total Treatment Time: 57 Minutes    [facility] [employer] [facility]22  Handout on this week's wellness topic Body Image provided  along with a discussion on what it means, the benefits, and suggestions for practice.  Reviewed last week's topic of n/a (today is the patient's initial Wellness session).    Subjective:   Patient reports she has had some neck stiffness since ending physical therapy. Her pain is at a 2/10 coming into Wellness. Three weeks ago she had carpel tunnel surgery, so she has not been able to do much. Her elbow is still very tender and it hurts when lifting her arm over her head. Since she can not hold things in her hand or bend at the wrist, she has not been doing her stretches. For exercise she rides her recumbent bike everyday for 30 minutes. She also babysits her grandchildren every Friday. They keep her busy and walking around. Patient misses playing tennis and working out. She is  optimistic and staying positive during this time. She ices her elbow at home daily, but does not ice her neck.     For today's session, HC and patient decided to skip all upper body machines.     Objective:   Bernadette completed therapeutic stretches (Neck Retractions, etc) and the following MedX exercise machines: core cervical, torso rotation l/r, leg extension, leg curl, upright row, chest press, biceps curl, triceps extension, leg press      Fitness Machine Education Key:  E=education on equipment initiated and further follow up and education needed  I=independent with  and exercise.  The patient:  Adjusts machines to his/her settings  Uses equipment levers, pins, weights safely  Maintains safe and correct posture while exercising  Moves through exercise with correct pace and control  Gets on and off equipment safely          Lumbar/Cervical Ext.  Torso Rotation  Leg Press    Leg Extension  Seated Leg Curl  Chest Press    Seated Row  Hip ADD  Hip ABD    Triceps Extension  Bicep Curl  Other:        [x] Indicates exercise has been taught for home  Lumbar/Cervical Ext. [] Torso Rotation [] Leg Press []   Leg Extension [] Seated Leg Curl [] Chest Press []   Seated Row [] Hip ADD [] Hip ABD []   Triceps Extension [] Bicep Curl [] Other:        Please see exercise log in patient folder for rate of exertion and repetitions completed.     Assessment:   Patient tolerated Patient tolerated the Med X Cervical extension and all other peripheral exercises without an increase in symptoms. Patient warmed up on the treadmill for 5 minutes, stretched, and iced neck for 5 minutes at the end of the workout.     Plan:  Continue with established plan of care towards wellness goals.     Health  : Jenniffer Barker  9/6/2023

## 2023-09-13 ENCOUNTER — DOCUMENTATION ONLY (OUTPATIENT)
Dept: REHABILITATION | Facility: HOSPITAL | Age: 73
End: 2023-09-13
Payer: MEDICARE

## 2023-09-13 NOTE — PROGRESS NOTES
Health  Wellness Visit Note    Name: Bernadette Aquino  Clinic Number: [id number]  Physician: Tasneem Elizalde, PA-C  Diagnosis: No diagnosis found.  Past Medical History:   Diagnosis Date    Appendicitis with perforation     Status post appendectomy, January 2011    Celiac disease     Cerebral aneurysm     Right internal carotid artery, nonruptured, status post coil    DDD (degenerative disc disease), cervical     Paresthesia of hands    Elevated liver enzymes     Prior history    Encounter for blood transfusion     GERD (gastroesophageal reflux disease)     HTN (hypertension)     Hyperlipidemia     Mild depression     Multinodular thyroid     u/s 9/11    Osteopenia     Prior history of osteoporosis on bisphosphonate    Other specified acquired hypothyroidism     On replacement    Single cyst of left breast      Visit Number: 22  Precautions: hx of R RTC Repair, HTN, Osteopenia     1st PT visit:  04/26/2023  Year of care end date:  April 2024  Mindbody plan: 2F  Patient level: NP      Time In: 11:00 AM  Time Out: 11:47 AM  Total Treatment Time: 47 Minutes    [employer] 2022  Handout on this week's wellness topic Preventing Burnout provided  along with a discussion on what it means, the benefits, and suggestions for practice.  Reviewed last week's topic of Body Image.     Subjective:   Patient reports after leaving Wellness last week she had a day of neck pain. The pain was mostly on her right side and subsided after a day of rest. Patient is still recovering from carpel tunnel surgery and can not lift with her right hand. She's been very busy with her grandchildren and riding her recumbent bike for 30 minutes daily. She does her stretches to the best of her ability without causing discomfort with her hand.  Patient ices her elbow at home daily, but does not ice her neck.    Patient reports some discomfort while doing leg press. She has a steroid injection schedule within the next month. Today we skipped leg  press but plans to do it next week at a lower weight.     On 9/13 patient learned beginner and intermediate HEP for Rotary Torso machine.     Patient misses playing tennis and working out. She is optimistic and staying positive during this time.        Objective:   Bernadette completed therapeutic stretches (Neck Retractions, etc) and the following MedX exercise machines: core cervical, torso rotation l/r, leg extension, leg curl, upright row, chest press, biceps curl, triceps extension, leg press      Fitness Machine Education Key:  E=education on equipment initiated and further follow up and education needed  I=independent with  and exercise.  The patient:  Adjusts machines to his/her settings  Uses equipment levers, pins, weights safely  Maintains safe and correct posture while exercising  Moves through exercise with correct pace and control  Gets on and off equipment safely          Lumbar/Cervical Ext.  Torso Rotation  Leg Press    Leg Extension  Seated Leg Curl  Chest Press    Seated Row  Hip ADD  Hip ABD    Triceps Extension  Bicep Curl  Other:        [x] Indicates exercise has been taught for home  Lumbar/Cervical Ext. [] Torso Rotation [x] Leg Press []   Leg Extension [] Seated Leg Curl [] Chest Press []   Seated Row [] Hip ADD [] Hip ABD []   Triceps Extension [] Bicep Curl [] Other:        Please see exercise log in patient folder for rate of exertion and repetitions completed.     Assessment:   Patient tolerated Patient tolerated the Med X Cervical extension and all other peripheral exercises without an increase in symptoms. Patient warmed up on the treadmill for 5 minutes, stretched, and iced neck for 10 minutes at the end of the workout.     Plan:  Continue with established plan of care towards wellness goals.     Health  : Jenniffer Barker  9/13/2023

## 2023-09-20 ENCOUNTER — DOCUMENTATION ONLY (OUTPATIENT)
Dept: REHABILITATION | Facility: HOSPITAL | Age: 73
End: 2023-09-20
Payer: MEDICARE

## 2023-09-20 NOTE — PROGRESS NOTES
Health  Wellness Visit Note    Name: Bernadette Aquino  Clinic Number: [id number]  Physician: No ref. provider found  Diagnosis: No diagnosis found.  Past Medical History:   Diagnosis Date    Appendicitis with perforation     Status post appendectomy, January 2011    Celiac disease     Cerebral aneurysm     Right internal carotid artery, nonruptured, status post coil    DDD (degenerative disc disease), cervical     Paresthesia of hands    Elevated liver enzymes     Prior history    Encounter for blood transfusion     GERD (gastroesophageal reflux disease)     HTN (hypertension)     Hyperlipidemia     Mild depression     Multinodular thyroid     u/s 9/11    Osteopenia     Prior history of osteoporosis on bisphosphonate    Other specified acquired hypothyroidism     On replacement    Single cyst of left breast      Visit Number: 23  Precautions: hx of R RTC Repair, HTN, Osteopenia     1st PT visit:  04/26/2023  Year of care end date:  April 2024  Mindbody plan: Plan A- 6 Months  Patient level: C      Time In: 10:00 AM  Time Out: 10:56 AM  Total Treatment Time: 56 Minutes    [facility] [employer] 2022  Handout on this week's wellness topic Ownership provided along with a discussion on what it means, the benefits, and suggestions for practice.  Reviewed last week's topic of Preventing Burnout.     Subjective:   Patient reports no neck pain. She associates her neck pain with playing tennis. Since she is still recovering from carpel tunnel surgery she has not played tennis in weeks. She is excited for her follow-up tomorrow with hand surgeon in hopes of getting released from her wrist brace. Once she no longer has her brace she will start post-opp OT. Over the last week she rode her bike for 30 minutes at least three times and on days she does not bike, she watches her grandchildren.     Patient reports some low back pain today.  introduced her to the back extension machine. Patient did 18 reps at 40lbs. She reports no  pain after doing the back extension.     On 9/13 patient learned beginner and intermediate HEP for Rotary Torso machine.     Patient misses playing tennis and working out. She is optimistic and staying positive during this time.        Objective:   Bernadette completed therapeutic stretches (Neck Retractions, etc) and the following MedX exercise machines: core cervical, torso rotation l/r, leg extension, leg curl, upright row, chest press, biceps curl, triceps extension, leg press      Fitness Machine Education Key:  E=education on equipment initiated and further follow up and education needed  I=independent with  and exercise.  The patient:  Adjusts machines to his/her settings  Uses equipment levers, pins, weights safely  Maintains safe and correct posture while exercising  Moves through exercise with correct pace and control  Gets on and off equipment safely          Lumbar/Cervical Ext.  Torso Rotation  Leg Press    Leg Extension  Seated Leg Curl  Chest Press    Seated Row  Hip ADD  Hip ABD    Triceps Extension  Bicep Curl  Other:        [x] Indicates exercise has been taught for home  Lumbar/Cervical Ext. [] Torso Rotation [x] Leg Press []   Leg Extension [] Seated Leg Curl [] Chest Press []   Seated Row [] Hip ADD [] Hip ABD []   Triceps Extension [] Bicep Curl [] Other:        Please see exercise log in patient folder for rate of exertion and repetitions completed.     Assessment:   Patient tolerated Patient tolerated the Med X Cervical extension and all other peripheral exercises without an increase in symptoms. Patient warmed up on the treadmill for 5 minutes, stretched, and iced neck for 10 minutes at the end of the workout.     Plan:  Continue with established plan of care towards wellness goals.     Health  : Jenniffer Barker  9/20/2023

## 2023-10-04 ENCOUNTER — DOCUMENTATION ONLY (OUTPATIENT)
Dept: REHABILITATION | Facility: HOSPITAL | Age: 73
End: 2023-10-04
Payer: MEDICARE

## 2023-10-04 NOTE — PROGRESS NOTES
Health  Wellness Visit Note    Name: Bernadette Aquino  Clinic Number: [id number]  Physician: No ref. provider found  Diagnosis: No diagnosis found.  Past Medical History:   Diagnosis Date    Appendicitis with perforation     Status post appendectomy, January 2011    Celiac disease     Cerebral aneurysm     Right internal carotid artery, nonruptured, status post coil    DDD (degenerative disc disease), cervical     Paresthesia of hands    Elevated liver enzymes     Prior history    Encounter for blood transfusion     GERD (gastroesophageal reflux disease)     HTN (hypertension)     Hyperlipidemia     Mild depression     Multinodular thyroid     u/s 9/11    Osteopenia     Prior history of osteoporosis on bisphosphonate    Other specified acquired hypothyroidism     On replacement    Single cyst of left breast      Visit Number: 24  Precautions: hx of R RTC Repair, HTN, Osteopenia     1st PT visit:  04/26/2023  Year of care end date:  April 2024  Mindbody plan: Plan A- 6 Months  Patient level: C      Time In: 11:00 AM  Time Out: 11:45 AM  Total Treatment Time: 45 Minutes    [employer] 2022  Handout on this week's wellness topic Relationships provided along with a discussion on what it means, the benefits, and suggestions for practice.  Reviewed last week's topic of n/a (patient did not attend Wellness last week).     Subjective:   Patient reports she is neck pain free today. She did not attend Wellness last week because she was out of town. She had no problem with her neck while she was traveling. Since her last Wellness session she got her wrist brace removed and started physical therapy. She feels confident that within the next three weeks she will be discharged from physical therapy. She plans to play tennis this afternoon for the first time in seven weeks. Patient will take it easy, not over-do it and ice after if need be. For additional exercise she rides her recumbent bike or walks her treadmill for  about 30 minutes daily. She spends a lot of time with her grandson whom keeps her moving with activities.     Patient reports some low back pain today. HC introduced her to the back extension machine. Patient did 20 reps at lb55s. She reports no pain after doing the back extension.     On 9/13 patient learned beginner and intermediate HEP for Rotary Torso machine.     Patient misses playing tennis and working out. She is optimistic and staying positive during this time.        Objective:   Bernadette completed therapeutic stretches (Neck Retractions, etc) and the following MedX exercise machines: core cervical, torso rotation l/r, leg extension, leg curl, upright row, chest press, biceps curl, triceps extension, leg press      Fitness Machine Education Key:  E=education on equipment initiated and further follow up and education needed  I=independent with  and exercise.  The patient:  Adjusts machines to his/her settings  Uses equipment levers, pins, weights safely  Maintains safe and correct posture while exercising  Moves through exercise with correct pace and control  Gets on and off equipment safely          Lumbar/Cervical Ext. E Torso Rotation E Leg Press    Leg Extension  Seated Leg Curl  Chest Press    Seated Row  Hip ADD E Hip ABD E   Triceps Extension  Bicep Curl  Other:        [x] Indicates exercise has been taught for home  Lumbar/Cervical Ext. [] Torso Rotation [x] Leg Press []   Leg Extension [] Seated Leg Curl [] Chest Press []   Seated Row [] Hip ADD [] Hip ABD []   Triceps Extension [] Bicep Curl [] Other:        Please see exercise log in patient folder for rate of exertion and repetitions completed.     Assessment:   Patient tolerated Patient tolerated the Med X Cervical extension and all other peripheral exercises without an increase in symptoms. Patient warmed up on the treadmill for 5 minutes, stretched, and iced neck for 10 minutes at the end of the workout.     Plan:  Continue with  established plan of care towards wellness goals.     Health  : Jenniffer Barker  10/4/2023

## 2023-10-11 ENCOUNTER — DOCUMENTATION ONLY (OUTPATIENT)
Dept: REHABILITATION | Facility: HOSPITAL | Age: 73
End: 2023-10-11
Payer: MEDICARE

## 2023-10-11 NOTE — PROGRESS NOTES
Health  Wellness Visit Note    Name: Bernadette Aquino  Clinic Number: [id number]  Physician: No ref. provider found  Diagnosis: No diagnosis found.  Past Medical History:   Diagnosis Date    Appendicitis with perforation     Status post appendectomy, January 2011    Celiac disease     Cerebral aneurysm     Right internal carotid artery, nonruptured, status post coil    DDD (degenerative disc disease), cervical     Paresthesia of hands    Elevated liver enzymes     Prior history    Encounter for blood transfusion     GERD (gastroesophageal reflux disease)     HTN (hypertension)     Hyperlipidemia     Mild depression     Multinodular thyroid     u/s 9/11    Osteopenia     Prior history of osteoporosis on bisphosphonate    Other specified acquired hypothyroidism     On replacement    Single cyst of left breast      Visit Number: 25  Precautions: hx of R RTC Repair, HTN, Osteopenia     1st PT visit:  04/26/2023  Year of care end date:  April 2024  Mindbody plan: Plan A- 6 Months  Patient level: C      Time In: 11:00 AM  Time Out: 11:55 AM  Total Treatment Time: 55 Minutes    Wellness [employer] 2022  Handout on this week's wellness topic Communication provided along with a discussion on what it means, the benefits, and suggestions for practice.  Reviewed last week's topic of Relationships.     Subjective:   Patient reports no neck pain. Over the last week she had a few family functions that kept her moving and on her feet. On Monday she played tennis for the first time in weeks. Her hand did okay but she stayed cautious and after playing iced her hand. She plans to ride her recumbent bike for 20 minutes after today's Wellness session. She has a buys weekend ahead watching several of her grandchildren.     Patient did 15 reps at 60 lbs on back extension. She reports no pain after doing the back extension.     On 9/13 patient learned beginner and intermediate HEP for Rotary Torso machine.     Objective:   Bernadette completed  therapeutic stretches (Neck Retractions, etc) and the following MedX exercise machines: core cervical, torso rotation l/r, leg extension, leg curl, upright row, chest press, biceps curl, triceps extension, leg press      Fitness Machine Education Key:  E=education on equipment initiated and further follow up and education needed  I=independent with  and exercise.  The patient:  Adjusts machines to his/her settings  Uses equipment levers, pins, weights safely  Maintains safe and correct posture while exercising  Moves through exercise with correct pace and control  Gets on and off equipment safely          Lumbar/Cervical Ext. E Torso Rotation E Leg Press E   Leg Extension E Seated Leg Curl E Chest Press    Seated Row  Hip ADD E Hip ABD E   Triceps Extension  Bicep Curl  Other:        [x] Indicates exercise has been taught for home  Lumbar/Cervical Ext. [] Torso Rotation [x] Leg Press []   Leg Extension [] Seated Leg Curl [] Chest Press []   Seated Row [] Hip ADD [] Hip ABD []   Triceps Extension [] Bicep Curl [] Other:        Please see exercise log in patient folder for rate of exertion and repetitions completed.     Assessment:   Patient tolerated Patient tolerated the Med X Cervical extension and all other peripheral exercises without an increase in symptoms. Patient warmed up on the treadmill for 5 minutes, stretched, and iced neck for 5 minutes at the end of the workout.     Plan:  Continue with established plan of care towards wellness goals.     Health  : Jenniffer Barker  10/11/2023

## 2023-10-18 ENCOUNTER — DOCUMENTATION ONLY (OUTPATIENT)
Dept: REHABILITATION | Facility: HOSPITAL | Age: 73
End: 2023-10-18
Payer: MEDICARE

## 2023-10-18 NOTE — PROGRESS NOTES
Health  Wellness Visit Note    Name: Bernadette Aquino  Clinic Number: [id number]  Physician: No ref. provider found  Diagnosis: No diagnosis found.  Past Medical History:   Diagnosis Date    Appendicitis with perforation     Status post appendectomy, January 2011    Celiac disease     Cerebral aneurysm     Right internal carotid artery, nonruptured, status post coil    DDD (degenerative disc disease), cervical     Paresthesia of hands    Elevated liver enzymes     Prior history    Encounter for blood transfusion     GERD (gastroesophageal reflux disease)     HTN (hypertension)     Hyperlipidemia     Mild depression     Multinodular thyroid     u/s 9/11    Osteopenia     Prior history of osteoporosis on bisphosphonate    Other specified acquired hypothyroidism     On replacement    Single cyst of left breast      Visit Number: 26  Precautions: hx of R RTC Repair, HTN, Osteopenia     1st PT visit:  04/26/2023  Year of care end date:  April 2024  Mindbody plan: Plan A- 6 Months  Patient level: C      Time In: 11:00 AM  Time Out: 11:43 AM  Total Treatment Time: 43 Minutes    Wellness [employer] 2022  Handout on this week's wellness topic Self-Assessment provided along with a discussion on what it means, the benefits, and suggestions for practice.  Reviewed last week's topic of Communication.     Subjective:   Patient reports some neck stiffness today but no pain. Over the weekend she babysat 4 of her young grandchildren. They kept her moving and active. She played tennis last week but not as much as the week before. She plans to ride her recumbent bike at home for 20 minutes at least twice before her next Wellness session. Patient was discharged from occupational therapy for her hand. She has no complaints involving her hand. She did not ice her neck at home.     Patient did 15 reps at 65 lbs on back extension. She reports no pain after doing the back extension.     On 9/13 patient learned beginner and intermediate HEP  for Rotary Torso machine.     Objective:   Bernadette completed therapeutic stretches (Neck Retractions, etc) and the following MedX exercise machines: core cervical, torso rotation l/r, leg extension, leg curl, upright row, chest press, biceps curl, triceps extension, leg press      Fitness Machine Education Key:  E=education on equipment initiated and further follow up and education needed  I=independent with  and exercise.  The patient:  Adjusts machines to his/her settings  Uses equipment levers, pins, weights safely  Maintains safe and correct posture while exercising  Moves through exercise with correct pace and control  Gets on and off equipment safely          Lumbar/Cervical Ext. E Torso Rotation E Leg Press E   Leg Extension E Seated Leg Curl E Chest Press    Seated Row  Hip ADD E Hip ABD E   Triceps Extension  Bicep Curl  Other:        [x] Indicates exercise has been taught for home  Lumbar/Cervical Ext. [] Torso Rotation [x] Leg Press []   Leg Extension [] Seated Leg Curl [] Chest Press []   Seated Row [] Hip ADD [] Hip ABD []   Triceps Extension [] Bicep Curl [] Other:        Please see exercise log in patient folder for rate of exertion and repetitions completed.     Assessment:   Patient tolerated Patient tolerated the Med X Cervical extension and all other peripheral exercises without an increase in symptoms. Patient warmed up on the treadmill for 5 minutes, stretched, and iced neck for 5 minutes at the end of the workout.     Plan:  Continue with established plan of care towards wellness goals.     Health  : Jenniffer Barker  10/18/2023

## 2023-11-08 ENCOUNTER — DOCUMENTATION ONLY (OUTPATIENT)
Dept: REHABILITATION | Facility: HOSPITAL | Age: 73
End: 2023-11-08
Payer: MEDICARE

## 2023-11-15 ENCOUNTER — DOCUMENTATION ONLY (OUTPATIENT)
Dept: REHABILITATION | Facility: HOSPITAL | Age: 73
End: 2023-11-15
Payer: MEDICARE

## 2023-11-15 NOTE — PROGRESS NOTES
Health  Wellness Visit Note    Name: Bernadette Aquino  Clinic Number: [id number]  Physician: No ref. provider found  Diagnosis: No diagnosis found.  Past Medical History:   Diagnosis Date    Appendicitis with perforation     Status post appendectomy, January 2011    Celiac disease     Cerebral aneurysm     Right internal carotid artery, nonruptured, status post coil    DDD (degenerative disc disease), cervical     Paresthesia of hands    Elevated liver enzymes     Prior history    Encounter for blood transfusion     GERD (gastroesophageal reflux disease)     HTN (hypertension)     Hyperlipidemia     Mild depression     Multinodular thyroid     u/s 9/11    Osteopenia     Prior history of osteoporosis on bisphosphonate    Other specified acquired hypothyroidism     On replacement    Single cyst of left breast      Visit Number: 28  Precautions: hx of R RTC Repair, HTN, Osteopenia     1st PT visit:  04/26/2023  Year of care end date:  April 2024  Mindbody plan: Plan A- 6 Months  Patient level: C    Time In: 11:00 AM  Time Out: 11:50 AM  Total Treatment Time: 50 Minutes    Wellness [employer] 2022  Handout on this week's wellness topic Sleep Journal provided along with a discussion on what it means, the benefits, and suggestions for practice.  Reviewed last week's topic of Sleep Positions.     Subjective:   Patient reports some neck pain over the last week. Since her last Wellness session, she did not have her tennis tournament because of the rain. She did play solo on Monday and had a lot of neck stiffness afterwards. To help subside her pain she iced her neck. Patient's right knee has bothered her a good bit last week. She also iced her neck. She does her stretches every morning, ran errands for the holidays, went to MD appointments and watched her grandchildren.     Patient did 15 reps at 70 lbs on back extension. She reports no pain after doing the back extension.     On 9/13 patient learned beginner and  intermediate HEP for Rotary Torso machine.     Objective:   Bernadette completed therapeutic stretches (Neck Retractions, etc) and the following MedX exercise machines: core cervical, torso rotation l/r, leg extension, leg curl, upright row, chest press, biceps curl, triceps extension, leg press      Fitness Machine Education Key:  E=education on equipment initiated and further follow up and education needed  I=independent with  and exercise.  The patient:  Adjusts machines to his/her settings  Uses equipment levers, pins, weights safely  Maintains safe and correct posture while exercising  Moves through exercise with correct pace and control  Gets on and off equipment safely          Lumbar/Cervical Ext. E Torso Rotation E Leg Press E   Leg Extension E Seated Leg Curl E Chest Press    Seated Row E Hip ADD E Hip ABD E   Triceps Extension  Bicep Curl  Other:        [x] Indicates exercise has been taught for home  Lumbar/Cervical Ext. [] Torso Rotation [x] Leg Press []   Leg Extension [] Seated Leg Curl [] Chest Press []   Seated Row [] Hip ADD [] Hip ABD []   Triceps Extension [] Bicep Curl [] Other:        Please see exercise log in patient folder for rate of exertion and repetitions completed.     Assessment:   Patient tolerated Patient tolerated the Med X Cervical extension and all other peripheral exercises without an increase in symptoms. Patient warmed up on the treadmill for 5 minutes, stretched, and  opted out of icing neck for 5 minutes at the end of the workout.     Plan:  Continue with established plan of care towards wellness goals.     Health  : Jenniffer Barker  11/15/2023

## 2023-11-29 ENCOUNTER — DOCUMENTATION ONLY (OUTPATIENT)
Dept: REHABILITATION | Facility: HOSPITAL | Age: 73
End: 2023-11-29
Payer: MEDICARE

## 2023-11-29 NOTE — PROGRESS NOTES
Health  Wellness Visit Note    Name: Bernadette Aquino  Clinic Number: [id number]  Physician: No ref. provider found  Diagnosis: No diagnosis found.  Past Medical History:   Diagnosis Date    Appendicitis with perforation     Status post appendectomy, January 2011    Celiac disease     Cerebral aneurysm     Right internal carotid artery, nonruptured, status post coil    DDD (degenerative disc disease), cervical     Paresthesia of hands    Elevated liver enzymes     Prior history    Encounter for blood transfusion     GERD (gastroesophageal reflux disease)     HTN (hypertension)     Hyperlipidemia     Mild depression     Multinodular thyroid     u/s 9/11    Osteopenia     Prior history of osteoporosis on bisphosphonate    Other specified acquired hypothyroidism     On replacement    Single cyst of left breast      Visit Number: 29  Precautions: hx of R RTC Repair, HTN, Osteopenia     1st PT visit:  04/26/2023  Year of care end date:  April 2024  Mindbody plan: Plan A- 6 Months  Patient level: C    Time In: 11:00 AM  Time Out: 11:42 AM  Total Treatment Time: 42 Minutes    Wellness [employer] 2022  Handout on this week's wellness topic Move More, Sleep Better provided along with a discussion on what it means, the benefits, and suggestions for practice.  Reviewed last week's topic of n/a (patient did not attend Wellness last week).     Subjective:   Patient reports some neck pain here and there but nothing out the ordinary. She continued to play tennis, watch her grandchildren and celebrate the holidays. She plans to play tennis when she can throughout this week. She is preparing for her daughter's 30th birthday party, daughter-in-law's baby shower and a few monica parties. Patient stretches daily. She does not ice her back or neck at home.      Patient did 20 reps at 70 lbs on back extension. She reports no pain after doing the back extension.     On 9/13 patient learned beginner and intermediate HEP for Rotary Torso  machine.     Objective:   Bernadette completed therapeutic stretches (Neck Retractions, etc) and the following MedX exercise machines: core cervical, torso rotation l/r, leg extension, leg curl, upright row, chest press, biceps curl, triceps extension, leg press      Fitness Machine Education Key:  E=education on equipment initiated and further follow up and education needed  I=independent with  and exercise.  The patient:  Adjusts machines to his/her settings  Uses equipment levers, pins, weights safely  Maintains safe and correct posture while exercising  Moves through exercise with correct pace and control  Gets on and off equipment safely          Lumbar/Cervical Ext. E Torso Rotation E Leg Press E   Leg Extension E Seated Leg Curl E Chest Press    Seated Row E Hip ADD E Hip ABD E   Triceps Extension  Bicep Curl  Other:        [x] Indicates exercise has been taught for home  Lumbar/Cervical Ext. [] Torso Rotation [x] Leg Press []   Leg Extension [] Seated Leg Curl [] Chest Press []   Seated Row [] Hip ADD [] Hip ABD []   Triceps Extension [] Bicep Curl [] Other:        Please see exercise log in patient folder for rate of exertion and repetitions completed.     Assessment:   Patient tolerated Patient tolerated the Med X Cervical extension and all other peripheral exercises without an increase in symptoms. Patient warmed up on the treadmill for 5 minutes, stretched, and  opted out of icing neck for 5 minutes at the end of the workout.     Plan:  Continue with established plan of care towards wellness goals.     Health  : Jenniffer Barker  11/29/2023

## 2023-12-13 ENCOUNTER — DOCUMENTATION ONLY (OUTPATIENT)
Dept: REHABILITATION | Facility: HOSPITAL | Age: 73
End: 2023-12-13
Payer: MEDICARE

## 2023-12-13 NOTE — PROGRESS NOTES
Health  Wellness Visit Note    Name: Bernadette Aquino  Clinic Number: [id number]  Physician: No ref. provider found  Diagnosis: No diagnosis found.  Past Medical History:   Diagnosis Date    Appendicitis with perforation     Status post appendectomy, January 2011    Celiac disease     Cerebral aneurysm     Right internal carotid artery, nonruptured, status post coil    DDD (degenerative disc disease), cervical     Paresthesia of hands    Elevated liver enzymes     Prior history    Encounter for blood transfusion     GERD (gastroesophageal reflux disease)     HTN (hypertension)     Hyperlipidemia     Mild depression     Multinodular thyroid     u/s 9/11    Osteopenia     Prior history of osteoporosis on bisphosphonate    Other specified acquired hypothyroidism     On replacement    Single cyst of left breast      Visit Number: 30  Precautions: hx of R RTC Repair, HTN, Osteopenia     1st PT visit:  04/26/2023  Year of care end date:  April 2024  Mindbody plan: Plan A- 6 Months  Patient level: C    Time In: 10:30 AM  Time Out: 11:16 AM  Total Treatment Time: 46 Minutes    [employer] 2022  Handout on this week's wellness topic Coping provided along with a discussion on what it means, the benefits, and suggestions for practice.  Reviewed last week's topic of n/a (patient did not attend Wellness last week).     Subjective:   Patient reports some neck stiffness and limited range of motion. She does have some pain but nothing she can not manage. Patient has some low back pain. She continued to play tennis, watch her grandchildren and celebrate the holidays. Over the weekend she stayed busy with her daughter's birthday bas. She does not ice her back or neck at home.      Patient did 20 reps at 75 lbs on back extension. She reports no pain after doing the back extension.     On 9/13 patient learned beginner and intermediate HEP for Rotary Torso machine.     Objective:   Bernadette completed therapeutic stretches (Neck  Retractions, etc) and the following MedX exercise machines: core cervical, torso rotation l/r, leg extension, leg curl, upright row, chest press, biceps curl, triceps extension, leg press      Fitness Machine Education Key:  E=education on equipment initiated and further follow up and education needed  I=independent with  and exercise.  The patient:  Adjusts machines to his/her settings  Uses equipment levers, pins, weights safely  Maintains safe and correct posture while exercising  Moves through exercise with correct pace and control  Gets on and off equipment safely          Lumbar/Cervical Ext. E Torso Rotation E Leg Press E   Leg Extension E Seated Leg Curl E Chest Press    Seated Row E Hip ADD E Hip ABD E   Triceps Extension  Bicep Curl  Other:        [x] Indicates exercise has been taught for home  Lumbar/Cervical Ext. [] Torso Rotation [x] Leg Press []   Leg Extension [] Seated Leg Curl [] Chest Press []   Seated Row [] Hip ADD [] Hip ABD []   Triceps Extension [] Bicep Curl [] Other:        Please see exercise log in patient folder for rate of exertion and repetitions completed.     Assessment:   Patient tolerated Patient tolerated the Med X Cervical extension and all other peripheral exercises without an increase in symptoms. Patient warmed up on the treadmill for 5 minutes, stretched, and  opted out of icing neck for 5 minutes at the end of the workout.     Plan:  Continue with established plan of care towards wellness goals.     Health  : Jenniffer Barker  12/13/2023

## 2023-12-20 ENCOUNTER — DOCUMENTATION ONLY (OUTPATIENT)
Dept: REHABILITATION | Facility: HOSPITAL | Age: 73
End: 2023-12-20
Payer: MEDICARE

## 2023-12-20 NOTE — PROGRESS NOTES
Health  Wellness Visit Note    Name: Bernadette Aquino  Clinic Number: [id number]  Physician: No ref. provider found  Diagnosis: No diagnosis found.  Past Medical History:   Diagnosis Date    Appendicitis with perforation     Status post appendectomy, January 2011    Celiac disease     Cerebral aneurysm     Right internal carotid artery, nonruptured, status post coil    DDD (degenerative disc disease), cervical     Paresthesia of hands    Elevated liver enzymes     Prior history    Encounter for blood transfusion     GERD (gastroesophageal reflux disease)     HTN (hypertension)     Hyperlipidemia     Mild depression     Multinodular thyroid     u/s 9/11    Osteopenia     Prior history of osteoporosis on bisphosphonate    Other specified acquired hypothyroidism     On replacement    Single cyst of left breast      Visit Number: 31  Precautions: hx of R RTC Repair, HTN, Osteopenia     1st PT visit:  04/26/2023  Year of care end date:  April 2024  Mindbody plan: Plan A- 6 Months  Patient level: C    Time In: 11:00 AM  Time Out: 11:58 AM  Total Treatment Time: 58 Minutes    [employer] 2022  Handout on this week's wellness topic  Time Management provided along with a discussion on what it means, the benefits, and suggestions for practice.  Reviewed last week's topic of Coping.     Subjective:   Patient reports no neck pain but does have some stiffness. Over the week she worked, watched her grandchildren, prepared for the holidays and went to a few social events. She did not play tennis this week but plans to start back in the new year. Patient stretches daily, sometimes twice daily when her pain is persistent. She does not ice outside of Wellness.     Patient did 20 reps at 75 lbs on back extension. She reports no pain after doing the back extension.     On 9/13 patient learned beginner and intermediate HEP for Rotary Torso machine.     Objective:   Bernadette completed therapeutic stretches (Neck Retractions, etc)  and the following MedX exercise machines: core cervical, torso rotation l/r, leg extension, leg curl, upright row, chest press, biceps curl, triceps extension, leg press      Fitness Machine Education Key:  E=education on equipment initiated and further follow up and education needed  I=independent with  and exercise.  The patient:  Adjusts machines to his/her settings  Uses equipment levers, pins, weights safely  Maintains safe and correct posture while exercising  Moves through exercise with correct pace and control  Gets on and off equipment safely          Lumbar/Cervical Ext. E Torso Rotation E Leg Press E   Leg Extension E Seated Leg Curl E Chest Press E   Seated Row E Hip ADD E Hip ABD E   Triceps Extension X Bicep Curl  Other:        [x] Indicates exercise has been taught for home  Lumbar/Cervical Ext. [] Torso Rotation [x] Leg Press []   Leg Extension [] Seated Leg Curl [] Chest Press []   Seated Row [] Hip ADD [] Hip ABD []   Triceps Extension [] Bicep Curl [] Other:        Please see exercise log in patient folder for rate of exertion and repetitions completed.     Assessment:   Patient tolerated Patient tolerated the Med X Cervical extension and all other peripheral exercises without an increase in symptoms. Patient warmed up on the treadmill for 5 minutes, stretched, and  opted out of icing neck for 5 minutes at the end of the workout.     Plan:  Continue with established plan of care towards wellness goals.     Health  : Jenniffer Barker  12/20/2023

## 2023-12-27 ENCOUNTER — DOCUMENTATION ONLY (OUTPATIENT)
Dept: REHABILITATION | Facility: HOSPITAL | Age: 73
End: 2023-12-27
Payer: MEDICARE

## 2023-12-27 NOTE — PROGRESS NOTES
Health  Wellness Visit Note    Name: Bernadette Aquino  Clinic Number: [id number]  Physician: No ref. provider found  Diagnosis: No diagnosis found.  Past Medical History:   Diagnosis Date    Appendicitis with perforation     Status post appendectomy, January 2011    Celiac disease     Cerebral aneurysm     Right internal carotid artery, nonruptured, status post coil    DDD (degenerative disc disease), cervical     Paresthesia of hands    Elevated liver enzymes     Prior history    Encounter for blood transfusion     GERD (gastroesophageal reflux disease)     HTN (hypertension)     Hyperlipidemia     Mild depression     Multinodular thyroid     u/s 9/11    Osteopenia     Prior history of osteoporosis on bisphosphonate    Other specified acquired hypothyroidism     On replacement    Single cyst of left breast      Visit Number: 32  Precautions: hx of R RTC Repair, HTN, Osteopenia     1st PT visit:  04/26/2023  Year of care end date:  April 2024  Mindbody plan: Plan A- 6 Months  Patient level: C    Time In: 11:40 AM  Time Out: 12:25 PM  Total Treatment Time: 45 Minutes    [employer] 2022  Handout on this week's wellness topic Organize and Prioritize was provided along with a discussion on what it means, the benefits, and suggestions for practice.  Reviewed last week's topic of Time Management.     Subjective:   Patient reports no neck or back pain but does have some stiffness in both areas. She had a busy holiday weekend.  She plays tennis 3-4 days per week.  HC discussed plans to begin strength training on own at the gym.  She stays busy during the week and trying to figure out how to add it in. Patient stretches daily, sometimes twice daily when her pain is persistent. She does not ice outside of Wellness.     Patient did 20 reps at 80 lbs on back extension. She reports no pain after doing the back extension.     On 9/13 patient learned beginner and intermediate HEP for Rotary Torso machine.     Objective:    Bernadette completed therapeutic stretches (Neck Retractions, etc) and the following MedX exercise machines: core cervical, torso rotation l/r, leg extension, leg curl, upright row, chest press, biceps curl, triceps extension, leg press      Fitness Machine Education Key:  E=education on equipment initiated and further follow up and education needed  I=independent with  and exercise.  The patient:  Adjusts machines to his/her settings  Uses equipment levers, pins, weights safely  Maintains safe and correct posture while exercising  Moves through exercise with correct pace and control  Gets on and off equipment safely          Lumbar/Cervical Ext. E Torso Rotation E Leg Press E   Leg Extension E Seated Leg Curl E Chest Press E   Seated Row E Hip ADD E Hip ABD E   Triceps Extension X Bicep Curl  Other:        [x] Indicates exercise has been taught for home  Lumbar/Cervical Ext. [] Torso Rotation [x] Leg Press []   Leg Extension [] Seated Leg Curl [] Chest Press []   Seated Row [] Hip ADD [] Hip ABD []   Triceps Extension [] Bicep Curl [] Other:        Please see exercise log in patient folder for rate of exertion and repetitions completed.     Assessment:   Patient tolerated Patient tolerated the Med X Cervical extension and all other peripheral exercises without an increase in symptoms. Patient warmed up on the treadmill for 5 minutes, stretched, and  opted out of icing neck for 5 minutes at the end of the workout.     Plan:  Continue with established plan of care towards wellness goals.     Health  : Cristy Cheung  12/27/2023

## 2024-01-03 ENCOUNTER — DOCUMENTATION ONLY (OUTPATIENT)
Dept: REHABILITATION | Facility: HOSPITAL | Age: 74
End: 2024-01-03
Payer: MEDICARE

## 2024-01-03 NOTE — PROGRESS NOTES
Health  Wellness Visit Note    Name: Bernadette Aquino  Clinic Number: [id number]  Physician: No ref. provider found  Diagnosis: No diagnosis found.  Past Medical History:   Diagnosis Date    Appendicitis with perforation     Status post appendectomy, January 2011    Celiac disease     Cerebral aneurysm     Right internal carotid artery, nonruptured, status post coil    DDD (degenerative disc disease), cervical     Paresthesia of hands    Elevated liver enzymes     Prior history    Encounter for blood transfusion     GERD (gastroesophageal reflux disease)     HTN (hypertension)     Hyperlipidemia     Mild depression     Multinodular thyroid     u/s 9/11    Osteopenia     Prior history of osteoporosis on bisphosphonate    Other specified acquired hypothyroidism     On replacement    Single cyst of left breast      Visit Number: 33  Precautions: hx of R RTC Repair, HTN, Osteopenia     1st PT visit:  04/26/2023  Year of care end date:  April 2024  Mindbody plan: Plan A- 6 Months  Patient level: B    Time In: 11:15 AM  Time Out: 12:00 PM  Total Treatment Time: 45 Minutes    [employer] 2022  Handout on this week's wellness topic Gratitude was provided along with a discussion on what it means, the benefits, and suggestions for practice.  Reviewed last week's topic of Organize and Prioritize.    Subjective:   Patient reports some neck stiffness but no pain. Since her last Wellness session she stretched daily, went on walks, watched her grandchildren, played tennis once and celebrated the holiday. She reports no low back pain coming into Wellness today. She does not ice at home.    Patient did 20 reps at 80 lbs on back extension. She reports no pain after doing the back extension.     On 9/13 patient learned beginner and intermediate HEP for Rotary Torso machine.     Objective:   Bernadette completed therapeutic stretches (Neck Retractions, etc) and the following MedX exercise machines: core cervical, torso rotation l/r,  leg extension, leg curl, upright row, chest press, biceps curl, triceps extension, leg press      Fitness Machine Education Key:  E=education on equipment initiated and further follow up and education needed  I=independent with  and exercise.  The patient:  Adjusts machines to his/her settings  Uses equipment levers, pins, weights safely  Maintains safe and correct posture while exercising  Moves through exercise with correct pace and control  Gets on and off equipment safely          Lumbar/Cervical Ext. I Torso Rotation I Leg Press I   Leg Extension I Seated Leg Curl I Chest Press I   Seated Row I Hip ADD I Hip ABD I   Triceps Extension X Bicep Curl X Other:        [x] Indicates exercise has been taught for home  Lumbar/Cervical Ext. [] Torso Rotation [x] Leg Press []   Leg Extension [] Seated Leg Curl [] Chest Press []   Seated Row [] Hip ADD [] Hip ABD []   Triceps Extension [] Bicep Curl [] Other:        Please see exercise log in patient folder for rate of exertion and repetitions completed.     Assessment:   Patient tolerated Patient tolerated the Med X Cervical extension and all other peripheral exercises without an increase in symptoms. Patient warmed up on the treadmill for 5 minutes, stretched, and  opted out of icing neck for 5 minutes at the end of the workout.     Plan:  Continue with established plan of care towards wellness goals.     Health  : Jenniffer Barker  1/3/2024

## 2024-01-10 ENCOUNTER — DOCUMENTATION ONLY (OUTPATIENT)
Dept: REHABILITATION | Facility: HOSPITAL | Age: 74
End: 2024-01-10
Payer: MEDICARE

## 2024-01-10 NOTE — PROGRESS NOTES
Health  Wellness Visit Note    Name: Bernadette Aquino  Clinic Number: [id number]  Physician: No ref. provider found  Diagnosis: No diagnosis found.  Past Medical History:   Diagnosis Date    Appendicitis with perforation     Status post appendectomy, January 2011    Celiac disease     Cerebral aneurysm     Right internal carotid artery, nonruptured, status post coil    DDD (degenerative disc disease), cervical     Paresthesia of hands    Elevated liver enzymes     Prior history    Encounter for blood transfusion     GERD (gastroesophageal reflux disease)     HTN (hypertension)     Hyperlipidemia     Mild depression     Multinodular thyroid     u/s 9/11    Osteopenia     Prior history of osteoporosis on bisphosphonate    Other specified acquired hypothyroidism     On replacement    Single cyst of left breast      Visit Number: 34  Precautions: hx of R RTC Repair, HTN, Osteopenia     1st PT visit:  04/26/2023  Year of care end date:  April 2024  Mindbody plan: Plan A- 6 Months  Patient level: B    Time In: 11:15 AM  Time Out: 11:58 PM  Total Treatment Time: 43 Minutes    [employer] 2022  Handout on this week's wellness topic Get Outdoors was provided along with a discussion on what it means, the benefits, and suggestions for practice.  Reviewed last week's topic of Gratitude.     Subjective:   Patient reports some neck discomfort and stiffness. She does have some numbness in her fingers but usually only at night when she is not doing anything. She plans to see MD soon to discuss the cause. She went back to her normal schedule of watching her grandchildren, played tennis and pickleball and running errands. She reports no low back pain coming into Wellness today. Patient does not ice at home.    Patient reports some shoulder pain and to monitor upper body exercises.     Patient did 20 reps at 85 lbs on back extension. She reports no pain after doing the back extension.     On 9/13 patient learned beginner and  intermediate HEP for Rotary Torso machine.     Objective:   Bernadette completed therapeutic stretches (Neck Retractions, etc) and the following MedX exercise machines: core cervical, torso rotation l/r, leg extension, leg curl, upright row, chest press, biceps curl, triceps extension, leg press      Fitness Machine Education Key:  E=education on equipment initiated and further follow up and education needed  I=independent with  and exercise.  The patient:  Adjusts machines to his/her settings  Uses equipment levers, pins, weights safely  Maintains safe and correct posture while exercising  Moves through exercise with correct pace and control  Gets on and off equipment safely          Lumbar/Cervical Ext. I Torso Rotation I Leg Press I   Leg Extension I Seated Leg Curl I Chest Press I   Seated Row I Hip ADD I Hip ABD I   Triceps Extension X Bicep Curl X Other:        [x] Indicates exercise has been taught for home  Lumbar/Cervical Ext. [] Torso Rotation [x] Leg Press []   Leg Extension [] Seated Leg Curl [] Chest Press []   Seated Row [] Hip ADD [] Hip ABD []   Triceps Extension [] Bicep Curl [] Other:        Please see exercise log in patient folder for rate of exertion and repetitions completed.     Assessment:   Patient tolerated Patient tolerated the Med X Cervical extension and all other peripheral exercises without an increase in symptoms. Patient warmed up on the treadmill for 5 minutes, stretched, and  opted out of icing neck for 5 minutes at the end of the workout.     Plan:  Continue with established plan of care towards wellness goals.     Health  : Jenniffer Barker  1/10/2024

## 2024-01-17 ENCOUNTER — DOCUMENTATION ONLY (OUTPATIENT)
Dept: REHABILITATION | Facility: HOSPITAL | Age: 74
End: 2024-01-17
Payer: MEDICARE

## 2024-01-17 NOTE — PROGRESS NOTES
Health  Wellness Visit Note    Name: Bernadette Aquino  Clinic Number: [id number]  Physician: No ref. provider found  Diagnosis: No diagnosis found.  Past Medical History:   Diagnosis Date    Appendicitis with perforation     Status post appendectomy, January 2011    Celiac disease     Cerebral aneurysm     Right internal carotid artery, nonruptured, status post coil    DDD (degenerative disc disease), cervical     Paresthesia of hands    Elevated liver enzymes     Prior history    Encounter for blood transfusion     GERD (gastroesophageal reflux disease)     HTN (hypertension)     Hyperlipidemia     Mild depression     Multinodular thyroid     u/s 9/11    Osteopenia     Prior history of osteoporosis on bisphosphonate    Other specified acquired hypothyroidism     On replacement    Single cyst of left breast      Visit Number: 35  Precautions: hx of R RTC Repair, HTN, Osteopenia     1st PT visit:  04/26/2023  Year of care end date:  April 2024  Mindbody plan: Plan A- 6 Months  Patient level: B    Time In: 11:00 AM  Time Out: 11:53 PM  Total Treatment Time: 53 Minutes    [employer] 2022  Handout on this week's wellness topic Meditation was provided along with a discussion on what it means, the benefits, and suggestions for practice.  Reviewed last week's topic of Get Outdoors.     Subjective:   Patient reports some neck discomfort but nothing out the ordinary that stretching does not help. She may go to her MD to get another injection but not until the pain becomes consistent. She did not play tennis or pickleball yesterday due to the cold weather. Instead for 15 minutes she danced at home on her own. She plans to play tennis twice tomorrow and watch her grandson over night on Friday. Patient does not ice at home.    Patient reports some shoulder pain and to monitor upper body exercises.     Patient did 20 reps at 85 lbs on back extension. She reports no pain after doing the back extension.     On 9/13  patient learned beginner and intermediate HEP for Rotary Torso machine.     Objective:   Bernadette completed therapeutic stretches (Neck Retractions, etc) and the following MedX exercise machines: core cervical, torso rotation l/r, leg extension, leg curl, upright row, chest press, biceps curl, triceps extension, leg press      Fitness Machine Education Key:  E=education on equipment initiated and further follow up and education needed  I=independent with  and exercise.  The patient:  Adjusts machines to his/her settings  Uses equipment levers, pins, weights safely  Maintains safe and correct posture while exercising  Moves through exercise with correct pace and control  Gets on and off equipment safely          Lumbar/Cervical Ext. I Torso Rotation I Leg Press I   Leg Extension I Seated Leg Curl I Chest Press I   Seated Row I Hip ADD I Hip ABD I   Triceps Extension X Bicep Curl X Other:        [x] Indicates exercise has been taught for home  Lumbar/Cervical Ext. [] Torso Rotation [x] Leg Press []   Leg Extension [] Seated Leg Curl [] Chest Press []   Seated Row [] Hip ADD [] Hip ABD []   Triceps Extension [] Bicep Curl [] Other:        Please see exercise log in patient folder for rate of exertion and repetitions completed.     Assessment:   Patient tolerated Patient tolerated the Med X Cervical extension and all other peripheral exercises without an increase in symptoms. Patient warmed up on the treadmill for 5 minutes, stretched, and  opted out of icing neck for 5 minutes at the end of the workout.     Plan:  Continue with established plan of care towards wellness goals.     Health  : Jenniffer Barker  1/17/2024

## 2024-02-07 ENCOUNTER — DOCUMENTATION ONLY (OUTPATIENT)
Dept: REHABILITATION | Facility: HOSPITAL | Age: 74
End: 2024-02-07
Payer: MEDICARE

## 2024-02-07 ENCOUNTER — OFFICE VISIT (OUTPATIENT)
Dept: SPORTS MEDICINE | Facility: CLINIC | Age: 74
End: 2024-02-07
Payer: MEDICARE

## 2024-02-07 ENCOUNTER — HOSPITAL ENCOUNTER (OUTPATIENT)
Dept: RADIOLOGY | Facility: HOSPITAL | Age: 74
Discharge: HOME | End: 2024-02-07
Attending: PHYSICIAN ASSISTANT
Payer: MEDICARE

## 2024-02-07 VITALS
SYSTOLIC BLOOD PRESSURE: 125 MMHG | HEART RATE: 81 BPM | DIASTOLIC BLOOD PRESSURE: 78 MMHG | BODY MASS INDEX: 23.43 KG/M2 | WEIGHT: 132.25 LBS

## 2024-02-07 DIAGNOSIS — M25.512 LEFT SHOULDER PAIN, UNSPECIFIED CHRONICITY: ICD-10-CM

## 2024-02-07 DIAGNOSIS — S43.432A DEGENERATIVE SUPERIOR LABRAL ANTERIOR-TO-POSTERIOR (SLAP) TEAR OF LEFT SHOULDER: Primary | ICD-10-CM

## 2024-02-07 DIAGNOSIS — M75.22 BICEPS TENDINITIS OF LEFT UPPER EXTREMITY: ICD-10-CM

## 2024-02-07 PROCEDURE — 99999 PR PBB SHADOW E&M-EST. PATIENT-LVL IV: CPT | Mod: PBBFAC,,, | Performed by: PHYSICIAN ASSISTANT

## 2024-02-07 PROCEDURE — 73030 X-RAY EXAM OF SHOULDER: CPT | Mod: TC,LT

## 2024-02-07 PROCEDURE — 20610 DRAIN/INJ JOINT/BURSA W/O US: CPT | Mod: LT,S$GLB,, | Performed by: PHYSICIAN ASSISTANT

## 2024-02-07 PROCEDURE — 73030 X-RAY EXAM OF SHOULDER: CPT | Mod: 26,LT,, | Performed by: RADIOLOGY

## 2024-02-07 PROCEDURE — 99214 OFFICE O/P EST MOD 30 MIN: CPT | Mod: 25,S$GLB,, | Performed by: PHYSICIAN ASSISTANT

## 2024-02-07 RX ORDER — TRIAMCINOLONE ACETONIDE 40 MG/ML
40 INJECTION, SUSPENSION INTRA-ARTICULAR; INTRAMUSCULAR
Status: COMPLETED | OUTPATIENT
Start: 2024-02-07 | End: 2024-02-07

## 2024-02-07 RX ADMIN — TRIAMCINOLONE ACETONIDE 40 MG: 40 INJECTION, SUSPENSION INTRA-ARTICULAR; INTRAMUSCULAR at 04:02

## 2024-02-07 NOTE — PROGRESS NOTES
Health  Wellness Visit Note    Name: Bernadette Aquino  Clinic Number: [id number]  Physician: No ref. provider found  Diagnosis: No diagnosis found.  Past Medical History:   Diagnosis Date    Appendicitis with perforation     Status post appendectomy, January 2011    Celiac disease     Cerebral aneurysm     Right internal carotid artery, nonruptured, status post coil    DDD (degenerative disc disease), cervical     Paresthesia of hands    Elevated liver enzymes     Prior history    Encounter for blood transfusion     GERD (gastroesophageal reflux disease)     HTN (hypertension)     Hyperlipidemia     Mild depression     Multinodular thyroid     u/s 9/11    Osteopenia     Prior history of osteoporosis on bisphosphonate    Other specified acquired hypothyroidism     On replacement    Single cyst of left breast      Visit Number: 36  Precautions: hx of R RTC Repair, HTN, Osteopenia     1st PT visit:  04/26/2023  Year of care end date:  April 2024  Mindbody plan: Plan A- 6 Months  Patient level: A    Time In: 11:00 AM  Time Out: 11:54 PM  Total Treatment Time: 54 Minutes    [email or web address] 2022  Handout on this week's wellness topic Muscular Strength was provided along with a discussion on what it means, the benefits, and suggestions for practice.  Reviewed last week's topic of n/a (patient did not attend Wellness last week).     Subjective:   Patient reports she occasionally has some neck pain. Recently her pain is mostly in her left shoulder. She has an appointment with her MD following today's Wellness session. She reports her pain has stopped her from playing tennis. Since her last Wellness session she has only practiced twice. She went to to the parade with her grandchildren over the weekend and did her stretches everyday. Patient does not ice her neck at home.     For today's session patient and HC decided to skip seated dip machine and chest press. We lowered the weight on dumbbell bicep curl from 7lbs to  5lbs.     Patient reports some shoulder pain and to monitor upper body exercises.     Patient did 20 reps at 85 lbs on back extension. She reports no pain after doing the back extension.     On 9/13 patient learned beginner and intermediate HEP for Rotary Torso machine.     Objective:   Bernadette completed therapeutic stretches (Neck Retractions, etc) and the following MedX exercise machines: core cervical, torso rotation l/r, leg extension, leg curl, upright row, chest press, biceps curl, triceps extension, leg press      Fitness Machine Education Key:  E=education on equipment initiated and further follow up and education needed  I=independent with  and exercise.  The patient:  Adjusts machines to his/her settings  Uses equipment levers, pins, weights safely  Maintains safe and correct posture while exercising  Moves through exercise with correct pace and control  Gets on and off equipment safely          Lumbar/Cervical Ext. I Torso Rotation I Leg Press I   Leg Extension I Seated Leg Curl I Chest Press I   Seated Row I Hip ADD I Hip ABD I   Triceps Extension X Bicep Curl X Other:        [x] Indicates exercise has been taught for home  Lumbar/Cervical Ext. [] Torso Rotation [x] Leg Press []   Leg Extension [] Seated Leg Curl [] Chest Press []   Seated Row [] Hip ADD [] Hip ABD []   Triceps Extension [] Bicep Curl [] Other:        Please see exercise log in patient folder for rate of exertion and repetitions completed.     Assessment:   Patient tolerated Patient tolerated the Med X Cervical extension and all other peripheral exercises without an increase in symptoms. Patient warmed up on the treadmill for 5 minutes, stretched, and  opted out of icing neck for 5 minutes at the end of the workout.     Plan:  Continue with established plan of care towards wellness goals.     Health  : Jenniffer Barker  2/7/2024

## 2024-02-07 NOTE — PROGRESS NOTES
Subjective:     Chief Complaint: Bernadette Aquino is a 73 y.o. female who had concerns including Pain of the Left Shoulder.    Bernadette Aquino is a pleasant right handed retiree who takes care of her grand children, previously seen for bilateral knees and 8 years s/p right shoulder arthroscopy presents for new left shoulder pain.  The gradual pain started 1 weeks ago with playing tennis and no clear HANK and is becoming progressively worse last few weeks. Pain is located over (points to) anterior arm radiating down bicep muscle. She reports that the pain is a 6 /10 aching and throbbing pain today. Has not tried any other treatments other than NSAIDs. Pain not responding adequately to conservative measures which have included activity modifications, rest, and oral medication. Is affecting ADLs and limiting desired level of activity. Denies numbness, tingling, radiation, and neck pain or radicular symptoms.  Pain is 8 /10 at its worst    Mechanical symptoms: none  Subjective instability: -  Worse with overhead activities and reaching behind, throwing a ball  Better with rest.   Nocturnal symptoms: +    No previous surgeries or trauma on left shoulder      DATE OF PROCEDURE: 12/11/2015     ATTENDING SURGEON: Surgeon(s) and Role:     * Shalonda Altman MD - Primary     * La Tang DO - Fellow     * Laquita Vail PA-C - assistant     PREOPERATIVE DIAGNOSIS:    Pre-operative Diagnosis: Right shoulder   Tear, biceps tendon, non-traumatic M66.829 Rotator Cuff Syndrome/Disorder  M75.100, Tear, Biceps Tendon, Non-Tramatic M66.829, Tear, Rotator Cuff, Tramatic S46.012A, S46.011A and Labral tear and chondromalacia     Post-operative Diagnosis:  Right shoulder   Tear, biceps tendon, non-traumatic M66.829 Rotator Cuff Syndrome/Disorder  M75.100, Tear, Rotator Cuff, Tramatic S46.012A, S46.011A and Labral Tear, Chondromalacia Shoulder joint      Procedures Performed:  1. Right shoulder Arthroscopic rotator cuff repair  "CPT - 77130, COMPLEX     2. Right shoulder Biceps tenodesis CPT - 48823, COMPLEX     3. Right shoulder Arthroscopic labral debridement CPT - 65323     4. Right shoulder Arthroscopic chondroplasty     FINDINGS:  Muscle atrophy: None  Biceps tendon status:  Partial tear > 50%  Rotator cuff status:   Subscapularis: normal   Supraspinatus: [id number]%",} Size 4 cm  Infraspinatus: [id number]%",} Size 4 cm          Review of Systems   Constitutional: Negative for chills and fever.   HENT:  Negative for congestion and sore throat.    Eyes:  Negative for discharge and double vision.   Cardiovascular:  Negative for chest pain, palpitations and syncope.   Respiratory:  Negative for cough and shortness of breath.    Endocrine: Negative for cold intolerance and heat intolerance.   Skin:  Negative for dry skin and rash.   Musculoskeletal:  Positive for joint pain.   Gastrointestinal:  Negative for abdominal pain, nausea and vomiting.   Neurological:  Negative for focal weakness, numbness and paresthesias.                 Objective:     General: Bernadette is well-developed, well-nourished, appears stated age, in no acute distress, alert and oriented to time, place and person.     General    Nursing note and vitals reviewed.  Constitutional: She is oriented to person, place, and time. She appears well-developed and well-nourished. No distress.   HENT:   Head: Normocephalic and atraumatic.   Nose: Nose normal.   Eyes: Conjunctivae and EOM are normal. Pupils are equal, round, and reactive to light.   Cardiovascular:  Normal rate, regular rhythm and intact distal pulses.            Pulmonary/Chest: Effort normal and breath sounds normal.   Abdominal: Soft. Bowel sounds are normal. She exhibits no distension.   Neurological: She is alert and oriented to person, place, and time. She has normal reflexes.   Psychiatric: She has a normal mood and affect. Her behavior is normal. Judgment and thought content normal.         Right Shoulder " Exam     Inspection/Observation   Swelling: absent  Bruising: absent  Scars: present  Deformity: absent  Scapular Winging: absent  Scapular Dyskinesia: negative  Atrophy: absent    Tenderness   The patient is experiencing no tenderness.    Range of Motion   Active abduction:  150   Passive abduction:  150   Extension:  50   Forward Flexion:  180   Forward Elevation: 180  Adduction: 30   Internal rotation 0 degrees:  T8   Internal rotation 90 degrees:  90     Tests & Signs   Apprehension: negative  Cross arm: negative  Drop arm: negative  Buckley test: negative  Impingement: negative  Lift Off Sign: negative  Belly Press: negative  Active Compression Test (Atlantic's Sign): negative  Yergason's Test: negative  Speed's Test: negative  Relocation 90 degrees: negative  Jerk Test: negative    Other   Sensation: normal    Left Shoulder Exam     Inspection/Observation   Swelling: absent  Bruising: absent  Scars: absent  Deformity: absent  Scapular Winging: absent  Scapular Dyskinesia: negative  Atrophy: absent    Tenderness   The patient is tender to palpation of the medial scapula and biceps tendon.    Crepitus   The patient has crepitus of the greater tuberosity    Range of Motion   Active abduction:  150   Passive abduction:  150   Extension:  50   Forward Flexion:  140 (+ pain) abnormal   Forward Elevation: 180  Adduction: 30   External Rotation 0 degrees:  70 (Limited by pain) abnormal   Internal rotation 0 degrees:  T10   Internal rotation 90 degrees:  90     Tests & Signs   Apprehension: positive  Cross arm: negative  Drop arm: negative  Buckley test: positive  Impingement: positive  Rotator Cuff Painful Arc/Range: mild  Lift Off Sign: negative  Belly Press: negative  Active Compression Test (Atlantic's Sign): positive  Yergasons's Test: negative  Speed's Test: positive  Relocation 90 degrees: positive  Jerk Test: negative    Other   Sensation: normal     Comments:  + clunk test      Muscle Strength   Right Upper  Extremity   Shoulder Abduction: 5/5   Shoulder Internal Rotation: 5/5   Shoulder External Rotation: 5/5   Supraspinatus: 5/5   Subscapularis: 5/5   Biceps: 5/5   Left Upper Extremity  Shoulder Abduction: 5/5   Shoulder Internal Rotation: 5/5 (+ pain)   Shoulder External Rotation: 5/5 (+ pain)   Supraspinatus: 5/5   Subscapularis: 5/5   Biceps: 4/5     Vascular Exam     Right Pulses      Radial:                    2+      Left Pulses      Radial:                    2+      Capillary Refill  Right Hand: normal capillary refill  Left Hand: normal capillary refill        Radiographic findings today:    Degenerative changes of the glenohumeral joint and the acromioclavicular joint.  No evidence of acute fracture or dislocation.  No soft tissue abnormality.     Xrays of the left shoulder were ordered and reviewed by me today. These findings were discussed and reviewed with the patient.      Assessment:     Encounter Diagnoses   Name Primary?    Degenerative superior labral anterior-to-posterior (SLAP) tear of left shoulder Yes    Biceps tendinitis of left upper extremity     Left shoulder pain, unspecified chronicity         Plan:     We have discussed a variety of treatment options including medications, injections, physical therapy and other alternative treatments. I also explained the indications, risks and benefits of surgery. Patient chooses to proceed with MRI, CSI and physical therapy at this time.    I made the decision to obtain old records of the patient including previous notes and imaging. I independently reviewed and interpreted lab results today as well as prior imaging.     1. Injection Procedure  A time out was performed, including verification of patient ID, procedure, site and side, availability of information and equipment, review of safety issues, and agreement with consent, the procedure site was marked.    After time out was performed, the patient was prepped aseptically with chloraprep swabstick. A  diagnostic and therapeutic injection of 1:4cc Kenalog/Marcaine was given under sterile technique using a 22g x 1.5 needle from the Posterior  aspect of the left Subacromial in the sitting position.      Bernadette Aquino had no adverse reactions to the medication. Pain decreased. She was instructed to apply ice to the joint for 20 minutes and avoid strenuous activities for 24-36 hours following the injection. She was warned of possible blood sugar and/or blood pressure changes during that time. Following that time, she can resume regular activities.    She was reminded to call the clinic immediately for any adverse side effects as explained in clinic today.    2. MRI left shoulder evaluate for biceps tendinopathy, labral and rotator cuff pathology.  3. Ice compress to the affected area 2-3x a day for 15-20 minutes as needed for pain management.  4. Ambulatory referral to physical therapy for biceps tendinitis protocol.  5. RTC to see Ry Barba PA-C for virtual MRI results and for follow-up.    All of the patient's questions were answered and the patient will contact us if they have any questions or concerns in the interim.      Patient questionnaires may have been collected.

## 2024-02-15 ENCOUNTER — DOCUMENTATION ONLY (OUTPATIENT)
Dept: REHABILITATION | Facility: HOSPITAL | Age: 74
End: 2024-02-15
Payer: MEDICARE

## 2024-02-15 NOTE — PROGRESS NOTES
Health  Wellness Visit Note    Name: Bernadette Aquino  Clinic Number: [id number]  Physician: No ref. provider found  Diagnosis: No diagnosis found.  Past Medical History:   Diagnosis Date    Appendicitis with perforation     Status post appendectomy, January 2011    Celiac disease     Cerebral aneurysm     Right internal carotid artery, nonruptured, status post coil    DDD (degenerative disc disease), cervical     Paresthesia of hands    Elevated liver enzymes     Prior history    Encounter for blood transfusion     GERD (gastroesophageal reflux disease)     HTN (hypertension)     Hyperlipidemia     Mild depression     Multinodular thyroid     u/s 9/11    Osteopenia     Prior history of osteoporosis on bisphosphonate    Other specified acquired hypothyroidism     On replacement    Single cyst of left breast      Visit Number: 37  Precautions: hx of R RTC Repair, HTN, Osteopenia     1st PT visit:  04/26/2023  Year of care end date:  April 2024  Mindbody plan: Plan A- 6 Months  Patient level: A    Time In: 12:30 PM  Time Out: 1:15 PM  Total Treatment Time: 45 Minutes    [employer]  Handout on this week's wellness topic Cardio was provided along with a discussion on what it means, the benefits, and suggestions for practice.  Reviewed last week's topic of Muscular Strength.     Subjective:   Patient reports she had some neck pain over the week but nothing she could not manage at home. She had a MRI done on her shoulder yesterday. Her MD also did an injection which helped a lot. She has not been playing tennis or pickleball. Over the holiday weekend she went to parades with her grandchildren and stayed busy watching them. Patient does not ice outside of Wellness.     For today's session patient and HC decided to skip seated dip machine and chest press. We lowered the weight on dumbbell bicep curl from 7lbs to 6lbs.     Patient reports some shoulder pain and to monitor upper body exercises.     Patient did  20 reps at 90 lbs on back extension. She reports no pain after doing the back extension.     On 9/13 patient learned beginner and intermediate HEP for Rotary Torso machine.     Objective:   Bernadette completed therapeutic stretches (Neck Retractions, etc) and the following MedX exercise machines: core cervical, torso rotation l/r, leg extension, leg curl, upright row, chest press, biceps curl, triceps extension, leg press      Fitness Machine Education Key:  E=education on equipment initiated and further follow up and education needed  I=independent with  and exercise.  The patient:  Adjusts machines to his/her settings  Uses equipment levers, pins, weights safely  Maintains safe and correct posture while exercising  Moves through exercise with correct pace and control  Gets on and off equipment safely          Lumbar/Cervical Ext. I Torso Rotation I Leg Press I   Leg Extension I Seated Leg Curl I Chest Press I   Seated Row I Hip ADD I Hip ABD I   Triceps Extension X Bicep Curl X Other:        [x] Indicates exercise has been taught for home  Lumbar/Cervical Ext. [] Torso Rotation [x] Leg Press []   Leg Extension [] Seated Leg Curl [] Chest Press []   Seated Row [] Hip ADD [] Hip ABD []   Triceps Extension [] Bicep Curl [] Other:        Please see exercise log in patient folder for rate of exertion and repetitions completed.     Assessment:   Patient tolerated Patient tolerated the Med X Cervical extension and all other peripheral exercises without an increase in symptoms. Patient warmed up on the treadmill for 5 minutes, stretched, and iced her neck for 5 minutes at the end of the workout.     Plan:  Continue with established plan of care towards wellness goals.     Health  : Jenniffer Barker  2/15/2024

## 2024-02-21 ENCOUNTER — DOCUMENTATION ONLY (OUTPATIENT)
Dept: REHABILITATION | Facility: HOSPITAL | Age: 74
End: 2024-02-21
Payer: MEDICARE

## 2024-02-21 ENCOUNTER — HOSPITAL ENCOUNTER (OUTPATIENT)
Dept: RADIOLOGY | Facility: HOSPITAL | Age: 74
Discharge: HOME | End: 2024-02-21
Attending: PHYSICIAN ASSISTANT
Payer: MEDICARE

## 2024-02-21 DIAGNOSIS — M75.22 BICEPS TENDINITIS OF LEFT UPPER EXTREMITY: ICD-10-CM

## 2024-02-21 DIAGNOSIS — M25.512 LEFT SHOULDER PAIN, UNSPECIFIED CHRONICITY: ICD-10-CM

## 2024-02-21 DIAGNOSIS — S43.432A DEGENERATIVE SUPERIOR LABRAL ANTERIOR-TO-POSTERIOR (SLAP) TEAR OF LEFT SHOULDER: ICD-10-CM

## 2024-02-21 PROCEDURE — 73221 MRI JOINT UPR EXTREM W/O DYE: CPT | Mod: 26,LT,, | Performed by: RADIOLOGY

## 2024-02-21 PROCEDURE — 73221 MRI JOINT UPR EXTREM W/O DYE: CPT | Mod: TC,LT

## 2024-02-21 NOTE — PROGRESS NOTES
Health  Wellness Visit Note    Name: Bernadette Aquino  Clinic Number: [id number]  Physician: No ref. provider found  Diagnosis: No diagnosis found.  Past Medical History:   Diagnosis Date    Appendicitis with perforation     Status post appendectomy, January 2011    Celiac disease     Cerebral aneurysm     Right internal carotid artery, nonruptured, status post coil    DDD (degenerative disc disease), cervical     Paresthesia of hands    Elevated liver enzymes     Prior history    Encounter for blood transfusion     GERD (gastroesophageal reflux disease)     HTN (hypertension)     Hyperlipidemia     Mild depression     Multinodular thyroid     u/s 9/11    Osteopenia     Prior history of osteoporosis on bisphosphonate    Other specified acquired hypothyroidism     On replacement    Single cyst of left breast      Visit Number: 38  Precautions: hx of R RTC Repair, HTN, Osteopenia     1st PT visit:  04/26/2023  Year of care end date:  April 2024  Mindbody plan: Plan A- 6 Months  Patient level: A    Time In: 11:05 AM  Time Out: 12:00 PM  Total Treatment Time: 55 Minutes    [employer]22  Handout on this week's wellness topic Flexibility was provided along with a discussion on what it means, the benefits, and suggestions for practice.  Reviewed last week's topic of Cardio.     Subjective:   Patient reports she has neck pain and limited range of motion. She has had a very busy weekend and beginning of the week with her children and grandchildren. She had no issues while traveling to [place] and [facility]. Her shoulder pain is persistent with no relief. She has a MRI today for her shoulder. Patient has stopped playing tennis and pickleball until the results of her MRI are in. She did not ice outside of Wellness.     For today's session patient and HC decided to skip seated dip machine and chest press. We lowered the weight on dumbbell bicep curl from 7lbs to 6lbs.      Patient did 20 reps at 85 lbs on back  extension. She reports no pain after doing the back extension.     On 9/13 patient learned beginner and intermediate HEP for Rotary Torso machine.     Objective:   Bernadette completed therapeutic stretches (Neck Retractions, etc) and the following MedX exercise machines: core cervical, torso rotation l/r, leg extension, leg curl, upright row, chest press, biceps curl, triceps extension, leg press      Fitness Machine Education Key:  E=education on equipment initiated and further follow up and education needed  I=independent with  and exercise.  The patient:  Adjusts machines to his/her settings  Uses equipment levers, pins, weights safely  Maintains safe and correct posture while exercising  Moves through exercise with correct pace and control  Gets on and off equipment safely          Lumbar/Cervical Ext. I Torso Rotation I Leg Press I   Leg Extension I Seated Leg Curl I Chest Press I   Seated Row I Hip ADD I Hip ABD I   Triceps Extension X Bicep Curl X Other:        [x] Indicates exercise has been taught for home  Lumbar/Cervical Ext. [] Torso Rotation [x] Leg Press []   Leg Extension [] Seated Leg Curl [] Chest Press []   Seated Row [] Hip ADD [] Hip ABD []   Triceps Extension [] Bicep Curl [] Other:        Please see exercise log in patient folder for rate of exertion and repetitions completed.     Assessment:   Patient tolerated Patient tolerated the Med X Cervical extension and all other peripheral exercises without an increase in symptoms. Patient warmed up on the treadmill for 5 minutes, stretched, and iced her neck for 5 minutes at the end of the workout.     Plan:  Continue with established plan of care towards wellness goals.     Health  : Jenniffer Barker  2/21/2024

## 2024-02-26 ENCOUNTER — OFFICE VISIT (OUTPATIENT)
Dept: SPORTS MEDICINE | Facility: CLINIC | Age: 74
End: 2024-02-26
Payer: MEDICARE

## 2024-02-26 DIAGNOSIS — M75.22 BICEPS TENDINITIS OF LEFT UPPER EXTREMITY: Primary | ICD-10-CM

## 2024-02-26 DIAGNOSIS — M75.102 ROTATOR CUFF SYNDROME OF LEFT SHOULDER: ICD-10-CM

## 2024-02-26 PROCEDURE — 99212 OFFICE O/P EST SF 10 MIN: CPT | Mod: 95,,, | Performed by: PHYSICIAN ASSISTANT

## 2024-02-26 NOTE — PROGRESS NOTES
The patient location is: home  The chief complaint leading to consultation is: Pt presents for MRI results and follow-up.       Visit type: audiovisual    Face to Face time with patient:   15 minutes of total time spent on the encounter, which includes face to face time and non-face to face time preparing to see the patient (eg, review of tests), Obtaining and/or reviewing separately obtained history, Documenting clinical information in the electronic or other health record, Independently interpreting results (not separately reported) and communicating results to the patient/family/caregiver, or Care coordination (not separately reported).         Each patient to whom he or she provides medical services by telemedicine is:  (1) informed of the relationship between the physician and patient and the respective role of any other health care provider with respect to management of the patient; and (2) notified that he or she may decline to receive medical services by telemedicine and may withdraw from such care at any time.    Notes:     HPI:   Interval hx: Pt presents for MRI results and follow-up. She reports symptoms have significantly improved with CSI.     Results for orders placed during the hospital encounter of 02/21/24    MRI Shoulder Without Contrast Left    Narrative  EXAMINATION:  MRI SHOULDER WITHOUT CONTRAST LEFT    CLINICAL HISTORY:  Shoulder pain, rotator cuff disorder suspected, xray done;Shoulder pain, labral tear suspected, xray done;assess for labral tear, biceps and rotator cuff pathology;  Pain in left shoulder    TECHNIQUE:  Multiplanar multisequence MRI examination of LEFT shoulder.    COMPARISON:  02/07/2024    FINDINGS:  ROTATOR CUFF:    Supraspinatus: 5 mm partial-thickness undersurface tear with interstitial extension.  No evidence for complete tear.  Associated tendinosis.    Infraspinatus: Intact.  Moderate tendinosis.    Subscapularis: Intact.  Moderate tendinosis.    Teres Minor: Intact.   "No tendinosis.    There is minimal fluid within the subacromial/subdeltoid bursa.    LABRUM: Superior labrum appears grossly intact on this standard non arthrogram exam.Posterior aspect of superior labrum ( "peel-back" location) appears intact. Posterior labrum is unremarkable.Anterior inferior labrum appears intact. IGHL:Intact.    LONG HEAD BICEPS TENDON: Located within bicipital groove and intact.Biceps-labral anchor demonstrates degenerative related change. Intra-articular tendinosis.  No significant tenosynovitis. Rotator Interval is abnormal with synovial thickening/increased signal.. Biceps pulley is intact.    BONES: No evident fracture.Visualized marrow within normal limits. AC joint demonstrates normal alignment with moderate hypertrophy.No significant osteo-acromial outlet narrowing.  There is no evident os acromiale.    CARTILAGE: Humeral head cartilage demonstrates mild irregularity.  Glenoid cartilage is preserved.No subchondral marrow edema.  Joint effusion with synovitis.  No intra-articular loose bodies. Glenoid fossa demonstrates no sclerosis.    MUSCLES:  Normal bulk and signal.    Impression  Partial-thickness undersurface tear supraspinatus 5 mm with interstitial extension.  Tendinosis, moderate, supraspinatus and infraspinatus.    Moderate intra-articular bicipital tendinosis with tendinosis/degenerative change at the level the biceps anchor.  Small joint effusion with synovitis.      Electronically signed by: Polo Guaman MD  Date:    02/21/2024  Time:    14:46           ICD-10-CM ICD-9-CM   1. Biceps tendinitis of left upper extremity  M75.22 726.12   2. Rotator cuff syndrome of left shoulder  M75.102 726.10       Plan:  1. We reviewed with Bernadette Aquino today, the pathology and natural history of her diagnosis. We have discussed a variety of treatment options including medications, physical therapy and other alternative treatments. I also explained the indications, risks and benefits " of surgery.     Continue full 6 weeks of physical therapy prior to considering arthroscopy. After discussion, she decided to proceed physical therapy and if pain is refractory then with surgery.      Left shoulder arthroscopic rotator cuff repair versus reverse total shoulder, we will review with Shalonda Altman MD.      The details of the surgical procedure were explained, including the location of probable incisions and a description of likely hardware and/or grafts to be used.  The patient understands the likely convalescence after surgery.  Also, we have thoroughly discussed the risks, benefits and alternatives to surgery, including, but not limited to, the risk of infection, joint stiffness, blood clot (including DVT and/or pulmonary embolus), neurologic and vascular injury.  It was explained that, if tissue has been repaired or reconstructed, there is a chance of failure, which may require further management.    I made the decision to obtain old records of the patient including previous notes and imaging. I independently reviewed and interpreted lab results today as well as prior imaging.     2. Ice compress to the affected area 2-3x a day for 15-20 minutes as needed for pain management.  3. Continue physical therapy for rotator cuff protocol       All of the patient's questions were answered and the patient will contact us if they have any questions or concerns in the interim.

## 2024-02-28 ENCOUNTER — DOCUMENTATION ONLY (OUTPATIENT)
Dept: REHABILITATION | Facility: HOSPITAL | Age: 74
End: 2024-02-28
Payer: MEDICARE

## 2024-02-28 NOTE — PROGRESS NOTES
Health  Wellness Visit Note    Name: Bernadette Aquino  Clinic Number: [id number]  Physician: No ref. provider found  Diagnosis: No diagnosis found.  Past Medical History:   Diagnosis Date    Appendicitis with perforation     Status post appendectomy, January 2011    Celiac disease     Cerebral aneurysm     Right internal carotid artery, nonruptured, status post coil    DDD (degenerative disc disease), cervical     Paresthesia of hands    Elevated liver enzymes     Prior history    Encounter for blood transfusion     GERD (gastroesophageal reflux disease)     HTN (hypertension)     Hyperlipidemia     Mild depression     Multinodular thyroid     u/s 9/11    Osteopenia     Prior history of osteoporosis on bisphosphonate    Other specified acquired hypothyroidism     On replacement    Single cyst of left breast      Visit Number: 39  Precautions: hx of R RTC Repair, HTN, Osteopenia     1st PT visit:  04/26/2023  Year of care end date:  April 2024  Mindbody plan: Plan A- 6 Months  Patient level: A    Time In: 11:00 AM  Time Out: 11:52 AM  Total Treatment Time: 52 Minutes    [facility] [employer] 2022  Handout on this week's wellness topic Balance was provided along with a discussion on what it means, the benefits, and suggestions for practice.  Reviewed last week's topic of Flexibility.     Subjective:   Patient reports she has some neck pain. Her discomfort is predominately on her left side. She saw her MD about her shoulder last week. Doctor suggests she stop doing any activity that causes her shoulder to hurt. Patient has not been playing tennis or pickleball since. She continues to stretch daily and watch her grandchildren weekly. She goes to physical therapy three times a week for her shoulder. Patient applies heat in the mornings and ice at night.    For today's session patient and HC decided to skip seated dip machine and chest press. We lowered the weight on dumbbell bicep curl from 7lbs to 6lbs.      Patient did  20 reps at 90 lbs on back extension. She reports no pain after doing the back extension.     On 9/13 patient learned beginner and intermediate HEP for Rotary Torso machine.     Objective:   Bernadette completed therapeutic stretches (Neck Retractions, etc) and the following MedX exercise machines: core cervical, torso rotation l/r, leg extension, leg curl, upright row, chest press, biceps curl, triceps extension, leg press      Fitness Machine Education Key:  E=education on equipment initiated and further follow up and education needed  I=independent with  and exercise.  The patient:  Adjusts machines to his/her settings  Uses equipment levers, pins, weights safely  Maintains safe and correct posture while exercising  Moves through exercise with correct pace and control  Gets on and off equipment safely          Lumbar/Cervical Ext. I Torso Rotation I Leg Press I   Leg Extension I Seated Leg Curl I Chest Press I   Seated Row I Hip ADD I Hip ABD I   Triceps Extension X Bicep Curl X Other:        [x] Indicates exercise has been taught for home  Lumbar/Cervical Ext. [] Torso Rotation [x] Leg Press []   Leg Extension [] Seated Leg Curl [] Chest Press []   Seated Row [] Hip ADD [] Hip ABD []   Triceps Extension [] Bicep Curl [] Other:        Please see exercise log in patient folder for rate of exertion and repetitions completed.     Assessment:   Patient tolerated Patient tolerated the Med X Cervical extension and all other peripheral exercises without an increase in symptoms. Patient warmed up on the treadmill for 5 minutes, stretched, and iced her neck for 5 minutes at the end of the workout.     Plan:  Continue with established plan of care towards wellness goals.     Health  : Jenniffer Barker  2/28/2024

## 2024-03-06 ENCOUNTER — DOCUMENTATION ONLY (OUTPATIENT)
Dept: REHABILITATION | Facility: HOSPITAL | Age: 74
End: 2024-03-06
Payer: MEDICARE

## 2024-03-06 NOTE — PROGRESS NOTES
Health  Wellness Visit Note    Name: Bernadette Aquino  Clinic Number: [id number]  Physician: No ref. provider found  Diagnosis: No diagnosis found.  Past Medical History:   Diagnosis Date    Appendicitis with perforation     Status post appendectomy, January 2011    Celiac disease     Cerebral aneurysm     Right internal carotid artery, nonruptured, status post coil    DDD (degenerative disc disease), cervical     Paresthesia of hands    Elevated liver enzymes     Prior history    Encounter for blood transfusion     GERD (gastroesophageal reflux disease)     HTN (hypertension)     Hyperlipidemia     Mild depression     Multinodular thyroid     u/s 9/11    Osteopenia     Prior history of osteoporosis on bisphosphonate    Other specified acquired hypothyroidism     On replacement    Single cyst of left breast      Visit Number: 40  Precautions: hx of R RTC Repair, HTN, Osteopenia     1st PT visit:  04/26/2023  Year of care end date:  April 2024  Mindbody plan: Plan A- 6 Months  Patient level: A    Time In: 12:30 AM  Time Out: 1:15 AM  Total Treatment Time: 45 Minutes    [employer] 2022  Handout on this week's wellness topic Hydration was provided along with a discussion on what it means, the benefits, and suggestions for practice.  Reviewed last week's topic of Balance.     Subjective:   Patient reports no neck pain. She is very impressed with how well her neck has been doing since starting [facility]. She continues to go to physical therapy for her shoulder three times weekly. She has no complaints with any of the exercises there. She is not playing tennis at the moment per her MD. Patient plans to start back playing tennis in 4 weeks when PT is over. She is staying busy with her grandchildren. Patient does not ice her back or neck outside of Wellness.     For today's session patient and HC decided to skip seated dip machine and chest press. We lowered the weight on dumbbell bicep curl from 7lbs to 6lbs.       Patient did 20 reps at 90 lbs on back extension. She reports no pain after doing the back extension.     On 9/13 patient learned beginner and intermediate HEP for Rotary Torso machine.     Objective:   Bernadette completed therapeutic stretches (Neck Retractions, etc) and the following MedX exercise machines: core cervical, torso rotation l/r, leg extension, leg curl, upright row, chest press, biceps curl, triceps extension, leg press      Fitness Machine Education Key:  E=education on equipment initiated and further follow up and education needed  I=independent with  and exercise.  The patient:  Adjusts machines to his/her settings  Uses equipment levers, pins, weights safely  Maintains safe and correct posture while exercising  Moves through exercise with correct pace and control  Gets on and off equipment safely          Lumbar/Cervical Ext. I Torso Rotation I Leg Press I   Leg Extension I Seated Leg Curl I Chest Press I   Seated Row I Hip ADD I Hip ABD I   Triceps Extension X Bicep Curl X Other:        [x] Indicates exercise has been taught for home  Lumbar/Cervical Ext. [] Torso Rotation [x] Leg Press []   Leg Extension [] Seated Leg Curl [] Chest Press []   Seated Row [] Hip ADD [] Hip ABD []   Triceps Extension [] Bicep Curl [] Other:        Please see exercise log in patient folder for rate of exertion and repetitions completed.     Assessment:   Patient tolerated Patient tolerated the Med X Cervical extension and all other peripheral exercises without an increase in symptoms. Patient warmed up on the treadmill for 5 minutes, stretched, and iced her neck for 5 minutes at the end of the workout.     Plan:  Continue with established plan of care towards wellness goals.     Health  : Jenniffer Barker  3/6/2024

## 2024-03-20 ENCOUNTER — DOCUMENTATION ONLY (OUTPATIENT)
Dept: REHABILITATION | Facility: HOSPITAL | Age: 74
End: 2024-03-20
Payer: MEDICARE

## 2024-03-20 NOTE — PROGRESS NOTES
Health  Wellness Visit Note    Name: Bernadette Aquino  Clinic Number: [id number]  Physician: No ref. provider found  Diagnosis: No diagnosis found.  Past Medical History:   Diagnosis Date    Appendicitis with perforation     Status post appendectomy, January 2011    Celiac disease     Cerebral aneurysm     Right internal carotid artery, nonruptured, status post coil    DDD (degenerative disc disease), cervical     Paresthesia of hands    Elevated liver enzymes     Prior history    Encounter for blood transfusion     GERD (gastroesophageal reflux disease)     HTN (hypertension)     Hyperlipidemia     Mild depression     Multinodular thyroid     u/s 9/11    Osteopenia     Prior history of osteoporosis on bisphosphonate    Other specified acquired hypothyroidism     On replacement    Single cyst of left breast      Visit Number: 41  Precautions: hx of R RTC Repair, HTN, Osteopenia     1st PT visit:  04/26/2023  Year of care end date:  April 2024  Mindbody plan: Plan A- 6 Months  Patient level: A    Time In: 12:00 AM  Time Out: 12:47 AM  Total Treatment Time: 47 Minutes    [employer] 2022  Handout on this week's wellness topic Portion Control was provided along with a discussion on what it means, the benefits, and suggestions for practice.  Reviewed last week's topic of n/a (patient did not attend Wellness last week).     Subjective:   Patient reports to Wellness after missing a week. She stayed busy watching her grandchildren over the weekend and helping her daughter at home. She continued to go to physical therapy for her shoulder twice weekly. They cleared her to ease back into playing tennis. Yesterday was the patient's first time playing tennis in over a month. She reports no complaints following her game. Patient does not ice outside of Wellness.     For today's session patient and HC decided to skip seated dip machine and chest press. We lowered the weight on dumbbell bicep curl from 7lbs to 6lbs.       Patient did 20 reps at 85 lbs on back extension. She reports no pain after doing the back extension.     On 9/13 patient learned beginner and intermediate HEP for Rotary Torso machine.     Objective:   Bernadette completed therapeutic stretches (Neck Retractions, etc) and the following MedX exercise machines: core cervical, torso rotation l/r, leg extension, leg curl, upright row, chest press, biceps curl, triceps extension, leg press      Fitness Machine Education Key:  E=education on equipment initiated and further follow up and education needed  I=independent with  and exercise.  The patient:  Adjusts machines to his/her settings  Uses equipment levers, pins, weights safely  Maintains safe and correct posture while exercising  Moves through exercise with correct pace and control  Gets on and off equipment safely          Lumbar/Cervical Ext. I Torso Rotation I Leg Press I   Leg Extension I Seated Leg Curl I Chest Press I   Seated Row I Hip ADD I Hip ABD I   Triceps Extension X Bicep Curl X Other:        [x] Indicates exercise has been taught for home  Lumbar/Cervical Ext. [] Torso Rotation [x] Leg Press []   Leg Extension [] Seated Leg Curl [] Chest Press []   Seated Row [] Hip ADD [] Hip ABD []   Triceps Extension [] Bicep Curl [] Other:        Please see exercise log in patient folder for rate of exertion and repetitions completed.     Assessment:   Patient tolerated Patient tolerated the Med X Cervical extension and all other peripheral exercises without an increase in symptoms. Patient warmed up on the treadmill for 5 minutes, stretched, and iced her neck for 5 minutes at the end of the workout.     Plan:  Continue with established plan of care towards wellness goals.     Health  : Jenniffer Barker  3/20/2024

## 2024-03-27 ENCOUNTER — DOCUMENTATION ONLY (OUTPATIENT)
Dept: REHABILITATION | Facility: HOSPITAL | Age: 74
End: 2024-03-27
Payer: MEDICARE

## 2024-03-27 NOTE — PROGRESS NOTES
Health  Wellness Visit Note    Name: Bernadette Aquino  Clinic Number: [id number]  Physician: No ref. provider found  Diagnosis: No diagnosis found.  Past Medical History:   Diagnosis Date    Appendicitis with perforation     Status post appendectomy, January 2011    Celiac disease     Cerebral aneurysm     Right internal carotid artery, nonruptured, status post coil    DDD (degenerative disc disease), cervical     Paresthesia of hands    Elevated liver enzymes     Prior history    Encounter for blood transfusion     GERD (gastroesophageal reflux disease)     HTN (hypertension)     Hyperlipidemia     Mild depression     Multinodular thyroid     u/s 9/11    Osteopenia     Prior history of osteoporosis on bisphosphonate    Other specified acquired hypothyroidism     On replacement    Single cyst of left breast      Visit Number: 42  Precautions: hx of R RTC Repair, HTN, Osteopenia     1st PT visit:  04/26/2023  Year of care end date:  April 2024  Mindbody plan: Plan A- 6 Months  Patient level: A    Time In: 10:15 AM  Time Out: 10:45 AM  Total Treatment Time: 30 Minutes    [employer] 2022  Handout on this week's wellness topic Anti-Inflammatory Diet was provided along with a discussion on what it means, the benefits, and suggestions for practice.  Reviewed last week's topic of Portion Control.     Subjective:   Patient reports no neck or back pain coming into Wellness today. Her shoulder pain has been a lot better since being in physical therapy. Patient returned to playing tennis last week. She will play once a day for three days out the week. She stays busy with her grandchildren weekly. Patient does not ice outside of Wellness.     Patient and HC decided to stop doing seated dip machine and chest press due to shoulder injury. We lowered the weight on dumbbell bicep curl from 7lbs to 6lbs.      Patient did 20 reps at 85 lbs on back extension. She reports no pain after doing the back extension.     On 9/13  patient learned beginner and intermediate HEP for Rotary Torso machine.     Objective:   Bernadette completed therapeutic stretches (Neck Retractions, etc) and the following MedX exercise machines: core cervical, torso rotation l/r, leg extension, leg curl, upright row, chest press, biceps curl, triceps extension, leg press      Fitness Machine Education Key:  E=education on equipment initiated and further follow up and education needed  I=independent with  and exercise.  The patient:  Adjusts machines to his/her settings  Uses equipment levers, pins, weights safely  Maintains safe and correct posture while exercising  Moves through exercise with correct pace and control  Gets on and off equipment safely          Lumbar/Cervical Ext. I Torso Rotation I Leg Press I   Leg Extension I Seated Leg Curl I Chest Press I   Seated Row I Hip ADD I Hip ABD I   Triceps Extension X Bicep Curl X Other:        [x] Indicates exercise has been taught for home  Lumbar/Cervical Ext. [] Torso Rotation [x] Leg Press []   Leg Extension [] Seated Leg Curl [] Chest Press []   Seated Row [] Hip ADD [] Hip ABD []   Triceps Extension [] Bicep Curl [] Other:        Please see exercise log in patient folder for rate of exertion and repetitions completed.     Assessment:   Patient tolerated Patient tolerated the Med X Cervical extension and all other peripheral exercises without an increase in symptoms. Patient warmed up on the treadmill for 5 minutes, stretched, and opted out of icing her neck for 5 minutes at the end of the workout.     Plan:  Continue with established plan of care towards wellness goals.     Health  : Jenniffer Barker  3/27/2024

## 2024-04-03 ENCOUNTER — DOCUMENTATION ONLY (OUTPATIENT)
Dept: REHABILITATION | Facility: HOSPITAL | Age: 74
End: 2024-04-03
Payer: MEDICARE

## 2024-04-03 NOTE — PROGRESS NOTES
Health  Wellness Visit Note    Name: Bernadette Aquino  Clinic Number: [id number]  Physician: No ref. provider found  Diagnosis: No diagnosis found.  Past Medical History:   Diagnosis Date    Appendicitis with perforation     Status post appendectomy, January 2011    Celiac disease     Cerebral aneurysm     Right internal carotid artery, nonruptured, status post coil    DDD (degenerative disc disease), cervical     Paresthesia of hands    Elevated liver enzymes     Prior history    Encounter for blood transfusion     GERD (gastroesophageal reflux disease)     HTN (hypertension)     Hyperlipidemia     Mild depression     Multinodular thyroid     u/s 9/11    Osteopenia     Prior history of osteoporosis on bisphosphonate    Other specified acquired hypothyroidism     On replacement    Single cyst of left breast      Visit Number: 43  Precautions: hx of R RTC Repair, HTN, Osteopenia     1st PT visit:  04/26/2023  Year of care end date:  April 2024  Mindbody plan: Plan A- 6 Months  Patient level: A    Time In: 12:37 PM  Time Out: 1:20 PM  Total Treatment Time: 47 Minutes    [employer] 2022  Handout on this week's wellness topic Postural Awareness was provided along with a discussion on what it means, the benefits, and suggestions for practice.  Reviewed last week's topic of Anti-Inflammatory Diet.     Subjective:   Patient reports she has some neck and back pain coming into Wellness today. Since her last visit she celebrated the holidays with her family, played tennis twice, took care of her grandchildren and went to PT for her shoulder. She does her neck and shoulder stretches every morning before starting her day. Patient does not ice outside of Wellness.    Patient and HC decided to stop doing seated dip machine and chest press due to shoulder injury. We lowered the weight on dumbbell bicep curl from 7lbs to 6lbs.      Patient did 20 reps at 85 lbs on back extension. She reports no pain after doing the back  extension.     On 9/13 patient learned beginner and intermediate HEP for Rotary Torso machine.     Objective:   Bernadette completed therapeutic stretches (Neck Retractions, etc) and the following MedX exercise machines: core cervical, torso rotation l/r, leg extension, leg curl, upright row, chest press, biceps curl, triceps extension, leg press      Fitness Machine Education Key:  E=education on equipment initiated and further follow up and education needed  I=independent with  and exercise.  The patient:  Adjusts machines to his/her settings  Uses equipment levers, pins, weights safely  Maintains safe and correct posture while exercising  Moves through exercise with correct pace and control  Gets on and off equipment safely          Lumbar/Cervical Ext. I Torso Rotation I Leg Press I   Leg Extension I Seated Leg Curl I Chest Press I   Seated Row I Hip ADD I Hip ABD I   Triceps Extension X Bicep Curl X Other:        [x] Indicates exercise has been taught for home  Lumbar/Cervical Ext. [] Torso Rotation [x] Leg Press []   Leg Extension [] Seated Leg Curl [] Chest Press []   Seated Row [] Hip ADD [] Hip ABD []   Triceps Extension [] Bicep Curl [] Other:        Please see exercise log in patient folder for rate of exertion and repetitions completed.     Assessment:   Patient tolerated Patient tolerated the Med X Cervical extension and all other peripheral exercises without an increase in symptoms. Patient warmed up on the treadmill for 5 minutes, stretched, and opted out of icing her neck for 5 minutes at the end of the workout.     Plan:  Continue with established plan of care towards wellness goals.     Health  : Jenniffer Barker  4/3/2024

## 2024-04-09 ENCOUNTER — DOCUMENTATION ONLY (OUTPATIENT)
Dept: REHABILITATION | Facility: HOSPITAL | Age: 74
End: 2024-04-09
Payer: MEDICARE

## 2024-04-09 NOTE — PROGRESS NOTES
Health  Wellness Visit Note    Name: Bernadette Aquino  Clinic Number: [id number]  Physician: No ref. provider found  Diagnosis: No diagnosis found.  Past Medical History:   Diagnosis Date    Appendicitis with perforation     Status post appendectomy, January 2011    Celiac disease     Cerebral aneurysm     Right internal carotid artery, nonruptured, status post coil    DDD (degenerative disc disease), cervical     Paresthesia of hands    Elevated liver enzymes     Prior history    Encounter for blood transfusion     GERD (gastroesophageal reflux disease)     HTN (hypertension)     Hyperlipidemia     Mild depression     Multinodular thyroid     u/s 9/11    Osteopenia     Prior history of osteoporosis on bisphosphonate    Other specified acquired hypothyroidism     On replacement    Single cyst of left breast      Visit Number: 44  Precautions: hx of R RTC Repair, HTN, Osteopenia     1st PT visit:  04/26/2023  Year of care end date:  April 2024  Mindbody plan: Plan A- 6 Months  Patient level: A    Time In: 11:05 AM  Time Out: 12:05 PM  Total Treatment Time: 60 Minutes    [employer] 2022  Handout on this week's wellness topic Breathing Awareness was provided along with a discussion on what it means, the benefits, and suggestions for practice.  Reviewed last week's topic of Postural Awareness.     Subjective:   Patient reports no complaints of her neck or shoulder. She does have some low back pain and knee pain. Patient mentioned going through the [facility] program of [facility]. She has been playing tennis three times a week and coming to Wellness once a week. Next week will be her last Wellness session. Patient plans to go to the gym independently twice weekly. She will continue to do her physical therapy stretches every morning.     For today's session, HC taught patient three variations of the cervical extension to be done at home. She left with a copy of the exercises.    Patient and HC decided to stop  doing seated dip machine and chest press due to shoulder injury. We lowered the weight on dumbbell bicep curl from 7lbs to 6lbs.      Patient did 20 reps at 85 lbs on back extension. She reports no pain after doing the back extension.     On 9/13 patient learned beginner and intermediate HEP for Rotary Torso machine.     Objective:   Bernadette completed therapeutic stretches (Neck Retractions, etc) and the following MedX exercise machines: core cervical, torso rotation l/r, leg extension, leg curl, upright row, chest press, biceps curl, triceps extension, leg press      Fitness Machine Education Key:  E=education on equipment initiated and further follow up and education needed  I=independent with  and exercise.  The patient:  Adjusts machines to his/her settings  Uses equipment levers, pins, weights safely  Maintains safe and correct posture while exercising  Moves through exercise with correct pace and control  Gets on and off equipment safely          Lumbar/Cervical Ext. I Torso Rotation I Leg Press I   Leg Extension I Seated Leg Curl I Chest Press I   Seated Row I Hip ADD I Hip ABD I   Triceps Extension X Bicep Curl X Other:        [x] Indicates exercise has been taught for home  Lumbar/Cervical Ext. [x] Torso Rotation [x] Leg Press []   Leg Extension [] Seated Leg Curl [] Chest Press []   Seated Row [] Hip ADD [] Hip ABD []   Triceps Extension [] Bicep Curl [] Other:        Please see exercise log in patient folder for rate of exertion and repetitions completed.     Assessment:   Patient tolerated Patient tolerated the Med X Cervical extension and all other peripheral exercises without an increase in symptoms. Patient warmed up on the treadmill for 5 minutes, stretched, and opted out of icing her neck for 5 minutes at the end of the workout.     Plan:  Continue with established plan of care towards wellness goals.     Health  : Jenniffer Barker  4/9/2024

## 2024-04-17 ENCOUNTER — DOCUMENTATION ONLY (OUTPATIENT)
Dept: REHABILITATION | Facility: HOSPITAL | Age: 74
End: 2024-04-17
Payer: MEDICARE

## 2024-04-17 NOTE — PROGRESS NOTES
Health  Wellness Visit Note    Name: Bernadette Aquino  Clinic Number: [id number]  Physician: No ref. provider found  Diagnosis: No diagnosis found.  Past Medical History:   Diagnosis Date    Appendicitis with perforation     Status post appendectomy, January 2011    Celiac disease     Cerebral aneurysm     Right internal carotid artery, nonruptured, status post coil    DDD (degenerative disc disease), cervical     Paresthesia of hands    Elevated liver enzymes     Prior history    Encounter for blood transfusion     GERD (gastroesophageal reflux disease)     HTN (hypertension)     Hyperlipidemia     Mild depression     Multinodular thyroid     u/s 9/11    Osteopenia     Prior history of osteoporosis on bisphosphonate    Other specified acquired hypothyroidism     On replacement    Single cyst of left breast      Visit Number: 45  Precautions: hx of R RTC Repair, HTN, Osteopenia     1st PT visit:  04/26/2023  Year of care end date:  April 2024  Mindbody plan: Plan A- 6 Months  Patient level: A    Time In: 12:35 PM  Time Out: 1:15 PM  Total Treatment Time: 40 Minutes    Wellness [facility] [employer] 2022  Handout on this week's wellness topic Air Quality and Oxygen Utilization was provided along with a discussion on what it means, the benefits, and suggestions for practice. Reviewed last week's topic of Breathing Awareness.     Subjective:   Patient reports no change since last week's Wellness session. She has no complaints of her neck or shoulder. She does have some low back pain and knee pain. She played tennis and stayed active with her grandchildren over the last week.     Today is the patient's last Wellness session. She plans to go to the gym independently twice weekly. She left with a copy of her final weight card.     04/09: HC taught patient three variations of the cervical extension to be done at home. She left with a copy of the exercises.    Patient and HC decided to stop doing seated dip machine and chest press due  to shoulder injury. We lowered the weight on dumbbell bicep curl from 7lbs to 6lbs.      Patient did 20 reps at 85 lbs on back extension. She reports no pain after doing the back extension.     On 9/13 patient learned beginner and intermediate HEP for Rotary Torso machine.     Objective:   Bernadette completed therapeutic stretches (Neck Retractions, etc) and the following MedX exercise machines: core cervical, torso rotation l/r, leg extension, leg curl, upright row, chest press, biceps curl, triceps extension, leg press      Fitness Machine Education Key:  E=education on equipment initiated and further follow up and education needed  I=independent with  and exercise.  The patient:  Adjusts machines to his/her settings  Uses equipment levers, pins, weights safely  Maintains safe and correct posture while exercising  Moves through exercise with correct pace and control  Gets on and off equipment safely          Lumbar/Cervical Ext. I Torso Rotation I Leg Press I   Leg Extension I Seated Leg Curl I Chest Press I   Seated Row I Hip ADD I Hip ABD I   Triceps Extension X Bicep Curl X Other:        [x] Indicates exercise has been taught for home  Lumbar/Cervical Ext. [x] Torso Rotation [x] Leg Press []   Leg Extension [] Seated Leg Curl [] Chest Press []   Seated Row [] Hip ADD [] Hip ABD []   Triceps Extension [] Bicep Curl [] Other:        Please see exercise log in patient folder for rate of exertion and repetitions completed.     Assessment:   Patient tolerated Patient tolerated the Med X Cervical extension and all other peripheral exercises without an increase in symptoms. Patient warmed up on the treadmill for 5 minutes, stretched, and iced her neck for 5 minutes at the end of the workout.     Plan:  Discharged from Wellness program.     Health  : Jenniffer Barker  4/17/2024

## 2024-07-16 DIAGNOSIS — I67.1 CEREBRAL ANEURYSM, NONRUPTURED: Primary | ICD-10-CM

## 2024-07-24 ENCOUNTER — HOSPITAL ENCOUNTER (OUTPATIENT)
Dept: RADIOLOGY | Facility: HOSPITAL | Age: 74
Discharge: HOME | End: 2024-07-24
Payer: MEDICARE

## 2024-07-24 DIAGNOSIS — I67.1 CEREBRAL ANEURYSM, NONRUPTURED: ICD-10-CM

## 2024-07-24 PROCEDURE — 70544 MR ANGIOGRAPHY HEAD W/O DYE: CPT | Mod: TC

## 2024-07-24 PROCEDURE — 70544 MR ANGIOGRAPHY HEAD W/O DYE: CPT | Mod: 26,,, | Performed by: STUDENT IN AN ORGANIZED HEALTH CARE EDUCATION/TRAINING PROGRAM

## 2024-07-29 ENCOUNTER — OFFICE VISIT (OUTPATIENT)
Dept: INTERVENTIONAL RADIOLOGY/VASCULAR | Facility: CLINIC | Age: 74
End: 2024-07-29
Payer: MEDICARE

## 2024-07-29 VITALS
BODY MASS INDEX: 23.36 KG/M2 | DIASTOLIC BLOOD PRESSURE: 86 MMHG | HEIGHT: 63 IN | SYSTOLIC BLOOD PRESSURE: 140 MMHG | WEIGHT: 131.81 LBS | HEART RATE: 84 BPM

## 2024-07-29 DIAGNOSIS — I67.1 CEREBRAL ANEURYSM, NONRUPTURED: Primary | ICD-10-CM

## 2024-07-29 DIAGNOSIS — M54.9 BACK PAIN, UNSPECIFIED BACK LOCATION, UNSPECIFIED BACK PAIN LATERALITY, UNSPECIFIED CHRONICITY: ICD-10-CM

## 2024-07-29 PROCEDURE — 3079F DIAST BP 80-89 MM HG: CPT | Mod: CPTII,S$GLB,,

## 2024-07-29 PROCEDURE — 99213 OFFICE O/P EST LOW 20 MIN: CPT | Mod: S$GLB,,,

## 2024-07-29 PROCEDURE — 3077F SYST BP >= 140 MM HG: CPT | Mod: CPTII,S$GLB,,

## 2024-07-29 PROCEDURE — 99999 PR PBB SHADOW E&M-EST. PATIENT-LVL III: CPT | Mod: PBBFAC,,,

## 2024-07-29 PROCEDURE — 1157F ADVNC CARE PLAN IN RCRD: CPT | Mod: CPTII,S$GLB,,

## 2024-07-29 PROCEDURE — 3008F BODY MASS INDEX DOCD: CPT | Mod: CPTII,S$GLB,,

## 2024-07-29 NOTE — PROGRESS NOTES
Subjective     Patient ID: Bernadette Aquino is a 73 y.o. female.    Chief Complaint: Follow-up    73 y.o. 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  She has no new symptoms from her aneurysms.  Denies HA, Weakness, Numbness, other stroke sx.  She had F/U MRA on 7/24/22 which was unchanged from prior studies.      Patient does admit to an episode of passing out after getting overheated at a softball game. Follow up imaging was completed after episode. She feels back to base line now. She believes she was dehydrated.     Patient experienced significant relief from previous cervical injections and healthy back program. She continues with her neck exercises. CC today. Thoracic back pain.     Review of Systems   Constitutional:  Negative for appetite change and fatigue.   Respiratory:  Negative for cough and shortness of breath.    Cardiovascular:  Negative for chest pain.   Gastrointestinal:  Negative for abdominal pain.   Musculoskeletal:  Positive for back pain.   Neurological:  Negative for facial asymmetry and speech difficulty.   Psychiatric/Behavioral:  Negative for behavioral problems and confusion.         Objective     Physical Exam  Vitals reviewed.   Constitutional:       Appearance: Normal appearance.   HENT:      Head: Normocephalic and atraumatic.      Right Ear: External ear normal.      Left Ear: External ear normal.      Nose: Nose normal.   Eyes:      General:         Right eye: No discharge.         Left eye: No discharge.      Conjunctiva/sclera: Conjunctivae normal.   Pulmonary:      Effort: Pulmonary effort is normal.   Abdominal:      General: Abdomen is flat.   Musculoskeletal:      Right lower leg: No edema.      Left lower leg: No edema.   Skin:     General: Skin is warm and dry.      Coloration: Skin is not jaundiced.   Neurological:      General: No focal deficit  present.      Mental Status: She is alert and oriented to person, place, and time.   Psychiatric:         Mood and Affect: Mood normal.         Behavior: Behavior normal.        Assessment and Plan     1. Cerebral aneurysm, nonruptured  -     MRA Brain without contrast; Future; Expected date: 07/29/2024    2. Back pain, unspecified back location, unspecified back pain laterality, unspecified chronicity  -     Ambulatory referral/consult to Physical/Occupational Therapy; Future; Expected date: 07/30/2024    - Imaging reviewed by Dr. Engle. Stable. Recommend continued surveillance with 2 year MRA. (7/2026)    Tasneem Elizalde PA-C  Interventional Radiology  [phone]

## 2024-09-17 ENCOUNTER — CLINICAL SUPPORT (OUTPATIENT)
Dept: REHABILITATION | Facility: HOSPITAL | Age: 74
End: 2024-09-17
Payer: MEDICARE

## 2024-09-17 DIAGNOSIS — M54.9 BACK PAIN, UNSPECIFIED BACK LOCATION, UNSPECIFIED BACK PAIN LATERALITY, UNSPECIFIED CHRONICITY: ICD-10-CM

## 2024-09-17 PROCEDURE — 97750 PHYSICAL PERFORMANCE TEST: CPT | Mod: 32

## 2024-09-17 NOTE — PLAN OF CARE
[facility]  Physical Therapy Lumbar Evaluation      Name: Bernadette Aquino  Clinic Number: [id number]    Therapy Diagnosis:   Encounter Diagnosis   Name Primary?    Back pain, unspecified back location, unspecified back pain laterality, unspecified chronicity      Physician: Tasneem Elizalde, CLIFFORD    Physician Orders: PT Eval and Treat  Medical Diagnosis from Referral: M54.9 (ICD-10-CM) - Back pain, unspecified back location, unspecified back pain laterality, unspecified chronicity  Evaluation Date: 9/17/2024  Authorization Period Expiration: 7/29/2025  Plan of Care Expiration: 11/26/2024  Reassessment Due: 10/17/2024  Visit # / Visits authorized: 1/20  MedX testing visit 2    Time In: 2:00 PM  Time Out: 2:50 PM  Total Billable Time: 50 minutes  INSURANCE and OUTCOMES: Fee for Service with FOTO Outcomes 1/3    Precautions: Standard, HTN, Osteopenia, Hx of R RTC repair     Pattern of pain determined: 1 PEN    Subjective     Date of onset: chronic  History of current condition: Bernadette reports years of mid back pain, worse when sitting for long periods of time or after playing tennis.  She says      Medical History:   Past Medical History:   Diagnosis Date    Appendicitis with perforation     Status post appendectomy, January 2011    Celiac disease     Cerebral aneurysm     Right internal carotid artery, nonruptured, status post coil    DDD (degenerative disc disease), cervical     Paresthesia of hands    Elevated liver enzymes     Prior history    Encounter for blood transfusion     GERD (gastroesophageal reflux disease)     HTN (hypertension)     Hyperlipidemia     Mild depression     Multinodular thyroid     u/s 9/11    Osteopenia     Prior history of osteoporosis on bisphosphonate    Other specified acquired hypothyroidism     On replacement    Single cyst of left breast        Surgical History:   Bernadette Aquino  has a past surgical history that includes Appendectomy;  Cholecystectomy; Total abdominal hysterectomy; Bunionectomy (10/2013); Shoulder arthroscopy (Right, 2015); Colonoscopy (N/A, 10/6/2016); Breast surgery; Breast lumpectomy (Right); Breast biopsy (Left); Knee arthroscopy (Right); Cerebral aneurysm repair; Eye surgery (Left);  section; Carpal tunnel release (Left); Cerebral angiogram (N/A, 2018); Chondroplasty of knee (Left, 3/22/2019); Synovectomy of knee (Left, 3/22/2019); Arthroscopic chondroplasty of knee joint (Left, 3/22/2019); and Knee arthroscopy w/ meniscectomy (Left, 3/22/2019).    Medications:   Bernadette has a current medication list which includes the following prescription(s): amlodipine, ascorbic acid (vitamin c), aspirin, celecoxib, cholecalciferol (vitamin d3), diclofenac sodium, omeprazole, sertraline, simvastatin, and synthroid.    Allergies:   Review of patient's allergies indicates:   Allergen Reactions    No known drug allergies         Imaging: no recent imaging     Prior Therapy: yes, HB for neck  Prior Treatment: HB for neck, injections  Social History:  lives with their spouse  Occupation: retired  Leisure: plays tennis, goes to gym      Prior Level of Function: independent  Current Level of Function: independent  DME owned/used: stationary bike  Gym Membership: yes, [facility]    Pain:  Current 3/10, worst 6/10, best 3/10   Location: bilateral mid back   Description: Sharp  Aggravating Factors: Sitting and playing tennis  Easing Factors:  Tylenol and ice  Disturbed Sleep: no    Pattern of pain questions:  1.  Where is your pain the worst? Mid back  2.  Is your pain constant or intermittent? intermittent  3.  Does bending forward make your typical pain worse? yes  4.  Since the start of your back pain, has there been a change in your bowel or bladder? no  5.  What can't you do now that you use to be able to do? nothing    Pts goals: stretch and decrease pain    Red Flag Screening:   Cough/Sneeze Strain: (--)  Bladder/Bowel:  (--)  Falls: (--)  Night pain: (--)  Unexplained weight loss: (--)  General health: good    Objective      Postural examination/scapula alignment: Rounded shoulder, Increased kyphosis, and Decreased lordosis  Joint integrity: Firm end feeling  Skin integrity:WNL   Edema: None  Correction of posture: better with lumbar roll      MOVEMENT LOSS - Lumbar   Norms ROM Loss Initial   Flexion Fingers touch toes, sacral angle >/= 70 deg, uniform spinal curvature, posterior weight shift  within functional limits and poor curve reversal   Extension ASIS surpasses toes, spine of scapulae surpasses heels, uniform spinal curve within functional limits and painful   Side glide Right  minimal loss and painful   Side glide Left  within functional limits   Rotation Right PT observes contralateral shoulder moderate loss   Rotation Left PT observes contralateral shoulder moderate loss     Lower Extremity Strength  Right LE  Left LE    Hip flexion: 4+/5 Hip flexion: 4+/5   Hip extension:  4/5 Hip extension: 4/5   Hip abduction: 4+/5 Hip abduction: 4+/5   Hip adduction:  4+/5 Hip adduction:  4+/5   Hip External Rotation 4/5 Hip External Rotation 4/5   Hip Internal rotation   4/5 Hip Internal rotation 4/5   Knee Flexion 5/5 Knee Flexion 5/5   Knee Extension 5/5 Knee Extension 5/5   Ankle dorsiflexion: 5/5 Ankle dorsiflexion: 5/5   Ankle plantarflexion: 4+/5 Ankle plantarflexion: 4+/5     GAIT:  Assistive Device used: none  Level of Assistance: independent  Patient displays the following gait deviations: no gait deviations observed.     Special Tests:   Test Name  Test Result   Prone Instability Test (--)   SI Joint Provocation Test (--)   Straight Leg Raise (--)   Neural Tension Test (--)   Crossed Straight Leg Raise (--)   Walking on toes Able   Walking on heels  Able     NEUROLOGICAL SCREENING:     Sensory deficits: intact to light touch    Reflexes:    Left Right   Patella Tendon 2+ 2+   Achilles Tendon 2+ 2+   Babinski  NT NT   Clonus  (--) (--)     REPEATED TEST MOVEMENTS:    Baseline symptoms:  Repeated Flexion in Standing pain during motion  Pain decreases with repitition   Repeated Extension in Standing pain during motion  Decreases with repitition   Repeated Flexion in lying no effect   Repeated Extension in lying  end range pain  no effect       STATIC TESTS and other movements:   Prone lie no effect   Prone lie on elbows worse   Sitting slouched  worse   Sitting erect better   Standing slouched worse   Standing erect  better   Lying prone in extension  worse   Long sitting   NT   Sustained flexion worse   Sustained prone using mat NT     Lumbar testing Visit 2      Intake Outcome Measure for FOTO thoracic Survey    Therapist reviewed FOTO scores for Bernadette Aquino on 9/17/2024.   FOTO report - see Media section or FOTO account episode details.    Intake Score: 56%  Goal: 62% functional ability          Treatment     Total Treatment time separate from Evaluation: 15 minutes    Bernadette received therapeutic exercises to develop/improve posture, lumbar ROM, strength, and muscular endurance for 10 minutes including the following exercises:     Open book x 10  SOC x 10  Thoracic rotation x 10    Written Home Exercises Provided: yes.    HEP AS FOLLOWS:  SOC, open book, thoracic rotation    Exercises were reviewed and Bernadette was able to demonstrate them prior to the end of the session. Bernadette demonstrated good  understanding of the education provided.     See EMR under Patient Instructions for exercises provided 9/17/2024.    MedX Testing:  MedX testing to be performed next visit        9/17/2024     3:08 PM   [facility]   Visit Number 1   VAS Pain Rating 3         Therapeutic Education/Activity provided for 5 minutes:   - Patient was given an [facility] Visit 1 handout which discusses the following:  - what to expect in therapy  - an overview of the program, including health coaching and wellness  - importance of spinal hygiene,  proper posture, lifting mechanics, sleep quality, and nutrition/hydration   - Steven roll trialed, recommended, and purchase information was provided.  - Patient received a handout regarding anticipated muscular soreness following the isometric test and strategies for management were reviewed with patient including stretching, using ice and scheduled rest.   - Patient received verbal education on the following:   - Healthy Back program   - purpose of the isometric test  - safe progression of lumbar strengthening, wellness approach, and systemic strengthening.   - safe usage of MedX machine and testing protocols.    Bernadette received cold pack for 0 minutes to back in [place].    Assessment     Bernadette is a 74 y.o. female referred to [facility] with a medical diagnosis of M54.9 (ICD-10-CM) - Back pain, unspecified back location, unspecified back pain laterality, unspecified chronicity. Pt presents with chronic mid back pain, decreased lumbar range of motion, posture impairments, and decreased activity tolerance. All of the above noted supports potential lumbar classification as a pattern 1 PEN with recurrent/or consistent symptoms, thus pt is a good candidate for the Healthy Back Program. Pt would benefit from LE and trunk mobility training, stability training,  improved cardiovascular and muscular endurance, neuromuscular re-education for posture, coordination, and muscular recruitment and education on positional offloading techniques to decrease the intensity and frequency of flare-ups.       Pain Pattern: 1 PEN       Pt prognosis is Good.     Pt will benefit from skilled outpatient Physical Therapy to address the deficits stated above and in the chart below, to provide pt/family education, and to maximize pt's level of independence. Based on the above history and physical examination an active physical therapy program is recommended.      Plan of care discussed with patient: Yes  Pt's spiritual, cultural and  educational needs considered and patient is agreeable to the plan of care and goals as stated below:     Anticipated Barriers for therapy: none    PT Evaluation Completed? Yes    Medical necessity is demonstrated by the following problem list:    History  Co-morbidities and personal factors that may impact the plan of care [] LOW: no personal factors / co-morbidities  [x] MODERATE: 1-2 personal factors / co-morbidities  [] HIGH: 3+ personal factors / co-morbidities    Moderate / High Support Documentation:   Co-morbidities affecting plan of care: HTN, osteopenia    Personal Factors:   age     Examination  Body Structures and Functions, activity limitations and participation restrictions that may impact the plan of care [x] LOW: addressing 1-2 elements  [] MODERATE: 3+ elements  [] HIGH: 4+ elements (please support below)    Moderate / High Support Documentation:     Clinical Presentation [x] LOW: stable  [] MODERATE: Evolving  [] HIGH: Unstable     Decision Making/ Complexity Score: low         GOALS: Pt is in agreement with the following goals.    Short term goals:  6 weeks or 10 visits   - Pt will demonstrate increased lumbar MedX ROM by at least 3 degrees from the initial ROM value with improvements noted in functional ROM and ability to perform ADLs. Appropriate and Ongoing  - Pt will demonstrate increased MedX average isometric strength value by 25% from initial test resulting in improved ability to perform bending, lifting, and carrying activities safely, confidently. Appropriate and Ongoing  - Pt will report a reduction in worst pain score by 1-2 points for improved tolerance for recreational activities. Appropriate and Ongoing  - Pt able to perform HEP correctly with minimal cueing or supervision from therapist to encourage independent management of symptoms. Appropriate and Ongoing    Long term goals: 10 weeks or 20 visits   - Pt will demonstrate increased lumbar MedX ROM by at least 6 degrees from initial  ROM value, resulting in improved ability to perform functional forward bending while standing and sitting. Appropriate and Ongoing  - Pt will demonstrate increased MedX average isometric strength value by 40% from initial test resulting in improved ability to perform bending, lifting, and carrying activities safely and confidently. Appropriate and Ongoing  - Pt to demonstrate ability to independently control and reduce their pain through posture positioning and mechanical movements throughout a typical day. Appropriate and Ongoing  - Pt will demonstrate reduced pain and improved functional outcomes as reported on the FOTO by reaching an intake score of >/= 62% functional ability in order to demonstrate subjective improvement in patient's condition. . Appropriate and Ongoing  - Pt will demonstrate independence with the HEP at discharge. Appropriate and Ongoing  - Pt will be able to play tennis without increase in mid back pain. (patient goal) Appropriate and Ongoing    Plan     Outpatient physical therapy 2x week for 10 weeks or 20 visits to include the following:   - Patient education  - Therapeutic exercise  - Manual therapy  - Performance testing   - Neuromuscular Re-education  - Therapeutic activity   - Modalities    Pt may be seen by PTA as part of the rehabilitation team.     Therapist: Kami Baez, PT  9/17/2024

## 2024-10-09 ENCOUNTER — CLINICAL SUPPORT (OUTPATIENT)
Dept: REHABILITATION | Facility: HOSPITAL | Age: 74
End: 2024-10-09
Payer: MEDICARE

## 2024-10-09 DIAGNOSIS — M54.9 BACK PAIN, UNSPECIFIED BACK LOCATION, UNSPECIFIED BACK PAIN LATERALITY, UNSPECIFIED CHRONICITY: Primary | ICD-10-CM

## 2024-10-09 PROCEDURE — 97110 THERAPEUTIC EXERCISES: CPT

## 2024-10-09 PROCEDURE — 97750 PHYSICAL PERFORMANCE TEST: CPT

## 2024-10-09 NOTE — PROGRESS NOTES
[facility]      Name: Bernadette Aquino  Clinic Number: [id number]    Therapy Diagnosis: No diagnosis found.  Physician: Tasneem Elizalde PA-C    Visit Date: 10/9/2024       Physician Orders: PT Eval and Treat  Medical Diagnosis from Referral: M54.9 (ICD-10-CM) - Back pain, unspecified back location, unspecified back pain laterality, unspecified chronicity  Evaluation Date: 9/17/2024  Authorization Period Expiration: 7/29/2025  Plan of Care Expiration: 11/26/2024  Reassessment Due: 10/17/2024  Visit # / Visits authorized: 2/20 elevate HOB  MedX testing visit 2     Time In: 9  Time Out: 950  Total Billable Time: 50minutes  INSURANCE and OUTCOMES: Fee for Service with FOTO Outcomes 1/3     Precautions: Standard, HTN, Osteopenia, Hx of R RTC repair      Pattern of pain determined: 1 PEN    Subjective   Bernadette reports she has been trying HEP.  Reports pain is typically mid thoracic by bra line.      Patient reports tolerating previous visit well  Patient reports their pain to be 3/10 on a 0-10 scale with 0 being no pain and 10 being the worst pain imaginable.  Pain Location: mid thoracic     Prior Therapy: yes, HB for neck  Prior Treatment: HB for neck, injections  Social History:  lives with their spouse  Occupation: retired  Leisure: plays tennis, goes to gym      Prior Level of Function: independent  Current Level of Function: independent  DME owned/used: stationary bike  Gym Membership: yes, [place]    Pt goals: stretch and decrease pain     Objective     Baseline IM Testing Results:   Date of testing: 10/09/24  ROM 0-51 deg   Max Peak Torque 73   Min Peak Torque 24    Flex/Ext Ratio 3:1   % below normative data 51     Outcomes:  Initial score: 56%  Visit 5 score:  Goal: 62%      Treatment    Bernadette received the treatments listed below:      MedX testing performed day 2: Patient  received neuromuscular education to engage spinal musculature correctly for motor control and engagement of  musculature for 15 minutes including the MedX exercise component and practice and standard testing. MedX dynamic exercise and baseline isometric test performed with instructions to guide the patient safely through the testing procedure. Patient instructed to perform isometric test correctly and safely while building to an optimal force with a pain-free effort. Patient also instructed that they should feel support/pressure from MedX restraints but no pain/discomfort, and encouraged to report any pain to therapist. Patient demonstrated appropriate understanding of information and tolerance of test.  Education regarding purpose of test, safety during test given, and reviewed possible more soreness and strategies.          10/9/2024     9:16 AM   [facility]   Visit Number 2   VAS Pain Rating 3   Treadmill Time (in min.) 5 min   Lumbar Stretches - Slouch Overcorrection 10   Flexion in Sitting 10   Lumbar Extension Seat Pad 2   Femur Restraint 6   Top Dead Center 24   Counterweight 208   Lumbar Flexion 51   Lumbar Extension 0   Lumbar Peak Torque 73 ft. lbs.   Min Torque 24   Test Percent Below Normative Data 51 %   Ice - Z Lie (in min.) 5       therapeutic exercises to develop strength, endurance, ROM, flexibility, posture, and core stabilization for 35 minutes including:  Open book x 10  SOC x 10  Thoracic rotation x 10 thread the needle   Seated trunk flexion 5x fwd/side to side  Thoracic extension with 1/2 roll 10x    Peripheral muscle strengthening which included 1 set of 15-20 repetitions at a slow, controlled 10-13 second per rep pace focused on strengthening supporting musculature for improved body mechanics and functional mobility.  Pt and therapist focused on proper form during treatment to ensure optimal strengthening of each targeted muscle group.  Machines were utilized including torso rotation, leg press, hip abd and hip add, leg ext.  Leg curl, triceps, biceps, chest and row added visit 3      cold  pack for 5 minutes to mid back.    Home Exercises Provided and Patient Education Provided   Home exercises include:open book/trunk rotation and SOC  Cardio program:10/16/24  Lifting education date:10/30/24  Posture/Lumbar roll: unknown  Fridge Magnet Discharge handout (date given):  Equipment at home/gym membership: yes      Education provided:   - exertion levels for exercises  Med x protocol  Review of HP    Written Home Exercises Provided: Patient instructed to cont prior HEP.  Exercises were reviewed and Bernadette was able to demonstrate them prior to the end of the session.  Bernadette demonstrated good  understanding of the education provided.     See EMR under Patient Instructions for exercises provided prior visit.          Assessment     Pt presents to second healthy back visit reporting 3/10 pain level, was able to demo HEP with Min VC for form. Pt tested on Med X today with a 51% deficit noted as compared to age norms. Pt also demonstrates a 3:1 flex to extension ratio.  Pt was also able to complete half of the peripheral strengthening exercises without increased discomfort and will complete the complete circuit next visit as tolerated.    Patient is making good progress towards established goals.  Pt will continue to benefit from skilled outpatient physical therapy to address the deficits stated in the impairment chart, provide pt/family education and to maximize pt's level of independence in the home and community environment.     Anticipated Barriers for therapy: none  Pt's spiritual, cultural and educational needs considered and pt agreeable to plan of care and goals as stated below:          GOALS: Pt is in agreement with the following goals.     Short term goals:  6 weeks or 10 visits   - Pt will demonstrate increased lumbar MedX ROM by at least 3 degrees from the initial ROM value with improvements noted in functional ROM and ability to perform ADLs. Appropriate and Ongoing  - Pt will demonstrate increased  MedX average isometric strength value by 25% from initial test resulting in improved ability to perform bending, lifting, and carrying activities safely, confidently. Appropriate and Ongoing  - Pt will report a reduction in worst pain score by 1-2 points for improved tolerance for recreational activities. Appropriate and Ongoing  - Pt able to perform HEP correctly with minimal cueing or supervision from therapist to encourage independent management of symptoms. Appropriate and Ongoing     Long term goals: 10 weeks or 20 visits   - Pt will demonstrate increased lumbar MedX ROM by at least 6 degrees from initial ROM value, resulting in improved ability to perform functional forward bending while standing and sitting. Appropriate and Ongoing  - Pt will demonstrate increased MedX average isometric strength value by 40% from initial test resulting in improved ability to perform bending, lifting, and carrying activities safely and confidently. Appropriate and Ongoing  - Pt to demonstrate ability to independently control and reduce their pain through posture positioning and mechanical movements throughout a typical day. Appropriate and Ongoing  - Pt will demonstrate reduced pain and improved functional outcomes as reported on the FOTO by reaching an intake score of >/= 62% functional ability in order to demonstrate subjective improvement in patient's condition. . Appropriate and Ongoing  - Pt will demonstrate independence with the HEP at discharge. Appropriate and Ongoing  - Pt will be able to play tennis without increase in mid back pain. (patient goal) Appropriate and Ongoing             Plan   Continue with established Plan of Care towards established PT goals.       Therapist: Angela Tejeda, PATRICK  10/9/2024

## 2024-10-15 ENCOUNTER — CLINICAL SUPPORT (OUTPATIENT)
Dept: REHABILITATION | Facility: HOSPITAL | Age: 74
End: 2024-10-15
Payer: MEDICARE

## 2024-10-15 DIAGNOSIS — M25.69 DECREASED RANGE OF MOTION OF TRUNK AND BACK: Primary | ICD-10-CM

## 2024-10-15 PROCEDURE — 97110 THERAPEUTIC EXERCISES: CPT

## 2024-10-15 PROCEDURE — 97112 NEUROMUSCULAR REEDUCATION: CPT

## 2024-10-15 NOTE — PROGRESS NOTES
[facility] Physical Therapy Treatment      Name: Bernadette Aquino  Clinic Number: [id number]    Therapy Diagnosis:   Encounter Diagnosis   Name Primary?    Decreased range of motion of trunk and back Yes     Physician: Tasneem Elizalde PA-C    Visit Date: 10/15/2024    Physician Orders: PT Eval and Treat  Medical Diagnosis from Referral: M54.9 (ICD-10-CM) - Back pain, unspecified back location, unspecified back pain laterality, unspecified chronicity  Evaluation Date: 9/17/2024  Authorization Period Expiration: 7/29/2025  Plan of Care Expiration: 11/26/2024  Reassessment Due: 10/17/2024  Visit # / Visits authorized: 3/20 elevate [facility]  MedX testing visit 2     Time In: 10:05 AM  Time Out: 11:05 AM  Total Billable Time: 55 minutes  INSURANCE and OUTCOMES: Fee for Service with FOTO Outcomes 1/3     Precautions: Standard, HTN, Osteopenia, Hx of R RTC repair      Pattern of pain determined: 1 PEN    Subjective   Bernadette reports mid back pain is about the same.     Patient reports tolerating previous visit well  Patient reports their pain to be 4/10 on a 0-10 scale with 0 being no pain and 10 being the worst pain imaginable.  Pain Location: mid thoracic     Prior Therapy: yes, HB for neck  Prior Treatment: HB for neck, injections  Social History:  lives with their spouse  Occupation: retired  Leisure: plays tennis, goes to gym      Prior Level of Function: independent  Current Level of Function: independent  DME owned/used: stationary bike  Gym Membership: yes, [place]    Pt goals: stretch and decrease pain     Objective     Baseline IM Testing Results:   Date of testing: 10/09/24  ROM 0-51 deg   Max Peak Torque 73   Min Peak Torque 24    Flex/Ext Ratio 3:1   % below normative data 51     Outcomes:  Initial score: 56%  Visit 5 score:  Goal: 62%    Treatment    Bernadette received the treatments listed below:      Medical MedX Treatment as follows:  MedX exercise started day 3:  Patient received neuromuscular education for  10 minutes via participation on the [email or web address] Machine. Therapist assisted patient in isolating and engaging spinal stabilization musculature in order to improve functional ability and postural control. Patient performed exercise with therapist guidance in order to accurately use pacer function, avoid valsalva, and optimally exert effort within a safe and effective range via the Brayden Exertion Rating Scale. Patient instructed to perform at a midrange of exertion and to complete 15-20 repetitions within appropriate split time, with proper technique, and while maintaining safety.          10/15/2024    11:23 AM   [facility]   Visit Number 3   VAS Pain Rating 4   Treadmill Time (in min.) 5 min   Lumbar Stretches - Slouch Overcorrection 10   Flexion in Sitting 10   Cervical Weight 35 lbs   Repetitions 15   Rating of Perceived Exertion 4   Lumbar Weight 35 lbs   Repetitions 15   Rating of Perceived Exertion 4   Ice - Z Lie (in min.) 5     therapeutic exercises to develop strength, endurance, ROM, flexibility, posture, and core stabilization for 45 minutes including:    Open book x15  Seated trunk flexion c/ T-ball x10 fwd / x5 side to side  Thoracic extension with 1/2 roll x15  SOC x 10  Thoracic rotation thread the needle standing x10 each side   +No money red TB x10    Peripheral muscle strengthening which included 1 set of 15-20 repetitions at a slow, controlled 10-13 second per rep pace focused on strengthening supporting musculature for improved body mechanics and functional mobility.  Pt and therapist focused on proper form during treatment to ensure optimal strengthening of each targeted muscle group.  Machines were utilized including torso rotation, leg press, hip abd and hip add, leg ext.  Leg curl, triceps, biceps, chest and row added visit 3      cold pack for 5 minutes to mid back.    Home Exercises Provided and Patient Education Provided   Home exercises include:open book/trunk rotation and  SOC  Cardio program:10/16/24  Lifting education date:10/30/24  Posture/Lumbar roll: unknown  Fridge Magnet Discharge handout (date given):  Equipment at home/gym membership: yes    Education provided:   - exertion levels for exercises  Med x protocol  Review of HP    Written Home Exercises Provided: Patient instructed to cont prior HEP.  Exercises were reviewed and Bernadette was able to demonstrate them prior to the end of the session.  Bernadette demonstrated good  understanding of the education provided.     See EMR under Patient Instructions for exercises provided prior visit.    Assessment   Bernadette returns with mid back pain rated 4/10 today. Treatment continued with mobility, strengthening, and neuro re-education exercises. Added no money exercise which she tolerated well. Thread the needle performed in standing due to knee pain. Lumbar MedX dynamic exercise was initiated at 35 ft/lbs with pt completing 15 reps with RPE = 4/10. She was also able to complete the peripheral strengthening exercises without increased discomfort. Will progress treatment per HB protocol and patient's tolerance.     Patient is making good progress towards established goals.  Pt will continue to benefit from skilled outpatient physical therapy to address the deficits stated in the impairment chart, provide pt/family education and to maximize pt's level of independence in the home and community environment.     Anticipated Barriers for therapy: none  Pt's spiritual, cultural and educational needs considered and pt agreeable to plan of care and goals as stated below:     GOALS: Pt is in agreement with the following goals.     Short term goals:  6 weeks or 10 visits   - Pt will demonstrate increased lumbar MedX ROM by at least 3 degrees from the initial ROM value with improvements noted in functional ROM and ability to perform ADLs. Appropriate and Ongoing  - Pt will demonstrate increased MedX average isometric strength value by 25% from initial  test resulting in improved ability to perform bending, lifting, and carrying activities safely, confidently. Appropriate and Ongoing  - Pt will report a reduction in worst pain score by 1-2 points for improved tolerance for recreational activities. Appropriate and Ongoing  - Pt able to perform HEP correctly with minimal cueing or supervision from therapist to encourage independent management of symptoms. Appropriate and Ongoing     Long term goals: 10 weeks or 20 visits   - Pt will demonstrate increased lumbar MedX ROM by at least 6 degrees from initial ROM value, resulting in improved ability to perform functional forward bending while standing and sitting. Appropriate and Ongoing  - Pt will demonstrate increased MedX average isometric strength value by 40% from initial test resulting in improved ability to perform bending, lifting, and carrying activities safely and confidently. Appropriate and Ongoing  - Pt to demonstrate ability to independently control and reduce their pain through posture positioning and mechanical movements throughout a typical day. Appropriate and Ongoing  - Pt will demonstrate reduced pain and improved functional outcomes as reported on the FOTO by reaching an intake score of >/= 62% functional ability in order to demonstrate subjective improvement in patient's condition. . Appropriate and Ongoing  - Pt will demonstrate independence with the HEP at discharge. Appropriate and Ongoing  - Pt will be able to play tennis without increase in mid back pain. (patient goal) Appropriate and Ongoing       Plan   Continue with established Plan of Care towards established PT goals.       Therapist: Trice Piña, PT  10/15/2024

## 2024-10-18 NOTE — PROGRESS NOTES
[facility]      Name: Bernadette Aquino  Clinic Number: [id number]    Therapy Diagnosis:   Encounter Diagnosis   Name Primary?    Decreased range of motion of trunk and back Yes     Physician: Tasneem Elizalde PA-C    Visit Date: 10/21/2024    Physician Orders: PT Eval and Treat  Medical Diagnosis from Referral: M54.9 (ICD-10-CM) - Back pain, unspecified back location, unspecified back pain laterality, unspecified chronicity  Evaluation Date: 9/17/2024  Authorization Period Expiration: 7/29/2025  Plan of Care Expiration: 11/26/2024  Reassessment Due: 10/17/2024 (A reassess was performed by Kami Baez, PT this visit 10/21/24)  Visit # / Visits authorized: 4/20 elevate HOB (Consider use of lumbar roll on MedX machine next visit)  MedX testing visit 2     Time In:  2:25 PM   Time Out: 3:20 PM  Total Billable Time: 55 minutes  INSURANCE and OUTCOMES: Fee for Service with FOTO Outcomes 1/3     Precautions: Standard, HTN, Osteopenia, Hx of R RTC repair      Pattern of pain determined: 1 PEN    Subjective   Bernadette reports minimal lower thoracic/upper lumbar discomfort presently. States that she remains active with tennis (played this morning) and her HEP.    Patient reports tolerating previous visit Muscular soreness  Patient reports their pain to be 4/10 on a 0-10 scale with 0 being no pain and 10 being the worst pain imaginable.  Pain Location: mid to lower thoracic/upper lumbar      Prior Therapy: yes, HB for neck  Prior Treatment: HB for neck, injections  Social History:  lives with their spouse  Occupation: retired  Leisure: plays tennis, goes to gym      Prior Level of Function: independent  Current Level of Function: independent  DME owned/used: stationary bike  Gym Membership: yes, [employer]    Pt goals: stretch and decrease pain     Objective     MOVEMENT LOSS - Lumbar    Norms ROM Loss Initial 10/21/24   Flexion Fingers touch toes, sacral angle >/= 70 deg, uniform spinal  curvature, posterior weight shift  within functional limits and poor curve reversal WFL, poor curve reversal   Extension ASIS surpasses toes, spine of scapulae surpasses heels, uniform spinal curve within functional limits and painful WFL, painful   Side glide Right   minimal loss and painful Min loss, painful   Side glide Left   within functional limits Min loss, painful   Rotation Right PT observes contralateral shoulder moderate loss Moderate loss   Rotation Left PT observes contralateral shoulder moderate loss Moderate loss        Baseline IM Testing Results:   Date of testing: 10/09/24  ROM 0-51 deg   Max Peak Torque 73   Min Peak Torque 24    Flex/Ext Ratio 3:1   % below normative data 51     Outcomes:  Initial score: 56%  Visit 5 score:  Goal: 62%    Treatment    Bernadette received the treatments listed below:      Medical MedX Treatment as follows:  Patient received neuromuscular education for 10 minutes via participation on the Medical MedX Machine. Therapist assisted patient in isolating and engaging spinal stabilization musculature in order to improve functional ability and postural control. Patient performed exercise with therapist guidance in order to accurately use pacer function, avoid valsalva, and optimally exert effort within a safe and effective range via the Brayden Exertion Rating Scale. Patient instructed to perform at a midrange of exertion and to complete 15-20 repetitions within appropriate split time, with proper technique, and while maintaining safety.          10/21/2024     3:10 PM   [facility] - Short   Visit Number 4   VAS Pain Rating 4   Treadmill Time (in min.) 5 min   Extension in Lying 10   Flexion in Sitting 10   Manual Therapy 5 mins.   Lumbar Weight 35 lbs   Repetitions 18   Rating of Perceived Exertion 4        therapeutic exercises to develop strength, endurance, ROM, flexibility, posture, and core stabilization for 40 minutes including:    +LTR stretch x 10  Open book  x10  +Bridging x 15  + EIL within comfortable ROM x 10  +Cat/cow stretch x 10  +LE bird dog x 10  Seated Thoracic extension with 1/2 roll x10  SOC x 10--NP  Thoracic rotation thread the needle standing x10 each side   No money red TB x15  Seated trunk flexion c/ T-ball x10 fwd / x5 side to side    Peripheral muscle strengthening which included 1 set of 15-20 repetitions at a slow, controlled 10-13 second per rep pace focused on strengthening supporting musculature for improved body mechanics and functional mobility.  Pt and therapist focused on proper form during treatment to ensure optimal strengthening of each targeted muscle group.  Machines were utilized including torso rotation, leg press, hip abd and hip add, leg ext.  Leg curl, triceps, biceps, chest and row added visit 3    Manual therapy: Patient received STM to lower thoracic/upper lumbar paraspinals x 5 minutes    cold pack for 00 minutes to mid/lower back.(Patient declines)    Home Exercises Provided and Patient Education Provided   Home exercises include:open book/trunk rotation and SOC  Cardio program:10/16/24  Lifting education date:10/30/24  Posture/Lumbar roll: unknown  Fridge Magnet Discharge handout (date given):  Equipment at home/gym membership: yes    Education provided:   - exertion levels for exercises  Med x protocol  Review of HP    Written Home Exercises Provided: Patient instructed to cont prior HEP.  Exercises were reviewed and Bernadette was able to demonstrate them prior to the end of the session.  Bernadette demonstrated good  understanding of the education provided.     See EMR under Patient Instructions for exercises provided prior visit.    Assessment   Bernadette returns with chronic lower thoracic/upper lumbar discomfort which is rated as 4/10 currently. Treatment continued with mobility, strengthening, and neuro re-education exercises. Added cat/cow stretch, LE bird dog, bridging, LTR and EIL this visit which she was able to perform without  increased symptoms. Attempted supine thoracic extension stretch however, patient found this to be uncomfortable. Also added manual techniques to include: STM which provides some relief. Lumbar MedX resistance was maintained 35 ft/lbs with pt completing 18 reps with RPE = 4/10 (Min (L) lower thoracic discomfort reported---may consider use of Lumbar roll next visit). She was also able to complete the peripheral strengthening exercises without increased discomfort. Will progress treatment per HB protocol and patient's tolerance.     Patient is making good progress towards established goals.  Pt will continue to benefit from skilled outpatient physical therapy to address the deficits stated in the impairment chart, provide pt/family education and to maximize pt's level of independence in the home and community environment.     Anticipated Barriers for therapy: none  Pt's spiritual, cultural and educational needs considered and pt agreeable to plan of care and goals as stated below:     GOALS: Pt is in agreement with the following goals.     Short term goals:  6 weeks or 10 visits   - Pt will demonstrate increased lumbar MedX ROM by at least 3 degrees from the initial ROM value with improvements noted in functional ROM and ability to perform ADLs. Appropriate and Ongoing  - Pt will demonstrate increased MedX average isometric strength value by 25% from initial test resulting in improved ability to perform bending, lifting, and carrying activities safely, confidently. Appropriate and Ongoing  - Pt will report a reduction in worst pain score by 1-2 points for improved tolerance for recreational activities. Appropriate and Ongoing  - Pt able to perform HEP correctly with minimal cueing or supervision from therapist to encourage independent management of symptoms. Appropriate and Ongoing     Long term goals: 10 weeks or 20 visits   - Pt will demonstrate increased lumbar MedX ROM by at least 6 degrees from initial ROM value,  resulting in improved ability to perform functional forward bending while standing and sitting. Appropriate and Ongoing  - Pt will demonstrate increased MedX average isometric strength value by 40% from initial test resulting in improved ability to perform bending, lifting, and carrying activities safely and confidently. Appropriate and Ongoing  - Pt to demonstrate ability to independently control and reduce their pain through posture positioning and mechanical movements throughout a typical day. Appropriate and Ongoing  - Pt will demonstrate reduced pain and improved functional outcomes as reported on the FOTO by reaching an intake score of >/= 62% functional ability in order to demonstrate subjective improvement in patient's condition. . Appropriate and Ongoing  - Pt will demonstrate independence with the HEP at discharge. Appropriate and Ongoing  - Pt will be able to play tennis without increase in mid back pain. (patient goal) Appropriate and Ongoing       Plan   Continue with established Plan of Care towards established PT goals.     Therapist: Noe Mckeon, ALLY  10/18/2024

## 2024-10-21 ENCOUNTER — CLINICAL SUPPORT (OUTPATIENT)
Dept: REHABILITATION | Facility: HOSPITAL | Age: 74
End: 2024-10-21
Payer: MEDICARE

## 2024-10-21 DIAGNOSIS — M25.69 DECREASED RANGE OF MOTION OF TRUNK AND BACK: Primary | ICD-10-CM

## 2024-10-21 PROCEDURE — 97110 THERAPEUTIC EXERCISES: CPT | Mod: CQ

## 2024-10-21 PROCEDURE — 97112 NEUROMUSCULAR REEDUCATION: CPT | Mod: CQ

## 2024-10-23 ENCOUNTER — CLINICAL SUPPORT (OUTPATIENT)
Dept: REHABILITATION | Facility: HOSPITAL | Age: 74
End: 2024-10-23
Payer: MEDICARE

## 2024-10-23 DIAGNOSIS — M25.69 DECREASED RANGE OF MOTION OF TRUNK AND BACK: Primary | ICD-10-CM

## 2024-10-23 PROCEDURE — 97110 THERAPEUTIC EXERCISES: CPT | Mod: CQ

## 2024-10-23 PROCEDURE — 97112 NEUROMUSCULAR REEDUCATION: CPT | Mod: CQ

## 2024-10-23 NOTE — PROGRESS NOTES
[facility]      Name: Bernadette Aquino  Clinic Number: [id number]    Therapy Diagnosis:   Encounter Diagnosis   Name Primary?    Decreased range of motion of trunk and back Yes     Physician: Tasneem Elizalde PA-C    Visit Date: 10/23/2024    Physician Orders: PT Eval and Treat  Medical Diagnosis from Referral: M54.9 (ICD-10-CM) - Back pain, unspecified back location, unspecified back pain laterality, unspecified chronicity  Evaluation Date: 9/17/2024  Authorization Period Expiration: 7/29/2025  Plan of Care Expiration: 11/26/2024  Reassessment Due: 11/20/2024    Visit # / Visits authorized: 5/20 elevate HOB (lumbar roll on MedX machine)  MedX testing visit 2     Time In:  2:30 PM   Time Out:  3;35 PM  Total Billable Time: 40 minutes ( 1 on 1 time)   INSURANCE and OUTCOMES: Fee for Service with FOTO Outcomes 1/3     Precautions: Standard, HTN, Osteopenia, Hx of R RTC repair      Pattern of pain determined: 1 PEN    Subjective   Bernadette reports minimal lower thoracic/upper lumbar discomfort presently.     Patient reports tolerating previous visit Muscular soreness  Patient reports their pain to be 3-4/10 on a 0-10 scale with 0 being no pain and 10 being the worst pain imaginable.  Pain Location: mid to lower thoracic/upper lumbar      Prior Therapy: yes, HB for neck  Prior Treatment: HB for neck, injections  Social History:  lives with their spouse  Occupation: retired  Leisure: plays tennis, goes to gym      Prior Level of Function: independent  Current Level of Function: independent  DME owned/used: stationary bike  Gym Membership: yes, [place]    Pt goals: stretch and decrease pain     Objective     MOVEMENT LOSS - Lumbar    Norms ROM Loss Initial 10/21/24   Flexion Fingers touch toes, sacral angle >/= 70 deg, uniform spinal curvature, posterior weight shift  within functional limits and poor curve reversal WFL, poor curve reversal   Extension ASIS surpasses toes, spine of scapulae  surpasses heels, uniform spinal curve within functional limits and painful WFL, painful   Side glide Right   minimal loss and painful Min loss, painful   Side glide Left   within functional limits Min loss, painful   Rotation Right PT observes contralateral shoulder moderate loss Moderate loss   Rotation Left PT observes contralateral shoulder moderate loss Moderate loss        Baseline IM Testing Results:   Date of testing: 10/09/24  ROM 0-51 deg   Max Peak Torque 73   Min Peak Torque 24    Flex/Ext Ratio 3:1   % below normative data 51     Outcomes:  Initial score: 56%  Visit 5 score:  Goal: 62%    Treatment    Bernadette received the treatments listed below:      Medical MedX Treatment as follows:  Patient received neuromuscular education for 10 minutes via participation on the Medical MedX Machine. Therapist assisted patient in isolating and engaging spinal stabilization musculature in order to improve functional ability and postural control. Patient performed exercise with therapist guidance in order to accurately use pacer function, avoid valsalva, and optimally exert effort within a safe and effective range via the Brayden Exertion Rating Scale. Patient instructed to perform at a midrange of exertion and to complete 15-20 repetitions within appropriate split time, with proper technique, and while maintaining safety.           10/23/2024     3:26 PM   [facility] - Short   Visit Number 5   VAS Pain Rating 4   Treadmill Time (in min.) 5 min   Extension in Lying 10   Flexion in Sitting 10   Manual Therapy 5 mins.   Lumbar Weight 35 lbs   Repetitions 20   Rating of Perceived Exertion 4        therapeutic exercises to develop strength, endurance, ROM, flexibility, posture, and core stabilization for 40 minutes including:    LTR stretch x 10  Open book x5, x 5 with RTB  Bridging + RTB x 15  EIL within comfortable ROM x 10  Cat/cow stretch x 10  LE bird dog x 10 ( cue level pelvis)  Seated Thoracic extension with  1/2 roll x10  SOC x 10--NP  Thoracic rotation thread the needle standing x10 each side   No money red TB x20  Seated trunk flexion c/ T-ball x10 fwd / x5 side to side    Peripheral muscle strengthening which included 1 set of 15-20 repetitions at a slow, controlled 10-13 second per rep pace focused on strengthening supporting musculature for improved body mechanics and functional mobility.  Pt and therapist focused on proper form during treatment to ensure optimal strengthening of each targeted muscle group.  Machines were utilized including torso rotation, leg press, hip abd and hip add, leg ext.  Leg curl, triceps, biceps, chest and row added visit 3    Manual therapy: Patient received STM to lower thoracic/upper lumbar paraspinals x 5 minutes    cold pack for 5 minutes to mid/lower back     Home Exercises Provided and Patient Education Provided   Home exercises include:open book/trunk rotation and SOC  Cardio program:10/23/24 reviewed benefits of cardio w/handout provided (Patient remains active with tennis a few days/per week).  Lifting education date:10/30/24  Posture/Lumbar roll: unknown  Frie Magnet Discharge handout (date given):  Equipment at home/gym membership: yes    Education provided:   - exertion levels for exercises  Med x protocol  Review of HP    Written Home Exercises Provided: Patient instructed to cont prior HEP.  Exercises were reviewed and Bernadette was able to demonstrate them prior to the end of the session.  Bernadette demonstrated good  understanding of the education provided.     See EMR under Patient Instructions for exercises provided prior visit.    Assessment   Bernadette returns with chronic lower thoracic/upper lumbar discomfort which is rated as 3-4/10 currently. Treatment continued with mobility, strengthening, and neuro re-education exercises.  She was progressed to light resistance (RTB) for open book ex, Bridging w/Band and also progressed reps for scap retract/ no money . She was able to  perform ex's without increased symptoms.  Continued with manual techniques to include: STM which provides some relief. Lumbar MedX resistance was maintained 35 ft/lbs with pt completing 20 reps with RPE = 4/10 (Lumbar roll was used on MedX today which patient reported as being helpful). She was also able to complete the peripheral strengthening exercises without increased discomfort. Will progress treatment per HB protocol and patient's tolerance.     Patient is making progress towards established goals.  Pt will continue to benefit from skilled outpatient physical therapy to address the deficits stated in the impairment chart, provide pt/family education and to maximize pt's level of independence in the home and community environment.     Anticipated Barriers for therapy: none  Pt's spiritual, cultural and educational needs considered and pt agreeable to plan of care and goals as stated below:     GOALS: Pt is in agreement with the following goals.     Short term goals:  6 weeks or 10 visits   - Pt will demonstrate increased lumbar MedX ROM by at least 3 degrees from the initial ROM value with improvements noted in functional ROM and ability to perform ADLs. Appropriate and Ongoing  - Pt will demonstrate increased MedX average isometric strength value by 25% from initial test resulting in improved ability to perform bending, lifting, and carrying activities safely, confidently. Appropriate and Ongoing  - Pt will report a reduction in worst pain score by 1-2 points for improved tolerance for recreational activities. Appropriate and Ongoing  - Pt able to perform HEP correctly with minimal cueing or supervision from therapist to encourage independent management of symptoms. Appropriate and Ongoing     Long term goals: 10 weeks or 20 visits   - Pt will demonstrate increased lumbar MedX ROM by at least 6 degrees from initial ROM value, resulting in improved ability to perform functional forward bending while standing  and sitting. Appropriate and Ongoing  - Pt will demonstrate increased MedX average isometric strength value by 40% from initial test resulting in improved ability to perform bending, lifting, and carrying activities safely and confidently. Appropriate and Ongoing  - Pt to demonstrate ability to independently control and reduce their pain through posture positioning and mechanical movements throughout a typical day. Appropriate and Ongoing  - Pt will demonstrate reduced pain and improved functional outcomes as reported on the FOTO by reaching an intake score of >/= 62% functional ability in order to demonstrate subjective improvement in patient's condition. . Appropriate and Ongoing  - Pt will demonstrate independence with the HEP at discharge. Appropriate and Ongoing  - Pt will be able to play tennis without increase in mid back pain. (patient goal) Appropriate and Ongoing       Plan   Continue with established Plan of Care towards established PT goals.     Therapist: Noe Mckeon PTA  10/23/2024

## 2024-10-30 ENCOUNTER — CLINICAL SUPPORT (OUTPATIENT)
Dept: REHABILITATION | Facility: HOSPITAL | Age: 74
End: 2024-10-30
Payer: MEDICARE

## 2024-10-30 DIAGNOSIS — M25.69 DECREASED RANGE OF MOTION OF TRUNK AND BACK: Primary | ICD-10-CM

## 2024-10-30 PROCEDURE — 97110 THERAPEUTIC EXERCISES: CPT

## 2024-10-30 PROCEDURE — 97112 NEUROMUSCULAR REEDUCATION: CPT

## 2024-11-04 NOTE — PROGRESS NOTES
[facility]      Name: Bernadette Aquino  Clinic Number: [id number]    Therapy Diagnosis:   Encounter Diagnosis   Name Primary?    Decreased range of motion of trunk and back Yes       Physician: Tasneem Elizalde PA-C    Visit Date: 11/5/2024    Physician Orders: PT Eval and Treat  Medical Diagnosis from Referral: M54.9 (ICD-10-CM) - Back pain, unspecified back location, unspecified back pain laterality, unspecified chronicity  Evaluation Date: 9/17/2024  Authorization Period Expiration: 7/29/2025  Plan of Care Expiration: 11/26/2024  Reassessment Due: 11/20/2024    Visit # / Visits authorized: 7/20 elevate HOB (lumbar roll on MedX machine)  MedX testing visit 2     Time In: 11:00am  Time Out: 12:00pm  Total Billable Time: 55 mins    INSURANCE and OUTCOMES: Fee for Service with FOTO Outcomes 1/3     Precautions: Standard, HTN, Osteopenia, Hx of R RTC repair      Pattern of pain determined: 1 PEN    Subjective   Bernadette arrives reporting moderate pain in mid back    Patient reports tolerating previous visit Muscular soreness  Patient reports their pain to be 3-4/10 on a 0-10 scale with 0 being no pain and 10 being the worst pain imaginable.  Pain Location: mid to lower thoracic/upper lumbar      Prior Therapy: yes, HB for neck  Prior Treatment: HB for neck, injections  Social History:  lives with their spouse  Occupation: retired  Leisure: plays tennis, goes to gym      Prior Level of Function: independent  Current Level of Function: independent  DME owned/used: stationary bike  Gym Membership: yes, [place]    Pt goals: stretch and decrease pain     Objective     MOVEMENT LOSS - Lumbar    Norms ROM Loss Initial 10/21/24   Flexion Fingers touch toes, sacral angle >/= 70 deg, uniform spinal curvature, posterior weight shift  within functional limits and poor curve reversal WFL, poor curve reversal   Extension ASIS surpasses toes, spine of scapulae surpasses heels, uniform spinal curve  within functional limits and painful WFL, painful   Side glide Right   minimal loss and painful Min loss, painful   Side glide Left   within functional limits Min loss, painful   Rotation Right PT observes contralateral shoulder moderate loss Moderate loss   Rotation Left PT observes contralateral shoulder moderate loss Moderate loss        Baseline IM Testing Results:   Date of testing: 10/09/24  ROM 0-51 deg   Max Peak Torque 73   Min Peak Torque 24    Flex/Ext Ratio 3:1   % below normative data 51     Outcomes:  Initial score: 56%  Visit 6 score: 57%  Goal: 62%    Treatment    Bernadette received the treatments listed below:      Medical MedX Treatment as follows:  Patient received neuromuscular education for 10 minutes via participation on the Medical MedX Machine. Therapist assisted patient in isolating and engaging spinal stabilization musculature in order to improve functional ability and postural control. Patient performed exercise with therapist guidance in order to accurately use pacer function, avoid valsalva, and optimally exert effort within a safe and effective range via the Brayden Exertion Rating Scale. Patient instructed to perform at a midrange of exertion and to complete 15-20 repetitions within appropriate split time, with proper technique, and while maintaining safety.          11/5/2024    11:58 AM   [facility]   Visit Number 7   VAS Pain Rating 3.5   Treadmill Time (in min.) 5 min   Scapular Retraction 15   Extension in Lying 10   Flexion in Sitting 10   Lumbar Weight 38 lbs   Repetitions 18   Rating of Perceived Exertion 4   Ice - Z Lie (in min.) 5     therapeutic exercises to develop strength, endurance, ROM, flexibility, posture, and core stabilization for 45 minutes including:    LTR stretch x 10  Open book x5, x 5 with RTB  Bridging + RTB x 15  EIL within comfortable ROM x 10  Cat/cow stretch x 10  LE bird dog x 10 ( cue level pelvis)  Seated Thoracic extension with 1/2 roll x10  SOC 2x  10 (cues for comfortable range of motion and axial extension)  Thoracic rotation thread the needle in QP x10 each side   No money red TB x20  Seated trunk flexion c/ T-ball x10 fwd / x5 side to side  + Paloff press 5# x 10     Peripheral muscle strengthening which included 1 set of 15-20 repetitions at a slow, controlled 10-13 second per rep pace focused on strengthening supporting musculature for improved body mechanics and functional mobility.  Pt and therapist focused on proper form during treatment to ensure optimal strengthening of each targeted muscle group.  Machines were utilized including torso rotation, leg press, hip abd and hip add, leg ext.  Leg curl, triceps, biceps, chest and row added visit 3    Manual therapy: Patient received STM to lower thoracic/upper lumbar paraspinals x 00 minutes    cold pack for 5 minutes to mid/lower back     Home Exercises Provided and Patient Education Provided   Home exercises include:open book/trunk rotation and SOC  Cardio program:10/23/24 reviewed benefits of cardio w/handout provided (Patient remains active with tennis a few days/per week).  Lifting education date:10/30/24  Posture/Lumbar roll: unknown  Fridge Magnet Discharge handout (date given):  Equipment at home/gym membership: yes    Education provided:   - exertion levels for exercises  Med x protocol  Review of HP    Written Home Exercises Provided: Patient instructed to cont prior HEP.  Exercises were reviewed and Bernadtete was able to demonstrate them prior to the end of the session.  Bernadette demonstrated good  understanding of the education provided.     See EMR under Patient Instructions for exercises provided prior visit.    Assessment   Bernadette presents to [facility]th [facility] visit reporting moderate midback pain. Pt was able to complete peripheral strengthening ex's with appropriate muscular fatigue and without increased pain. MedX strengthening performed for 18 reps at 38 ft-lbs with RPE = 4/10. Pt completed  second round of FOTO questionnaire and demonstrated 1 point improvement, indicating mild improvement in quality of life disruption 2/2 pain.    Patient is making progress towards established goals.  Pt will continue to benefit from skilled outpatient physical therapy to address the deficits stated in the impairment chart, provide pt/family education and to maximize pt's level of independence in the home and community environment.     Anticipated Barriers for therapy: none  Pt's spiritual, cultural and educational needs considered and pt agreeable to plan of care and goals as stated below:     GOALS: Pt is in agreement with the following goals.     Short term goals:  6 weeks or 10 visits   - Pt will demonstrate increased lumbar MedX ROM by at least 3 degrees from the initial ROM value with improvements noted in functional ROM and ability to perform ADLs. Appropriate and Ongoing  - Pt will demonstrate increased MedX average isometric strength value by 25% from initial test resulting in improved ability to perform bending, lifting, and carrying activities safely, confidently. Appropriate and Ongoing  - Pt will report a reduction in worst pain score by 1-2 points for improved tolerance for recreational activities. Appropriate and Ongoing  - Pt able to perform HEP correctly with minimal cueing or supervision from therapist to encourage independent management of symptoms. Appropriate and Ongoing     Long term goals: 10 weeks or 20 visits   - Pt will demonstrate increased lumbar MedX ROM by at least 6 degrees from initial ROM value, resulting in improved ability to perform functional forward bending while standing and sitting. Appropriate and Ongoing  - Pt will demonstrate increased MedX average isometric strength value by 40% from initial test resulting in improved ability to perform bending, lifting, and carrying activities safely and confidently. Appropriate and Ongoing  - Pt to demonstrate ability to independently  control and reduce their pain through posture positioning and mechanical movements throughout a typical day. Appropriate and Ongoing  - Pt will demonstrate reduced pain and improved functional outcomes as reported on the FOTO by reaching an intake score of >/= 62% functional ability in order to demonstrate subjective improvement in patient's condition. . Appropriate and Ongoing  - Pt will demonstrate independence with the HEP at discharge. Appropriate and Ongoing  - Pt will be able to play tennis without increase in mid back pain. (patient goal) Appropriate and Ongoing       Plan   Continue with established Plan of Care towards established PT goals.     Therapist: Conrad Harrington, PT  11/5/2024

## 2024-11-05 ENCOUNTER — CLINICAL SUPPORT (OUTPATIENT)
Dept: REHABILITATION | Facility: HOSPITAL | Age: 74
End: 2024-11-05
Payer: MEDICARE

## 2024-11-05 DIAGNOSIS — M25.69 DECREASED RANGE OF MOTION OF TRUNK AND BACK: Primary | ICD-10-CM

## 2024-11-05 PROCEDURE — 97110 THERAPEUTIC EXERCISES: CPT | Mod: KX

## 2024-11-05 PROCEDURE — 97112 NEUROMUSCULAR REEDUCATION: CPT | Mod: KX

## 2024-11-07 ENCOUNTER — CLINICAL SUPPORT (OUTPATIENT)
Dept: REHABILITATION | Facility: HOSPITAL | Age: 74
End: 2024-11-07
Payer: MEDICARE

## 2024-11-07 DIAGNOSIS — M25.69 DECREASED RANGE OF MOTION OF TRUNK AND BACK: Primary | ICD-10-CM

## 2024-11-07 PROCEDURE — 97110 THERAPEUTIC EXERCISES: CPT | Mod: CQ

## 2024-11-07 PROCEDURE — 97112 NEUROMUSCULAR REEDUCATION: CPT | Mod: CQ

## 2024-11-07 NOTE — PROGRESS NOTES
[facility]      Name: Bernadette Aquion  Clinic Number: [id number]    Therapy Diagnosis:   Encounter Diagnosis   Name Primary?    Decreased range of motion of trunk and back Yes       Physician: Tasneem Elizalde PA-C    Visit Date: 11/7/2024    Physician Orders: PT Eval and Treat  Medical Diagnosis from Referral: M54.9 (ICD-10-CM) - Back pain, unspecified back location, unspecified back pain laterality, unspecified chronicity  Evaluation Date: 9/17/2024  Authorization Period Expiration: 7/29/2025  Plan of Care Expiration: 11/26/2024  Reassessment Due: 11/20/2024    Visit # / Visits authorized: 8/20 elevate HOB (lumbar roll on MedX machine)  MedX testing visit 2     Time In: 9:05 am  Time Out: 10:00 am  Total Billable Time: 25  mins ( 1 on1 time)    INSURANCE and OUTCOMES: Fee for Service with FOTO Outcomes 1/3     Precautions: Standard, HTN, Osteopenia, Hx of R RTC repair      Pattern of pain determined: 1 PEN    Subjective   Bernadette arrives with minimal discomfort to lower thoracic/Upper lumbar area (L)>R . States that she also has a chiropractor appt this afternoon    Patient reports tolerating previous visit Muscular soreness  Patient reports their pain to be 3/10 on a 0-10 scale with 0 being no pain and 10 being the worst pain imaginable.  Pain Location: mid to lower thoracic/upper lumbar      Prior Therapy: yes, HB for neck  Prior Treatment: HB for neck, injections  Social History:  lives with their spouse  Occupation: retired  Leisure: plays tennis, goes to gym      Prior Level of Function: independent  Current Level of Function: independent  DME owned/used: stationary bike  Gym Membership: yes, [employer]    Pt goals: stretch and decrease pain     Objective     MOVEMENT LOSS - Lumbar    Norms ROM Loss Initial 10/21/24   Flexion Fingers touch toes, sacral angle >/= 70 deg, uniform spinal curvature, posterior weight shift  within functional limits and poor curve reversal WFL, poor  curve reversal   Extension ASIS surpasses toes, spine of scapulae surpasses heels, uniform spinal curve within functional limits and painful WFL, painful   Side glide Right   minimal loss and painful Min loss, painful   Side glide Left   within functional limits Min loss, painful   Rotation Right PT observes contralateral shoulder moderate loss Moderate loss   Rotation Left PT observes contralateral shoulder moderate loss Moderate loss        Baseline IM Testing Results:   Date of testing: 10/09/24  ROM 0-51 deg   Max Peak Torque 73   Min Peak Torque 24    Flex/Ext Ratio 3:1   % below normative data 51     Outcomes:  Initial score: 56%  Visit 6 score: 57%  Goal: 62%    Treatment    Bernadette received the treatments listed below:      Medical MedX Treatment as follows:  Patient received neuromuscular education for 10 minutes via participation on the Medical MedX Machine. Therapist assisted patient in isolating and engaging spinal stabilization musculature in order to improve functional ability and postural control. Patient performed exercise with therapist guidance in order to accurately use pacer function, avoid valsalva, and optimally exert effort within a safe and effective range via the Brayden Exertion Rating Scale. Patient instructed to perform at a midrange of exertion and to complete 15-20 repetitions within appropriate split time, with proper technique, and while maintaining safety.           11/7/2024     9:44 AM   [facility] - [facility]   Visit Number 8   VAS Pain Rating 3   Treadmill Time (in min.) 5 min   Extension in Lying 10   Flexion in Sitting 10   Lumbar Weight 38 lbs   Repetitions 20   Rating of Perceived Exertion 4      therapeutic exercises to develop strength, endurance, ROM, flexibility, posture, and core stabilization for 45 minutes including:    LTR stretch x 10  Open book x5, x 5 with RTB  Bridging + GTB x 15  EIL within comfortable ROM x 10  Cat/cow stretch x 10  LE bird dog x 10 ( cue level  pelvis)  Seated Thoracic extension with 1/2 roll x10  SOC 2x 10 (cues for comfortable range of motion and axial extension)  Thoracic rotation thread the needle in standing x10 each side   No money GTB x20  Seated trunk flexion c/ T-ball x10 fwd / x5 side to side  Paloff press 5# x 15     Peripheral muscle strengthening which included 1 set of 15-20 repetitions at a slow, controlled 10-13 second per rep pace focused on strengthening supporting musculature for improved body mechanics and functional mobility.  Pt and therapist focused on proper form during treatment to ensure optimal strengthening of each targeted muscle group.  Machines were utilized including torso rotation, leg press, hip abd and hip add, leg ext.  Leg curl, triceps, biceps, chest and row added visit 3    Manual therapy: Patient received STM to lower thoracic/upper lumbar paraspinals x 00 minutes    cold pack for 5 minutes to mid/lower back     Home Exercises Provided and Patient Education Provided   Home exercises include:open book/trunk rotation and SOC  Cardio program:10/23/24 reviewed benefits of cardio w/handout provided (Patient remains active with tennis a few days/per week).  Lifting education date:10/30/24  Posture/Lumbar roll: unknown  Fridge Magnet Discharge handout (date given):  Equipment at home/gym membership: yes    Education provided:   - exertion levels for exercises  Med x protocol  Review of HP    Written Home Exercises Provided: Patient instructed to cont prior HEP.  Exercises were reviewed and Bernadette was able to demonstrate them prior to the end of the session.  Bernadette demonstrated good  understanding of the education provided.     See EMR under Patient Instructions for exercises provided prior visit.    Assessment   Bernadette returns with chronic lower thoracic/upper lumbar discomfort which is rated as 3-4/10 currently. Treatment continued with mobility, strengthening, and neuro re-education exercises.  She was progressed to GTB for  scap retract/no money and bridging with band, increased reps for Paloff press for progressive strengthening. She was able to perform ex's without increased symptoms. She did report some dizziness/vertigo symptoms with transitions with supine-->sit transition on mat which resolved with rest. (States that is happened more frequently and will look to discuss more with her ENT). Lumbar MedX resistance was maintained 38 ft/lbs and she completed 20 reps with RPE = 4/10 (Lumbar roll was used for comfort). She was also able to complete the peripheral strengthening exercises without increased discomfort. Will progress treatment per HB protocol and patient's tolerance.     Patient is making progress towards established goals.  Pt will continue to benefit from skilled outpatient physical therapy to address the deficits stated in the impairment chart, provide pt/family education and to maximize pt's level of independence in the home and community environment.     Anticipated Barriers for therapy: none  Pt's spiritual, cultural and educational needs considered and pt agreeable to plan of care and goals as stated below:     GOALS: Pt is in agreement with the following goals.     Short term goals:  6 weeks or 10 visits   - Pt will demonstrate increased lumbar MedX ROM by at least 3 degrees from the initial ROM value with improvements noted in functional ROM and ability to perform ADLs. Appropriate and Ongoing  - Pt will demonstrate increased MedX average isometric strength value by 25% from initial test resulting in improved ability to perform bending, lifting, and carrying activities safely, confidently. Appropriate and Ongoing  - Pt will report a reduction in worst pain score by 1-2 points for improved tolerance for recreational activities. Appropriate and Ongoing  - Pt able to perform HEP correctly with minimal cueing or supervision from therapist to encourage independent management of symptoms. Appropriate and Ongoing     Long  term goals: 10 weeks or 20 visits   - Pt will demonstrate increased lumbar MedX ROM by at least 6 degrees from initial ROM value, resulting in improved ability to perform functional forward bending while standing and sitting. Appropriate and Ongoing  - Pt will demonstrate increased MedX average isometric strength value by 40% from initial test resulting in improved ability to perform bending, lifting, and carrying activities safely and confidently. Appropriate and Ongoing  - Pt to demonstrate ability to independently control and reduce their pain through posture positioning and mechanical movements throughout a typical day. Appropriate and Ongoing  - Pt will demonstrate reduced pain and improved functional outcomes as reported on the FOTO by reaching an intake score of >/= 62% functional ability in order to demonstrate subjective improvement in patient's condition. . Appropriate and Ongoing  - Pt will demonstrate independence with the HEP at discharge. Appropriate and Ongoing  - Pt will be able to play tennis without increase in mid back pain. (patient goal) Appropriate and Ongoing     Plan   Continue with established Plan of Care towards established PT goals.     Therapist: Noe Mckeon, PTA  11/7/2024

## 2024-11-11 NOTE — PROGRESS NOTES
[facility]      Name: Bernadette Aquino  Clinic Number: [id number]    Therapy Diagnosis:   Encounter Diagnosis   Name Primary?    Decreased range of motion of trunk and back Yes         Physician: Tasneem Elizalde PA-C    Visit Date: 11/12/2024    Physician Orders: PT Eval and Treat  Medical Diagnosis from Referral: M54.9 (ICD-10-CM) - Back pain, unspecified back location, unspecified back pain laterality, unspecified chronicity  Evaluation Date: 9/17/2024  Authorization Period Expiration: 7/29/2025  Plan of Care Expiration: 11/26/2024  Reassessment Due: 11/20/2024    Visit # / Visits authorized: 9/20 elevate HOB (lumbar roll on MedX machine)  MedX testing visit 2     Time In: 11:10am (10 mins late)  Time Out: 12:00pm  Total Billable Time: 50 mins      INSURANCE and OUTCOMES: Fee for Service with FOTO Outcomes 1/3     Precautions: Standard, HTN, Osteopenia, Hx of R RTC repair      Pattern of pain determined: 1 PEN    Subjective   Bernadette arrives with mild pain, no complaints.    Patient reports tolerating previous visit Muscular soreness  Patient reports their pain to be 3/10 on a 0-10 scale with 0 being no pain and 10 being the worst pain imaginable.  Pain Location: mid to lower thoracic/upper lumbar      Prior Therapy: yes, HB for neck  Prior Treatment: HB for neck, injections  Social History:  lives with their spouse  Occupation: retired  Leisure: plays tennis, goes to gym      Prior Level of Function: independent  Current Level of Function: independent  DME owned/used: stationary bike  Gym Membership: yes, [facility]    Pt goals: stretch and decrease pain     Objective     MOVEMENT LOSS - Lumbar    Norms ROM Loss Initial 10/21/24   Flexion Fingers touch toes, sacral angle >/= 70 deg, uniform spinal curvature, posterior weight shift  within functional limits and poor curve reversal WFL, poor curve reversal   Extension ASIS surpasses toes, spine of scapulae surpasses heels, uniform  "spinal curve within functional limits and painful WFL, painful   Side glide Right   minimal loss and painful Min loss, painful   Side glide Left   within functional limits Min loss, painful   Rotation Right PT observes contralateral shoulder moderate loss Moderate loss   Rotation Left PT observes contralateral shoulder moderate loss Moderate loss        Baseline IM Testing Results:   Date of testing: 10/09/24  ROM 0-51 deg   Max Peak Torque 73   Min Peak Torque 24    Flex/Ext Ratio 3:1   % below normative data 51     Outcomes:  Initial score: 56%  Visit 6 score: 57%  Goal: 62%    Treatment    Bernadette received the treatments listed below:      Medical MedX Treatment as follows:  Patient received neuromuscular education for 10 minutes via participation on the Medical MedX Machine. Therapist assisted patient in isolating and engaging spinal stabilization musculature in order to improve functional ability and postural control. Patient performed exercise with therapist guidance in order to accurately use pacer function, avoid valsalva, and optimally exert effort within a safe and effective range via the Brayden Exertion Rating Scale. Patient instructed to perform at a midrange of exertion and to complete 15-20 repetitions within appropriate split time, with proper technique, and while maintaining safety.           11/12/2024    12:49 PM   [facility]   Visit Number 9   VAS Pain Rating 3   Extension in Lying 10   Flexion in Sitting 10   Lumbar Weight 42 lbs   Repetitions 15   Rating of Perceived Exertion 4   Ice - Z Lie (in min.) 5     therapeutic exercises to develop strength, endurance, ROM, flexibility, posture, and core stabilization for 40 minutes including:    LTR stretch x 10  Open book x5, x 5 with GTB  Bridging + GTB x 15  EIL within comfortable ROM x 10  Cat/cow stretch x 10  Child's Pose x20" (bolster behind knees for comfort)  LE bird dog x 10 ( cue level pelvis)  Seated Thoracic extension with 1/2 roll " x10  SOC 2x 10 (cues for comfortable range of motion and axial extension)  Thoracic rotation thread the needle in standing x10 each side   No money GTB x20  Seated trunk flexion c/ T-ball x10 fwd / x5 side to side  Seated diaphragm breathing x5  Paloff press 5# x 15     Peripheral muscle strengthening which included 1 set of 15-20 repetitions at a slow, controlled 10-13 second per rep pace focused on strengthening supporting musculature for improved body mechanics and functional mobility.  Pt and therapist focused on proper form during treatment to ensure optimal strengthening of each targeted muscle group.  Machines were utilized including torso rotation, leg press, hip abd and hip add, leg ext.  Leg curl, triceps, biceps, chest and row added visit 3    Manual therapy: Patient received STM to lower thoracic/upper lumbar paraspinals x 00 minutes    cold pack for 00 minutes to mid/lower back     Home Exercises Provided and Patient Education Provided   Home exercises include:open book/trunk rotation and SOC  Cardio program:10/23/24 reviewed benefits of cardio w/handout provided (Patient remains active with tennis a few days/per week).  Lifting education date:10/30/24  Posture/Lumbar roll: unknown  Fridge Magnet Discharge handout (date given):  Equipment at home/gym membership: yes    Education provided:   - exertion levels for exercises  Med x protocol  Review of HP    Written Home Exercises Provided: Patient instructed to cont prior HEP.  Exercises were reviewed and Bernadette was able to demonstrate them prior to the end of the session.  Bernadette demonstrated good  understanding of the education provided.     See EMR under Patient Instructions for exercises provided prior visit.    Assessment   Bernadette presents to 9th healthy back visit reporting mild pain. Pt was able to complete peripheral strengthening ex's with appropriate muscular fatigue and without increased pain. MedX strengthening performed for 15 reps at 42 ft-lbs  with RPE = 4/10. Pt instructed further in diaphragmatic breathing to assist with decreasing muscle tension and to improve postural change dizziness that patient reports transferring from supine to sitting. Quickly assessed pt for BPPV and determined unlikely this is etiology as repeat testing did not provoke symptoms.    Patient is making progress towards established goals.  Pt will continue to benefit from skilled outpatient physical therapy to address the deficits stated in the impairment chart, provide pt/family education and to maximize pt's level of independence in the home and community environment.     Anticipated Barriers for therapy: none  Pt's spiritual, cultural and educational needs considered and pt agreeable to plan of care and goals as stated below:     GOALS: Pt is in agreement with the following goals.     Short term goals:  6 weeks or 10 visits   - Pt will demonstrate increased lumbar MedX ROM by at least 3 degrees from the initial ROM value with improvements noted in functional ROM and ability to perform ADLs. Appropriate and Ongoing  - Pt will demonstrate increased MedX average isometric strength value by 25% from initial test resulting in improved ability to perform bending, lifting, and carrying activities safely, confidently. Appropriate and Ongoing  - Pt will report a reduction in worst pain score by 1-2 points for improved tolerance for recreational activities. Appropriate and Ongoing  - Pt able to perform HEP correctly with minimal cueing or supervision from therapist to encourage independent management of symptoms. Appropriate and Ongoing     Long term goals: 10 weeks or 20 visits   - Pt will demonstrate increased lumbar MedX ROM by at least 6 degrees from initial ROM value, resulting in improved ability to perform functional forward bending while standing and sitting. Appropriate and Ongoing  - Pt will demonstrate increased MedX average isometric strength value by 40% from initial test  resulting in improved ability to perform bending, lifting, and carrying activities safely and confidently. Appropriate and Ongoing  - Pt to demonstrate ability to independently control and reduce their pain through posture positioning and mechanical movements throughout a typical day. Appropriate and Ongoing  - Pt will demonstrate reduced pain and improved functional outcomes as reported on the FOTO by reaching an intake score of >/= 62% functional ability in order to demonstrate subjective improvement in patient's condition. . Appropriate and Ongoing  - Pt will demonstrate independence with the HEP at discharge. Appropriate and Ongoing  - Pt will be able to play tennis without increase in mid back pain. (patient goal) Appropriate and Ongoing     Plan   Continue with established Plan of Care towards established PT goals.     Therapist: Conrad Harrington, PT  11/12/2024

## 2024-11-12 ENCOUNTER — CLINICAL SUPPORT (OUTPATIENT)
Dept: REHABILITATION | Facility: HOSPITAL | Age: 74
End: 2024-11-12
Payer: MEDICARE

## 2024-11-12 DIAGNOSIS — M25.69 DECREASED RANGE OF MOTION OF TRUNK AND BACK: Primary | ICD-10-CM

## 2024-11-12 PROCEDURE — 97110 THERAPEUTIC EXERCISES: CPT | Mod: KX

## 2024-11-12 PROCEDURE — 97112 NEUROMUSCULAR REEDUCATION: CPT | Mod: KX

## 2024-11-19 ENCOUNTER — CLINICAL SUPPORT (OUTPATIENT)
Dept: REHABILITATION | Facility: HOSPITAL | Age: 74
End: 2024-11-19
Payer: MEDICARE

## 2024-11-19 DIAGNOSIS — M25.69 DECREASED RANGE OF MOTION OF TRUNK AND BACK: Primary | ICD-10-CM

## 2024-11-19 PROCEDURE — 97112 NEUROMUSCULAR REEDUCATION: CPT

## 2024-11-19 PROCEDURE — 97110 THERAPEUTIC EXERCISES: CPT

## 2024-11-19 NOTE — PROGRESS NOTES
[facility]      Name: Bernadette Aquino  Clinic Number: [id number]    Therapy Diagnosis:   Encounter Diagnosis   Name Primary?    Decreased range of motion of trunk and back Yes         Physician: Tasneem Elizalde PA-C    Visit Date: 11/19/2024    Physician Orders: PT Eval and Treat  Medical Diagnosis from Referral: M54.9 (ICD-10-CM) - Back pain, unspecified back location, unspecified back pain laterality, unspecified chronicity  Evaluation Date: 9/17/2024  Authorization Period Expiration: 7/29/2025  Plan of Care Expiration: 11/26/2024  Reassessment Due: 11/20/2024    Visit # / Visits authorized: 9/20 elevate HOB (lumbar roll on MedX machine)  MedX testing visit 2     Time In: 11:00 am   Time Out: 12:00 pm  Total Billable Time: 30 mins 1:1      INSURANCE and OUTCOMES: Fee for Service with FOTO Outcomes 1/3     Precautions: Standard, HTN, Osteopenia, Hx of R RTC repair      Pattern of pain determined: 1 PEN    Subjective   Bernadette arrives with mild pain, shoulders and knees are more painful today, wants to avoid quadruped position.    Patient reports tolerating previous visit Muscular soreness  Patient reports their pain to be 3/10 on a 0-10 scale with 0 being no pain and 10 being the worst pain imaginable.  Pain Location: mid to lower thoracic/upper lumbar      Prior Therapy: yes, HB for neck  Prior Treatment: HB for neck, injections  Social History:  lives with their spouse  Occupation: retired  Leisure: plays tennis, goes to gym      Prior Level of Function: independent  Current Level of Function: independent  DME owned/used: stationary bike  Gym Membership: yes, [place]    Pt goals: stretch and decrease pain     Objective     MOVEMENT LOSS - Lumbar    Norms ROM Loss Initial 10/21/24 11/19/24   Flexion Fingers touch toes, sacral angle >/= 70 deg, uniform spinal curvature, posterior weight shift  within functional limits and poor curve reversal WFL, poor curve reversal WFL, decreased  curve reversal   Extension ASIS surpasses toes, spine of scapulae surpasses heels, uniform spinal curve within functional limits and painful WFL, painful WFL   Side glide Right   minimal loss and painful Min loss, painful Min loss   Side glide Left   within functional limits Min loss, painful Min loss   Rotation Right PT observes contralateral shoulder moderate loss Moderate loss Min loss   Rotation Left PT observes contralateral shoulder moderate loss Moderate loss Min loss        Baseline IM Testing Results:   Date of testing: 10/09/24  ROM 0-51 deg   Max Peak Torque 73   Min Peak Torque 24    Flex/Ext Ratio 3:1   % below normative data 51     Outcomes:  Initial score: 56%  Visit 6 score: 57%  Visit 10 score: 62% functional ability  Goal: 62%    Treatment    Bernadette received the treatments listed below:      Medical MedX Treatment as follows:  MedX re-testing performed day 10: Patient  received neuromuscular education to engage spinal musculature correctly for motor control and engagement of musculature for 15 minutes including the MedX exercise component and practice and standard testing. MedX dynamic exercise and baseline isometric test performed with instructions to guide the patient safely through the testing procedure. Patient instructed to perform isometric test correctly and safely while building to an optimal force with a pain-free effort. Patient also instructed that they should feel support/pressure from MedX restraints but no pain/discomfort, and encouraged to report any pain to therapist. Patient demonstrated appropriate understanding of information and tolerance of test.  Education regarding purpose of test, safety during test given, and reviewed possible more soreness and strategies.            11/19/2024    12:08 PM   [facility]   Visit Number 10   VAS Pain Rating 3   Treadmill Time (in min.) 5 min   Extension in Lying 10   Flexion in Sitting 10   Lumbar Flexion 51   Lumbar Extension 0  "  Lumbar Peak Torque 101 ft. lbs.   Min Torque 44   Test Percent Below Normative Data 23 %   Test Percent Gain in Strength from Initial  62 %   Ice - Z Lie (in min.) 5         therapeutic exercises to develop strength, endurance, ROM, flexibility, posture, and core stabilization for 40 minutes including:    LTR stretch x 10  Open book x 10 with GTB  Bridging + GTB x 15  EIL within comfortable ROM x 10  Cat/cow stretch x 10 NP  Child's Pose x20" (bolster behind knees for comfort) NP  LE bird dog x 10 ( cue level pelvis) NP  Seated Thoracic extension with 1/2 roll x10  SOC 2x 10 (cues for comfortable range of motion and axial extension)  Thoracic rotation thread the needle in standing x10 each side   No money GTB x20  Seated trunk flexion c/ T-ball x10 fwd / x5 side to side  Seated diaphragm breathing x5  Paloff press 5# x 15     Peripheral muscle strengthening which included 1 set of 15-20 repetitions at a slow, controlled 10-13 second per rep pace focused on strengthening supporting musculature for improved body mechanics and functional mobility.  Pt and therapist focused on proper form during treatment to ensure optimal strengthening of each targeted muscle group.  Machines were utilized including torso rotation, leg press, hip abd and hip add, leg ext.  Leg curl, triceps, biceps, chest and row added visit 3    Manual therapy: Patient received STM to lower thoracic/upper lumbar paraspinals x 00 minutes    cold pack for 00 minutes to mid/lower back     Home Exercises Provided and Patient Education Provided   Home exercises include:open book/trunk rotation and SOC  Cardio program:10/23/24 reviewed benefits of cardio w/handout provided (Patient remains active with tennis a few days/per week).  Lifting education date:10/30/24  Posture/Lumbar roll: unknown  Fridge Magnet Discharge handout (date given):  Equipment at home/gym membership: yes    Education provided:   - exertion levels for exercises  Med x protocol  Review " of     Written Home Exercises Provided: Patient instructed to cont prior HEP.  Exercises were reviewed and Bernadette was able to demonstrate them prior to the end of the session.  Bernadette demonstrated good  understanding of the education provided.     See EMR under Patient Instructions for exercises provided prior visit.    Assessment   Bernadette has attended 10 visits at [facility] which included MD evaluation, PT evaluation with isometric testing, and physical therapy treatment including HEP instruction, education, aerobic activity, dynamic strengthening on MedX equipment for the spine, and whole body strengthening on MedX equipment with increasing resistance. Patient demonstrates increased ability to reduce symptoms, improve posture, improve ROM, and improve strength, as stated below:    -Improved posture, is lumbar roll  -Improved strength at each test point on lumbar MedX isometric test with 63% average improvement noted with reduced pain noted by patient.  -Initial outcome tool score 56% functional ability and current outcome tool score 62% functional ability indicating reduced pain and improved function.        Patient is making progress towards established goals.  Pt will continue to benefit from skilled outpatient physical therapy to address the deficits stated in the impairment chart, provide pt/family education and to maximize pt's level of independence in the home and community environment.     Anticipated Barriers for therapy: none  Pt's spiritual, cultural and educational needs considered and pt agreeable to plan of care and goals as stated below:     GOALS: Pt is in agreement with the following goals.     Short term goals:  6 weeks or 10 visits   - Pt will demonstrate increased lumbar MedX ROM by at least 3 degrees from the initial ROM value with improvements noted in functional ROM and ability to perform ADLs. Not met  - Pt will demonstrate increased MedX average isometric strength value by 25% from  initial test resulting in improved ability to perform bending, lifting, and carrying activities safely, confidently. MET  - Pt will report a reduction in worst pain score by 1-2 points for improved tolerance for recreational activities. Appropriate and Ongoing  - Pt able to perform HEP correctly with minimal cueing or supervision from therapist to encourage independent management of symptoms. MET     Long term goals: 10 weeks or 20 visits   - Pt will demonstrate increased lumbar MedX ROM by at least 6 degrees from initial ROM value, resulting in improved ability to perform functional forward bending while standing and sitting. Appropriate and Ongoing  - Pt will demonstrate increased MedX average isometric strength value by 40% from initial test resulting in improved ability to perform bending, lifting, and carrying activities safely and confidently. Appropriate and Ongoing  - Pt to demonstrate ability to independently control and reduce their pain through posture positioning and mechanical movements throughout a typical day. Appropriate and Ongoing  - Pt will demonstrate reduced pain and improved functional outcomes as reported on the FOTO by reaching an intake score of >/= 62% functional ability in order to demonstrate subjective improvement in patient's condition. . MET 11/19/24  - Pt will demonstrate independence with the HEP at discharge. Appropriate and Ongoing  - Pt will be able to play tennis without increase in mid back pain. (patient goal) Appropriate and Ongoing     Plan   Continue with established Plan of Care towards established PT goals.     Therapist: Kami Baez, PT  11/19/2024

## 2024-11-25 ENCOUNTER — OFFICE VISIT (OUTPATIENT)
Dept: INTERVENTIONAL RADIOLOGY/VASCULAR | Facility: CLINIC | Age: 74
End: 2024-11-25
Payer: MEDICARE

## 2024-11-25 VITALS — BODY MASS INDEX: 24.06 KG/M2 | WEIGHT: 135.81 LBS

## 2024-11-25 DIAGNOSIS — M54.9 CHRONIC BACK PAIN, UNSPECIFIED BACK LOCATION, UNSPECIFIED BACK PAIN LATERALITY: Primary | ICD-10-CM

## 2024-11-25 DIAGNOSIS — G89.29 CHRONIC BACK PAIN, UNSPECIFIED BACK LOCATION, UNSPECIFIED BACK PAIN LATERALITY: Primary | ICD-10-CM

## 2024-11-25 PROCEDURE — 1157F ADVNC CARE PLAN IN RCRD: CPT | Mod: CPTII,S$GLB,,

## 2024-11-25 PROCEDURE — 99999 PR PBB SHADOW E&M-EST. PATIENT-LVL II: CPT | Mod: PBBFAC,,,

## 2024-11-25 PROCEDURE — 99213 OFFICE O/P EST LOW 20 MIN: CPT | Mod: S$GLB,,,

## 2024-11-25 PROCEDURE — 3008F BODY MASS INDEX DOCD: CPT | Mod: CPTII,S$GLB,,

## 2024-11-25 NOTE — PROGRESS NOTES
Subjective     Patient ID: Bernadette Aquino is a 74 y.o. female.    Chief Complaint: Back Pain    74 y.o. female who presents to discuss 6-7/10 mid back pain X 3 months. She is currently in the healthy back program with some relief; however, pain persist. Patient is very active and plays tennis.     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    She is also s/p bilateral C4-5, C5-6 and C6-7 facet joint steroid injections with continued significant relief. (4/2023). She report most of her pain is in the mid back. We have no imaging lower and mid back.     Review of Systems   Constitutional:  Negative for activity change, appetite change, fatigue and unexpected weight change.   Respiratory:  Negative for cough and shortness of breath.    Cardiovascular:  Negative for chest pain and leg swelling.   Gastrointestinal:  Negative for abdominal pain.   Musculoskeletal:  Positive for back pain (mid back pain).   Neurological:  Positive for dizziness (with movement. Being referred to ENT). Negative for facial asymmetry, speech difficulty and coordination difficulties.   Psychiatric/Behavioral:  Negative for behavioral problems and confusion.         Objective     Physical Exam  Constitutional:       General: She is not in acute distress.     Appearance: Normal appearance.   HENT:      Head: Normocephalic and atraumatic.      Nose: Nose normal.   Eyes:      Conjunctiva/sclera: Conjunctivae normal.   Pulmonary:      Effort: Pulmonary effort is normal.   Musculoskeletal:         General: Tenderness (Mid back and bra line) present.   Neurological:      General: No focal deficit present.      Mental Status: She is alert.   Psychiatric:         Mood and Affect: Mood normal.         Behavior: Behavior normal.        Assessment and Plan     1. Chronic back pain, unspecified back location, unspecified back pain laterality  -     MRI Thoracic Spine  Without Contrast; Future; Expected date: 11/25/2024  -     MRI Lumbar Spine Without Contrast; Future; Expected date: 11/25/2024    Continued 6-7/10 mid back pain with conservative therapy (healthy back program and medications).   Recommend MRI of the thoracic and lumbar spine.   Will call patient with results.     Tasneem Elizalde PA-C  Interventional Radiology  [phone]     [] : Resident [[email or web address]] : Patient is here for f/u of hypertension and elevated PSA. Losartan was begun last visit and BP is low today. The last clinical note suggested discontinuing labetalol but patient is still taking it. Will taper labetalol to 50 mg (half pill) q 12h with the hope of discontinuing it in the future. I also advised patient to return to the Urologist for f/u of a very high PSA. He and son agreed.

## 2024-11-26 ENCOUNTER — CLINICAL SUPPORT (OUTPATIENT)
Dept: REHABILITATION | Facility: HOSPITAL | Age: 74
End: 2024-11-26
Payer: MEDICARE

## 2024-11-26 ENCOUNTER — DOCUMENTATION ONLY (OUTPATIENT)
Dept: REHABILITATION | Facility: HOSPITAL | Age: 74
End: 2024-11-26
Payer: MEDICARE

## 2024-11-26 DIAGNOSIS — M25.69 DECREASED RANGE OF MOTION OF TRUNK AND BACK: Primary | ICD-10-CM

## 2024-11-26 PROCEDURE — 97112 NEUROMUSCULAR REEDUCATION: CPT | Mod: CQ

## 2024-11-26 PROCEDURE — 97110 THERAPEUTIC EXERCISES: CPT | Mod: CQ

## 2024-11-26 NOTE — PROGRESS NOTES
[facility]      Name: Bernadette Aquino  Clinic Number: [id number]    Therapy Diagnosis:   Encounter Diagnosis   Name Primary?    Decreased range of motion of trunk and back Yes         Physician: Tasneem Elizalde PA-C    Visit Date: 11/26/2024    Physician Orders: PT Eval and Treat  Medical Diagnosis from Referral: M54.9 (ICD-10-CM) - Back pain, unspecified back location, unspecified back pain laterality, unspecified chronicity  Evaluation Date: 9/17/2024  Authorization Period Expiration: 7/29/2025  Plan of Care Expiration: 2/7/2024 (POC sent by Kami Baez PT)  Reassessment Due: 12/18/2024    Visit # / Visits authorized: 11/20 elevate HOB (lumbar roll on MedX machine)  MedX testing visit 2     Time In: 11:02  am   Time Out:  11:52 am  Total Billable Time: 50 mins       INSURANCE and OUTCOMES: Fee for Service with FOTO Outcomes 1/3     Precautions: Standard, HTN, Osteopenia, Hx of R RTC repair      Pattern of pain determined: 1 PEN    Subjective   Bernadette  minimal mid/lower back pain/ discomfort. States that she was encouraged by her strength gains as noted with MedX testing last visit. States that she has a MRI planned for her lower back next month.    Patient reports tolerating previous visit Muscular soreness  Patient reports their pain to be 3/10 on a 0-10 scale with 0 being no pain and 10 being the worst pain imaginable.  Pain Location: mid to lower thoracic/upper lumbar      Prior Therapy: yes, HB for neck  Prior Treatment: HB for neck, injections  Social History:  lives with their spouse  Occupation: retired  Leisure: plays tennis, goes to gym      Prior Level of Function: independent  Current Level of Function: independent  DME owned/used: stationary bike  Gym Membership: yes, [employer]    Pt goals: stretch and decrease pain     Objective     MOVEMENT LOSS - Lumbar    Norms ROM Loss Initial 10/21/24 11/19/24   Flexion Fingers touch toes, sacral angle >/= 70 deg, uniform  spinal curvature, posterior weight shift  within functional limits and poor curve reversal WFL, poor curve reversal WFL, decreased curve reversal   Extension ASIS surpasses toes, spine of scapulae surpasses heels, uniform spinal curve within functional limits and painful WFL, painful WFL   Side glide Right   minimal loss and painful Min loss, painful Min loss   Side glide Left   within functional limits Min loss, painful Min loss   Rotation Right PT observes contralateral shoulder moderate loss Moderate loss Min loss   Rotation Left PT observes contralateral shoulder moderate loss Moderate loss Min loss     Lumbar IM Testing Results:    Initial test 10/9/24 Mid point test 11/19/2024   ROM 0-51 deg 0-51 deg   Max Peak Torque 73 101   Min Peak Torque 24  44   Flex/Ext Ratio 3:1 2.5:1   % below normative data 51 23%   Strength gain since initial  62%        Outcomes:  Initial score: 56%  Visit 6 score: 57%  Visit 10 score: 62% functional ability  Goal: 62%    Treatment    Bernadette received the treatments listed below:      Bernadette received neuromuscular education  to isolate and engage spinal stabilization musculature correctly for motor control and coordination to aid in function and posture for 10 minutes on the Cryptopay Machine.  Patient performed MedX dynamic exercise with emphasis on spinal muscular control using pacer throughout  active range of motion. Therapist assisted patient in achieving optimal exertion for neural reeducation and endurance training by using the  Brayden Exertion Rating scale, by instructing the patient to aim for mid range of exertion, performing 15-20 repetitions, slowly, correctly,and safely.           11/26/2024    11:35 AM   [facility] - Short   Visit Number 11   VAS Pain Rating 3   Treadmill Time (in min.) 5 min   Lumbar Stretches - Slouch 10   Extension in Standing 10   Flexion in Sitting 10   Lumbar Weight 42 lbs   Repetitions 18   Rating of Perceived Exertion 4       "  therapeutic exercises to develop strength, endurance, ROM, flexibility, posture, and core stabilization for 40 minutes including:    LTR stretch x 10  Open book x 10 with GTB  Bridging + GTB x 20  EIL within comfortable ROM x 10--NP  Cat/cow stretch x 10 NP  Child's Pose x20" (bolster behind knees for comfort) NP due due to shoulder discomfort  LE bird dog x 10 ( cue level pelvis) NP due to shoulder discomfort  Seated Thoracic extension with 1/2 roll x10  SOC 2x 10 (cues for comfortable range of motion and axial extension)  Thoracic rotation thread the needle in standing x10 each side   No money GTB x20  + EIS (Gentle within comfortable ROM) x 10 (Min discomfort reported)  Seated trunk flexion c/ T-ball x10 fwd / x5 side to side  Seated diaphragm breathing x5--NP  Paloff press 5# x 15   + Lifting education   Floor<->waist lift   Golfer's lift   Hip hinge    Peripheral muscle strengthening which included 1 set of 15-20 repetitions at a slow, controlled 10-13 second per rep pace focused on strengthening supporting musculature for improved body mechanics and functional mobility.  Pt and therapist focused on proper form during treatment to ensure optimal strengthening of each targeted muscle group.  Machines were utilized including torso rotation, leg press, hip abd and hip add, leg ext.  Leg curl, triceps, biceps, chest and row added visit 3    Manual therapy: Patient received STM to lower thoracic/upper lumbar paraspinals x 00 minutes    cold pack for 00 minutes to mid/lower back ( Patient declines)    Home Exercises Provided and Patient Education Provided   Home exercises include:open book/trunk rotation and SOC  Cardio program:10/23/24 reviewed benefits of cardio w/handout provided (Patient remains active with tennis a few days/per week).  Lifting education date:11/26/24  Posture/Lumbar roll: unknown  Fridge Magnet Discharge handout (date given):  Equipment at home/gym membership: yes    Education provided:   - " cues w/ex's  MedX performance  Precor ex performance     Written Home Exercises Provided: Patient instructed to cont prior HEP.  Exercises were reviewed and Bernadette was able to demonstrate them prior to the end of the session.  Bernadette demonstrated good  understanding of the education provided.     See EMR under Patient Instructions for exercises provided prior visit     Assessment   Bernadette returns with chronic lower thoracic/upper lumbar discomfort which is rated as 3/10 currently. Treatment continued with mobility, strengthening, and neuro re-education exercises. She was progressed progressed with increased reps for bridging w/band and also added EIS (Gentle). She reports mild lower back discomfort with end range EIS otherwise, no c/o.  Quadruped ex's remain deferred due to shoulder discomfort.  She was also educated on the do's and don'ts of lifting this visit with good understanding. Lumbar MedX resistance was maintained 42 ft/lbs and she completed 18 reps with RPE = 4/10 (Lumbar roll was used for comfort). She was also able to complete the peripheral strengthening exercises without increased back discomfort. Will progress treatment per HB protocol and patient's tolerance.     Patient is making progress towards established goals.  Pt will continue to benefit from skilled outpatient physical therapy to address the deficits stated in the impairment chart, provide pt/family education and to maximize pt's level of independence in the home and community environment.     Anticipated Barriers for therapy: none  Pt's spiritual, cultural and educational needs considered and pt agreeable to plan of care and goals as stated below:     GOALS: Pt is in agreement with the following goals.     Short term goals:  6 weeks or 10 visits   - Pt will demonstrate increased lumbar MedX ROM by at least 3 degrees from the initial ROM value with improvements noted in functional ROM and ability to perform ADLs. Not met  - Pt will demonstrate  increased MedX average isometric strength value by 25% from initial test resulting in improved ability to perform bending, lifting, and carrying activities safely, confidently. MET  - Pt will report a reduction in worst pain score by 1-2 points for improved tolerance for recreational activities. Appropriate and Ongoing  - Pt able to perform HEP correctly with minimal cueing or supervision from therapist to encourage independent management of symptoms. MET     Long term goals: 10 weeks or 20 visits   - Pt will demonstrate increased lumbar MedX ROM by at least 6 degrees from initial ROM value, resulting in improved ability to perform functional forward bending while standing and sitting. Appropriate and Ongoing  - Pt will demonstrate increased MedX average isometric strength value by 40% from initial test resulting in improved ability to perform bending, lifting, and carrying activities safely and confidently. Appropriate and Ongoing  - Pt to demonstrate ability to independently control and reduce their pain through posture positioning and mechanical movements throughout a typical day. Appropriate and Ongoing  - Pt will demonstrate reduced pain and improved functional outcomes as reported on the FOTO by reaching an intake score of >/= 62% functional ability in order to demonstrate subjective improvement in patient's condition. . MET 11/19/24  - Pt will demonstrate independence with the HEP at discharge. Appropriate and Ongoing  - Pt will be able to play tennis without increase in mid back pain. (patient goal) Appropriate and Ongoing     Plan   Continue with established Plan of Care towards established PT goals.     Therapist: Noe Mckeon, ALLY  11/26/2024

## 2024-11-26 NOTE — PROGRESS NOTES
PT/PTA met face to face to discuss pt's treatment plan and progress towards established goals. Pt will be seen by a physical therapist minimally every 6th visit or every 30 days.   .    Noe Mckeon PTA

## 2024-11-27 NOTE — PLAN OF CARE
[facility]  Physical Therapy Plan of Care Note     Name: Bernadette Aquino  Clinic Number: [id number]    Therapy Diagnosis:   Encounter Diagnosis   Name Primary?    Decreased range of motion of trunk and back Yes     Physician: Tasneem Elizalde PA-C    Visit Date: 11/26/2024    Physician Orders: Eval and Treat   Medical Diagnosis from Referral:  M54.9 (ICD-10-CM) - Back pain, unspecified back location, unspecified back pain laterality, unspecified chronicity  Evaluation Date: 9/17/2024  Authorization Period Expiration: 7/29/2025   Plan of Care Expiration: 2/7/2024  Progress Note Due: 12/18/2024   Visit # / Visits authorized: 11/ 20  FOTO: 3/4    Precautions: Standard, HTN, Osteopenia, Hx of R RTC repair  Pattern of pain determined: 1 PEN    Subjective     Update: Bernadette  minimal mid/lower back pain/ discomfort. States that she was encouraged by her strength gains as noted with MedX testing last visit. States that she has a MRI planned for her lower back next month.     Objective      Update: MOVEMENT LOSS - Lumbar    Norms ROM Loss Initial 10/21/24 11/19/24   Flexion Fingers touch toes, sacral angle >/= 70 deg, uniform spinal curvature, posterior weight shift  within functional limits and poor curve reversal WFL, poor curve reversal WFL, decreased curve reversal   Extension ASIS surpasses toes, spine of scapulae surpasses heels, uniform spinal curve within functional limits and painful WFL, painful WFL   Side glide Right   minimal loss and painful Min loss, painful Min loss   Side glide Left   within functional limits Min loss, painful Min loss   Rotation Right PT observes contralateral shoulder moderate loss Moderate loss Min loss   Rotation Left PT observes contralateral shoulder moderate loss Moderate loss Min loss        Lumbar IM Testing Results:   Date of testing: 10/09/24   Initial test 10/9/24 Mid point test 11/19/2024   ROM 0-51 deg 0-51 deg   Max Peak Torque 73 101   Min Peak  Torque 24  44   Flex/Ext Ratio 3:1 2.5:1   % below normative data 51 23%   Strength gain since initial  62%     Outcomes:  Initial score: 56%  Visit 6 score: 57%  Visit 10 score: 62% functional ability  Goal: 62%    Assessment     Update: Mid point reassessment of isometric strength of lumbar extensor muscles demonstrate 62% increase in strength as compared to initial testing.  FOTO scores demonstrate improvement in perceived functional mobility with increase to 62% functional ability since first visit.  She would benefit from continued skilled physical therapy intervention to maximize strength and range of motion gains to reduce pain and improve overall functional mobility and independence.     Previous Short Term Goals Status:   2/4 met  New Short Term Goals Status:   continue as per initial plan of care  Long Term Goal Status: continue per initial plan of care.  Reasons for Recertification of Therapy:   expiration of plan of care    GOALS  Short term goals:  6 weeks or 10 visits   - Pt will demonstrate increased lumbar MedX ROM by at least 3 degrees from the initial ROM value with improvements noted in functional ROM and ability to perform ADLs. Not met  - Pt will demonstrate increased MedX average isometric strength value by 25% from initial test resulting in improved ability to perform bending, lifting, and carrying activities safely, confidently. MET  - Pt will report a reduction in worst pain score by 1-2 points for improved tolerance for recreational activities. Appropriate and Ongoing  - Pt able to perform HEP correctly with minimal cueing or supervision from therapist to encourage independent management of symptoms. MET     Long term goals: 10 weeks or 20 visits   - Pt will demonstrate increased lumbar MedX ROM by at least 6 degrees from initial ROM value, resulting in improved ability to perform functional forward bending while standing and sitting. Appropriate and Ongoing  - Pt will demonstrate increased  MedX average isometric strength value by 40% from initial test resulting in improved ability to perform bending, lifting, and carrying activities safely and confidently. Appropriate and Ongoing  - Pt to demonstrate ability to independently control and reduce their pain through posture positioning and mechanical movements throughout a typical day. Appropriate and Ongoing  - Pt will demonstrate reduced pain and improved functional outcomes as reported on the FOTO by reaching an intake score of >/= 62% functional ability in order to demonstrate subjective improvement in patient's condition. . MET 11/19/24  - Pt will demonstrate independence with the HEP at discharge. Appropriate and Ongoing  - Pt will be able to play tennis without increase in mid back pain. (patient goal) Appropriate and Ongoing    Plan     Updated Certification Period: 11/26/24 to 2/7/2024   Recommended Treatment Plan: 2 times per week for 6 weeks for 9 additional visits:  Electrical Stimulation PRN, Gait Training, Manual Therapy, Moist Heat/ Ice, Neuromuscular Re-ed, Patient Education, Self Care, Therapeutic Activities, and Therapeutic Exercise  Other Recommendations: per [facility] protocol    Kami Baez, PT

## 2024-12-05 ENCOUNTER — CLINICAL SUPPORT (OUTPATIENT)
Dept: REHABILITATION | Facility: HOSPITAL | Age: 74
End: 2024-12-05
Payer: MEDICARE

## 2024-12-05 DIAGNOSIS — M25.69 DECREASED RANGE OF MOTION OF TRUNK AND BACK: Primary | ICD-10-CM

## 2024-12-05 PROCEDURE — 97110 THERAPEUTIC EXERCISES: CPT

## 2024-12-05 PROCEDURE — 97112 NEUROMUSCULAR REEDUCATION: CPT

## 2024-12-05 NOTE — PROGRESS NOTES
[facility]      Name: Bernadette Aquino  Clinic Number: [id number]    Therapy Diagnosis:   No diagnosis found.        Physician: Tasneem Elizalde PA-C    Visit Date: 12/5/2024    Physician Orders: PT Eval and Treat  Medical Diagnosis from Referral: M54.9 (ICD-10-CM) - Back pain, unspecified back location, unspecified back pain laterality, unspecified chronicity  Evaluation Date: 9/17/2024  Authorization Period Expiration: 7/29/2025  Plan of Care Expiration: 2/7/2024 (POC sent by Kami Baez PT)  Reassessment Due: 12/18/2024    Visit # / Visits authorized: 12/20 elevate HOB (lumbar roll on MedX machine)  MedX testing visit 2     Time In: ***  Time Out: ***  Total Billable Time: ***    INSURANCE and OUTCOMES: Fee for Service with FOTO Outcomes 1/3     Precautions: Standard, HTN, Osteopenia, Hx of R RTC repair      Pattern of pain determined: 1 PEN    Subjective   Bernadette  reports ***    Patient reports tolerating previous visit Muscular soreness  Patient reports their pain to be 3***/10 on a 0-10 scale with 0 being no pain and 10 being the worst pain imaginable.  Pain Location: mid to lower thoracic/upper lumbar      Prior Therapy: yes, HB for neck  Prior Treatment: HB for neck, injections  Social History:  lives with their spouse  Occupation: retired  Leisure: plays tennis, goes to gym      Prior Level of Function: independent  Current Level of Function: independent  DME owned/used: stationary bike  Gym Membership: yes, [place]    Pt goals: stretch and decrease pain     Objective     MOVEMENT LOSS - Lumbar    Norms ROM Loss Initial 10/21/24 11/19/24   Flexion Fingers touch toes, sacral angle >/= 70 deg, uniform spinal curvature, posterior weight shift  within functional limits and poor curve reversal WFL, poor curve reversal WFL, decreased curve reversal   Extension ASIS surpasses toes, spine of scapulae surpasses heels, uniform spinal curve within functional limits and painful  "WFL, painful WFL   Side glide Right   minimal loss and painful Min loss, painful Min loss   Side glide Left   within functional limits Min loss, painful Min loss   Rotation Right PT observes contralateral shoulder moderate loss Moderate loss Min loss   Rotation Left PT observes contralateral shoulder moderate loss Moderate loss Min loss     Lumbar IM Testing Results:    Initial test 10/9/24 Mid point test 11/19/2024   ROM 0-51 deg 0-51 deg   Max Peak Torque 73 101   Min Peak Torque 24  44   Flex/Ext Ratio 3:1 2.5:1   % below normative data 51 23%   Strength gain since initial  62%        Outcomes:  Initial score: 56%  Visit 6 score: 57%  Visit 10 score: 62% functional ability  Goal: 62%    Treatment    Bernadette received the treatments listed below:      Bernadette received neuromuscular education  to isolate and engage spinal stabilization musculature correctly for motor control and coordination to aid in function and posture for 10 minutes on the Medical Medx Machine.  Patient performed MedX dynamic exercise with emphasis on spinal muscular control using pacer throughout  active range of motion. Therapist assisted patient in achieving optimal exertion for neural reeducation and endurance training by using the  Brayden Exertion Rating scale, by instructing the patient to aim for mid range of exertion, performing 15-20 repetitions, slowly, correctly,and safely.       *** hbt       therapeutic exercises to develop strength, endurance, ROM, flexibility, posture, and core stabilization for 40 minutes including:    LTR stretch x 10  Open book x 10 with GTB  Bridging + GTB x 20  EIL within comfortable ROM x 10--NP  Cat/cow stretch x 10 NP  Child's Pose x20" (bolster behind knees for comfort) NP due due to shoulder discomfort  LE bird dog x 10 ( cue level pelvis) NP due to shoulder discomfort  Seated Thoracic extension with 1/2 roll x10  SOC 2x 10 (cues for comfortable range of motion and axial extension)  Thoracic rotation thread " the needle in standing x10 each side   No money GTB x20  + EIS (Gentle within comfortable ROM) x 10 (Min discomfort reported)  Seated trunk flexion c/ T-ball x10 fwd / x5 side to side  Seated diaphragm breathing x5--NP  Paloff press 5# x 15   + Lifting education   Floor<->waist lift   Golfer's lift   Hip hinge    Peripheral muscle strengthening which included 1 set of 15-20 repetitions at a slow, controlled 10-13 second per rep pace focused on strengthening supporting musculature for improved body mechanics and functional mobility.  Pt and therapist focused on proper form during treatment to ensure optimal strengthening of each targeted muscle group.  Machines were utilized including torso rotation, leg press, hip abd and hip add, leg ext.  Leg curl, triceps, biceps, chest and row added visit 3    Manual therapy: Patient received STM to lower thoracic/upper lumbar paraspinals x 00 minutes    cold pack for 00 minutes to mid/lower back ( Patient declines)    Home Exercises Provided and Patient Education Provided   Home exercises include:open book/trunk rotation and SOC  Cardio program:10/23/24 reviewed benefits of cardio w/handout provided (Patient remains active with tennis a few days/per week).  Lifting education date:11/26/24  Posture/Lumbar roll: unknown  Fridge Magnet Discharge handout (date given):  Equipment at home/gym membership: yes    Education provided:   - cues w/ex's  MedX performance  Precor ex performance     Written Home Exercises Provided: Patient instructed to cont prior HEP.  Exercises were reviewed and Bernadette was able to demonstrate them prior to the end of the session.  Bernadette demonstrated good  understanding of the education provided.     See EMR under Patient Instructions for exercises provided prior visit     Assessment   Bernadette presents to *** healthy back visit reporting ***. Pt was able to complete peripheral strengthening ex's with appropriate muscular fatigue and without increased pain. MedX  strengthening performed for *** reps at *** ft-lbs with RPE = ***/10. ***      Patient is making progress towards established goals.  Pt will continue to benefit from skilled outpatient physical therapy to address the deficits stated in the impairment chart, provide pt/family education and to maximize pt's level of independence in the home and community environment.     Anticipated Barriers for therapy: none  Pt's spiritual, cultural and educational needs considered and pt agreeable to plan of care and goals as stated below:     GOALS: Pt is in agreement with the following goals.     Short term goals:  6 weeks or 10 visits   - Pt will demonstrate increased lumbar MedX ROM by at least 3 degrees from the initial ROM value with improvements noted in functional ROM and ability to perform ADLs. Not met  - Pt will demonstrate increased MedX average isometric strength value by 25% from initial test resulting in improved ability to perform bending, lifting, and carrying activities safely, confidently. MET  - Pt will report a reduction in worst pain score by 1-2 points for improved tolerance for recreational activities. Appropriate and Ongoing  - Pt able to perform HEP correctly with minimal cueing or supervision from therapist to encourage independent management of symptoms. MET     Long term goals: 10 weeks or 20 visits   - Pt will demonstrate increased lumbar MedX ROM by at least 6 degrees from initial ROM value, resulting in improved ability to perform functional forward bending while standing and sitting. Appropriate and Ongoing  - Pt will demonstrate increased MedX average isometric strength value by 40% from initial test resulting in improved ability to perform bending, lifting, and carrying activities safely and confidently. Appropriate and Ongoing  - Pt to demonstrate ability to independently control and reduce their pain through posture positioning and mechanical movements throughout a typical day. Appropriate and  Ongoing  - Pt will demonstrate reduced pain and improved functional outcomes as reported on the FOTO by reaching an intake score of >/= 62% functional ability in order to demonstrate subjective improvement in patient's condition. . MET 11/19/24  - Pt will demonstrate independence with the HEP at discharge. Appropriate and Ongoing  - Pt will be able to play tennis without increase in mid back pain. (patient goal) Appropriate and Ongoing     Plan   Continue with established Plan of Care towards established PT goals.     Therapist: Conrad Harrington, PT  12/5/2024

## 2024-12-05 NOTE — PROGRESS NOTES
[facility]      Name: Bernadette Aquino  Clinic Number: [id number]    Therapy Diagnosis:   Encounter Diagnosis   Name Primary?    Decreased range of motion of trunk and back Yes     Physician: Tasneem Elizalde PA-C    Visit Date: 12/5/2024    Physician Orders: PT Eval and Treat  Medical Diagnosis from Referral: M54.9 (ICD-10-CM) - Back pain, unspecified back location, unspecified back pain laterality, unspecified chronicity  Evaluation Date: 9/17/2024  Authorization Period Expiration: 7/29/2025  Plan of Care Expiration: 2/7/2024   Reassessment Due: 12/18/2024    Visit # / Visits authorized: 12/20 elevate HOB (lumbar roll on MedX machine)  MedX testing visit 2     Time In: 1:30 PM  Time Out: 2:30 PM  Total Billable Time: 55 minutes    INSURANCE and OUTCOMES: Fee for Service with FOTO Outcomes 1/3     Precautions: Standard, HTN, Osteopenia, Hx of R RTC repair      Pattern of pain determined: 1 PEN    Subjective   Bernadette reports minimal mid/lower back pain rated 3/10 today. States that she has a MRI scheduled for her lower back on Monday.     Patient reports tolerating previous visit Muscular soreness  Patient reports their pain to be 3/10 on a 0-10 scale with 0 being no pain and 10 being the worst pain imaginable.  Pain Location: mid to lower thoracic/upper lumbar      Prior Therapy: yes, HB for neck  Prior Treatment: HB for neck, injections  Social History:  lives with their spouse  Occupation: retired  Leisure: plays tennis, goes to gym      Prior Level of Function: independent  Current Level of Function: independent  DME owned/used: stationary bike  Gym Membership: yes, [employer]    Pt goals: stretch and decrease pain     Objective     MOVEMENT LOSS - Lumbar    Norms ROM Loss Initial 10/21/24 11/19/24   Flexion Fingers touch toes, sacral angle >/= 70 deg, uniform spinal curvature, posterior weight shift  within functional limits and poor curve reversal WFL, poor curve reversal WFL,  decreased curve reversal   Extension ASIS surpasses toes, spine of scapulae surpasses heels, uniform spinal curve within functional limits and painful WFL, painful WFL   Side glide Right   minimal loss and painful Min loss, painful Min loss   Side glide Left   within functional limits Min loss, painful Min loss   Rotation Right PT observes contralateral shoulder moderate loss Moderate loss Min loss   Rotation Left PT observes contralateral shoulder moderate loss Moderate loss Min loss     Lumbar IM Testing Results:    Initial test 10/9/24 Mid point test 11/19/2024   ROM 0-51 deg 0-51 deg   Max Peak Torque 73 101   Min Peak Torque 24  44   Flex/Ext Ratio 3:1 2.5:1   % below normative data 51 23%   Strength gain since initial  62%        Outcomes:  Initial score: 56%  Visit 6 score: 57%  Visit 10 score: 62% functional ability  Goal: 62%    Treatment    Bernadette received the treatments listed below:      Bernadette received neuromuscular education  to isolate and engage spinal stabilization musculature correctly for motor control and coordination to aid in function and posture for 10 minutes on the Spark Machine.  Patient performed MedX dynamic exercise with emphasis on spinal muscular control using pacer throughout  active range of motion. Therapist assisted patient in achieving optimal exertion for neural reeducation and endurance training by using the  Brayden Exertion Rating scale, by instructing the patient to aim for mid range of exertion, performing 15-20 repetitions, slowly, correctly,and safely.           12/5/2024     2:19 PM   [facility]   Visit Number 12   VAS Pain Rating 3   Treadmill Time (in min.) 5 min   Lumbar Stretches - Slouch Overcorrection 10   Extension in Standing 10   Flexion in Sitting 10   Lumbar Weight 42 lbs   Repetitions 20   Rating of Perceived Exertion 4   Ice - Z Lie (in min.) 5     therapeutic exercises to develop strength, endurance, ROM, flexibility, posture, and core  "stabilization for 40 minutes including:    LTR stretch x 10  Open book x 10 with GTB  Bridging + GTB x 20  +Clamshells c/ red TB x10  EIL within comfortable ROM x 10--NP  Cat/cow stretch x 10 NP  Child's Pose x20" (bolster behind knees for comfort) NP due due to shoulder discomfort  LE bird dog x 10 ( cue level pelvis) NP due to shoulder discomfort  Seated Thoracic extension with 1/2 roll x10  SOC 2x 10 (cues for comfortable range of motion and axial extension)  Thoracic rotation thread the needle in standing x10 each side   No money GTB x20  EIS (Gentle within comfortable ROM) x 10 (Min discomfort reported)  Seated trunk flexion c/ T-ball x10 fwd / x5 side to side  Seated diaphragm breathing x5--NP  Paloff press 5# x 15     Peripheral muscle strengthening which included 1 set of 15-20 repetitions at a slow, controlled 10-13 second per rep pace focused on strengthening supporting musculature for improved body mechanics and functional mobility.  Pt and therapist focused on proper form during treatment to ensure optimal strengthening of each targeted muscle group.  Machines were utilized including torso rotation, leg press, hip abd and hip add, leg ext.  Leg curl, triceps, biceps, chest and row added visit 3    Manual therapy: Patient received STM to lower thoracic/upper lumbar paraspinals x 00 minutes    cold pack for 00 minutes to mid/lower back Patient declines)    Home Exercises Provided and Patient Education Provided   Home exercises include:open book/trunk rotation and SOC  Cardio program:10/23/24 reviewed benefits of cardio w/handout provided (Patient remains active with tennis a few days/per week).  Lifting education date:11/26/24  Posture/Lumbar roll: unknown  Fridge Magnet Discharge handout (date given):  Equipment at home/gym membership: yes    Education provided:   - cues w/ex's  MedX performance  Precor ex performance     Written Home Exercises Provided: Patient instructed to cont prior HEP.  Exercises " were reviewed and Bernadette was able to demonstrate them prior to the end of the session.  Bernadette demonstrated good  understanding of the education provided.     See EMR under Patient Instructions for exercises provided prior visit     Assessment   Bernadette returns with chronic lower thoracic/upper lumbar discomfort which is rated as 3/10 currently. Treatment continued with mobility, strengthening, and neuro re-education exercises. She reports mild lower back discomfort with end range EIS otherwise, no c/o increased symptoms. Added clamshells today.  Quadruped ex's remain deferred due to shoulder discomfort. Lumbar MedX resistance was maintained 42 ft/lbs and she completed 20 reps with RPE = 4/10 (Lumbar roll was used for comfort). She was also able to complete the peripheral strengthening exercises without increased back discomfort. Will progress treatment per HB protocol and patient's tolerance.     Patient is making progress towards established goals.  Pt will continue to benefit from skilled outpatient physical therapy to address the deficits stated in the impairment chart, provide pt/family education and to maximize pt's level of independence in the home and community environment.     Anticipated Barriers for therapy: none  Pt's spiritual, cultural and educational needs considered and pt agreeable to plan of care and goals as stated below:     GOALS: Pt is in agreement with the following goals.     Short term goals:  6 weeks or 10 visits   - Pt will demonstrate increased lumbar MedX ROM by at least 3 degrees from the initial ROM value with improvements noted in functional ROM and ability to perform ADLs. Not met  - Pt will demonstrate increased MedX average isometric strength value by 25% from initial test resulting in improved ability to perform bending, lifting, and carrying activities safely, confidently. MET  - Pt will report a reduction in worst pain score by 1-2 points for improved tolerance for recreational  activities. Appropriate and Ongoing  - Pt able to perform HEP correctly with minimal cueing or supervision from therapist to encourage independent management of symptoms. MET     Long term goals: 10 weeks or 20 visits   - Pt will demonstrate increased lumbar MedX ROM by at least 6 degrees from initial ROM value, resulting in improved ability to perform functional forward bending while standing and sitting. Appropriate and Ongoing  - Pt will demonstrate increased MedX average isometric strength value by 40% from initial test resulting in improved ability to perform bending, lifting, and carrying activities safely and confidently. Appropriate and Ongoing  - Pt to demonstrate ability to independently control and reduce their pain through posture positioning and mechanical movements throughout a typical day. Appropriate and Ongoing  - Pt will demonstrate reduced pain and improved functional outcomes as reported on the FOTO by reaching an intake score of >/= 62% functional ability in order to demonstrate subjective improvement in patient's condition. . MET 11/19/24  - Pt will demonstrate independence with the HEP at discharge. Appropriate and Ongoing  - Pt will be able to play tennis without increase in mid back pain. (patient goal) Appropriate and Ongoing     Plan   Continue with established Plan of Care towards established PT goals.     Therapist: Trice Piña, PT  12/5/2024

## 2024-12-09 ENCOUNTER — CLINICAL SUPPORT (OUTPATIENT)
Dept: REHABILITATION | Facility: HOSPITAL | Age: 74
End: 2024-12-09
Payer: MEDICARE

## 2024-12-09 ENCOUNTER — HOSPITAL ENCOUNTER (OUTPATIENT)
Dept: RADIOLOGY | Facility: HOSPITAL | Age: 74
Discharge: HOME | End: 2024-12-09
Payer: MEDICARE

## 2024-12-09 DIAGNOSIS — M54.9 CHRONIC BACK PAIN, UNSPECIFIED BACK LOCATION, UNSPECIFIED BACK PAIN LATERALITY: ICD-10-CM

## 2024-12-09 DIAGNOSIS — M25.69 DECREASED RANGE OF MOTION OF TRUNK AND BACK: Primary | ICD-10-CM

## 2024-12-09 DIAGNOSIS — G89.29 CHRONIC BACK PAIN, UNSPECIFIED BACK LOCATION, UNSPECIFIED BACK PAIN LATERALITY: ICD-10-CM

## 2024-12-09 PROCEDURE — 97112 NEUROMUSCULAR REEDUCATION: CPT | Mod: CQ

## 2024-12-09 PROCEDURE — 72148 MRI LUMBAR SPINE W/O DYE: CPT | Mod: 26,,, | Performed by: STUDENT IN AN ORGANIZED HEALTH CARE EDUCATION/TRAINING PROGRAM

## 2024-12-09 PROCEDURE — 72146 MRI CHEST SPINE W/O DYE: CPT | Mod: TC

## 2024-12-09 PROCEDURE — 72146 MRI CHEST SPINE W/O DYE: CPT | Mod: 26,,, | Performed by: STUDENT IN AN ORGANIZED HEALTH CARE EDUCATION/TRAINING PROGRAM

## 2024-12-09 PROCEDURE — 72148 MRI LUMBAR SPINE W/O DYE: CPT | Mod: TC

## 2024-12-09 PROCEDURE — 97110 THERAPEUTIC EXERCISES: CPT | Mod: CQ

## 2024-12-09 NOTE — PROGRESS NOTES
[facility]      Name: Bernadette Aquino  Clinic Number: [id number]    Therapy Diagnosis:   Encounter Diagnosis   Name Primary?    Decreased range of motion of trunk and back Yes     Physician: Tasneem Elizalde PA-C    Visit Date: 12/9/2024    Physician Orders: PT Eval and Treat  Medical Diagnosis from Referral: M54.9 (ICD-10-CM) - Back pain, unspecified back location, unspecified back pain laterality, unspecified chronicity  Evaluation Date: 9/17/2024  Authorization Period Expiration: 7/29/2025  Plan of Care Expiration: 2/7/2024   Reassessment Due: 12/18/2024    Visit # / Visits authorized: 13/20 elevate HOB (lumbar roll on MedX machine)  MedX testing visit 2     Time In: 2:30 PM  Time Out: 3:25 PM  Total Billable Time:  50 minutes    INSURANCE and OUTCOMES: Fee for Service with FOTO Outcomes 1/3     Precautions: Standard, HTN, Osteopenia, Hx of R RTC repair      Pattern of pain determined: 1 PEN    Subjective   Bernadette reports minimal mid/lower back pain rated 3/10 today. States that she completed her MRI this morning but does not have the results.     Patient reports tolerating previous visit Muscular soreness  Patient reports their pain to be 3/10 on a 0-10 scale with 0 being no pain and 10 being the worst pain imaginable.  Pain Location: mid to lower thoracic/upper lumbar      Prior Therapy: yes, HB for neck  Prior Treatment: HB for neck, injections  Social History:  lives with their spouse  Occupation: retired  Leisure: plays tennis, goes to gym      Prior Level of Function: independent  Current Level of Function: independent  DME owned/used: stationary bike  Gym Membership: yes, [employer]    Pt goals: stretch and decrease pain     Objective     MOVEMENT LOSS - Lumbar    Norms ROM Loss Initial 10/21/24 11/19/24   Flexion Fingers touch toes, sacral angle >/= 70 deg, uniform spinal curvature, posterior weight shift  within functional limits and poor curve reversal WFL, poor curve  reversal WFL, decreased curve reversal   Extension ASIS surpasses toes, spine of scapulae surpasses heels, uniform spinal curve within functional limits and painful WFL, painful WFL   Side glide Right   minimal loss and painful Min loss, painful Min loss   Side glide Left   within functional limits Min loss, painful Min loss   Rotation Right PT observes contralateral shoulder moderate loss Moderate loss Min loss   Rotation Left PT observes contralateral shoulder moderate loss Moderate loss Min loss     Lumbar IM Testing Results:    Initial test 10/9/24 Mid point test 11/19/2024   ROM 0-51 deg 0-51 deg   Max Peak Torque 73 101   Min Peak Torque 24  44   Flex/Ext Ratio 3:1 2.5:1   % below normative data 51 23%   Strength gain since initial  62%        Outcomes:  Initial score: 56%  Visit 6 score: 57%  Visit 10 score: 62% functional ability  Goal: 62%    Treatment    Bernadette received the treatments listed below:      Bernadette received neuromuscular education  to isolate and engage spinal stabilization musculature correctly for motor control and coordination to aid in function and posture for 10 minutes on the Five9 Machine.  Patient performed MedX dynamic exercise with emphasis on spinal muscular control using pacer throughout  active range of motion. Therapist assisted patient in achieving optimal exertion for neural reeducation and endurance training by using the  Brayden Exertion Rating scale, by instructing the patient to aim for mid range of exertion, performing 15-20 repetitions, slowly, correctly,and safely.           12/9/2024     2:48 PM   [facility]   Visit Number 13   VAS Pain Rating 3   Treadmill Time (in min.) 5 min   Lumbar Stretches - Slouch 10   Extension in Standing 10   Flexion in Sitting 10   Lumbar Weight 45 lbs   Repetitions 15   Rating of Perceived Exertion 4      therapeutic exercises to develop strength, endurance, ROM, flexibility, posture, and core stabilization for 40 minutes  "including:    LTR stretch x 10  Open book x 10 with GTB  Bridging + GTB x 20  Clamshells c/ GTBx15  EIL within comfortable ROM x 10--NP  Cat/cow stretch x 10 NP  Child's Pose x20" (bolster behind knees for comfort) NP due due to shoulder discomfort  LE bird dog x 10 ( cue level pelvis) NP due to shoulder discomfort  Seated Thoracic extension with 1/2 roll x10  SOC 2x 10 (cues for comfortable range of motion and axial extension)  Thoracic rotation thread the needle in standing x10 each side   No money GTB x20  EIS (Gentle within comfortable ROM) x 10    Seated trunk flexion c/ T-ball x10 fwd / x5 side to side  Seated diaphragm breathing x5--NP  Paloff press 10# x 10    Peripheral muscle strengthening which included 1 set of 15-20 repetitions at a slow, controlled 10-13 second per rep pace focused on strengthening supporting musculature for improved body mechanics and functional mobility.  Pt and therapist focused on proper form during treatment to ensure optimal strengthening of each targeted muscle group.  Machines were utilized including torso rotation, leg press, hip abd and hip add, leg ext.  Leg curl, triceps, biceps, chest and row added visit 3    Manual therapy: Patient received STM to lower thoracic/upper lumbar paraspinals x 00 minutes    cold pack for 5 minutes to mid/lower back      Home Exercises Provided and Patient Education Provided   Home exercises include:open book/trunk rotation and SOC  Cardio program:10/23/24 reviewed benefits of cardio w/handout provided (Patient remains active with tennis a few days/per week).  Lifting education date:11/26/24  Posture/Lumbar roll: unknown  Fridge Magnet Discharge handout (date given):  Equipment at home/gym membership: yes    Education provided:   - cues w/ex's  MedX performance  Precor ex performance     Written Home Exercises Provided: Patient instructed to cont prior HEP.  Exercises were reviewed and Bernadette was able to demonstrate them prior to the end of the " session.  Bernadette demonstrated good  understanding of the education provided.     See EMR under Patient Instructions for exercises provided prior visit     Assessment   Bernadette returns with chronic lower thoracic/upper lumbar discomfort which is rated as 3/10 currently. Treatment continued with mobility, strengthening, and neuro re-education exercises. She was progressed to GTB resistance with clamshells and also increased resistance for Paloff press ex. She was able to perform ex's without increased symptoms. Lumbar MedX resistance was increased to 45 ft/lbs and she completed 20 reps with RPE = 4/10 (Lumbar roll was used for comfort). She was also able to complete the peripheral strengthening exercises without increased back discomfort (Min knee discomfort with precor leg press--try shuttle next, min  (L) shoulder discomfort with precor tricep-will use Inspire machine next visit).  Will progress treatment per HB protocol and patient's tolerance.     Patient is making progress towards established goals.  Pt will continue to benefit from skilled outpatient physical therapy to address the deficits stated in the impairment chart, provide pt/family education and to maximize pt's level of independence in the home and community environment.     Anticipated Barriers for therapy: none  Pt's spiritual, cultural and educational needs considered and pt agreeable to plan of care and goals as stated below:     GOALS: Pt is in agreement with the following goals.     Short term goals:  6 weeks or 10 visits   - Pt will demonstrate increased lumbar MedX ROM by at least 3 degrees from the initial ROM value with improvements noted in functional ROM and ability to perform ADLs. Not met  - Pt will demonstrate increased MedX average isometric strength value by 25% from initial test resulting in improved ability to perform bending, lifting, and carrying activities safely, confidently. MET  - Pt will report a reduction in worst pain score by  1-2 points for improved tolerance for recreational activities. Appropriate and Ongoing  - Pt able to perform HEP correctly with minimal cueing or supervision from therapist to encourage independent management of symptoms. MET     Long term goals: 10 weeks or 20 visits   - Pt will demonstrate increased lumbar MedX ROM by at least 6 degrees from initial ROM value, resulting in improved ability to perform functional forward bending while standing and sitting. Appropriate and Ongoing  - Pt will demonstrate increased MedX average isometric strength value by 40% from initial test resulting in improved ability to perform bending, lifting, and carrying activities safely and confidently. Appropriate and Ongoing  - Pt to demonstrate ability to independently control and reduce their pain through posture positioning and mechanical movements throughout a typical day. Appropriate and Ongoing  - Pt will demonstrate reduced pain and improved functional outcomes as reported on the FOTO by reaching an intake score of >/= 62% functional ability in order to demonstrate subjective improvement in patient's condition. . MET 11/19/24  - Pt will demonstrate independence with the HEP at discharge. Appropriate and Ongoing  - Pt will be able to play tennis without increase in mid back pain. (patient goal) Appropriate and Ongoing     Plan   Continue with established Plan of Care towards established PT goals.     Therapist: Noe Mckeon, PTA  12/9/2024

## 2024-12-10 ENCOUNTER — HOSPITAL ENCOUNTER (OUTPATIENT)
Dept: RADIOLOGY | Facility: HOSPITAL | Age: 74
Discharge: HOME | End: 2024-12-10
Attending: PHYSICIAN ASSISTANT
Payer: MEDICARE

## 2024-12-10 ENCOUNTER — OFFICE VISIT (OUTPATIENT)
Dept: SPORTS MEDICINE | Facility: CLINIC | Age: 74
End: 2024-12-10
Payer: MEDICARE

## 2024-12-10 VITALS
DIASTOLIC BLOOD PRESSURE: 77 MMHG | HEIGHT: 63 IN | SYSTOLIC BLOOD PRESSURE: 118 MMHG | HEART RATE: 86 BPM | BODY MASS INDEX: 23.63 KG/M2 | WEIGHT: 133.38 LBS

## 2024-12-10 DIAGNOSIS — M25.561 RIGHT KNEE PAIN, UNSPECIFIED CHRONICITY: ICD-10-CM

## 2024-12-10 DIAGNOSIS — M17.0 PRIMARY OSTEOARTHRITIS OF BOTH KNEES: Primary | ICD-10-CM

## 2024-12-10 DIAGNOSIS — M21.162 GENU VARUM OF BOTH LOWER EXTREMITIES: ICD-10-CM

## 2024-12-10 DIAGNOSIS — M21.161 GENU VARUM OF BOTH LOWER EXTREMITIES: ICD-10-CM

## 2024-12-10 PROCEDURE — 99214 OFFICE O/P EST MOD 30 MIN: CPT | Mod: S$GLB,,, | Performed by: PHYSICIAN ASSISTANT

## 2024-12-10 PROCEDURE — 99999 PR PBB SHADOW E&M-EST. PATIENT-LVL III: CPT | Mod: PBBFAC,,, | Performed by: PHYSICIAN ASSISTANT

## 2024-12-10 PROCEDURE — 3008F BODY MASS INDEX DOCD: CPT | Mod: CPTII,S$GLB,, | Performed by: PHYSICIAN ASSISTANT

## 2024-12-10 PROCEDURE — 1159F MED LIST DOCD IN RCRD: CPT | Mod: CPTII,S$GLB,, | Performed by: PHYSICIAN ASSISTANT

## 2024-12-10 PROCEDURE — 3288F FALL RISK ASSESSMENT DOCD: CPT | Mod: CPTII,S$GLB,, | Performed by: PHYSICIAN ASSISTANT

## 2024-12-10 PROCEDURE — 1160F RVW MEDS BY RX/DR IN RCRD: CPT | Mod: CPTII,S$GLB,, | Performed by: PHYSICIAN ASSISTANT

## 2024-12-10 PROCEDURE — 3044F HG A1C LEVEL LT 7.0%: CPT | Mod: CPTII,S$GLB,, | Performed by: PHYSICIAN ASSISTANT

## 2024-12-10 PROCEDURE — 1101F PT FALLS ASSESS-DOCD LE1/YR: CPT | Mod: CPTII,S$GLB,, | Performed by: PHYSICIAN ASSISTANT

## 2024-12-10 PROCEDURE — 73564 X-RAY EXAM KNEE 4 OR MORE: CPT | Mod: TC,50

## 2024-12-10 PROCEDURE — 3074F SYST BP LT 130 MM HG: CPT | Mod: CPTII,S$GLB,, | Performed by: PHYSICIAN ASSISTANT

## 2024-12-10 PROCEDURE — 1126F AMNT PAIN NOTED NONE PRSNT: CPT | Mod: CPTII,S$GLB,, | Performed by: PHYSICIAN ASSISTANT

## 2024-12-10 PROCEDURE — 3078F DIAST BP <80 MM HG: CPT | Mod: CPTII,S$GLB,, | Performed by: PHYSICIAN ASSISTANT

## 2024-12-10 PROCEDURE — 1157F ADVNC CARE PLAN IN RCRD: CPT | Mod: CPTII,S$GLB,, | Performed by: PHYSICIAN ASSISTANT

## 2024-12-10 PROCEDURE — 73564 X-RAY EXAM KNEE 4 OR MORE: CPT | Mod: 26,50,, | Performed by: RADIOLOGY

## 2024-12-10 NOTE — PROGRESS NOTES
Subjective:          Chief Complaint: Bernadette Aquino is a 74 y.o. female who had concerns including Pain of the Right Knee.  History of Present Illness    CHIEF COMPLAINT:  - Bernadette presents today for follow-up evaluation of bilateral knee pain. This appears to be a routine follow-up visit for ongoing knee issues.    HPI:  Bernadette returns for a follow-up visit for knee pain. She reports bilateral knee pain. She continues to play tennis, specifically doubles, and uses a sleeve-type knee brace during play. She feels more comfortable wearing the brace, particularly on the right knee, even during sleep. She notes pain in her whole body, attributing it to age. No new injuries or pains are reported, with the current pain described as similar to previous experiences.    She received bilateral knee injections in July 2022, approximately a year ago. These injections were effective, lasting for an extended period. She remains active, playing tennis regularly despite knee issues.    PREVIOUS TREATMENTS:  - Bilateral knee arthritis injections given by Dr. Altman in July 2022, which provided significant benefit for over a year  - Cortisone injections in both knees at an unspecified time in the past  - Subchondroplasty in the left knee at an unspecified time in the past, which has been helping to prolong pain relief  - Bernadette uses a knee brace (sleeve type) on the right knee when playing tennis, which provides some comfort    SURGICAL HISTORY:  - Subchondroplasty in the left knee      ROS:  General: negative fever, negative chills, negative fatigue, negative weight gain, negative weight loss  Eyes: negative vision changes, negative redness, negative discharge  ENT: negative ear pain, negative nasal congestion, negative sore throat  Cardiovascular: negative chest pain, negative palpitations, negative lower extremity edema  Respiratory: negative cough, negative shortness of breath  Gastrointestinal: negative abdominal pain, negative  nausea, negative vomiting, negative diarrhea, negative constipation, negative blood in stool  Genitourinary: negative dysuria, negative hematuria, negative frequency  Musculoskeletal: +joint pain, negative muscle pain  Skin: negative rash, negative lesion  Neurological: negative headache, negative dizziness, negative numbness, negative tingling  Psychiatric: negative anxiety, negative depression, negative sleep difficulty         Interval Hx: Patient presents for 2 year follow-up of bilateral knees (R>L). Reports symptoms are worsening with recently. Aching 8/10 pain right knee and 4/10 pain left knee. She received right knee Zilretta injection October 2020 with Shalonda Altman MD with significant relief until recently. Denies new injury or trauma.  Patient presents to discuss continued treatment options. She continues to play tennis for activity, but would like to break for a while.    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 LEFT  Femoral Subchondroplasty, 02829  2.  LEFT  Tibial Subchondroplasty, 87500  3.  LEFT  Arthroscopy, with meniscectomy (medial OR lateral) 02137  4LEFT  Arthroscopy, debridement/shaving of articular cartilage (chondroplasty) 72992  5. LEFT  Arthroscopy, knee, synovectomy, limited 76861      DATE OF PROCEDURE: 12/11/2015     ATTENDING SURGEON: Surgeon(s) and Role:     * Shalonda Altman MD -  Primary     * La Tang DO - Fellow     * Laquita Vail PA-C - assistant     PREOPERATIVE DIAGNOSIS:    Pre-operative Diagnosis: Right shoulder   Tear, biceps tendon, non-traumatic M66.829 Rotator Cuff Syndrome/Disorder  M75.100, Tear, Biceps Tendon, Non-Tramatic M66.829, Tear, Rotator Cuff, Tramatic S46.012A, S46.011A and Labral tear and chondromalacia     Post-operative Diagnosis:  Right shoulder   Tear, biceps tendon, non-traumatic M66.829 Rotator Cuff Syndrome/Disorder  M75.100, Tear, Rotator Cuff, Tramatic S46.012A, S46.011A and Labral Tear, Chondromalacia Shoulder joint      Procedures Performed:  1. Right shoulder Arthroscopic rotator cuff repair CPT - 79125, COMPLEX     2. Right shoulder Biceps tenodesis CPT - 65625, COMPLEX     3. Right shoulder Arthroscopic labral debridement CPT - 98780     4. Right shoulder Arthroscopic chondroplasty    Pain  Associated symptoms include joint swelling. Pertinent negatives include no chest pain, fever, numbness or rash.       Pain Related Questions  Over the past 3 days, what was your average pain during activity? (I.e. running, jogging, walking, climbing stairs, getting dressed, ect.): 2  Over the past 3 days, what was your highest pain level?: 2  Over the past 3 days, what was your lowest pain level? : 0    Other  Was the patient's HEIGHT measured or patient reported?: Patient Reported  Was the patient's WEIGHT measured or patient reported?: Measured      Objective:        General: Bernadette is well-developed, well-nourished, appears stated age, in no acute distress, alert and oriented to time, place and person.     General    Vitals reviewed.  Constitutional: She is oriented to person, place, and time. She appears well-developed and well-nourished. No distress.   HENT: Mouth/Throat: No oropharyngeal exudate.   Eyes: Right eye exhibits no discharge. Left eye exhibits no discharge.   Pulmonary/Chest: Effort normal and breath sounds normal. No respiratory distress.    Neurological: She is alert and oriented to person, place, and time. She has normal reflexes. No cranial nerve deficit. Coordination normal.   Psychiatric: She has a normal mood and affect. Her behavior is normal. Judgment and thought content normal.     General Musculoskeletal Exam   Gait: normal       Right Knee Exam     Inspection   Erythema: absent  Scars: present  Swelling: absent  Effusion: absent  Deformity: absent  Bruising: absent    Tenderness   The patient is tender to palpation of the medial joint line and patella.    Crepitus   The patient has crepitus of the patella.    Range of Motion   Extension:  10 abnormal   Flexion:  abnormal Right knee flexion: 125.    Tests   Meniscus   Neeta:  Medial - negative Lateral - negative  Ligament Examination   Lachman: normal (-1 to 2mm)   PCL-Posterior Drawer: normal (0 to 2mm)     MCL - Valgus: normal (0 to 2mm)  LCL - Varus: normal  Pivot Shift: normal (Equal)  Reverse Pivot Shift: normal (Equal)  Dial Test at 30 degrees: normal (< 5 degrees)  Dial Test at 90 degrees: normal (< 5 degrees)  Posterior Sag Test: negative  Posterolateral Corner: stable  Patella   Patellar apprehension: negative  Passive Patellar Tilt: neutral  Patellar Tracking: normal  Patellar Glide (quadrants): Lateral - 1   Medial - 2  Q-Angle at 90 degrees: normal  Patellar Grind: positive  J-Sign: none    Other   Meniscal Cyst: absent  Popliteal (Baker's) Cyst: absent  Sensation: normal  Apley Grind Test: negative  Home Bounce Test: negative    Left Knee Exam     Inspection   Erythema: absent  Scars: present  Swelling: absent  Effusion: absent  Deformity: absent  Bruising: absent    Tenderness   The patient tender to palpation of the medial joint line.    Range of Motion   Extension:  5 abnormal   Flexion:  130 abnormal     Tests   Meniscus   Neeta:  Medial - negative Lateral - negative  Stability   Lachman: normal (-1 to 2mm)   PCL-Posterior Drawer: normal (0 to 2mm)  MCL - Valgus:  normal (0 to 2mm)  LCL - Varus: normal (0 to 2mm)  Pivot Shift: normal (Equal)  Reverse Pivot Shift: normal (Equal)  Dial Test at 30 degrees: normal (< 5 degrees)  Dial Test at 90 degrees: normal (< 5 degrees)  Posterior Sag Test: negative  Posterolateral Corner: stable  Patella   Patellar apprehension: negative  Passive Patellar Tilt: neutral  Patellar Tracking: normal  Patellar Glide (Quadrants): Lateral - 1 Medial - 2  Q-Angle at 90 degrees: normal  Patellar Grind: negative  J-Sign: J sign absent    Other   Meniscal Cyst: absent  Popliteal (Baker's) Cyst: absent  Sensation: normal  Apley Grind Test: negative  Home Bounce Test: negative    Right Hip Exam     Tests   Jorden: negative  Left Hip Exam     Tests   Jorden: negative          Muscle Strength   Right Lower Extremity   Hip Abduction: 5/5   Quadriceps:  4/5   Hamstrin/5   Left Lower Extremity   Hip Abduction: 5/5   Quadriceps:  4/5   Hamstrin/5     Reflexes     Left Side  Achilles:  2+  Quadriceps:  2+    Right Side   Achilles:  2+  Quadriceps:  2+    Vascular Exam     Right Pulses  Dorsalis Pedis:      2+  Posterior Tibial:      2+        Left Pulses  Dorsalis Pedis:      2+  Posterior Tibial:      2+        Radiographic Findings 22    Diffuse osteopenia.  No acute fracture or dislocation.  Small corticated bone fragment adjacent to the right medial tibial plateau, likely degenerative.  Unchanged ill-defined sclerosis in the left medial femoral condyle and medial tibial plateau, likely postoperative from prior subchondroplasty.  Tricompartmental degenerative changes in both knees, most severe in the medial compartments bilaterally, overall progressed from 2019.  No significant joint effusions.     Impression:     Progressive tricompartmental osteoarthritis in both knees as described.    Kellgren-Cheo Classification: 3    Xrays of the knees were ordered and reviewed by me today. These findings were discussed and reviewed with the  patient.          Assessment:       Encounter Diagnoses   Name Primary?    Primary osteoarthritis of both knees Yes    Genu varum of both lower extremities     Right knee pain, unspecified chronicity           Plan:       1. IKDC, SF-12 and KOOS was filled out today in clinic.     RTC in 5 months with Mid-level provider Patient will fill out IKDC, SF-12 and KOOS on return.    2. Medications: Refills of the following Rx were sent to patients preferred Pharmacy:  No Refills Needed Today    3. Physical Therapy:     4. HEP: N/A    5. Procedures/Procedural Planning:   We have discussed a variety of treatment options including medications, injections, physical therapy and other alternative treatments. I also explained the indications, risks and benefits of surgery.  Patient's pain is refractory to long-term use of PO/topical NSAIDs, physical therapy 6+ weeks, aerobic exercise, weightloss, walking aids, visco-supplementation, multiple corticosteroid injections, and he current treatment is the only effective treatment that relieved the patient's pain.  Recommending to continue Zilretta injection at this time.  Also recommending to continue HEP.  Patient agrees with treatment plan.    Pre authorization placed for left knee Zilretta injection.      6. DME: [id number] - Quin Kramer, performed a custom orthotic / brace adjustment, fitting and training with the patient. The patient demonstrated understanding and proper care. This was performed for 15 minutes.    7. Work/Sport Status: retired, doubles tennis     8. Visit Summary: Patient to return to clinic in 2 weeks with PA for Delanolretta.          [facility] patient questionnaires have been collected today.

## 2024-12-11 ENCOUNTER — CLINICAL SUPPORT (OUTPATIENT)
Dept: REHABILITATION | Facility: HOSPITAL | Age: 74
End: 2024-12-11
Attending: PHYSICAL MEDICINE & REHABILITATION
Payer: MEDICARE

## 2024-12-11 ENCOUNTER — TELEPHONE (OUTPATIENT)
Dept: INTERVENTIONAL RADIOLOGY/VASCULAR | Facility: CLINIC | Age: 74
End: 2024-12-11
Payer: MEDICARE

## 2024-12-11 DIAGNOSIS — M25.69 DECREASED RANGE OF MOTION OF TRUNK AND BACK: Primary | ICD-10-CM

## 2024-12-11 PROCEDURE — 97112 NEUROMUSCULAR REEDUCATION: CPT | Mod: KX | Performed by: PHYSICAL MEDICINE & REHABILITATION

## 2024-12-11 PROCEDURE — 97110 THERAPEUTIC EXERCISES: CPT | Mod: KX | Performed by: PHYSICAL MEDICINE & REHABILITATION

## 2024-12-11 NOTE — PROGRESS NOTES
[facility]      Name: Bernadette Aquino  Clinic Number: [id number]    Therapy Diagnosis:   Encounter Diagnosis   Name Primary?    Decreased range of motion of trunk and back Yes       Physician: Tasneem Elizalde PA-C    Visit Date: 12/11/2024    Physician Orders: PT Eval and Treat  Medical Diagnosis from Referral: M54.9 (ICD-10-CM) - Back pain, unspecified back location, unspecified back pain laterality, unspecified chronicity  Evaluation Date: 9/17/2024  Authorization Period Expiration: 7/29/2025  Plan of Care Expiration: 2/7/2024   Reassessment Due: 1/11/25   Visit # / Visits authorized: 14/20 elevate HOB (lumbar roll on MedX machine)  MedX testing visit 2     Time In: 2:40 PM (patient late)  Time Out: 3:40 PM  Total Billable Time:  60 minutes    INSURANCE and OUTCOMES: Fee for Service with FOTO Outcomes 1/3     Precautions: Standard, HTN, Osteopenia, Hx of R RTC repair      Pattern of pain determined: 1 PEN    Subjective   Bernadette reports minimal mid/lower back pain rated 3/10 today.  She has been having some dizziness and plans to see ENT.      Patient reports tolerating previous visit Muscular soreness  Patient reports their pain to be 3/10 on a 0-10 scale with 0 being no pain and 10 being the worst pain imaginable.  Pain Location: mid to lower thoracic/upper lumbar      Prior Therapy: yes, HB for neck  Prior Treatment: HB for neck, injections  Social History:  lives with their spouse  Occupation: retired  Leisure: plays tennis, goes to gym      Prior Level of Function: independent  Current Level of Function: independent  DME owned/used: stationary bike  Gym Membership: yes, [employer]    Pt goals: stretch and decrease pain     Objective     MOVEMENT LOSS - Lumbar    Norms ROM Loss Initial 10/21/24 12/11/24   Flexion Fingers touch toes, sacral angle >/= 70 deg, uniform spinal curvature, posterior weight shift  within functional limits and poor curve reversal WFL, poor curve reversal  WFL, decreased curve reversal   Extension ASIS surpasses toes, spine of scapulae surpasses heels, uniform spinal curve within functional limits and painful WFL, painful WFL   Side glide Right   minimal loss and painful Min loss, painful Min loss   Side glide Left   within functional limits Min loss, painful Min loss   Rotation Right PT observes contralateral shoulder moderate loss Moderate loss Min loss   Rotation Left PT observes contralateral shoulder moderate loss Moderate loss Min loss     Lumbar IM Testing Results:    Initial test 10/9/24 Mid point test 11/19/2024   ROM 0-51 deg 0-51 deg   Max Peak Torque 73 101   Min Peak Torque 24  44   Flex/Ext Ratio 3:1 2.5:1   % below normative data 51 23%   Strength gain since initial  62%        Outcomes:  Initial score: 56%  Visit 6 score: 57%  Visit 10 score: 62% functional ability  Goal: 62%    Treatment    Bernadette received the treatments listed below:      Bernadette received neuromuscular education  to isolate and engage spinal stabilization musculature correctly for motor control and coordination to aid in function and posture for 10 minutes on the [employer] Machine.  Patient performed MedX dynamic exercise with emphasis on spinal muscular control using pacer throughout  active range of motion. Therapist assisted patient in achieving optimal exertion for neural reeducation and endurance training by using the  Brayden Exertion Rating scale, by instructing the patient to aim for mid range of exertion, performing 15-20 repetitions, slowly, correctly,and safely.           12/11/2024     3:14 PM   [facility]   Visit Number 14   VAS Pain Rating 3   Treadmill Time (in min.) 5 min   Lumbar Stretches - Slouch Overcorrection 10   Extension in Standing 10   Flexion in Sitting 10   Lumbar Weight 45 lbs   Repetitions 18   Rating of Perceived Exertion 4   Ice - Z Lie (in min.) 5           therapeutic exercises to develop strength, endurance, ROM, flexibility, posture, and core  "stabilization for 50 minutes including:    LTR stretch x 10  Open book x 10 with BTB  Bridging + BTB x 20  Clamshells c/ BTBx15  EIL within comfortable ROM x 10--NP  Cat/cow stretch x 10 NP  Child's Pose x20" (bolster behind knees for comfort) NP due due to shoulder discomfort  LE bird dog x 10 ( cue level pelvis) NP due to shoulder discomfort  Seated Thoracic extension with 1/2 roll x10  SOC 2x 10 (cues for comfortable range of motion and axial extension)  Thoracic rotation thread the needle in standing x10 each side   No money GTB x20  EIS (Gentle within comfortable ROM) x 10    Seated trunk flexion c/ T-ball x10 fwd / x5 side to side  Seated diaphragm breathing x5--NP  Paloff press 10# x 10  March with pull down 10# x 10    Peripheral muscle strengthening which included 1 set of 15-20 repetitions at a slow, controlled 10-13 second per rep pace focused on strengthening supporting musculature for improved body mechanics and functional mobility.  Pt and therapist focused on proper form during treatment to ensure optimal strengthening of each targeted muscle group.  Machines were utilized including torso rotation, leg press, hip abd and hip add, leg ext.  Leg curl, triceps, biceps,  and row     Manual therapy: Patient received STM to lower thoracic/upper lumbar paraspinals x 00 minutes    cold pack for 5 minutes to mid/lower back      Home Exercises Provided and Patient Education Provided   Home exercises include:open book/trunk rotation and SOC  Cardio program:10/23/24 reviewed benefits of cardio w/handout provided (Patient remains active with tennis a few days/per week).  Lifting education date:11/26/24  Posture/Lumbar roll: unknown  Fridge Magnet Discharge handout (date given):  Equipment at home/gym membership: yes    Education provided:   - cues w/ex's  MedX performance  Precor ex performance     Written Home Exercises Provided: Patient instructed to cont prior HEP.  Exercises were reviewed and Bernadette was able to " demonstrate them prior to the end of the session.  Bernadette demonstrated good  understanding of the education provided.     See EMR under Patient Instructions for exercises provided prior visit     Assessment   Bernadette returns with chronic lower thoracic/upper lumbar discomfort which is rated as 3/10 currently. Treatment continued with mobility, strengthening, and neuro re-education exercises. She was progressed to BTB resistance with clamshells and also marching with pull downs at cable column.   She was able to perform ex's without increased symptoms. Lumbar MedX resistance was maintained at 45 ft/lbs and she completed 18 reps with RPE = 4/10 (Lumbar roll was used for comfort). She was also able to complete the peripheral strengthening exercises without increased back discomfort.  Utilized leg press with reduced weight and cuing not to go to full extension, patient found lower weight easy but managed without discomfort.  She needed cuing to slow down on multiple machines and LE weight had to be lowered when she slowed down.     tricep-with  Inspire which was well tolerated.    Will progress treatment per HB protocol and patient's tolerance.     Patient is making progress towards established goals.  Pt will continue to benefit from skilled outpatient physical therapy to address the deficits stated in the impairment chart, provide pt/family education and to maximize pt's level of independence in the home and community environment.     Anticipated Barriers for therapy: none  Pt's spiritual, cultural and educational needs considered and pt agreeable to plan of care and goals as stated below:     GOALS: Pt is in agreement with the following goals.     Short term goals:  6 weeks or 10 visits   - Pt will demonstrate increased lumbar MedX ROM by at least 3 degrees from the initial ROM value with improvements noted in functional ROM and ability to perform ADLs. Not met  - Pt will demonstrate increased MedX average isometric  strength value by 25% from initial test resulting in improved ability to perform bending, lifting, and carrying activities safely, confidently. MET  - Pt will report a reduction in worst pain score by 1-2 points for improved tolerance for recreational activities. Appropriate and Ongoing  - Pt able to perform HEP correctly with minimal cueing or supervision from therapist to encourage independent management of symptoms. MET     Long term goals: 10 weeks or 20 visits   - Pt will demonstrate increased lumbar MedX ROM by at least 6 degrees from initial ROM value, resulting in improved ability to perform functional forward bending while standing and sitting. Appropriate and Ongoing  - Pt will demonstrate increased MedX average isometric strength value by 40% from initial test resulting in improved ability to perform bending, lifting, and carrying activities safely and confidently. Appropriate and Ongoing  - Pt to demonstrate ability to independently control and reduce their pain through posture positioning and mechanical movements throughout a typical day. Appropriate and Ongoing  - Pt will demonstrate reduced pain and improved functional outcomes as reported on the FOTO by reaching an intake score of >/= 62% functional ability in order to demonstrate subjective improvement in patient's condition. . MET 11/19/24  - Pt will demonstrate independence with the HEP at discharge. Appropriate and Ongoing  - Pt will be able to play tennis without increase in mid back pain. (patient goal) Appropriate and Ongoing     Plan   Continue with established Plan of Care towards established PT goals.     Therapist: Eulalia Montanez PT  12/11/2024

## 2024-12-11 NOTE — TELEPHONE ENCOUNTER
LVM discussing imaging findings with patient. Imaging reviewed by Dr. Engle. Generalized degenerative changes, nothing that looks acute. No fracture. Nothing to target from a interventional standpoint.     Continue conservative therapies.     Tasneem Elizalde PA-C  Interventional Radiology  [phone]

## 2024-12-26 ENCOUNTER — CLINICAL SUPPORT (OUTPATIENT)
Dept: REHABILITATION | Facility: HOSPITAL | Age: 74
End: 2024-12-26
Payer: MEDICARE

## 2024-12-26 DIAGNOSIS — M25.69 DECREASED RANGE OF MOTION OF TRUNK AND BACK: Primary | ICD-10-CM

## 2024-12-26 PROCEDURE — 97112 NEUROMUSCULAR REEDUCATION: CPT

## 2024-12-26 PROCEDURE — 97110 THERAPEUTIC EXERCISES: CPT

## 2024-12-26 NOTE — PROGRESS NOTES
[facility]      Name: Bernadette Aquino  Clinic Number: [id number]    Therapy Diagnosis:   Encounter Diagnosis   Name Primary?    Decreased range of motion of trunk and back Yes       Physician: Tasneem Elizalde PA-C    Visit Date: 12/26/2024    Physician Orders: PT Eval and Treat  Medical Diagnosis from Referral: M54.9 (ICD-10-CM) - Back pain, unspecified back location, unspecified back pain laterality, unspecified chronicity  Evaluation Date: 9/17/2024  Authorization Period Expiration: 7/29/2025  Plan of Care Expiration: 2/7/2024   Reassessment Due: 1/11/25   Visit # / Visits authorized: 15/20 elevate HOB (lumbar roll on MedX machine)  MedX testing visit 2     Time In: 2:00 PM  Time Out: 3:05 PM  Total Billable Time:  30 minutes 1:1    INSURANCE and OUTCOMES: Fee for Service with FOTO Outcomes 1/3     Precautions: Standard, HTN, Osteopenia, Hx of R RTC repair      Pattern of pain determined: 1 PEN    Subjective   Bernadette reports minimal mid/lower back pain rated 3/10 today.  She reports dizziness is much improved after Epley maneuver last session.  She was sick and had to cancel a few visits.     Patient reports tolerating previous visit Muscular soreness  Patient reports their pain to be 3/10 on a 0-10 scale with 0 being no pain and 10 being the worst pain imaginable.  Pain Location: mid to lower thoracic/upper lumbar      Prior Therapy: yes, HB for neck  Prior Treatment: HB for neck, injections  Social History:  lives with their spouse  Occupation: retired  Leisure: plays tennis, goes to gym      Prior Level of Function: independent  Current Level of Function: independent  DME owned/used: stationary bike  Gym Membership: yes, [place]    Pt goals: stretch and decrease pain     Objective     MOVEMENT LOSS - Lumbar    Norms ROM Loss Initial 10/21/24 12/11/24   Flexion Fingers touch toes, sacral angle >/= 70 deg, uniform spinal curvature, posterior weight shift  within functional  limits and poor curve reversal WFL, poor curve reversal WFL, decreased curve reversal   Extension ASIS surpasses toes, spine of scapulae surpasses heels, uniform spinal curve within functional limits and painful WFL, painful WFL   Side glide Right   minimal loss and painful Min loss, painful Min loss   Side glide Left   within functional limits Min loss, painful Min loss   Rotation Right PT observes contralateral shoulder moderate loss Moderate loss Min loss   Rotation Left PT observes contralateral shoulder moderate loss Moderate loss Min loss     Lumbar IM Testing Results:    Initial test 10/9/24 Mid point test 11/19/2024   ROM 0-51 deg 0-51 deg   Max Peak Torque 73 101   Min Peak Torque 24  44   Flex/Ext Ratio 3:1 2.5:1   % below normative data 51 23%   Strength gain since initial  62%        Outcomes:  Initial score: 56%  Visit 6 score: 57%  Visit 10 score: 62% functional ability  Goal: 62%      POSITIONAL CANAL TESTING  Looking for nystagmus (slow drift to affected side with quick correction away)    Jos[place] Hallpike (posterior / CL anterior)   Right : no nystagmus noted, mild symptoms when returning to sitting position   Left: negative  Treatment Performed: Epley maneuver for right posterior canal x 1     Treatment    Bernadette received the treatments listed below:      Bernadette received neuromuscular education  to isolate and engage spinal stabilization musculature correctly for motor control and coordination to aid in function and posture for 10 minutes on the Medical Medx Machine.  Patient performed MedX dynamic exercise with emphasis on spinal muscular control using pacer throughout  active range of motion. Therapist assisted patient in achieving optimal exertion for neural reeducation and endurance training by using the  Brayden Exertion Rating scale, by instructing the patient to aim for mid range of exertion, performing 15-20 repetitions, slowly, correctly,and safely.           12/11/2024     3:14 PM   [facility]  "Therapy   Visit Number 14   VAS Pain Rating 3   Treadmill Time (in min.) 5 min   Lumbar Stretches - Slouch Overcorrection 10   Extension in Standing 10   Flexion in Sitting 10   Lumbar Weight 45 lbs   Repetitions 18   Rating of Perceived Exertion 4   Ice - Z Lie (in min.) 5         therapeutic exercises to develop strength, endurance, ROM, flexibility, posture, and core stabilization for 50 minutes including:    LTR stretch x 10  Open book x 10 with BTB  Bridging + BTB x 20  Clamshells c/ BTBx15  EIL within comfortable ROM x 10--NP  Cat/cow stretch x 10 NP  Child's Pose x20" (bolster behind knees for comfort) NP due due to shoulder discomfort  LE bird dog x 10 ( cue level pelvis) NP due to shoulder discomfort  Seated Thoracic extension with 1/2 roll x10  SOC 2x 10 (cues for comfortable range of motion and axial extension)  Thoracic rotation thread the needle in standing x10 each side   No money GTB x20  EIS (Gentle within comfortable ROM) x 10    Seated trunk flexion c/ T-ball x10 fwd / x5 side to side  Seated diaphragm breathing x5 (while on ice)  Paloff press 10# x 10  March with 10# KB in contralateral arm x 10    Peripheral muscle strengthening which included 1 set of 15-20 repetitions at a slow, controlled 10-13 second per rep pace focused on strengthening supporting musculature for improved body mechanics and functional mobility.  Pt and therapist focused on proper form during treatment to ensure optimal strengthening of each targeted muscle group.  Machines were utilized including torso rotation, leg press, hip abd and hip add, leg ext.  Leg curl, triceps, biceps,  and row     Manual therapy: Patient received STM to lower thoracic/upper lumbar paraspinals x 00 minutes    cold pack for 5 minutes to mid/lower back  in reclined position    Home Exercises Provided and Patient Education Provided   Home exercises include:open book/trunk rotation and SOC  Cardio program:10/23/24 reviewed benefits of cardio w/handout " provided (Patient remains active with tennis a few days/per week).  Lifting education date:11/26/24  Posture/Lumbar roll: unknown  Fridge Magnet Discharge handout (date given):  Equipment at home/gym membership: yes    Education provided:   - cues w/ex's  MedX performance  Precor ex performance     Written Home Exercises Provided: Patient instructed to cont prior HEP.  Exercises were reviewed and Bernadette was able to demonstrate them prior to the end of the session.  Bernadette demonstrated good  understanding of the education provided.     See EMR under Patient Instructions for exercises provided prior visit     Assessment   Bernadette returns with chronic lower thoracic/upper lumbar discomfort which is rated as 3/10 currently. Treatment continued with mobility, strengthening, and neuro re-education exercises. Modified march with kettle bell in contralateral arm to challenge core and balance.  She was able to perform ex's without increased symptoms. Lumbar MedX resistance was maintained at 45 ft/lbs and she completed 15 reps with RPE = 4/10 (Lumbar roll was used for comfort). She was also able to complete the peripheral strengthening exercises without increased back discomfort.   Will progress treatment per HB protocol and patient's tolerance.     Patient is making progress towards established goals.  Pt will continue to benefit from skilled outpatient physical therapy to address the deficits stated in the impairment chart, provide pt/family education and to maximize pt's level of independence in the home and community environment.     Anticipated Barriers for therapy: none  Pt's spiritual, cultural and educational needs considered and pt agreeable to plan of care and goals as stated below:     GOALS: Pt is in agreement with the following goals.     Short term goals:  6 weeks or 10 visits   - Pt will demonstrate increased lumbar MedX ROM by at least 3 degrees from the initial ROM value with improvements noted in functional ROM  and ability to perform ADLs. Not met  - Pt will demonstrate increased MedX average isometric strength value by 25% from initial test resulting in improved ability to perform bending, lifting, and carrying activities safely, confidently. MET  - Pt will report a reduction in worst pain score by 1-2 points for improved tolerance for recreational activities. Appropriate and Ongoing  - Pt able to perform HEP correctly with minimal cueing or supervision from therapist to encourage independent management of symptoms. MET     Long term goals: 10 weeks or 20 visits   - Pt will demonstrate increased lumbar MedX ROM by at least 6 degrees from initial ROM value, resulting in improved ability to perform functional forward bending while standing and sitting. Appropriate and Ongoing  - Pt will demonstrate increased MedX average isometric strength value by 40% from initial test resulting in improved ability to perform bending, lifting, and carrying activities safely and confidently. Appropriate and Ongoing  - Pt to demonstrate ability to independently control and reduce their pain through posture positioning and mechanical movements throughout a typical day. Appropriate and Ongoing  - Pt will demonstrate reduced pain and improved functional outcomes as reported on the FOTO by reaching an intake score of >/= 62% functional ability in order to demonstrate subjective improvement in patient's condition. . MET 11/19/24  - Pt will demonstrate independence with the HEP at discharge. Appropriate and Ongoing  - Pt will be able to play tennis without increase in mid back pain. (patient goal) Appropriate and Ongoing     Plan   Continue with established Plan of Care towards established PT goals.     Therapist: Eulalia Montanez PT  12/26/2024

## 2024-12-30 ENCOUNTER — OFFICE VISIT (OUTPATIENT)
Dept: SPORTS MEDICINE | Facility: CLINIC | Age: 74
End: 2024-12-30
Payer: MEDICARE

## 2024-12-30 VITALS
SYSTOLIC BLOOD PRESSURE: 121 MMHG | DIASTOLIC BLOOD PRESSURE: 84 MMHG | BODY MASS INDEX: 23.63 KG/M2 | WEIGHT: 133.38 LBS | HEIGHT: 63 IN | HEART RATE: 77 BPM

## 2024-12-30 DIAGNOSIS — M17.0 PRIMARY OSTEOARTHRITIS OF BOTH KNEES: Primary | ICD-10-CM

## 2024-12-30 PROCEDURE — 99999 PR PBB SHADOW E&M-EST. PATIENT-LVL III: CPT | Mod: PBBFAC,,, | Performed by: PHYSICIAN ASSISTANT

## 2024-12-30 PROCEDURE — 99499 UNLISTED E&M SERVICE: CPT | Mod: S$GLB,,, | Performed by: PHYSICIAN ASSISTANT

## 2024-12-30 PROCEDURE — 20610 DRAIN/INJ JOINT/BURSA W/O US: CPT | Mod: 50,S$GLB,, | Performed by: PHYSICIAN ASSISTANT

## 2024-12-30 NOTE — PROGRESS NOTES
Bernadette Aquino is here for follow up of bilateral knee arthritis. Pt is requesting Zilretta injection.  [facility] reviewed per encounter record. Has failed other conservative modalities including NSAIDS, activity modification, weight loss.    The prior shot was tolerated well.    PHYSICAL EXAMINATION:     General: The patient is alert and oriented x 3. Mood is pleasant.   Observation of ears, eyes and nose reveals no gross abnormalities. No   labored breathing observed.     No signs of infection or adverse reaction to knee.    Medical Necessity for viscosupplementation use: After thorough evaluation of the patient, I have determined that viscosupplementation treatment is medically necessary. The patient has painful degenerative joint disease (DJD) of the knee(s) with failure of conservative treatments including lifestyle modifications and rehabilitation exercises. Oral analgesics including NSAIDs have not adequately controlled the patient's symptoms. There is radiographic evidence of Kellgren-Cheo grade II (or greater) osteoarthritic (OA) changes, or if lack of radiographic evidence, there is arthroscopic or other evidence of chondrosis of the knee(s).     Injection Procedure  A time out was performed, including verification of patient ID, procedure, site and side, availability of information and equipment, review of safety issues, and agreement with consent, the procedure site was marked.    After time out was performed, the patient was prepped aseptically with chloraprep. A diagnostic and therapeutic injection of 32mg's Zilretta was given under sterile technique using a 22g x 1.5 needle from the Superolateral aspect of the bilateral Knee Joint in the supine position.      Bernadette Aquino had no adverse reactions to the medication. Pain decreased. She was instructed to apply ice to the joint for 20 minutes and avoid strenuous activities for 24-36 hours following the injection. She was warned of possible blood  sugar and/or blood pressure changes during that time. Following that time, she can resume regular activities.    She was reminded to call the clinic immediately for any adverse side effects as explained in clinic today.    RTC to see Ry Barba PA-C in 6 months for follow-up.    All of the patient's questions were answered and the patient will contact us if they have any questions or concerns in the interim.

## 2025-01-06 ENCOUNTER — CLINICAL SUPPORT (OUTPATIENT)
Dept: REHABILITATION | Facility: HOSPITAL | Age: 75
End: 2025-01-06
Payer: MEDICARE

## 2025-01-06 DIAGNOSIS — M25.69 DECREASED RANGE OF MOTION OF TRUNK AND BACK: Primary | ICD-10-CM

## 2025-01-06 PROCEDURE — 97112 NEUROMUSCULAR REEDUCATION: CPT

## 2025-01-06 PROCEDURE — 97110 THERAPEUTIC EXERCISES: CPT

## 2025-01-06 NOTE — PROGRESS NOTES
[facility]      Name: Bernadette Aquino  Clinic Number: [id number]    Therapy Diagnosis:   Encounter Diagnosis   Name Primary?    Decreased range of motion of trunk and back Yes         Physician: Tasneem Elizalde PA-C    Visit Date: 1/6/2025    Physician Orders: PT Eval and Treat  Medical Diagnosis from Referral: M54.9 (ICD-10-CM) - Back pain, unspecified back location, unspecified back pain laterality, unspecified chronicity  Evaluation Date: 9/17/2024  Authorization Period Expiration: 7/29/2025  Plan of Care Expiration: 2/7/2024   Reassessment Due: 1/11/25   Visit # / Visits authorized: 16/20 elevate HOB (lumbar roll on MedX machine)  MedX testing visit 2     Time In: 130PM  Time Out: 230pm  Total Billable Time: 55min    INSURANCE and OUTCOMES: Fee for Service with FOTO Outcomes 2/3     Precautions: Standard, HTN, Osteopenia, Hx of R RTC repair      Pattern of pain determined: 1 PEN    Subjective   Bernadette reports L>R back pain  Patient reports tolerating previous visit Muscular soreness  Patient reports their pain to be 3/10 on a 0-10 scale with 0 being no pain and 10 being the worst pain imaginable.  Pain Location: mid to lower thoracic/upper lumbar      Prior Therapy: yes, HB for neck  Prior Treatment: HB for neck, injections  Social History:  lives with their spouse  Occupation: retired  Leisure: plays tennis, goes to gym      Prior Level of Function: independent  Current Level of Function: independent  DME owned/used: stationary bike  Gym Membership: yes, [employer]    Pt goals: stretch and decrease pain     Objective     MOVEMENT LOSS - Lumbar    Norms ROM Loss Initial 10/21/24 12/11/24   Flexion Fingers touch toes, sacral angle >/= 70 deg, uniform spinal curvature, posterior weight shift  within functional limits and poor curve reversal WFL, poor curve reversal WFL, decreased curve reversal   Extension ASIS surpasses toes, spine of scapulae surpasses heels, uniform spinal curve  within functional limits and painful WFL, painful WFL   Side glide Right   minimal loss and painful Min loss, painful Min loss   Side glide Left   within functional limits Min loss, painful Min loss   Rotation Right PT observes contralateral shoulder moderate loss Moderate loss Min loss   Rotation Left PT observes contralateral shoulder moderate loss Moderate loss Min loss     Lumbar IM Testing Results:    Initial test 10/9/24 Mid point test 11/19/2024   ROM 0-51 deg 0-51 deg   Max Peak Torque 73 101   Min Peak Torque 24  44   Flex/Ext Ratio 3:1 2.5:1   % below normative data 51 23%   Strength gain since initial  62%        Outcomes:  Initial score: 56%  Visit 6 score: 57%  Visit 10 score: 62% functional ability  Goal: 62%      POSITIONAL CANAL TESTING  Looking for nystagmus (slow drift to affected side with quick correction away)    [place] Hallpike (posterior / CL anterior)   Right : no nystagmus noted, mild symptoms when returning to sitting position   Left: negative  Treatment Performed: Epley maneuver for right posterior canal x 1     Treatment    Bernadette received the treatments listed below:      Bernadette received neuromuscular education  to isolate and engage spinal stabilization musculature correctly for motor control and coordination to aid in function and posture for 10 minutes on the [employer] Machine.  Patient performed MedX dynamic exercise with emphasis on spinal muscular control using pacer throughout  active range of motion. Therapist assisted patient in achieving optimal exertion for neural reeducation and endurance training by using the  Brayden Exertion Rating scale, by instructing the patient to aim for mid range of exertion, performing 15-20 repetitions, slowly, correctly,and safely.         1/6/2025     1:42 PM   [facility]   Visit Number 16   VAS Pain Rating 3   Treadmill Time (in min.) 5 min   Lumbar Stretches - Slouch Overcorrection 10   Extension in Standing 10   Flexion in Sitting 10  "  Lumbar Weight 45 lbs   Repetitions 20   Rating of Perceived Exertion 4   Ice - Z Lie (in min.) 5            therapeutic exercises to develop strength, endurance, ROM, flexibility, posture, and core stabilization for 50 minutes including:    LTR stretch x 10  Open book x 10 with BTB  Bridging + BTB x 20  Clamshells c/ BTBx15  EIL within comfortable ROM x 10--NP  Cat/cow stretch x 10 NP  Child's Pose x20" (bolster behind knees for comfort) NP due due to shoulder discomfort  LE bird dog x 10 ( cue level pelvis) NP due to shoulder discomfort  Seated Thoracic extension with 1/2 roll x10  SOC 2x 10 (cues for comfortable range of motion and axial extension)  Thoracic rotation thread the needle in standing x10 each side   No money GTB x20  EIS (Gentle within comfortable ROM) x 10    Seated trunk flexion c/ T-ball x10 fwd / x5 side to side  Seated diaphragm breathing x5 (while on ice)  Paloff press 10# x 10  March with 10# KB in contralateral arm x 10    Peripheral muscle strengthening which included 1 set of 15-20 repetitions at a slow, controlled 10-13 second per rep pace focused on strengthening supporting musculature for improved body mechanics and functional mobility.  Pt and therapist focused on proper form during treatment to ensure optimal strengthening of each targeted muscle group.  Machines were utilized including torso rotation, leg press, hip abd and hip add, leg ext.  Leg curl, triceps, biceps,  and row     Manual therapy: Patient received STM to lower thoracic/upper lumbar paraspinals x 00 minutes    cold pack for 5 minutes to mid/lower back  in reclined position    Home Exercises Provided and Patient Education Provided   Home exercises include:open book/trunk rotation and SOC  Cardio program:10/23/24 reviewed benefits of cardio w/handout provided (Patient remains active with tennis a few days/per week).  Lifting education date:11/26/24  Posture/Lumbar roll: unknown  Fridge Magnet Discharge handout (date " given):  Equipment at home/gym membership: yes    Education provided:   - cues w/ex's  MedX performance  Precor ex performance     Written Home Exercises Provided: Patient instructed to cont prior HEP.  Exercises were reviewed and Bernadette was able to demonstrate them prior to the end of the session.  Bernadette demonstrated good  understanding of the education provided.     See EMR under Patient Instructions for exercises provided prior visit     Assessment   Bernadette returns with chronic L lower thoracic/upper lumbar discomfort which is rated as 3/10 currently. Treatment continued with mobility, strengthening, and neuro re-education exercises. Lumbar MedX resistance was maintained at 45 ft/lbs and she completed 20 reps with RPE = 5/10 (Lumbar roll was used for comfort). She was also able to complete the peripheral strengthening exercises without increased back discomfort.   Will progress treatment per HB protocol and patient's tolerance.     Patient is making progress towards established goals.  Pt will continue to benefit from skilled outpatient physical therapy to address the deficits stated in the impairment chart, provide pt/family education and to maximize pt's level of independence in the home and community environment.     Anticipated Barriers for therapy: none  Pt's spiritual, cultural and educational needs considered and pt agreeable to plan of care and goals as stated below:     GOALS: Pt is in agreement with the following goals.     Short term goals:  6 weeks or 10 visits   - Pt will demonstrate increased lumbar MedX ROM by at least 3 degrees from the initial ROM value with improvements noted in functional ROM and ability to perform ADLs. Not met  - Pt will demonstrate increased MedX average isometric strength value by 25% from initial test resulting in improved ability to perform bending, lifting, and carrying activities safely, confidently. MET  - Pt will report a reduction in worst pain score by 1-2 points for  improved tolerance for recreational activities. Appropriate and Ongoing  - Pt able to perform HEP correctly with minimal cueing or supervision from therapist to encourage independent management of symptoms. MET     Long term goals: 10 weeks or 20 visits   - Pt will demonstrate increased lumbar MedX ROM by at least 6 degrees from initial ROM value, resulting in improved ability to perform functional forward bending while standing and sitting. Appropriate and Ongoing  - Pt will demonstrate increased MedX average isometric strength value by 40% from initial test resulting in improved ability to perform bending, lifting, and carrying activities safely and confidently. Appropriate and Ongoing  - Pt to demonstrate ability to independently control and reduce their pain through posture positioning and mechanical movements throughout a typical day. Appropriate and Ongoing  - Pt will demonstrate reduced pain and improved functional outcomes as reported on the FOTO by reaching an intake score of >/= 62% functional ability in order to demonstrate subjective improvement in patient's condition. . MET 11/19/24  - Pt will demonstrate independence with the HEP at discharge. Appropriate and Ongoing  - Pt will be able to play tennis without increase in mid back pain. (patient goal) Appropriate and Ongoing     Plan   Continue with established Plan of Care towards established PT goals.     Therapist: Angela Tejeda PT  1/6/2025

## 2025-01-13 ENCOUNTER — CLINICAL SUPPORT (OUTPATIENT)
Dept: REHABILITATION | Facility: HOSPITAL | Age: 75
End: 2025-01-13
Payer: MEDICARE

## 2025-01-13 DIAGNOSIS — M25.69 DECREASED RANGE OF MOTION OF TRUNK AND BACK: Primary | ICD-10-CM

## 2025-01-13 PROCEDURE — 97112 NEUROMUSCULAR REEDUCATION: CPT

## 2025-01-13 PROCEDURE — 97110 THERAPEUTIC EXERCISES: CPT

## 2025-01-13 NOTE — PROGRESS NOTES
[facility]      Name: Bernadette Aquino  Clinic Number: [id number]    Therapy Diagnosis:   Encounter Diagnosis   Name Primary?    Decreased range of motion of trunk and back Yes           Physician: Tasneem Elizalde PA-C    Visit Date: 1/13/2025    Physician Orders: PT Eval and Treat  Medical Diagnosis from Referral: M54.9 (ICD-10-CM) - Back pain, unspecified back location, unspecified back pain laterality, unspecified chronicity  Evaluation Date: 9/17/2024  Authorization Period Expiration: 7/29/2025  Plan of Care Expiration: 2/7/2024   Reassessment Due: 1/11/25   Visit # / Visits authorized: 17/20 elevate HOB (lumbar roll on MedX machine)  MedX testing visit 2     Time In: 230  Time Out: 330pm  Total Billable Time: 55min 30 min 1 on 1    INSURANCE and OUTCOMES: Fee for Service with FOTO Outcomes 2/3     Precautions: Standard, HTN, Osteopenia, Hx of R RTC repair      Pattern of pain determined: 1 PEN    Subjective   Bernadette reports LLB pain, which remains present throughout the day  Patient reports tolerating previous visit Muscular soreness  Patient reports their pain to be 3/10 on a 0-10 scale with 0 being no pain and 10 being the worst pain imaginable.  Pain Location: mid to lower thoracic/upper lumbar      Prior Therapy: yes, HB for neck  Prior Treatment: HB for neck, injections  Social History:  lives with their spouse  Occupation: retired  Leisure: plays tennis, goes to gym      Prior Level of Function: independent  Current Level of Function: independent  DME owned/used: stationary bike  Gym Membership: yes, [place]    Pt goals: stretch and decrease pain     Objective     MOVEMENT LOSS - Lumbar    Norms ROM Loss Initial 10/21/24 12/11/24   Flexion Fingers touch toes, sacral angle >/= 70 deg, uniform spinal curvature, posterior weight shift  within functional limits and poor curve reversal WFL, poor curve reversal WFL, decreased curve reversal   Extension ASIS surpasses toes,  spine of scapulae surpasses heels, uniform spinal curve within functional limits and painful WFL, painful WFL   Side glide Right   minimal loss and painful Min loss, painful Min loss   Side glide Left   within functional limits Min loss, painful Min loss   Rotation Right PT observes contralateral shoulder moderate loss Moderate loss Min loss   Rotation Left PT observes contralateral shoulder moderate loss Moderate loss Min loss     Lumbar IM Testing Results:    Initial test 10/9/24 Mid point test 11/19/2024   ROM 0-51 deg 0-51 deg   Max Peak Torque 73 101   Min Peak Torque 24  44   Flex/Ext Ratio 3:1 2.5:1   % below normative data 51 23%   Strength gain since initial  62%        Outcomes:  Initial score: 56%  Visit 6 score: 57%  Visit 10 score: 62% functional ability  Goal: 62%      POSITIONAL CANAL TESTING  Looking for nystagmus (slow drift to affected side with quick correction away)    [place] Hallpike (posterior / CL anterior)   Right : no nystagmus noted, mild symptoms when returning to sitting position   Left: negative  Treatment Performed: Epley maneuver for right posterior canal x 1     Treatment    Bernadette received the treatments listed below:      Bernadette received neuromuscular education  to isolate and engage spinal stabilization musculature correctly for motor control and coordination to aid in function and posture for 10 minutes on the Medical Medx Machine.  Patient performed MedX dynamic exercise with emphasis on spinal muscular control using pacer throughout  active range of motion. Therapist assisted patient in achieving optimal exertion for neural reeducation and endurance training by using the  Brayden Exertion Rating scale, by instructing the patient to aim for mid range of exertion, performing 15-20 repetitions, slowly, correctly,and safely.         1/13/2025     3:23 PM   [facility]   Visit Number 17   VAS Pain Rating 3   Treadmill Time (in min.) 5 min   Lumbar Stretches - Slouch Overcorrection 10  "  Extension in Standing 10   Flexion in Sitting 10   Lumbar Weight 50 lbs   Repetitions 15   Rating of Perceived Exertion 5   Ice - Z Lie (in min.) 5            therapeutic exercises to develop strength, endurance, ROM, flexibility, posture, and core stabilization for 50 minutes including:    LTR stretch x 10  Open book x 10 with BTB  Bridging + BTB x 20  Clamshells c/ BTBx15  EIL within comfortable ROM x 10--NP  Cat/cow stretch x 10 NP  Child's Pose x20" (bolster behind knees for comfort) NP due due to shoulder discomfort  LE bird dog x 10 ( cue level pelvis) NP due to shoulder discomfort  Seated Thoracic extension with 1/2 roll x10  SOC 2x 10 (cues for comfortable range of motion and axial extension)  Thoracic rotation thread the needle in standing x10 each side   No money GTB x20  EIS (Gentle within comfortable ROM) x 10    Seated trunk flexion c/ T-ball x10 fwd / x5 side to side  Seated diaphragm breathing x5 (while on ice)  Paloff press 10# x 10  March with 10# KB in contralateral arm x 10    Peripheral muscle strengthening which included 1 set of 15-20 repetitions at a slow, controlled 10-13 second per rep pace focused on strengthening supporting musculature for improved body mechanics and functional mobility.  Pt and therapist focused on proper form during treatment to ensure optimal strengthening of each targeted muscle group.  Machines were utilized including torso rotation, leg press, hip abd and hip add, leg ext.  Leg curl, triceps, biceps,  and row     Manual therapy: Patient received STM to lower thoracic/upper lumbar paraspinals x 00 minutes    cold pack for 5 minutes to mid/lower back  in reclined position    Home Exercises Provided and Patient Education Provided   Home exercises include:open book/trunk rotation and SOC  Cardio program:10/23/24 reviewed benefits of cardio w/handout provided (Patient remains active with tennis a few days/per week).  Lifting education date:11/26/24  Posture/Lumbar roll: " unknown  Fridge Magnet Discharge handout (date given):  Equipment at home/gym membership: yes    Education provided:   - cues w/ex's  MedX performance  Precor ex performance     Written Home Exercises Provided: Patient instructed to cont prior HEP.  Exercises were reviewed and Bernadette was able to demonstrate them prior to the end of the session.  Bernadette demonstrated good  understanding of the education provided.     See EMR under Patient Instructions for exercises provided prior visit     Assessment   Bernadette returns with chronic L lower thoracic/upper lumbar discomfort which is rated as 3/10 currently. Treatment continued with mobility, strengthening, and neuro re-education exercises. Lumbar MedX resistance was increased to 50ft/lbs and she completed 15reps with RPE = 4/100 (Lumbar roll was used for comfort). She was also able to complete the peripheral strengthening exercises without increased back discomfort.   Will progress treatment per HB protocol and patient's tolerance.     Patient is making progress towards established goals.  Pt will continue to benefit from skilled outpatient physical therapy to address the deficits stated in the impairment chart, provide pt/family education and to maximize pt's level of independence in the home and community environment.     Anticipated Barriers for therapy: none  Pt's spiritual, cultural and educational needs considered and pt agreeable to plan of care and goals as stated below:     GOALS: Pt is in agreement with the following goals.     Short term goals:  6 weeks or 10 visits   - Pt will demonstrate increased lumbar MedX ROM by at least 3 degrees from the initial ROM value with improvements noted in functional ROM and ability to perform ADLs. Not met  - Pt will demonstrate increased MedX average isometric strength value by 25% from initial test resulting in improved ability to perform bending, lifting, and carrying activities safely, confidently. MET  - Pt will report a  reduction in worst pain score by 1-2 points for improved tolerance for recreational activities. Appropriate and Ongoing  - Pt able to perform HEP correctly with minimal cueing or supervision from therapist to encourage independent management of symptoms. MET     Long term goals: 10 weeks or 20 visits   - Pt will demonstrate increased lumbar MedX ROM by at least 6 degrees from initial ROM value, resulting in improved ability to perform functional forward bending while standing and sitting. Appropriate and Ongoing  - Pt will demonstrate increased MedX average isometric strength value by 40% from initial test resulting in improved ability to perform bending, lifting, and carrying activities safely and confidently. Appropriate and Ongoing  - Pt to demonstrate ability to independently control and reduce their pain through posture positioning and mechanical movements throughout a typical day. Appropriate and Ongoing  - Pt will demonstrate reduced pain and improved functional outcomes as reported on the FOTO by reaching an intake score of >/= 62% functional ability in order to demonstrate subjective improvement in patient's condition. . MET 11/19/24  - Pt will demonstrate independence with the HEP at discharge. Appropriate and Ongoing  - Pt will be able to play tennis without increase in mid back pain. (patient goal) Appropriate and Ongoing     Plan   Continue with established Plan of Care towards established PT goals.     Therapist: Angela Tejeda PT  1/14/2025

## 2025-01-27 ENCOUNTER — CLINICAL SUPPORT (OUTPATIENT)
Dept: REHABILITATION | Facility: HOSPITAL | Age: 75
End: 2025-01-27
Payer: MEDICARE

## 2025-01-27 DIAGNOSIS — M25.69 DECREASED RANGE OF MOTION OF TRUNK AND BACK: Primary | ICD-10-CM

## 2025-01-27 PROCEDURE — 97112 NEUROMUSCULAR REEDUCATION: CPT | Mod: CQ

## 2025-01-27 PROCEDURE — 97110 THERAPEUTIC EXERCISES: CPT | Mod: CQ
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
## 2025-02-03 ENCOUNTER — CLINICAL SUPPORT (OUTPATIENT)
Dept: REHABILITATION | Facility: HOSPITAL | Age: 75
End: 2025-02-03
Payer: MEDICARE

## 2025-02-03 DIAGNOSIS — M53.82 WEAKNESS OF NECK: ICD-10-CM

## 2025-02-03 DIAGNOSIS — R29.898 DECREASED RANGE OF MOTION OF NECK: Primary | ICD-10-CM

## 2025-02-03 PROCEDURE — 97110 THERAPEUTIC EXERCISES: CPT

## 2025-02-03 PROCEDURE — 97112 NEUROMUSCULAR REEDUCATION: CPT

## 2025-02-03 NOTE — PROGRESS NOTES
[facility]      Name: Bernadette Aquino  Clinic Number: [id number]    Therapy Diagnosis:   Encounter Diagnoses   Name Primary?    Decreased range of motion of neck Yes    Weakness of neck        Physician: Tasneem Elizalde PA-C    Visit Date: 2/3/2025    Physician Orders: PT Eval and Treat  Medical Diagnosis from Referral: M54.9 (ICD-10-CM) - Back pain, unspecified back location, unspecified back pain laterality, unspecified chronicity  Evaluation Date: 9/17/2024  Authorization Period Expiration: 7/29/2025  Plan of Care Expiration: 2/7/2024   Reassessment Due: 1/11/25 (PT not available for Reassess, last seen by PT  on 1/13/25 and will see again on 1/29/25)  Visit # / Visits authorized: 19/20 elevate HOB (lumbar roll on MedX machine)--Needs Fridge Magnet HEP next visit.   MedX testing visit 2     Time In: 1230   Time Out: 110  Total Billable Time: 40 min 1:1 time    INSURANCE and OUTCOMES: Fee for Service with FOTO Outcomes 2/3     Precautions: Standard, HTN, Osteopenia, Hx of R RTC repair      Pattern of pain determined: 1 PEN    Subjective   Bernadette reports her typical soreness level.     Patient reports tolerating previous visit Muscular soreness  Patient reports their pain to be 3/10 on a 0-10 scale with 0 being no pain and 10 being the worst pain imaginable.  Pain Location: mid to lower thoracic/upper lumbar      Prior Therapy: yes, HB for neck  Prior Treatment: HB for neck, injections  Social History:  lives with their spouse  Occupation: retired  Leisure: plays tennis, goes to gym      Prior Level of Function: independent  Current Level of Function: independent  DME owned/used: stationary bike  Gym Membership: yes, [employer]    Pt goals: stretch and decrease pain     Objective     MOVEMENT LOSS - Lumbar    Norms ROM Loss Initial 10/21/24 12/11/24   Flexion Fingers touch toes, sacral angle >/= 70 deg, uniform spinal curvature, posterior weight shift  within functional limits and  poor curve reversal WFL, poor curve reversal WFL, decreased curve reversal   Extension ASIS surpasses toes, spine of scapulae surpasses heels, uniform spinal curve within functional limits and painful WFL, painful WFL   Side glide Right   minimal loss and painful Min loss, painful Min loss   Side glide Left   within functional limits Min loss, painful Min loss   Rotation Right PT observes contralateral shoulder moderate loss Moderate loss Min loss   Rotation Left PT observes contralateral shoulder moderate loss Moderate loss Min loss     Lumbar IM Testing Results:    Initial test 10/9/24 Mid point test 11/19/2024   ROM 0-51 deg 0-51 deg   Max Peak Torque 73 101   Min Peak Torque 24  44   Flex/Ext Ratio 3:1 2.5:1   % below normative data 51 23%   Strength gain since initial  62%        Outcomes:  Initial score: 56%  Visit 6 score: 57%  Visit 10 score: 62% functional ability  Goal: 62%      POSITIONAL CANAL TESTING  Looking for nystagmus (slow drift to affected side with quick correction away)    [place] Hallpike (posterior / CL anterior)--NP   Right : no nystagmus noted, mild symptoms when returning to sitting position   Left: negative  Treatment Performed: Epley maneuver for right posterior canal x 1     Treatment    Bernadette received the treatments listed below:      Bernadette received neuromuscular education  to isolate and engage spinal stabilization musculature correctly for motor control and coordination to aid in function and posture for 10 minutes on the Medical Medx Machine.  Patient performed MedX dynamic exercise with emphasis on spinal muscular control using pacer throughout  active range of motion. Therapist assisted patient in achieving optimal exertion for neural reeducation and endurance training by using the  Brayden Exertion Rating scale, by instructing the patient to aim for mid range of exertion, performing 15-20 repetitions, slowly, correctly,and safely.            2/3/2025     1:06 PM   [facility]  "  Visit Number 19   VAS Pain Rating 3   Treadmill Time (in min.) 5 min   Lumbar Stretches - Slouch Overcorrection 10   Extension in Standing 10   Flexion in Sitting 10   Lumbar Weight 50 lbs   Repetitions 18   Rating of Perceived Exertion 4       therapeutic exercises to develop strength, endurance, ROM, flexibility, posture, and core stabilization for 30 minutes including:    LTR stretch x 10  Open book x 10 with BTB  Bridging + BTB x 20  Clamshells c/ BTBx15  Seated Thoracic extension with 1/2 roll x10  SOC 2x 10 (cues for comfortable range of motion and axial extension)  Thoracic rotation thread the needle in standing x10 each side   No money GTB x20  EIS (Gentle within comfortable ROM) x 10    Paloff press 10# x 15  +Suitcase carry with 10# KB 2 laps of 10 yards each    Not performed  EIL within comfortable ROM x 10--NP  Cat/cow stretch x 10 NP  Child's Pose x20" (bolster behind knees for comfort) NP due due to shoulder discomfort  LE bird dog x 10 ( cue level pelvis) NP due to shoulder discomfort  Seated trunk flexion c/ T-ball x10 fwd / x5 side to side--NP  Seated diaphragm breathing x5 (while on ice)--NP  March with 10# KB in contralateral arm x 10--np    Peripheral muscle strengthening which included 1 set of 15-20 repetitions at a slow, controlled 10-13 second per rep pace focused on strengthening supporting musculature for improved body mechanics and functional mobility.  Pt and therapist focused on proper form during treatment to ensure optimal strengthening of each targeted muscle group.  Machines were utilized including torso rotation, leg press, hip abd and hip add, leg ext.  Leg curl, triceps, biceps,  and row     Manual therapy: Patient received STM to lower thoracic/upper lumbar paraspinals x 00 minutes    cold pack for 0 minutes to mid/lower back  in reclined position (Patient declines    Home Exercises Provided and Patient Education Provided   Home exercises include:open book/trunk rotation and " SOC  Cardio program:10/23/24 reviewed benefits of cardio w/handout provided (Patient remains active with tennis a few days/per week).  Lifting education date:11/26/24  Posture/Lumbar roll: unknown  Fridge Magnet Discharge handout (date given):  Equipment at home/gym membership: yes    Education provided:   - cues w/ex's  MedX performance  Precor ex performance     Written Home Exercises Provided: Patient instructed to cont prior HEP.  Exercises were reviewed and Bernadette was able to demonstrate them prior to the end of the session.  Bernadette demonstrated good  understanding of the education provided.     See EMR under Patient Instructions for exercises provided prior visit     Assessment   Bernadette returns for treatment today with min LBP.  Treatment continued with mobility, strengthening, and neuro re-education exercises.  She was able to perform ex's with min cues and without increased pain. Lumbar MedX resistance was maintained at 50 ft/lbs and she completed 18reps with RPE = 4/10 (Lumbar roll was used for comfort). She was also able to complete the peripheral strengthening exercises without increased back discomfort. Will progress treatment per HB protocol and patient's tolerance.     Patient is making progress towards established goals.  Pt will continue to benefit from skilled outpatient physical therapy to address the deficits stated in the impairment chart, provide pt/family education and to maximize pt's level of independence in the home and community environment.     Anticipated Barriers for therapy: none  Pt's spiritual, cultural and educational needs considered and pt agreeable to plan of care and goals as stated below:     GOALS: Pt is in agreement with the following goals.     Short term goals:  6 weeks or 10 visits   - Pt will demonstrate increased lumbar MedX ROM by at least 3 degrees from the initial ROM value with improvements noted in functional ROM and ability to perform ADLs. Not met  - Pt will  demonstrate increased MedX average isometric strength value by 25% from initial test resulting in improved ability to perform bending, lifting, and carrying activities safely, confidently. MET  - Pt will report a reduction in worst pain score by 1-2 points for improved tolerance for recreational activities. Appropriate and Ongoing  - Pt able to perform HEP correctly with minimal cueing or supervision from therapist to encourage independent management of symptoms. MET     Long term goals: 10 weeks or 20 visits   - Pt will demonstrate increased lumbar MedX ROM by at least 6 degrees from initial ROM value, resulting in improved ability to perform functional forward bending while standing and sitting. Appropriate and Ongoing  - Pt will demonstrate increased MedX average isometric strength value by 40% from initial test resulting in improved ability to perform bending, lifting, and carrying activities safely and confidently. Appropriate and Ongoing  - Pt to demonstrate ability to independently control and reduce their pain through posture positioning and mechanical movements throughout a typical day. Appropriate and Ongoing  - Pt will demonstrate reduced pain and improved functional outcomes as reported on the FOTO by reaching an intake score of >/= 62% functional ability in order to demonstrate subjective improvement in patient's condition. . MET 11/19/24  - Pt will demonstrate independence with the HEP at discharge. Appropriate and Ongoing  - Pt will be able to play tennis without increase in mid back pain. (patient goal) Appropriate and Ongoing     Plan   Continue with established Plan of Care towards established PT goals.     Therapist: Angela Tejeda

## 2025-02-12 ENCOUNTER — CLINICAL SUPPORT (OUTPATIENT)
Dept: REHABILITATION | Facility: HOSPITAL | Age: 75
End: 2025-02-12
Payer: MEDICARE

## 2025-02-12 DIAGNOSIS — M25.69 DECREASED RANGE OF MOTION OF TRUNK AND BACK: Primary | ICD-10-CM

## 2025-02-12 PROCEDURE — 97110 THERAPEUTIC EXERCISES: CPT

## 2025-02-12 PROCEDURE — 97112 NEUROMUSCULAR REEDUCATION: CPT

## 2025-02-12 NOTE — PATIENT INSTRUCTIONS
"HEALTHY BACK TOOLS        KEEP YOUR SPINE FEELING FINE   HEALTHY HABITS   Do your "GO TO" stretches 2/day   Get a good night's REST   Watch your POSTURE in sitting/standing Drink PLENTY of water   Use a lumbar roll Eat LOTS of fruits & vegetables   GET UP often (walk and/or stretch) Manage your STRESS   Make your workplace IDEAL FOR YOU  Don't smoke   Lift correctly EXERCISE   Practice positive self talk-talk to yourself kindly like you would your best friend  Remember the value of mindfulness and breathing relaxation tools for self care                                                                                                                                 QR code for breathing    WHAT TO DO WHEN SYMPTOMS FLARE UP  Back and neck pain may occasionally flare up.  If you experience a flare   up, remember your tools. Be encouraged, by remembering that flare-ups will   usually pass.   My Tools:  ~Use your "Go To" Stretches/Positions   ~Keep Moving-pain usually gets better if you move  ~Z lie (with or without ice)  10 min several times a day until symptoms reduce  ~Slowly resume normal activities   ~Practice Deep Breathing and Relaxation techniques                                                 MY EXERCISE PLAN  GO TO STRETCHES  2/day (like brushing your teeth) STRENGTHENING  2-3 times/week CARDIO PROGRAM  min/week   Lower trunk rotations Wellness 1x per week Tennis   Bridges with band Gym 1x per week    Open books with band     Clamshells with band     Neck exercises         "

## 2025-02-12 NOTE — PROGRESS NOTES
[facility]   Treatment and DISCHARGE Note     Name: Bernadette Aquino  Clinic Number: [id number]    Therapy Diagnosis:   Encounter Diagnosis   Name Primary?    Decreased range of motion of trunk and back Yes         Physician: Tasneem Elizalde PA-C    Visit Date: 2025    Physician Orders: PT Eval and Treat  Medical Diagnosis from Referral: M54.9 (ICD-10-CM) - Back pain, unspecified back location, unspecified back pain laterality, unspecified chronicity  Evaluation Date: 2024  Authorization Period Expiration: 2025  Plan of Care Expiration: 25 (sent 25 by Conrad Harrington PT)  Reassessment Due: 25 (PT not available for Reassess, last seen by PT  on 25 and will see again on 25)  Visit # / Visits authorized:  elevate HOB (lumbar roll on MedX machine)--Needs Fridge Magnet HEP next visit.   MedX testing visit 2    REASON FOR REQUESTING Plan of Care EXTENSION: patient's POC  prior to planned discharge visit on today's date.     Time In: 11:10am (FOTO and Fridge magnet)  Time Out: 12:00pm  Total Billable Time: 50 mins      INSURANCE and OUTCOMES: Fee for Service with FOTO Outcomes 2/3     Precautions: Standard, HTN, Osteopenia, Hx of R RTC repair      Pattern of pain determined: 1 PEN    Subjective   Bernadette reports mild pain today, feels ready to graduate.    Patient reports tolerating previous visit Muscular soreness  Patient reports their pain to be 2/10 on a 0-10 scale with 0 being no pain and 10 being the worst pain imaginable.  Pain Location: mid to lower thoracic/upper lumbar      Prior Therapy: yes, HB for neck  Prior Treatment: HB for neck, injections  Social History:  lives with their spouse  Occupation: retired  Leisure: plays tennis, goes to gym      Prior Level of Function: independent  Current Level of Function: independent  DME owned/used: stationary bike  Gym Membership: yes, [employer]    Pt goals: stretch and decrease pain     Objective      MOVEMENT LOSS - Lumbar    Norms ROM Loss Initial 10/21/24 12/11/24   Flexion Fingers touch toes, sacral angle >/= 70 deg, uniform spinal curvature, posterior weight shift  within functional limits and poor curve reversal WFL, poor curve reversal WFL, decreased curve reversal   Extension ASIS surpasses toes, spine of scapulae surpasses heels, uniform spinal curve within functional limits and painful WFL, painful WFL   Side glide Right   minimal loss and painful Min loss, painful Min loss   Side glide Left   within functional limits Min loss, painful Min loss   Rotation Right PT observes contralateral shoulder moderate loss Moderate loss Min loss   Rotation Left PT observes contralateral shoulder moderate loss Moderate loss Min loss     Lumbar IM Testing Results:    Initial test 10/9/24 Mid point test 11/19/2024 Final testing  2/12/25   ROM 0-51 deg 0-51 deg 0-51 deg   Max Peak Torque 73 101 93   Min Peak Torque 24  44 72   Flex/Ext Ratio 3:1 2.5:1 1.3   % below normative data 51 23% 10%   Strength gain since initial  62% 95%        Outcomes:  Initial score: 56%  Visit 6 score: 57%  Visit 10 score: 62% functional ability  Visit 20 score: 68%  Goal: 62%      POSITIONAL CANAL TESTING  Looking for nystagmus (slow drift to affected side with quick correction away)    [place] Hallpike (posterior / CL anterior)--NP   Right : no nystagmus noted, mild symptoms when returning to sitting position   Left: negative  Treatment Performed: Epley maneuver for right posterior canal x 1     Treatment    Bernadette received the treatments listed below:      Bernadette received neuromuscular education  to isolate and engage spinal stabilization musculature correctly for motor control and coordination to aid in function and posture for 10 minutes on the Medical Medx Machine.  Patient performed MedX dynamic exercise with emphasis on spinal muscular control using pacer throughout  active range of motion. Therapist assisted patient in achieving  "optimal exertion for neural reeducation and endurance training by using the  Brayden Exertion Rating scale, by instructing the patient to aim for mid range of exertion, performing 15-20 repetitions, slowly, correctly,and safely.            2/12/2025    12:05 PM   [facility]   Visit Number 20   VAS Pain Rating 3   Lumbar Peak Torque 93 ft. lbs.   Min Torque 72   Test Percent Below Normative Data 10 %   Test Percent Gain in Strength from Initial  95 %         therapeutic exercises to develop strength, endurance, ROM, flexibility, posture, and core stabilization for 35 minutes including:    LTR stretch x 10  Open book x 10 with BTB  Bridging + BTB x 20  Clamshells c/ BTBx15  Seated Thoracic extension with 1/2 roll x10  SOC 2x 10 (cues for comfortable range of motion and axial extension)  Thoracic rotation thread the needle in standing x10 each side   No money GTB x20  EIS (Gentle within comfortable ROM) x 10    Paloff press 10# x 15  +Suitcase carry with 10# KB 2 laps of 10 yards each    WHAT TO DO WHEN SYMPTOMS FLARE UP  Back and neck pain may occasionally flare up.  If you experience a flare   up, remember your tools. Be encouraged, by remembering that flare-ups will   usually pass.   My Tools:  ~Use your "Go To" Stretches/Positions   ~Keep Moving-pain usually gets better if you move  ~Z lie (with or without ice)  10 min several times a day until symptoms reduce  ~Slowly resume normal activities   ~Practice Deep Breathing and Relaxation techniques                                                 MY EXERCISE PLAN  GO TO STRETCHES  2/day (like brushing your teeth) STRENGTHENING  2-3 times/week CARDIO PROGRAM  min/week   Lower trunk rotations Wellness 1x per week Tennis   Bridges with band Gym 1x per week    Open books with band     Clamshells with band     Neck exercises         Not performed  EIL within comfortable ROM x 10--NP  Cat/cow stretch x 10 NP  Child's Pose x20" (bolster behind knees for comfort) NP due " due to shoulder discomfort  LE bird dog x 10 ( cue level pelvis) NP due to shoulder discomfort  Seated trunk flexion c/ T-ball x10 fwd / x5 side to side--NP  Seated diaphragm breathing x5 (while on ice)--NP  March with 10# KB in contralateral arm x 10--np    Peripheral muscle strengthening which included 1 set of 15-20 repetitions at a slow, controlled 10-13 second per rep pace focused on strengthening supporting musculature for improved body mechanics and functional mobility.  Pt and therapist focused on proper form during treatment to ensure optimal strengthening of each targeted muscle group.  Machines were utilized including torso rotation, leg press, hip abd and hip add, leg ext.  Leg curl, triceps, biceps,  and row     Manual therapy: Patient received STM to lower thoracic/upper lumbar paraspinals x 00 minutes    cold pack for 0 minutes to mid/lower back  in reclined position (Patient declines    Home Exercises Provided and Patient Education Provided   Home exercises include:open book/trunk rotation and SOC  Cardio program:10/23/24 reviewed benefits of cardio w/handout provided (Patient remains active with tennis a few days/per week).  Lifting education date:11/26/24  Posture/Lumbar roll: unknown  Frie Magnet Discharge handout (date given):  Equipment at home/gym membership: yes    Education provided:   - cues w/ex's  MedX performance  Precor ex performance     Written Home Exercises Provided: Patient instructed to cont prior HEP.  Exercises were reviewed and Bernadette was able to demonstrate them prior to the end of the session.  Bernadette demonstrated good  understanding of the education provided.     See EMR under Patient Instructions for exercises provided prior visit     Assessment   Bernadette presents to 20th healthy back visit reporting usual level of pain. Final FOTO assessment shows significant improvement in functionality and quality of life compared to eval (68% from 56%), and final MedX testing showed  significantly increased strength compared to initial testing (overall improvement of 95%). Patient is confident in her capacity to perform HEP. Planning to engage with Wellness program at [facility] and to attend gym 1x/week. Patient is ready for discharge from HB program as she has met all goals.    Anticipated Barriers for therapy: none  Pt's spiritual, cultural and educational needs considered and pt agreeable to plan of care and goals as stated below:     GOALS: Pt is in agreement with the following goals.     Short term goals:  6 weeks or 10 visits   - Pt will demonstrate increased lumbar MedX ROM by at least 3 degrees from the initial ROM value with improvements noted in functional ROM and ability to perform ADLs. Not met  - Pt will demonstrate increased MedX average isometric strength value by 25% from initial test resulting in improved ability to perform bending, lifting, and carrying activities safely, confidently. MET  - Pt will report a reduction in worst pain score by 1-2 points for improved tolerance for recreational activities. MET  - Pt able to perform HEP correctly with minimal cueing or supervision from therapist to encourage independent management of symptoms. MET     Long term goals: 10 weeks or 20 visits   - Pt will demonstrate increased lumbar MedX ROM by at least 6 degrees from initial ROM value, resulting in improved ability to perform functional forward bending while standing and sitting. NOT MET  - Pt will demonstrate increased MedX average isometric strength value by 40% from initial test resulting in improved ability to perform bending, lifting, and carrying activities safely and confidently. MET  - Pt to demonstrate ability to independently control and reduce their pain through posture positioning and mechanical movements throughout a typical day. MET  - Pt will demonstrate reduced pain and improved functional outcomes as reported on the FOTO by reaching an intake score of >/= 62% functional  ability in order to demonstrate subjective improvement in patient's condition. . MET 11/19/24  - Pt will demonstrate independence with the HEP at discharge. MET  - Pt will be able to play tennis without increase in mid back pain. (patient goal) MET     Plan   Discharge to wellness and home plan.    Therapist: Conrad Harrington

## 2025-02-17 ENCOUNTER — TELEPHONE (OUTPATIENT)
Dept: SPORTS MEDICINE | Facility: CLINIC | Age: 75
End: 2025-02-17
Payer: MEDICARE

## 2025-02-17 DIAGNOSIS — M21.162 GENU VARUM OF BOTH LOWER EXTREMITIES: ICD-10-CM

## 2025-02-17 DIAGNOSIS — M21.161 GENU VARUM OF BOTH LOWER EXTREMITIES: ICD-10-CM

## 2025-02-17 DIAGNOSIS — M17.0 PRIMARY OSTEOARTHRITIS OF BOTH KNEES: Primary | ICD-10-CM

## 2025-02-17 NOTE — TELEPHONE ENCOUNTER
Spoke with patient and informed her that her pt referral has been sent over to her preferred physical therapy office.

## 2025-02-17 NOTE — TELEPHONE ENCOUNTER
----- Message from Deniz Barba PA-C sent at 2/17/2025  4:11 PM CST -----  Call after fax please.  ----- Message -----  From: Karen Gonzales MA  Sent: 2/17/2025   1:53 PM CST  To: Deniz Barba PA-C    Patient wants a physical therapy referral to  [facility] physical therapy [address] [place] [id number].  ----- Message -----  From: Ha Rivera  Sent: 2/17/2025  10:38 AM CST  To: Jameson Guaman Staff    Type: General Call Back Name of Caller:pt Reason pt states that her knees are still bothering her after getting the injections. [facility] Phone:  [phone] Fax# [phone] the patient rather a call back or a response via [facility]? Call [facility] Call Back Number: [phone] Information:

## 2025-07-08 ENCOUNTER — TELEPHONE (OUTPATIENT)
Dept: DERMATOLOGY | Facility: CLINIC | Age: 75
End: 2025-07-08
Payer: MEDICARE

## 2025-07-08 NOTE — TELEPHONE ENCOUNTER
----- Message from Med Assistant Luna sent at 7/3/2025  5:16 PM CDT -----  Regarding: FW: Protopic refill    ----- Message -----  From: Ivan Maria PA-C  Sent: 7/3/2025   2:46 PM CDT  To: Crow Love Staff  Subject: Protopic refill                                  Needs maricarmen with me (if it's something I can see) or with an MD for refills of her protopic ointment.    Thanks,    Ivan

## (undated) DEVICE — SYR 30CC LUER LOCK

## (undated) DEVICE — PAD COLD THERAPY KNEE WRAP ON

## (undated) DEVICE — MARKER SKIN STND TIP BLUE BARR

## (undated) DEVICE — BLADE SAG DUAL 18MMX1.27MMX90M

## (undated) DEVICE — SEE MEDLINE ITEM 157150

## (undated) DEVICE — SEE MEDLINE ITEM 152622

## (undated) DEVICE — PAD CAST SPECIALIST STRL 6

## (undated) DEVICE — TOURNIQUET SB QC DP 34X4IN

## (undated) DEVICE — APPLICATOR CHLORAPREP ORN 26ML

## (undated) DEVICE — SHAVER ULTRAFFR 4.2MM

## (undated) DEVICE — NDL 22GA X1 1/2 REG BEVEL

## (undated) DEVICE — GOWN SMART IMP BREATHABLE XXLG

## (undated) DEVICE — SEE MEDLINE ITEM 152530

## (undated) DEVICE — DRAPE STERI INSTRUMENT 1018

## (undated) DEVICE — SOL IRR NACL .9% 3000ML

## (undated) DEVICE — SUT VICRYL 1 CT-1 27 UNDIE

## (undated) DEVICE — SEE MEDLINE ITEM 146313

## (undated) DEVICE — DRAPE EMERALD 87X114.75X113

## (undated) DEVICE — TOURNIQUET SB QC SP 34X4IN

## (undated) DEVICE — SUT 4-0 ETHILON 18 PS-2

## (undated) DEVICE — PUMP COLD THERAPY

## (undated) DEVICE — PAD ABD 8X10 STERILE

## (undated) DEVICE — BLADE SURG #15 CARBON STEEL

## (undated) DEVICE — BLADE SURG CARBON STEEL SZ11

## (undated) DEVICE — DRAPE STERI U-SHAPED 47X51IN

## (undated) DEVICE — NDL SPINAL 18GX3.5 SPINOCAN

## (undated) DEVICE — SEE MEDLINE ITEM 152529

## (undated) DEVICE — GAUZE SPONGE 4X4 12PLY

## (undated) DEVICE — SYR ONLY LUER LOCK 20CC

## (undated) DEVICE — SUT MCRYL PLUS 4-0 PS2 27IN

## (undated) DEVICE — DRESSING XEROFORM FOIL PK 1X8

## (undated) DEVICE — DRAPE PLASTIC U 60X72

## (undated) DEVICE — SEE MEDLINE ITEM 157216

## (undated) DEVICE — SUT ETHILON 4-0 PS2 18 BLK